# Patient Record
Sex: FEMALE | Race: BLACK OR AFRICAN AMERICAN | NOT HISPANIC OR LATINO | Employment: FULL TIME | URBAN - METROPOLITAN AREA
[De-identification: names, ages, dates, MRNs, and addresses within clinical notes are randomized per-mention and may not be internally consistent; named-entity substitution may affect disease eponyms.]

---

## 2017-01-03 ENCOUNTER — TELEPHONE (OUTPATIENT)
Dept: FAMILY MEDICINE | Facility: CLINIC | Age: 52
End: 2017-01-03

## 2017-01-03 NOTE — TELEPHONE ENCOUNTER
----- Message from Amanda Ball sent at 1/3/2017  2:18 PM CST -----  Contact: Patient  Type: Returning a call    Who left a message? Fely     When did the practice call? Sent an email last week    Comments: Please call the patient at [phone].    Thanks!

## 2017-01-03 NOTE — TELEPHONE ENCOUNTER
Spoke with patient states [facility] never received the tramadol on 12/29/16.  After looking over the number RX was faxed to, it was faxed to the direct phone number.  RX re faxed to [facility] at [phone].  Patient advised

## 2017-01-20 ENCOUNTER — OFFICE VISIT (OUTPATIENT)
Dept: ELECTROPHYSIOLOGY | Facility: CLINIC | Age: 52
End: 2017-01-20
Payer: OTHER GOVERNMENT

## 2017-01-20 ENCOUNTER — HOSPITAL ENCOUNTER (OUTPATIENT)
Dept: CARDIOLOGY | Facility: CLINIC | Age: 52
Discharge: HOME | End: 2017-01-20
Payer: OTHER GOVERNMENT

## 2017-01-20 VITALS
HEIGHT: 64 IN | WEIGHT: 174.38 LBS | DIASTOLIC BLOOD PRESSURE: 80 MMHG | SYSTOLIC BLOOD PRESSURE: 110 MMHG | BODY MASS INDEX: 29.77 KG/M2 | HEART RATE: 92 BPM

## 2017-01-20 DIAGNOSIS — I47.10 SVT (SUPRAVENTRICULAR TACHYCARDIA): Primary | ICD-10-CM

## 2017-01-20 DIAGNOSIS — E78.5 HYPERLIPIDEMIA, UNSPECIFIED HYPERLIPIDEMIA TYPE: ICD-10-CM

## 2017-01-20 DIAGNOSIS — J45.998 ASTHMA IN REMISSION: ICD-10-CM

## 2017-01-20 DIAGNOSIS — I47.10 SVT (SUPRAVENTRICULAR TACHYCARDIA): ICD-10-CM

## 2017-01-20 PROCEDURE — 99214 OFFICE O/P EST MOD 30 MIN: CPT | Mod: S$PBB,,, | Performed by: NURSE PRACTITIONER

## 2017-01-20 PROCEDURE — 99213 OFFICE O/P EST LOW 20 MIN: CPT | Mod: PBBFAC | Performed by: NURSE PRACTITIONER

## 2017-01-20 PROCEDURE — 93005 ELECTROCARDIOGRAM TRACING: CPT | Mod: PBBFAC | Performed by: INTERNAL MEDICINE

## 2017-01-20 PROCEDURE — 93010 ELECTROCARDIOGRAM REPORT: CPT | Mod: S$PBB,,, | Performed by: INTERNAL MEDICINE

## 2017-01-20 PROCEDURE — 99999 PR PBB SHADOW E&M-EST. PATIENT-LVL III: CPT | Mod: PBBFAC,,, | Performed by: NURSE PRACTITIONER

## 2017-01-20 RX ORDER — LEVONORGESTREL/ETHINYL ESTRADIOL AND ETHINYL ESTRADIOL 100-20(84)
KIT ORAL
COMMUNITY
Start: 2017-01-15 | End: 2017-05-23 | Stop reason: SDUPTHER

## 2017-01-20 RX ORDER — DILTIAZEM HYDROCHLORIDE 240 MG/1
240 CAPSULE, COATED, EXTENDED RELEASE ORAL DAILY
Qty: 90 CAPSULE | Refills: 3 | Status: SHIPPED | OUTPATIENT
Start: 2017-01-20 | End: 2017-03-31 | Stop reason: SDUPTHER

## 2017-01-20 RX ORDER — MESALAMINE 1.2 G/1
TABLET, DELAYED RELEASE ORAL
COMMUNITY
Start: 2016-12-29 | End: 2017-03-27 | Stop reason: SDUPTHER

## 2017-01-20 NOTE — MR AVS SNAPSHOT
[facility]  [address]  [facility] [address]  Phone: [phone]  Fax: [phone]                  Sharlene Smith   2017 3:30 PM   Office Visit    Description:  Female : 1965   Provider:  SHANNON Almodovar   Department:  [facility]           Diagnoses this Visit        Comments    SVT (supraventricular tachycardia)    -  Primary     Asthma in remission         Hyperlipidemia, unspecified hyperlipidemia type                To Do List           Future Appointments        Provider Department Dept Phone    2/3/2017 11:00 AM [facility] [phone]      Goals (5 Years of Data)     None       These Medications        Disp Refills Start End    diltiaZEM (CARDIZEM CD) 240 MG 24 hr capsule 90 capsule 3 2017     Take 1 capsule (240 mg total) by mouth once daily. - Oral    Pharmacy: [facility] - [place], [place] - [address] Ph #: [phone]         [facility]     [facility] Nurse Care Line -  Assistance  Registered nurses in the [facility] provide clinical advisement, health education, appointment booking, and other advisory services.  Call for this free service at [phone].             Medications           Message regarding Medications     Verify the changes and/or additions to your medication regime listed below are the same as discussed with your clinician today.  If any of these changes or additions are incorrect, please notify your healthcare provider.        CHANGE how you are taking these medications     Start Taking Instead of    diltiaZEM (CARDIZEM CD) 240 MG 24 hr capsule diltiazem (CARDIZEM CD) 240 MG 24 hr capsule    Dosage:  Take 1 capsule (240 mg total) by mouth once daily. Dosage:  Take 1 capsule (240 mg total) by mouth once daily.    Reason for Change:  Reorder       STOP taking these medications     norethindrone-ethinyl estradiol  "(MICROGESTIN FE 1/20, 28,) 1 mg-20 mcg (21)/75 mg (7) per tablet Take 1 tablet by mouth once daily.    diltiazem (DILACOR XR) 240 MG CDCR Take 1 capsule (240 mg total) by mouth once daily.           Verify that the below list of medications is an accurate representation of the medications you are currently taking.  If none reported, the list may be blank. If incorrect, please contact your healthcare provider. Carry this list with you in case of emergency.           Current Medications     albuterol (PROAIR HFA) 90 mcg/actuation inhaler Inhale 2 puffs into the lungs every 4 (four) hours as needed. 2 HFA Aerosol Inhaler Inhalation Every 4 hours prn    atorvastatin (LIPITOR) 20 MG tablet TAKE 1 TABLET EVERY EVENING    budesonide (RINOCORT AQUA) 32 mcg/actuation nasal spray 1 spray (32 mcg total) by Nasal route once daily.    CAMRESE LO 0.10 mg-20 mcg (84)/10 mcg (7) 3MPk     cetirizine (ZYRTEC) 10 MG tablet Take 1 tablet (10 mg total) by mouth once daily.    diltiaZEM (CARDIZEM CD) 240 MG 24 hr capsule Take 1 capsule (240 mg total) by mouth once daily.    fexofenadine (ALLEGRA) 180 MG tablet Take 1 tablet (180 mg total) by mouth once daily.    LIALDA 1.2 gram TbEC     montelukast (SINGULAIR) 10 mg tablet TAKE 1 TABLET EVERY EVENING    tramadol (ULTRAM) 50 mg tablet Take 1 tablet (50 mg total) by mouth every 6 (six) hours as needed.    aspirin 81 MG Chew Take 81 mg by mouth once daily.           Clinical Reference Information           Vital Signs - Last Recorded  Most recent update: 1/20/2017  3:35 PM by Zane Landers MA    BP Pulse Ht Wt BMI    110/80 92 5' 4" (1.626 m) 79.1 kg (174 lb 6.1 oz) 29.93 kg/m2      Blood Pressure          Most Recent Value    BP  110/80      Allergies as of 1/20/2017     Hydrocodone      Immunizations Administered on Date of Encounter - 1/20/2017     None      "

## 2017-01-20 NOTE — PROGRESS NOTES
Subjective:    Patient ID:  Sharlene Smith is a 51 y.o. female who presents for follow-up of SVT.     Ms. Smith is a patient of Antonia Bach.     HPI     Ms. Smith is a 51 year old female with a hx of SVT, asthma, and HLD. Ms. Smith was first seen in [facility] EP clinic in October of 2013 for palpitations that brought her to ED; she had a long hx of palpitations, but none had been as severe as those leading up to her ED visit. In ED Ms. Smith was found to have short-RP tachycardia at a rate of 218 bpm. This was c/w slow-fast AVNRT; she initially wanted to undergo an RFA, but ultimately decided to defer this in favor of medical management with Cardizem, which has historically worked well.     Since her last office visit (12/03/14), Ms. Smith reports feeling well overall with only 3 notable episodes of palpitations (occurring in May, August, and December of 2016, respectively); her longest episode lasting approximately 1 hour. She noted that her episodes have historically occurred following a meal, and that they typically break with a valsalva maneuver or by drinking a cold glass of water. She denies chest pain, dizziness, or syncope. She only very occasionally notes SOB, which she attributes to Asthma; this is improved with the use of her inhaler. Ms. Smith is inquiring about possibly taking a BB (pill-in-the-pocket) if she were to experience another episode.     Recent cardiac studies:  Echo (10/07/13) revealed an EF of 55-60%, LVDD, trivial MR, trivial KS, and no evidence of intracardiac shunt.     I reviewed today's ECG which demonstrated NSR at 92 bpm; , QRS 80, and QTc 440.    Review of Systems   Constitution: Negative for decreased appetite, diaphoresis, weakness and malaise/fatigue.   HENT: Negative for congestion, headaches and nosebleeds.    Eyes: Negative for blurred vision and double vision.   Cardiovascular: Positive for palpitations. Negative for chest pain, claudication,  cyanosis, dyspnea on exertion, irregular heartbeat, leg swelling, near-syncope, orthopnea, paroxysmal nocturnal dyspnea and syncope.   Respiratory: Positive for shortness of breath. Negative for cough, hemoptysis, sputum production and wheezing.    Skin: Negative.    Musculoskeletal: Negative.    Gastrointestinal: Negative for abdominal pain, hematemesis, hematochezia, nausea and vomiting.   Neurological: Negative for dizziness and light-headedness.   Psychiatric/Behavioral: Negative for altered mental status.        Objective:    Physical Exam   Constitutional: She is oriented to person, place, and time. She appears well-developed and well-nourished.   HENT:   Head: Normocephalic and atraumatic.   Eyes: Pupils are equal, round, and reactive to light.   Neck: Normal range of motion. Neck supple.   Cardiovascular: Normal rate, regular rhythm, normal heart sounds and intact distal pulses.    Pulmonary/Chest: Effort normal and breath sounds normal.   Abdominal: Soft. Bowel sounds are normal.   Musculoskeletal: Normal range of motion.   Neurological: She is alert and oriented to person, place, and time.   Skin: She is not diaphoretic.   Vitals reviewed.        Assessment:       1. SVT (supraventricular tachycardia)    2. Asthma in remission    3. Hyperlipidemia, unspecified hyperlipidemia type         Plan:       In summary, Ms. Smith is a 51 y.o. female with a hx of SVT, asthma, and HLD. Ms. Smith is doing well from a rhythm perspective with only 3, rather short-lived episodes of palpitations since her last office visit.     Continue current medication regimen.   Will discuss with Dr. Antonia Bach the possibility of a cardioselective BB (beta 1); or alternative given her hx of Asthma. (possible pill-in-the-pocket).   Follow up in EP clinic in 1 year, sooner as needed.     Lurdes Davison, [place], APRN, FNP-C        A copy of today's clinic note sent to Dr. Antonia Bach.

## 2017-01-26 RX ORDER — PROPRANOLOL HYDROCHLORIDE 40 MG/1
40 TABLET ORAL DAILY PRN
Qty: 30 TABLET | Refills: 11 | Status: SHIPPED | OUTPATIENT
Start: 2017-01-26 | End: 2017-03-29 | Stop reason: SDUPTHER

## 2017-01-26 NOTE — TELEPHONE ENCOUNTER
----- Message from SHANNON Almodovar sent at 1/25/2017 12:54 PM CST -----  Jesi,     Per Dr. Antonia Bach, can we prescribe Ms. Smith Inderal 40 mg PO QD PRN, so long as she has no allergies or issues with this medication. Could you put this in and call her, Thanks.     CM   ----- Message -----     From: Shabbir Clark MD     Sent: 1/24/2017   7:21 PM       To: SHANNON Almodovar    Inderal 40   or  Atenolol 25  ----- Message -----     From: SHANNON Almodovar     Sent: 1/24/2017  10:21 AM       To: Shabbir Clark MD    That sounds good. What dose of Inderal do you recommend I Rx?     ----- Message -----     From: Shabbir Clark MD     Sent: 1/21/2017   3:46 AM       To: SHANNON Almodovar    She could but the fastest acting agent would be Inderal (non selective).  Atenolol may be an alternative that would be somewhat slower to kick in but maybe more appropriate    ----- Message -----     From: SHANNON Almodovar     Sent: 1/20/2017   4:10 PM       To: MD Dr. Antonia Sidhu,     I sent you ms. Smith's note today. She was inquiring about the possibility of a BB just for a pill-in-the-pocket approach. She is doing very well w/minimal episodes of SVT, but would like this as a back up. I wanted to discuss this w/you given her hx of Asthma. Can you recommend a cardioselective BB?     Thank you.     OLIVER

## 2017-01-26 NOTE — TELEPHONE ENCOUNTER
Called pt about inderal. Pt denies having taken med in past. Only admits to side effects from Hydrocodone, no true allergies. [facility] pt request.

## 2017-02-03 ENCOUNTER — HOSPITAL ENCOUNTER (OUTPATIENT)
Dept: RADIOLOGY | Facility: HOSPITAL | Age: 52
Discharge: HOME | End: 2017-02-03
Attending: FAMILY MEDICINE
Payer: OTHER GOVERNMENT

## 2017-02-03 DIAGNOSIS — Z12.31 OTHER SCREENING MAMMOGRAM: ICD-10-CM

## 2017-02-03 PROCEDURE — 77067 SCR MAMMO BI INCL CAD: CPT | Mod: TC

## 2017-02-03 PROCEDURE — 77067 SCR MAMMO BI INCL CAD: CPT | Mod: 26,,, | Performed by: RADIOLOGY

## 2017-02-14 ENCOUNTER — HOSPITAL ENCOUNTER (EMERGENCY)
Facility: HOSPITAL | Age: 52
Discharge: HOME | End: 2017-02-14
Attending: EMERGENCY MEDICINE
Payer: OTHER GOVERNMENT

## 2017-02-14 VITALS
HEIGHT: 64 IN | OXYGEN SATURATION: 100 % | RESPIRATION RATE: 18 BRPM | SYSTOLIC BLOOD PRESSURE: 112 MMHG | DIASTOLIC BLOOD PRESSURE: 68 MMHG | WEIGHT: 170 LBS | HEART RATE: 92 BPM | BODY MASS INDEX: 29.02 KG/M2 | TEMPERATURE: 98 F

## 2017-02-14 DIAGNOSIS — I47.10 SVT (SUPRAVENTRICULAR TACHYCARDIA): Primary | ICD-10-CM

## 2017-02-14 LAB
ALBUMIN SERPL BCP-MCNC: 4 G/DL
ALP SERPL-CCNC: 46 U/L
ALT SERPL W/O P-5'-P-CCNC: 21 U/L
ANION GAP SERPL CALC-SCNC: 12 MMOL/L
AST SERPL-CCNC: 21 U/L
BASOPHILS # BLD AUTO: 0.05 K/UL
BASOPHILS NFR BLD: 0.3 %
BILIRUB SERPL-MCNC: 0.4 MG/DL
BNP SERPL-MCNC: <10 PG/ML
BUN SERPL-MCNC: 13 MG/DL
CALCIUM SERPL-MCNC: 9.7 MG/DL
CHLORIDE SERPL-SCNC: 104 MMOL/L
CO2 SERPL-SCNC: 19 MMOL/L
CREAT SERPL-MCNC: 0.9 MG/DL
DIFFERENTIAL METHOD: ABNORMAL
EOSINOPHIL # BLD AUTO: 0.1 K/UL
EOSINOPHIL NFR BLD: 0.6 %
ERYTHROCYTE [DISTWIDTH] IN BLOOD BY AUTOMATED COUNT: 12 %
EST. GFR  (AFRICAN AMERICAN): >60 ML/MIN/1.73 M^2
EST. GFR  (NON AFRICAN AMERICAN): >60 ML/MIN/1.73 M^2
GLUCOSE SERPL-MCNC: 169 MG/DL
HCT VFR BLD AUTO: 42 %
HGB BLD-MCNC: 14.3 G/DL
INR PPP: 0.9
LYMPHOCYTES # BLD AUTO: 2.9 K/UL
LYMPHOCYTES NFR BLD: 19.2 %
MCH RBC QN AUTO: 31.8 PG
MCHC RBC AUTO-ENTMCNC: 34 %
MCV RBC AUTO: 93 FL
MONOCYTES # BLD AUTO: 0.3 K/UL
MONOCYTES NFR BLD: 2.2 %
NEUTROPHILS # BLD AUTO: 11.7 K/UL
NEUTROPHILS NFR BLD: 77.5 %
PLATELET # BLD AUTO: 447 K/UL
PMV BLD AUTO: 9.8 FL
POTASSIUM SERPL-SCNC: 4.7 MMOL/L
PROT SERPL-MCNC: 8.1 G/DL
PROTHROMBIN TIME: 10.2 SEC
RBC # BLD AUTO: 4.5 M/UL
SODIUM SERPL-SCNC: 135 MMOL/L
TROPONIN I SERPL DL<=0.01 NG/ML-MCNC: 0.04 NG/ML
WBC # BLD AUTO: 15.04 K/UL

## 2017-02-14 PROCEDURE — 25000003 PHARM REV CODE 250: Performed by: INTERNAL MEDICINE

## 2017-02-14 PROCEDURE — 99291 CRITICAL CARE FIRST HOUR: CPT | Mod: ,,, | Performed by: EMERGENCY MEDICINE

## 2017-02-14 PROCEDURE — 85610 PROTHROMBIN TIME: CPT

## 2017-02-14 PROCEDURE — 84484 ASSAY OF TROPONIN QUANT: CPT

## 2017-02-14 PROCEDURE — 96361 HYDRATE IV INFUSION ADD-ON: CPT

## 2017-02-14 PROCEDURE — 93010 ELECTROCARDIOGRAM REPORT: CPT | Mod: ,,, | Performed by: INTERNAL MEDICINE

## 2017-02-14 PROCEDURE — 80053 COMPREHEN METABOLIC PANEL: CPT

## 2017-02-14 PROCEDURE — 63600175 PHARM REV CODE 636 W HCPCS

## 2017-02-14 PROCEDURE — 93005 ELECTROCARDIOGRAM TRACING: CPT

## 2017-02-14 PROCEDURE — 83880 ASSAY OF NATRIURETIC PEPTIDE: CPT

## 2017-02-14 PROCEDURE — 99284 EMERGENCY DEPT VISIT MOD MDM: CPT | Mod: 25

## 2017-02-14 PROCEDURE — 96374 THER/PROPH/DIAG INJ IV PUSH: CPT

## 2017-02-14 PROCEDURE — 85025 COMPLETE CBC W/AUTO DIFF WBC: CPT

## 2017-02-14 RX ORDER — DILTIAZEM HYDROCHLORIDE 240 MG/1
240 CAPSULE, COATED, EXTENDED RELEASE ORAL
Status: COMPLETED | OUTPATIENT
Start: 2017-02-14 | End: 2017-02-14

## 2017-02-14 RX ORDER — ADENOSINE 3 MG/ML
INJECTION, SOLUTION INTRAVENOUS
Status: COMPLETED
Start: 2017-02-14 | End: 2017-02-14

## 2017-02-14 RX ORDER — ADENOSINE 3 MG/ML
6 INJECTION, SOLUTION INTRAVENOUS
Status: COMPLETED | OUTPATIENT
Start: 2017-02-14 | End: 2017-02-14

## 2017-02-14 RX ADMIN — DILTIAZEM HYDROCHLORIDE 240 MG: 240 CAPSULE, COATED, EXTENDED RELEASE ORAL at 09:02

## 2017-02-14 RX ADMIN — ADENOSINE 6 MG: 3 INJECTION, SOLUTION INTRAVENOUS at 09:02

## 2017-02-14 RX ADMIN — SODIUM CHLORIDE 1000 ML: 0.9 INJECTION, SOLUTION INTRAVENOUS at 09:02

## 2017-02-14 NOTE — DISCHARGE INSTRUCTIONS
Treatment for Supraventricular Tachycardia  Supraventricular tachycardia (SVT) is a type of abnormally fast heartbeat. The heart normally beats 60 to 100 beats per minute. With SVT, the heart beats more than 100 times a minute. It may beat as fast as 250 times a minute. This is caused by a problem in the electrical system of the heart. It can lessen the amount of blood pumped through the heart.  Types of treatment   You may not need treatment for SVT if you have only had one episode. If you do need treatment, there are several kinds. They include:  · Valsalva maneuver. This is a way to increase pressure in the abdomen and chest. It can correct your heart rhythm right away. To do it, you bear down with your stomach muscles, as though you are trying to have a bowel movement.  · Carotid massage. Your healthcare provider may gently rub the carotid artery in your neck. This can also help correct the heart rhythm right away.  · Medicine. There are various kinds you can take. Calcium channel blockers or adenosine can help correct heart rhythm right away. If you have SVT only 1 or 2 times a year, you may take beta-blockers or calcium channel medicine as needed. If your SVT is more frequent, you may need to take medicine every day. Some people may need to take several medicines to prevent episodes of SVT.  · Electrocardioversion. This is a shock to the heart to restart a normal rhythm right away. This may be done if you have a severe episode of SVT.  · Catheter ablation. This can help permanently stop SVT. Your healthcare provider puts a thin, flexible tube (catheter) into a blood vessel in the groin. He or she then gently pushes it up into your heart. The area of your heart that causes your SVT is then burned. This prevents that area from starting a signal that causes SVT.   Lifestyle changes to help prevent SVT episodes  Your healthcare provider might suggest other ways to help prevent SVT, such as:  · Having less  alcohol and caffeine  · Not smoking  · Lowering your stress  · Eating foods that are healthy for your heart  When to call your healthcare provider  Call your healthcare provider if you have any of the following:  · Sudden shortness of breath (call 911)  · Severe palpitations  · Severe dizziness  · Severe chest pain  · Symptoms that are happening more often   Date Last Reviewed: 8/10/2015  © 5163-5358 [employer]. [address] [place] [address], [place] [id number]. All rights reserved. This information is not intended as a substitute for professional medical care. Always follow your healthcare professional's instructions.

## 2017-02-14 NOTE — ED PROVIDER NOTES
Encounter Date: 2017    SCRIBE #1 NOTE: I, Wu Weaver, am scribing for, and in the presence of,  Dr. Howell . I have scribed the following portions of the note - the EKG reading and the Resident attestation.       History     Chief Complaint   Patient presents with    Fatigue     Review of patient's allergies indicates:   Allergen Reactions    Hydrocodone      HPI Comments: Ms. Smith is a 52 yo F with a pmhx of paroxysmal SVT, HPLD, and UC who presents to the ED with complaints of abrupt onset SOB, tachycardia, and palpitations this morning. She takes daily diltiazem XR and propranolol PRN was just added to her regimen. After taking her propranolol, she noted feeling nauseated with emesis x1 and had to lay recumbent at home. Patient reports trying vagal maneuvers at home and reports symptoms of presyncope, lightheadedness and cool, clammy extremities. She said she felt weak when ambulating. She presented to the ED with a regular SVT rate in the 190s and reports not having taken her morning diltiazem. Her last SVT episode occurred in 2016.     The history is provided by the patient.     Past Medical History   Diagnosis Date    Abnormal Pap smear of vagina yrs ago     possible BX?    Allergy     Asthma     Hyperlipidemia     Supraventricular tachycardia     SVT (supraventricular tachycardia)     Tachycardia     Ulcerative colitis      No past medical history pertinent negatives.  Past Surgical History   Procedure Laterality Date    Colonoscopy N/A 2015     Procedure: COLONOSCOPY;  Surgeon: Petr Miller MD;  Location: [address]);  Service: Endoscopy;  Laterality: N/A;     section, classic       x 2     Family History   Problem Relation Age of Onset    Hyperlipidemia Mother     Heart attack Father     Diabetes Father     Heart attack Paternal Grandmother     Heart disease Neg Hx     Hypertension Neg Hx      Social History   Substance Use Topics     Smoking status: Former Smoker     Types: Cigarettes     Quit date: 2/25/1999    Smokeless tobacco: Never Used    Alcohol use 4.2 oz/week     4 Cans of beer, 3 Shots of liquor, 0 Standard drinks or equivalent per week      Comment: Occasionally     Review of Systems   Constitutional: Positive for diaphoresis and fatigue. Negative for chills and fever.   HENT: Negative for rhinorrhea and sore throat.    Respiratory: Positive for shortness of breath. Negative for apnea, cough, choking, chest tightness, wheezing and stridor.    Cardiovascular: Positive for palpitations. Negative for chest pain and leg swelling.   Gastrointestinal: Positive for nausea and vomiting. Negative for abdominal distention, abdominal pain, constipation, diarrhea and rectal pain.   Endocrine: Negative for polyphagia and polyuria.   Genitourinary: Negative for frequency and urgency.   Musculoskeletal: Negative for back pain, gait problem, myalgias and neck pain.   Skin: Positive for pallor. Negative for color change, rash and wound.   Neurological: Positive for weakness and light-headedness. Negative for seizures, syncope, facial asymmetry and numbness.   Hematological: Negative for adenopathy.   Psychiatric/Behavioral: Negative for agitation. The patient is nervous/anxious.    All other systems reviewed and are negative.      Physical Exam   Initial Vitals   BP Pulse Resp Temp SpO2   -- 02/14/17 0847 02/14/17 0847 02/14/17 0847 02/14/17 0847    200 18 97.9 °F (36.6 °C) 98 %     Physical Exam    HENT:   Mouth/Throat: Oropharynx is clear and moist.   Eyes: Conjunctivae and EOM are normal. Pupils are equal, round, and reactive to light. No scleral icterus.   Neck: No thyromegaly present. No tracheal deviation present. No JVD present.   Cardiovascular: Normal heart sounds and intact distal pulses. Exam reveals no gallop and no friction rub.    No murmur heard.  tachycardic   Pulmonary/Chest: Breath sounds normal. No stridor. No respiratory  distress. She has no wheezes. She has no rhonchi. She has no rales. She exhibits no tenderness.   Abdominal: Soft. She exhibits no distension and no mass. There is no tenderness. There is no rebound and no guarding.   Musculoskeletal: Normal range of motion. She exhibits no edema or tenderness.   Lymphadenopathy:     She has no cervical adenopathy.   Neurological: She is alert. She displays normal reflexes. No cranial nerve deficit or sensory deficit.   Skin: Skin is warm and dry. No rash noted. No erythema. No pallor.   Psychiatric: She has a normal mood and affect.     : no CVA ttp    ED Course   Procedures   Critical Care Procedure Note:  Direct patient critical care time: 10 minutes  Additional history critical care time:10 minutes  Ordering / reviewing labs and studies: critical care time:10 minutes  Documentation critical care time: 10 minutes  Consulting other physicians critical care time: 10 minutes  Total critical care time (exclusive of procedural time) : 50 minutes   Reason for Critical Care: Cardiac instability with SVT / tachycardia and light headedness requiring IV adenosine for conversion    Labs Reviewed   CBC W/ AUTO DIFFERENTIAL - Abnormal; Notable for the following:        Result Value    WBC 15.04 (*)     MCH 31.8 (*)     Platelets 447 (*)     Gran # 11.7 (*)     Gran% 77.5 (*)     Mono% 2.2 (*)     All other components within normal limits    Narrative:     PLEASE REVIEW ORDER START TIME BEFORE MARKING SPECIMEN  COLLECTED.   COMPREHENSIVE METABOLIC PANEL - Abnormal; Notable for the following:     Sodium 135 (*)     CO2 19 (*)     Glucose 169 (*)     Alkaline Phosphatase 46 (*)     All other components within normal limits    Narrative:     PLEASE REVIEW ORDER START TIME BEFORE MARKING SPECIMEN  COLLECTED.   TROPONIN I - Abnormal; Notable for the following:     Troponin I 0.040 (*)     All other components within normal limits    Narrative:     PLEASE REVIEW ORDER START TIME BEFORE MARKING  SPECIMEN  COLLECTED.   PROTIME-INR    Narrative:     PLEASE REVIEW ORDER START TIME BEFORE MARKING SPECIMEN  COLLECTED.   B-TYPE NATRIURETIC PEPTIDE    Narrative:     PLEASE REVIEW ORDER START TIME BEFORE MARKING SPECIMEN  COLLECTED.     Imaging Results         X-Ray Chest 1 View (Final result) Result time:  02/14/17 10:05:58    Procedure changed from X-Ray Chest PA And Lateral        Final result by Kevyn Coe MD (02/14/17 10:05:58)    Impression:     Normal chest.      Electronically signed by: CARMELA COE MD  Date:     02/14/17  Time:    10:05     Narrative:    Single view of the chest, comparison 2013.  Heart normal.  Lungs clear.                EKG Readings: (Independently Interpreted)   Initial Reading: No STEMI. Rhythm: Supraventricular Tachycardia (SVT).     Initial EKG: SVT with rate of 192, non specific T-wave pattern, no STEMI     Second reading EKG: EKG: NSR, no RICHIE's or STD's, non-specific twave pattern, no STEMI  (post adenosine)        Medical Decision Making:   History:   Old Medical Records: I decided to obtain old medical records.  Initial Assessment:   52 yo F with SVT presents with SVT HR in 190s with symptoms of presyncope, diaphoresis, palpitations  Differential Diagnosis:   SVT w/ RVR  Atrial flutter  Paroxysmal Atrial fibrillation  Sinus tachycardia  Iatrogenic hypotension 2/2 Beta blocker use  Independently Interpreted Test(s):   I have ordered and independently interpreted X-rays - see prior notes.  Clinical Tests:   Lab Tests: Ordered  Radiological Study: Ordered  Medical Tests: Ordered  ED Management:  Patient put on tele with revealed regular SVT with HR in 190s; responded to adenosine push x1 and home diltiazem reordered with rate control. CXR, ACS labwork ordered.   9:33 AM    Other:   I discussed test(s) with the performing physician.            Scribe Attestation:   Scribe #1: I performed the above scribed service and the documentation accurately describes the  services I performed. I attest to the accuracy of the note.    Attending Attestation:   Physician Attestation Statement for Resident:  As the supervising MD   Physician Attestation Statement: I have personally seen and examined this patient.   I agree with the above history. -: Pt presents with sensation of palpitations and light headedness. She has hx of SVT and this felt like her prior SVT symptoms. I have concern for SVT and other cardio dysrhythmia, MI/ACS,and PE among other diagnoses. EKG  is consistent with SVT. I gave 6mg of adenosine which converted to normal SR.  After conversion to normal SR pt was asymptomatic and back to normal. I monitored in ER for few hours and pt remained in normal SR and asymptomatic. Troponin was 0.04 and I felt this was due to SVT and not to MI/ACS given she was not having any chest pain during this event and is asymptomatic now. The patient was given strict return precautions and follow up instructions and expressed understanding of these. The patient felt comfortable with the plan for discharge and I did as well.  Stable for DC.       As the supervising MD I agree with the above PE.    As the supervising MD I agree with the above treatment, course, plan, and disposition.  I have reviewed and agree with the residents interpretation of the following: lab data and EKG.  I have reviewed the following: old records at this facility.          Physician Attestation for Scribe:  Physician Attestation Statement for Scribe #1: I, Dr. Howell , reviewed documentation, as scribed by Wu Weaver  in my presence, and it is both accurate and complete.                   ED Course     Clinical Impression:   The encounter diagnosis was SVT (supraventricular tachycardia).          Diego Howell MD  02/14/17 1573

## 2017-02-14 NOTE — ED TRIAGE NOTES
Patient woke up around 0400 am with palpitations, SOB, dizziness. Patient has history of SVT. Patient states that she took her propranolol this am with no relief

## 2017-02-14 NOTE — ED AVS SNAPSHOT
[facility]  [address] [facility] [address]               Sharlene Smith   2017  8:51 AM   ED    Description:  Female : 1965   Department:  [facility]           Your Care was Coordinated By:     Provider Role From To    Diego Howell MD Attending Provider 17 0900 --    Stephen Miller MD Resident 17 9289 --      Reason for Visit     Fatigue           Diagnoses this Visit        Comments    SVT (supraventricular tachycardia)    -  Primary       ED Disposition     ED Disposition Condition Comment    Discharge             To Do List           Follow-up Information     Follow up with Mara Chang MD In 5 days.    Specialty:  Family Medicine    Contact information:    [address]  [address]  [place] [address]  [phone]          Follow up with [facility].    Specialty:  Emergency Medicine    Why:  If symptoms worsen    Contact information:    [address] [facility] [id number] [phone]      [facility] On Call     [facility] -  Assistance  Registered nurses in the [facility] provide clinical advisement, health education, appointment booking, and other advisory services.  Call for this free service at [phone].             Medications           Message regarding Medications     Verify the changes and/or additions to your medication regime listed below are the same as discussed with your clinician today.  If any of these changes or additions are incorrect, please notify your healthcare provider.        These medications were administered today        Dose Freq    adenosine injection 6 mg 6 mg ED 1 Time    Sig: Inject 2 mLs (6 mg total) into the vein ED 1 Time.    Class: Normal    Route: Intravenous    sodium chloride 0.9% bolus 1,000 mL 1,000 mL ED 1 Time    Sig: Inject 1,000 mLs into the vein ED 1  "Time.    Class: Normal    Route: Intravenous    adenosine (ADENOCARD) 3 mg/mL injection      Notes to Pharmacy: Created by cabinet override           Verify that the below list of medications is an accurate representation of the medications you are currently taking.  If none reported, the list may be blank. If incorrect, please contact your healthcare provider. Carry this list with you in case of emergency.           Current Medications     albuterol (PROAIR HFA) 90 mcg/actuation inhaler Inhale 2 puffs into the lungs every 4 (four) hours as needed. 2 HFA Aerosol Inhaler Inhalation Every 4 hours prn    atorvastatin (LIPITOR) 20 MG tablet TAKE 1 TABLET EVERY EVENING    budesonide (RINOCORT AQUA) 32 mcg/actuation nasal spray 1 spray (32 mcg total) by Nasal route once daily.    CAMRESE LO 0.10 mg-20 mcg (84)/10 mcg (7) 3MPk     cetirizine (ZYRTEC) 10 MG tablet Take 1 tablet (10 mg total) by mouth once daily.    diltiaZEM (CARDIZEM CD) 240 MG 24 hr capsule Take 1 capsule (240 mg total) by mouth once daily.    fexofenadine (ALLEGRA) 180 MG tablet Take 1 tablet (180 mg total) by mouth once daily.    LIALDA 1.2 gram TbEC     montelukast (SINGULAIR) 10 mg tablet TAKE 1 TABLET EVERY EVENING    propranolol (INDERAL) 40 MG tablet Take 1 tablet (40 mg total) by mouth daily as needed. For fast HR    tramadol (ULTRAM) 50 mg tablet Take 1 tablet (50 mg total) by mouth every 6 (six) hours as needed.    aspirin 81 MG Chew Take 81 mg by mouth once daily.    diltiaZEM 24 hr capsule 240 mg Take 1 capsule (240 mg total) by mouth ED 1 Time.           Clinical Reference Information           Your Vitals Were     BP Pulse Temp Resp Height Weight    112/71 98 97.9 °F (36.6 °C) (Oral) 18 5' 4" (1.626 m) 77.1 kg (170 lb)    SpO2 BMI             99% 29.18 kg/m2         Allergies as of 2/14/2017        Reactions    Hydrocodone       Immunizations Administered on Date of Encounter - 2/14/2017     None      ED Micro, Lab, POCT     Start Ordered    "    Status Ordering Provider    02/14/17 0858 02/14/17 0858  CBC auto differential  STAT      Final result     02/14/17 0858 02/14/17 0858  Comprehensive metabolic panel  STAT      Final result     02/14/17 0858 02/14/17 0858  Protime-INR  STAT      Final result     02/14/17 0858 02/14/17 0858  Troponin I  Now then every 3 hours     Comments:  PLEASE REVIEW ORDER START TIME BEFORE MARKING SPECIMEN COLLECTED.   Start Status   02/14/17 0858 Final result   02/14/17 1158 Acknowledged       Acknowledged     02/14/17 0858 02/14/17 0858  B-Type natriuretic peptide (BNP)  STAT      Final result       ED Imaging Orders     Start Ordered       Status Ordering Provider    02/14/17 0911 02/14/17 0858  X-Ray Chest 1 View  1 time imaging      Final result     02/14/17 0858 02/14/17 0858    1 time imaging,   Status:  Canceled      Canceled         Discharge Instructions         Treatment for Supraventricular Tachycardia  Supraventricular tachycardia (SVT) is a type of abnormally fast heartbeat. The heart normally beats 60 to 100 beats per minute. With SVT, the heart beats more than 100 times a minute. It may beat as fast as 250 times a minute. This is caused by a problem in the electrical system of the heart. It can lessen the amount of blood pumped through the heart.  Types of treatment   You may not need treatment for SVT if you have only had one episode. If you do need treatment, there are several kinds. They include:  · Valsalva maneuver. This is a way to increase pressure in the abdomen and chest. It can correct your heart rhythm right away. To do it, you bear down with your stomach muscles, as though you are trying to have a bowel movement.  · Carotid massage. Your healthcare provider may gently rub the carotid artery in your neck. This can also help correct the heart rhythm right away.  · Medicine. There are various kinds you can take. Calcium channel blockers or adenosine can help correct heart rhythm right away. If you  have SVT only 1 or 2 times a year, you may take beta-blockers or calcium channel medicine as needed. If your SVT is more frequent, you may need to take medicine every day. Some people may need to take several medicines to prevent episodes of SVT.  · Electrocardioversion. This is a shock to the heart to restart a normal rhythm right away. This may be done if you have a severe episode of SVT.  · Catheter ablation. This can help permanently stop SVT. Your healthcare provider puts a thin, flexible tube (catheter) into a blood vessel in the groin. He or she then gently pushes it up into your heart. The area of your heart that causes your SVT is then burned. This prevents that area from starting a signal that causes SVT.   Lifestyle changes to help prevent SVT episodes  Your healthcare provider might suggest other ways to help prevent SVT, such as:  · Having less alcohol and caffeine  · Not smoking  · Lowering your stress  · Eating foods that are healthy for your heart  When to call your healthcare provider  Call your healthcare provider if you have any of the following:  · Sudden shortness of breath (call 911)  · Severe palpitations  · Severe dizziness  · Severe chest pain  · Symptoms that are happening more often   Date Last Reviewed: 8/10/2015  © 1405-0223 [employer]. [address] [place] [address], [place] [address]. All rights reserved. This information is not intended as a substitute for professional medical care. Always follow your healthcare professional's instructions.          Smoking Cessation     If you would like to quit smoking:   You may be eligible for free services if you are a [place] resident and started smoking cigarettes before September 1, 1988.  Call the Smoking Cessation Trust (SCT) toll free at (612) 954-0927 or (843) 648-2627.   Call 9-800-QUIT-NOW if you do not meet the above criteria.             [facility] complies with applicable Federal civil rights laws and  does not discriminate on the basis of race, color, national origin, age, disability, or sex.        Language Assistance Services     ATTENTION: Language assistance services are available, free of charge. Please call [phone].      ATENCIÓN: Si anna barbosa, tiene a murrell disposición servicios gratuitos de asistencia lingüística. Llame al [phone].     CHÚ Ý: N?u b?n nói Ti?ng Vi?t, có các d?ch v? h? tr? ngôn ng? mi?n phí dành cho b?n. G?i s? [phone].

## 2017-02-22 DIAGNOSIS — N94.6 DYSMENORRHEA: ICD-10-CM

## 2017-02-22 RX ORDER — TRAMADOL HYDROCHLORIDE 50 MG/1
50 TABLET ORAL EVERY 6 HOURS PRN
Qty: 60 TABLET | Refills: 0 | Status: SHIPPED | OUTPATIENT
Start: 2017-02-22 | End: 2017-04-28 | Stop reason: SDUPTHER

## 2017-03-27 RX ORDER — MESALAMINE 1.2 G/1
TABLET, DELAYED RELEASE ORAL
Qty: 180 TABLET | Refills: 3 | Status: SHIPPED | OUTPATIENT
Start: 2017-03-27 | End: 2017-09-15

## 2017-03-29 DIAGNOSIS — I47.10 SVT (SUPRAVENTRICULAR TACHYCARDIA): Primary | ICD-10-CM

## 2017-03-30 RX ORDER — PROPRANOLOL HYDROCHLORIDE 40 MG/1
40 TABLET ORAL DAILY PRN
Qty: 90 TABLET | Refills: 11 | Status: ON HOLD | OUTPATIENT
Start: 2017-03-30 | End: 2018-10-08 | Stop reason: HOSPADM

## 2017-03-31 DIAGNOSIS — I47.10 SVT (SUPRAVENTRICULAR TACHYCARDIA): ICD-10-CM

## 2017-03-31 RX ORDER — DILTIAZEM HYDROCHLORIDE 240 MG/1
240 CAPSULE, COATED, EXTENDED RELEASE ORAL DAILY
Qty: 90 CAPSULE | Refills: 3 | Status: SHIPPED | OUTPATIENT
Start: 2017-03-31 | End: 2018-03-10 | Stop reason: SDUPTHER

## 2017-04-06 ENCOUNTER — OFFICE VISIT (OUTPATIENT)
Dept: FAMILY MEDICINE | Facility: CLINIC | Age: 52
End: 2017-04-06
Payer: OTHER GOVERNMENT

## 2017-04-06 ENCOUNTER — HOSPITAL ENCOUNTER (OUTPATIENT)
Dept: RADIOLOGY | Facility: HOSPITAL | Age: 52
Discharge: HOME | End: 2017-04-06
Attending: FAMILY MEDICINE
Payer: OTHER GOVERNMENT

## 2017-04-06 VITALS
DIASTOLIC BLOOD PRESSURE: 84 MMHG | TEMPERATURE: 98 F | BODY MASS INDEX: 29.77 KG/M2 | SYSTOLIC BLOOD PRESSURE: 122 MMHG | WEIGHT: 174.38 LBS | HEIGHT: 64 IN | HEART RATE: 80 BPM

## 2017-04-06 DIAGNOSIS — M25.559 ARTHRALGIA OF HIP, UNSPECIFIED LATERALITY: ICD-10-CM

## 2017-04-06 DIAGNOSIS — M25.559 ARTHRALGIA OF HIP, UNSPECIFIED LATERALITY: Primary | ICD-10-CM

## 2017-04-06 PROCEDURE — 99214 OFFICE O/P EST MOD 30 MIN: CPT | Mod: S$PBB,,, | Performed by: FAMILY MEDICINE

## 2017-04-06 PROCEDURE — 72170 X-RAY EXAM OF PELVIS: CPT | Mod: 26,,, | Performed by: RADIOLOGY

## 2017-04-06 PROCEDURE — 99214 OFFICE O/P EST MOD 30 MIN: CPT | Mod: PBBFAC,PO | Performed by: FAMILY MEDICINE

## 2017-04-06 PROCEDURE — 72170 X-RAY EXAM OF PELVIS: CPT | Mod: TC,PO

## 2017-04-06 PROCEDURE — 99999 PR PBB SHADOW E&M-EST. PATIENT-LVL IV: CPT | Mod: PBBFAC,,, | Performed by: FAMILY MEDICINE

## 2017-04-06 RX ORDER — METHYLPREDNISOLONE 4 MG/1
TABLET ORAL
Qty: 1 PACKAGE | Refills: 0 | Status: SHIPPED | OUTPATIENT
Start: 2017-04-06 | End: 2017-04-22

## 2017-04-06 RX ORDER — MEPERIDINE HYDROCHLORIDE 50 MG/1
50 TABLET ORAL NIGHTLY PRN
Qty: 20 TABLET | Refills: 0 | Status: SHIPPED | OUTPATIENT
Start: 2017-04-06 | End: 2017-12-01 | Stop reason: ALTCHOICE

## 2017-04-06 NOTE — MR AVS SNAPSHOT
[facility]  [address], [address]  [facility] [address]  Phone: [phone]  Fax: [phone]                  Sharlene Smith   2017 3:00 PM   Office Visit    Description:  Female : 1965   Provider:  Mara Chang MD   Department:  [facility]           Reason for Visit     Hip Pain     Leg Pain           Diagnoses this Visit        Comments    Arthralgia of hip, unspecified laterality    -  Primary            To Do List           Future Appointments        Provider Department Dept Phone    2017 9:00 AM Petr Miller MD [facility] - Gastroenterology [phone]    2017 2:00 PM Mara Chang MD [facility] [phone]      Goals (5 Years of Data)     None       These Medications        Disp Refills Start End    methylPREDNISolone (MEDROL DOSEPACK) 4 mg tablet 1 Package 0 2017    use as directed    [facility] [place] [address] Ph #: [phone]       meperidine (DEMEROL) 50 mg tablet 20 tablet 0 2017     Take 1 tablet (50 mg total) by mouth nightly as needed for Pain. - Oral    [facility] [place], [place] [address] Ph #: [phone]         [facility] On [facility]     [facility] -  Assistance  Unless otherwise directed by your provider, please contact [facility], our nurse care line that is available for  assistance.     Registered nurses in the [facility] provide: appointment scheduling, clinical advisement, health education, and other advisory services.  Call: [phone] (toll free)               Medications           Message regarding Medications     Verify the changes and/or additions to your medication regime listed below are the same as discussed with your clinician today.  If any of these changes or additions are  incorrect, please notify your healthcare provider.        START taking these NEW medications        Refills    methylPREDNISolone (MEDROL DOSEPACK) 4 mg tablet 0    Sig: use as directed    Class: Normal    meperidine (DEMEROL) 50 mg tablet 0    Sig: Take 1 tablet (50 mg total) by mouth nightly as needed for Pain.    Class: Print    Route: Oral           Verify that the below list of medications is an accurate representation of the medications you are currently taking.  If none reported, the list may be blank. If incorrect, please contact your healthcare provider. Carry this list with you in case of emergency.           Current Medications     albuterol (PROAIR HFA) 90 mcg/actuation inhaler Inhale 2 puffs into the lungs every 4 (four) hours as needed. 2 HFA Aerosol Inhaler Inhalation Every 4 hours prn    atorvastatin (LIPITOR) 20 MG tablet TAKE 1 TABLET EVERY EVENING    budesonide (RINOCORT AQUA) 32 mcg/actuation nasal spray 1 spray (32 mcg total) by Nasal route once daily.    CAMRESE LO 0.10 mg-20 mcg (84)/10 mcg (7) 3MPk     cetirizine (ZYRTEC) 10 MG tablet Take 1 tablet (10 mg total) by mouth once daily.    diltiaZEM (CARDIZEM CD) 240 MG 24 hr capsule Take 1 capsule (240 mg total) by mouth once daily.    fexofenadine (ALLEGRA) 180 MG tablet Take 1 tablet (180 mg total) by mouth once daily.    LIALDA 1.2 gram TbEC TAKE 2 TABLETS DAILY    montelukast (SINGULAIR) 10 mg tablet TAKE 1 TABLET EVERY EVENING    propranolol (INDERAL) 40 MG tablet Take 1 tablet (40 mg total) by mouth daily as needed. For fast HR    tramadol (ULTRAM) 50 mg tablet Take 1 tablet (50 mg total) by mouth every 6 (six) hours as needed.    aspirin 81 MG Chew Take 81 mg by mouth once daily.    meperidine (DEMEROL) 50 mg tablet Take 1 tablet (50 mg total) by mouth nightly as needed for Pain.    methylPREDNISolone (MEDROL DOSEPACK) 4 mg tablet use as directed           Clinical Reference Information           Your Vitals Were     BP Pulse Temp Height  "Weight BMI    122/84 (BP Location: Left arm) 80 98.1 °F (36.7 °C) 5' 4" (1.626 m) 79.1 kg (174 lb 6.1 oz) 29.93 kg/m2      Blood Pressure          Most Recent Value    BP  122/84      Allergies as of 4/6/2017     Hydrocodone      Immunizations Administered on Date of Encounter - 4/6/2017     None      Orders Placed During Today's Visit      Normal Orders This Visit    Ambulatory consult to Orthopedics     Future Labs/Procedures Expected by Expires    MRI Lower Extremity Joint W WO Contrast Left  4/6/2017 4/6/2018    X-Ray Pelvis Routine AP  4/6/2017 4/6/2018      Instructions      Understanding Osteoarthritis of the Hip    A joint is a place where two bones meet. The hip is a ball-and-socket joint. It is formed where the ball at the top of the thighbone (femur) rests in the socket in the pelvic bone. The hip joint allows the leg to move freely in many directions.  Hip osteoarthritis is a condition where parts of the hip joint wear out. It can lead to pain, stiffness, and limited movement.   What is osteoarthritis?  All joints contain a smooth tissue called cartilage. Cartilage cushions the ends of bones, helping them glide against each other. Hip osteoarthritis occurs when cartilage in the hip joint begins to break down and wear away. The ball and socket bones may then become exposed and rub together. The cartilage may become rough and pitted and may begin to wear away. This prevents smooth movement of the joint and can lead to pain.  Causes of osteoarthritis of the hip  Causes can include:  · Wear and tear from normal use of the hip over time  · Overuse of the hip during sports or work activities  · Being overweight. This increases stress on the hip joint.  · Injury to the hip  · Infection of the hip joint  Symptoms of osteoarthritis of the hip  The main symptom of hip osteoarthritis is pain. The pain is most often felt in the groin area. It may also travel down the leg to the knee or to the back of the hip. The " pain usually gets worse with activity, such as walking or climbing stairs. The pain may get better with rest. The joint may also be stiff first thing in the morning or after periods of sitting or lying down. Stiffness usually gets better with movement.  Treating osteoarthritis of the hip  Osteoarthritis is a long-term condition. Treatment usually focuses on managing symptoms. Treatment may include:  · Over-the-counter or prescription medicines taken by mouth to help relieve pain and swelling  · Injections of medicine into the joint to help relieve symptoms for a time  · A weight-loss plan if you are overweight  · A plan of physical therapy and exercises to improve strength and flexibility around the joint  · Cold or heat packs help relieve pain and stiffness  · Assistive devices that help with movement, such as a cane or a walker  · Assistive devices that make activities of daily life easier, such as raised toilet seats or shower bars  If other treatments dont do enough to relieve severe symptoms, you may need surgery to replace the joint. This surgery replaces the hip joint with an artificial joint. It can help relieve pain and stiffness and improve function of the hip.     When to call your healthcare provider  Call your healthcare provider right away if you have any of these:  · Fever of 100.4°F (38°C) or higher, or as directed  · Symptoms that dont get better with prescribed medicines or get worse  · New symptoms   Date Last Reviewed: 3/10/2016  © 2467-5217 [employer]. [address] [place] [address]. All rights reserved. This information is not intended as a substitute for professional medical care. Always follow your healthcare professional's instructions.             Language Assistance Services     ATTENTION: Language assistance services are available, free of charge. Please call [phone].      ATENCIÓN: Si habla español, tiene a murrell disposición servicios gratuitos de  asistencia lingüística. Leno go [phone].     NEAL Ý: N?u b?n nói Ti?ng Vi?t, có các d?ch v? h? tr? ngôn ng? mi?n phí dành cho b?n. G?i s? [phone].         [facility] complies with applicable Federal civil rights laws and does not discriminate on the basis of race, color, national origin, age, disability, or sex.

## 2017-04-06 NOTE — PATIENT INSTRUCTIONS
Understanding Osteoarthritis of the Hip    A joint is a place where two bones meet. The hip is a ball-and-socket joint. It is formed where the ball at the top of the thighbone (femur) rests in the socket in the pelvic bone. The hip joint allows the leg to move freely in many directions.  Hip osteoarthritis is a condition where parts of the hip joint wear out. It can lead to pain, stiffness, and limited movement.   What is osteoarthritis?  All joints contain a smooth tissue called cartilage. Cartilage cushions the ends of bones, helping them glide against each other. Hip osteoarthritis occurs when cartilage in the hip joint begins to break down and wear away. The ball and socket bones may then become exposed and rub together. The cartilage may become rough and pitted and may begin to wear away. This prevents smooth movement of the joint and can lead to pain.  Causes of osteoarthritis of the hip  Causes can include:  · Wear and tear from normal use of the hip over time  · Overuse of the hip during sports or work activities  · Being overweight. This increases stress on the hip joint.  · Injury to the hip  · Infection of the hip joint  Symptoms of osteoarthritis of the hip  The main symptom of hip osteoarthritis is pain. The pain is most often felt in the groin area. It may also travel down the leg to the knee or to the back of the hip. The pain usually gets worse with activity, such as walking or climbing stairs. The pain may get better with rest. The joint may also be stiff first thing in the morning or after periods of sitting or lying down. Stiffness usually gets better with movement.  Treating osteoarthritis of the hip  Osteoarthritis is a long-term condition. Treatment usually focuses on managing symptoms. Treatment may include:  · Over-the-counter or prescription medicines taken by mouth to help relieve pain and swelling  · Injections of medicine into the joint to help relieve symptoms for a time  · A  weight-loss plan if you are overweight  · A plan of physical therapy and exercises to improve strength and flexibility around the joint  · Cold or heat packs help relieve pain and stiffness  · Assistive devices that help with movement, such as a cane or a walker  · Assistive devices that make activities of daily life easier, such as raised toilet seats or shower bars  If other treatments dont do enough to relieve severe symptoms, you may need surgery to replace the joint. This surgery replaces the hip joint with an artificial joint. It can help relieve pain and stiffness and improve function of the hip.     When to call your healthcare provider  Call your healthcare provider right away if you have any of these:  · Fever of 100.4°F (38°C) or higher, or as directed  · Symptoms that dont get better with prescribed medicines or get worse  · New symptoms   Date Last Reviewed: 3/10/2016  © 8537-7885 [employer]. [address] [place] [address], [place] [address]. All rights reserved. This information is not intended as a substitute for professional medical care. Always follow your healthcare professional's instructions.

## 2017-04-07 ENCOUNTER — TELEPHONE (OUTPATIENT)
Dept: FAMILY MEDICINE | Facility: CLINIC | Age: 52
End: 2017-04-07

## 2017-04-07 NOTE — PROGRESS NOTES
Subjective:       Patient ID: Sharlene Smith is a 51 y.o. female.    Chief Complaint: Hip Pain (LEFT) and Leg Pain (LEFT LEG AND BUTTOCK)   ratio noticed left hip and thigh pain 3 weeks ago that has not gotten better and is painful with standing and walking.  Patient has no remote or recent history of any type of injury or strenuous activity.  Past medical history is significant for  Mild osteoarthritis of both hands, remote family history of rheumatoid arthritis for which the patient tested negative  HPI see above  Review of Systems   Eyes: Negative.    Endocrine: Negative.    Musculoskeletal: Positive for arthralgias and back pain.   Skin: Negative.    Allergic/Immunologic: Negative.    Neurological: Positive for weakness and numbness.   Hematological: Negative.    Psychiatric/Behavioral: Negative.        Objective:      Physical Exam   Constitutional: She is oriented to person, place, and time. She appears well-developed and well-nourished. She appears distressed.   HENT:   Head: Normocephalic and atraumatic.   Eyes: Conjunctivae and EOM are normal. Pupils are equal, round, and reactive to light.   Neck: Normal range of motion. Neck supple. No JVD present.   Musculoskeletal:        Right hip: Normal.        Left hip: She exhibits decreased range of motion, decreased strength and tenderness.        Lumbar back: She exhibits decreased range of motion, pain and spasm. She exhibits no tenderness.   Neurological: She is alert and oriented to person, place, and time. She has normal reflexes. She displays normal reflexes. No cranial nerve deficit. She exhibits normal muscle tone. Coordination normal.   Skin: Skin is warm and dry. No rash noted. No erythema. No pallor.   Psychiatric: She has a normal mood and affect. Her behavior is normal. Judgment and thought content normal.   Nursing note and vitals reviewed.      Assessment:       1. Arthralgia of hip, unspecified laterality     2,     history of  osteoarthritis  Plan:       refer to the med card dated 4/6/2017  methylPREDNISolone (MEDROL DOSEPACK) 4 mg tablet 1 Package 0 4/6/2017 4/27/2017      use as directed     [facility] - [place], [place] - [address] Ph #: [phone]        meperidine (DEMEROL) 50 mg tablet 20 tablet 0 4/6/2017      Take 1 tablet (50 mg total) by mouth nightly as needed for Pain. - Oral     Pharmacy: [facility] - [place], [place] - [address] Ph #: [phone]        Ambulatory consult to Orthopedics      Future Labs/Procedures Expected by Expires     MRI Lower Extremity Joint W WO Contrast Left  4/6/2017 4/6/2018     X-Ray Pelvis Routine AP  4/6/2017 4/6/2018       A contact the patient results when available.

## 2017-04-09 ENCOUNTER — HOSPITAL ENCOUNTER (OUTPATIENT)
Dept: RADIOLOGY | Facility: HOSPITAL | Age: 52
Discharge: HOME | End: 2017-04-09
Attending: FAMILY MEDICINE
Payer: OTHER GOVERNMENT

## 2017-04-09 DIAGNOSIS — M25.559 ARTHRALGIA OF HIP, UNSPECIFIED LATERALITY: ICD-10-CM

## 2017-04-09 PROCEDURE — 73721 MRI JNT OF LWR EXTRE W/O DYE: CPT | Mod: 26,LT,, | Performed by: RADIOLOGY

## 2017-04-09 PROCEDURE — 73721 MRI JNT OF LWR EXTRE W/O DYE: CPT | Mod: TC,LT

## 2017-04-13 RX ORDER — LEVONORGESTREL/ETHINYL ESTRADIOL AND ETHINYL ESTRADIOL 100-20(84)
KIT ORAL
Qty: 91 TABLET | Refills: 0 | OUTPATIENT
Start: 2017-04-13

## 2017-04-17 DIAGNOSIS — R51.9 SINUS HEADACHE: ICD-10-CM

## 2017-04-17 RX ORDER — MINERAL OIL
180 ENEMA (ML) RECTAL DAILY
Qty: 90 TABLET | Refills: 3 | Status: SHIPPED | OUTPATIENT
Start: 2017-04-17 | End: 2018-05-09 | Stop reason: SDUPTHER

## 2017-04-18 NOTE — TELEPHONE ENCOUNTER
Spoke with patient,made aware that refill for Allegra was sent to [facility], verbalized understanding. She questioned whether she had an Ortho referral , made her aware it was still pending ,she was given the number to our  to address.

## 2017-04-22 ENCOUNTER — OFFICE VISIT (OUTPATIENT)
Dept: INTERNAL MEDICINE | Facility: CLINIC | Age: 52
End: 2017-04-22
Payer: OTHER GOVERNMENT

## 2017-04-22 ENCOUNTER — TELEPHONE (OUTPATIENT)
Dept: INTERNAL MEDICINE | Facility: CLINIC | Age: 52
End: 2017-04-22

## 2017-04-22 VITALS
RESPIRATION RATE: 14 BRPM | BODY MASS INDEX: 29.82 KG/M2 | WEIGHT: 173.75 LBS | SYSTOLIC BLOOD PRESSURE: 108 MMHG | HEART RATE: 96 BPM | TEMPERATURE: 98 F | DIASTOLIC BLOOD PRESSURE: 76 MMHG

## 2017-04-22 DIAGNOSIS — J02.9 ACUTE PHARYNGITIS, UNSPECIFIED ETIOLOGY: Primary | ICD-10-CM

## 2017-04-22 DIAGNOSIS — J30.2 SEASONAL ALLERGIC RHINITIS, UNSPECIFIED ALLERGIC RHINITIS TRIGGER: ICD-10-CM

## 2017-04-22 LAB — DEPRECATED S PYO AG THROAT QL EIA: NEGATIVE

## 2017-04-22 PROCEDURE — 87880 STREP A ASSAY W/OPTIC: CPT | Mod: PO

## 2017-04-22 PROCEDURE — 99213 OFFICE O/P EST LOW 20 MIN: CPT | Mod: PBBFAC,PO | Performed by: INTERNAL MEDICINE

## 2017-04-22 PROCEDURE — 99212 OFFICE O/P EST SF 10 MIN: CPT | Mod: S$PBB,,, | Performed by: INTERNAL MEDICINE

## 2017-04-22 PROCEDURE — 96372 THER/PROPH/DIAG INJ SC/IM: CPT | Mod: PBBFAC,PO

## 2017-04-22 PROCEDURE — 99999 PR PBB SHADOW E&M-EST. PATIENT-LVL III: CPT | Mod: PBBFAC,,, | Performed by: INTERNAL MEDICINE

## 2017-04-22 PROCEDURE — 87081 CULTURE SCREEN ONLY: CPT

## 2017-04-22 RX ORDER — AZITHROMYCIN 250 MG/1
TABLET, FILM COATED ORAL
Qty: 6 TABLET | Refills: 0 | Status: SHIPPED | OUTPATIENT
Start: 2017-04-22 | End: 2017-06-20 | Stop reason: ALTCHOICE

## 2017-04-22 RX ORDER — TRIAMCINOLONE ACETONIDE 40 MG/ML
40 INJECTION, SUSPENSION INTRA-ARTICULAR; INTRAMUSCULAR
Status: COMPLETED | OUTPATIENT
Start: 2017-04-22 | End: 2017-04-22

## 2017-04-22 RX ADMIN — TRIAMCINOLONE ACETONIDE 40 MG: 40 INJECTION, SUSPENSION INTRA-ARTICULAR; INTRAMUSCULAR at 09:04

## 2017-04-24 LAB — BACTERIA THROAT CULT: NORMAL

## 2017-04-25 NOTE — PROGRESS NOTES
Subjective:       Patient ID: Sharlene Smith is a 51 y.o. female.    Chief Complaint: Sore Throat and Sinusitis    HPI   The patient presents with complaints of worsening sore throat.  Symptoms have been present for several days.  Right earache is also noted.  The patient has had headache and body aches for the past 5 days.  She denies having any chills or fever.  She does have a history of chronic rhinitis.  Review of Systems   Constitutional: Negative for chills and fever.   HENT: Positive for congestion, ear pain and sore throat. Negative for voice change.    Respiratory: Negative for cough, shortness of breath and wheezing.    Cardiovascular: Negative for chest pain.   Gastrointestinal: Negative for diarrhea and vomiting.   Musculoskeletal: Positive for myalgias.   Neurological: Positive for headaches.       Objective:      Physical Exam   Constitutional: She is oriented to person, place, and time. She appears well-developed and well-nourished. No distress.   HENT:   Head: Normocephalic and atraumatic.   Right Ear: External ear normal.   Left Ear: External ear normal.   Pharynx is injected but without exudate. Sinuses are non-tender to palpation.   Eyes: Conjunctivae are normal. Right eye exhibits no discharge. Left eye exhibits no discharge. No scleral icterus.   Neck: Normal range of motion. Neck supple. No JVD present. No thyromegaly present.   Cardiovascular: Normal rate, regular rhythm and normal heart sounds.  Exam reveals no gallop and no friction rub.    No murmur heard.  Pulmonary/Chest: Effort normal and breath sounds normal. No respiratory distress. She has no wheezes. She has no rales.   Lymphadenopathy:     She has cervical adenopathy.   Neurological: She is alert and oriented to person, place, and time.   Skin: Skin is warm and dry. No rash noted.   Nursing note and vitals reviewed.      Assessment:       1. Acute pharyngitis, unspecified etiology    2. Seasonal allergic rhinitis,  unspecified allergic rhinitis trigger        Plan:       Sharlene was seen today for sore throat and sinusitis.  Rapid strep test will be performed.  Kenalog will be administered at this time.  Xyzal and Zithromax will be prescribed.  The patient is to return to clinic as needed.    Diagnoses and all orders for this visit:    Acute pharyngitis, unspecified etiology  -     Throat Screen, Rapid    Seasonal allergic rhinitis, unspecified allergic rhinitis trigger    Other orders  -     azithromycin (ZITHROMAX Z-JEANINE) 250 MG tablet; Take 2 tabs po on day 1; then 1 po daily until completed.  -     triamcinolone acetonide injection 40 mg; Inject 1 mL (40 mg total) into the muscle one time.  -     Strep A culture, throat

## 2017-04-28 DIAGNOSIS — J45.909: ICD-10-CM

## 2017-04-28 DIAGNOSIS — R51.9 SINUS HEADACHE: ICD-10-CM

## 2017-04-28 DIAGNOSIS — N94.6 DYSMENORRHEA: ICD-10-CM

## 2017-04-28 DIAGNOSIS — J45.998 ASTHMA IN REMISSION: ICD-10-CM

## 2017-04-28 RX ORDER — BENZOCAINE .13; .15; .5; 2 G/100G; G/100G; G/100G; G/100G
1 GEL ORAL DAILY
Qty: 8.6 G | Refills: 1 | Status: SHIPPED | OUTPATIENT
Start: 2017-04-28 | End: 2018-03-14 | Stop reason: SDUPTHER

## 2017-04-28 RX ORDER — TRAMADOL HYDROCHLORIDE 50 MG/1
50 TABLET ORAL EVERY 6 HOURS PRN
Qty: 12 TABLET | Refills: 0 | Status: SHIPPED | OUTPATIENT
Start: 2017-04-28 | End: 2017-05-01

## 2017-04-28 RX ORDER — ALBUTEROL SULFATE 90 UG/1
1-2 AEROSOL, METERED RESPIRATORY (INHALATION) EVERY 6 HOURS PRN
Qty: 1 INHALER | Refills: 0 | Status: SHIPPED | OUTPATIENT
Start: 2017-04-28 | End: 2019-02-01

## 2017-04-28 NOTE — TELEPHONE ENCOUNTER
Please advise pt Albuterol and Tramadol refilled to [facility].  Tramadol refilled for 3 days; if Dr. Lincoln would like pt to continue this then pt will need to request refills from her.

## 2017-05-18 ENCOUNTER — PATIENT MESSAGE (OUTPATIENT)
Dept: OBSTETRICS AND GYNECOLOGY | Facility: CLINIC | Age: 52
End: 2017-05-18

## 2017-05-19 ENCOUNTER — PATIENT MESSAGE (OUTPATIENT)
Dept: OBSTETRICS AND GYNECOLOGY | Facility: CLINIC | Age: 52
End: 2017-05-19

## 2017-05-19 NOTE — TELEPHONE ENCOUNTER
I do not see a recent WWE.  Last exam was  and 3-6 month f/u recommended.  Please ask patient to schedule appt for a WWE.  When appt is scheduled, I will refill until appt time.

## 2017-05-22 ENCOUNTER — PATIENT MESSAGE (OUTPATIENT)
Dept: FAMILY MEDICINE | Facility: CLINIC | Age: 52
End: 2017-05-22

## 2017-05-22 ENCOUNTER — TELEPHONE (OUTPATIENT)
Dept: OBSTETRICS AND GYNECOLOGY | Facility: CLINIC | Age: 52
End: 2017-05-22

## 2017-05-22 DIAGNOSIS — M25.559 ARTHRALGIA OF HIP, UNSPECIFIED LATERALITY: ICD-10-CM

## 2017-05-22 DIAGNOSIS — Z30.011 OCP (ORAL CONTRACEPTIVE PILLS) INITIATION: Primary | ICD-10-CM

## 2017-05-22 NOTE — TELEPHONE ENCOUNTER
Discuss provider recommendation to schedule annual exam before refill. Patient decline scheduling an appointment with Dr Asif, because she is scheduled with  [facility]  Dr Lincoln 06/20/2017 and will have her exam and refill done during that appointment.

## 2017-05-22 NOTE — TELEPHONE ENCOUNTER
----- Message from Marley Sheppard sent at 5/22/2017  8:43 AM CDT -----   _1st Request  _  2nd Request  _  3rd Request    Who:KELLEE PISANO [[id number]]    Why:Patient was returning a call back from the staff     What Number to Call Back:[phone]    When to Expect a call back: (Before the end of the day)   -- if call after 3:00 call back will be tomorrow.

## 2017-05-23 RX ORDER — LEVONORGESTREL/ETHINYL ESTRADIOL AND ETHINYL ESTRADIOL 100-20(84)
1 KIT ORAL DAILY
Qty: 28 TABLET | Refills: 3 | Status: SHIPPED | OUTPATIENT
Start: 2017-05-23 | End: 2018-05-21 | Stop reason: SDUPTHER

## 2017-05-23 RX ORDER — TRAMADOL HYDROCHLORIDE 50 MG/1
50 TABLET ORAL EVERY 6 HOURS PRN
Qty: 60 TABLET | Refills: 0 | Status: SHIPPED | OUTPATIENT
Start: 2017-05-23 | End: 2017-07-18 | Stop reason: SDUPTHER

## 2017-05-25 ENCOUNTER — PATIENT MESSAGE (OUTPATIENT)
Dept: FAMILY MEDICINE | Facility: CLINIC | Age: 52
End: 2017-05-25

## 2017-05-26 ENCOUNTER — PATIENT MESSAGE (OUTPATIENT)
Dept: FAMILY MEDICINE | Facility: CLINIC | Age: 52
End: 2017-05-26

## 2017-05-26 RX ORDER — ALPRAZOLAM 0.25 MG/1
0.25 TABLET ORAL 2 TIMES DAILY PRN
Qty: 20 TABLET | Refills: 0 | Status: SHIPPED | OUTPATIENT
Start: 2017-05-26 | End: 2017-07-18 | Stop reason: SDUPTHER

## 2017-06-14 ENCOUNTER — TELEPHONE (OUTPATIENT)
Dept: FAMILY MEDICINE | Facility: CLINIC | Age: 52
End: 2017-06-14

## 2017-06-14 DIAGNOSIS — Z00.00 ROUTINE GENERAL MEDICAL EXAMINATION AT A HEALTH CARE FACILITY: Primary | ICD-10-CM

## 2017-06-14 NOTE — TELEPHONE ENCOUNTER
----- Message from Марина Stewart sent at 6/14/2017  8:38 AM CDT -----  Contact: self [phone]  Type: Orders Request    What orders/ testing are being requested? Labs     Is there a future appointment scheduled for the patient with PCP? Yes     When? 06/20/17    Comments: please advise , Thanks !

## 2017-06-15 ENCOUNTER — LAB VISIT (OUTPATIENT)
Dept: LAB | Facility: HOSPITAL | Age: 52
End: 2017-06-15
Attending: FAMILY MEDICINE
Payer: OTHER GOVERNMENT

## 2017-06-15 DIAGNOSIS — Z00.00 ROUTINE GENERAL MEDICAL EXAMINATION AT A HEALTH CARE FACILITY: ICD-10-CM

## 2017-06-15 LAB
ALBUMIN SERPL BCP-MCNC: 3.7 G/DL
ALP SERPL-CCNC: 50 U/L
ALT SERPL W/O P-5'-P-CCNC: 27 U/L
ANION GAP SERPL CALC-SCNC: 12 MMOL/L
AST SERPL-CCNC: 17 U/L
BASOPHILS # BLD AUTO: 0.05 K/UL
BASOPHILS NFR BLD: 0.5 %
BILIRUB SERPL-MCNC: 0.5 MG/DL
BUN SERPL-MCNC: 10 MG/DL
CALCIUM SERPL-MCNC: 9.1 MG/DL
CHLORIDE SERPL-SCNC: 104 MMOL/L
CHOLEST/HDLC SERPL: 2.8 {RATIO}
CO2 SERPL-SCNC: 22 MMOL/L
CREAT SERPL-MCNC: 0.6 MG/DL
DIFFERENTIAL METHOD: ABNORMAL
EOSINOPHIL # BLD AUTO: 0.1 K/UL
EOSINOPHIL NFR BLD: 0.7 %
ERYTHROCYTE [DISTWIDTH] IN BLOOD BY AUTOMATED COUNT: 12.6 %
EST. GFR  (AFRICAN AMERICAN): >60 ML/MIN/1.73 M^2
EST. GFR  (NON AFRICAN AMERICAN): >60 ML/MIN/1.73 M^2
GLUCOSE SERPL-MCNC: 82 MG/DL
HCT VFR BLD AUTO: 41 %
HDL/CHOLESTEROL RATIO: 36.4 %
HDLC SERPL-MCNC: 187 MG/DL
HDLC SERPL-MCNC: 68 MG/DL
HGB BLD-MCNC: 13.2 G/DL
LDLC SERPL CALC-MCNC: 95 MG/DL
LYMPHOCYTES # BLD AUTO: 2.9 K/UL
LYMPHOCYTES NFR BLD: 27.3 %
MCH RBC QN AUTO: 31.2 PG
MCHC RBC AUTO-ENTMCNC: 32.2 %
MCV RBC AUTO: 97 FL
MONOCYTES # BLD AUTO: 0.4 K/UL
MONOCYTES NFR BLD: 3.4 %
NEUTROPHILS # BLD AUTO: 7.2 K/UL
NEUTROPHILS NFR BLD: 67.8 %
NONHDLC SERPL-MCNC: 119 MG/DL
PLATELET # BLD AUTO: 442 K/UL
PMV BLD AUTO: 9.8 FL
POTASSIUM SERPL-SCNC: 3.8 MMOL/L
PROT SERPL-MCNC: 7.6 G/DL
RBC # BLD AUTO: 4.23 M/UL
SODIUM SERPL-SCNC: 138 MMOL/L
TRIGL SERPL-MCNC: 120 MG/DL
TSH SERPL DL<=0.005 MIU/L-ACNC: 0.48 UIU/ML
WBC # BLD AUTO: 10.55 K/UL

## 2017-06-15 PROCEDURE — 84443 ASSAY THYROID STIM HORMONE: CPT

## 2017-06-15 PROCEDURE — 36415 COLL VENOUS BLD VENIPUNCTURE: CPT | Mod: PO

## 2017-06-15 PROCEDURE — 85025 COMPLETE CBC W/AUTO DIFF WBC: CPT

## 2017-06-15 PROCEDURE — 80053 COMPREHEN METABOLIC PANEL: CPT

## 2017-06-15 PROCEDURE — 80061 LIPID PANEL: CPT

## 2017-06-20 ENCOUNTER — OFFICE VISIT (OUTPATIENT)
Dept: FAMILY MEDICINE | Facility: CLINIC | Age: 52
End: 2017-06-20
Payer: OTHER GOVERNMENT

## 2017-06-20 VITALS
TEMPERATURE: 98 F | HEART RATE: 90 BPM | WEIGHT: 171.94 LBS | HEIGHT: 65 IN | SYSTOLIC BLOOD PRESSURE: 114 MMHG | BODY MASS INDEX: 28.65 KG/M2 | DIASTOLIC BLOOD PRESSURE: 78 MMHG

## 2017-06-20 DIAGNOSIS — Z12.4 ROUTINE PAPANICOLAOU SMEAR: Primary | ICD-10-CM

## 2017-06-20 DIAGNOSIS — K51.919 ULCERATIVE COLITIS WITH COMPLICATION, UNSPECIFIED LOCATION: ICD-10-CM

## 2017-06-20 DIAGNOSIS — F41.9 ANXIETY: ICD-10-CM

## 2017-06-20 DIAGNOSIS — I47.10 SVT (SUPRAVENTRICULAR TACHYCARDIA): ICD-10-CM

## 2017-06-20 PROCEDURE — G0101 CA SCREEN;PELVIC/BREAST EXAM: HCPCS | Mod: S$PBB,,, | Performed by: FAMILY MEDICINE

## 2017-06-20 PROCEDURE — 82270 OCCULT BLOOD FECES: CPT | Mod: PBBFAC,PO | Performed by: FAMILY MEDICINE

## 2017-06-20 PROCEDURE — 99214 OFFICE O/P EST MOD 30 MIN: CPT | Mod: PBBFAC,PO,25 | Performed by: FAMILY MEDICINE

## 2017-06-20 PROCEDURE — 99396 PREV VISIT EST AGE 40-64: CPT | Mod: S$PBB,25,, | Performed by: FAMILY MEDICINE

## 2017-06-20 PROCEDURE — G0101 CA SCREEN;PELVIC/BREAST EXAM: HCPCS | Mod: PBBFAC,PO | Performed by: FAMILY MEDICINE

## 2017-06-20 PROCEDURE — 99999 PR PBB SHADOW E&M-EST. PATIENT-LVL IV: CPT | Mod: PBBFAC,,, | Performed by: FAMILY MEDICINE

## 2017-06-20 PROCEDURE — 88175 CYTOPATH C/V AUTO FLUID REDO: CPT

## 2017-06-20 RX ORDER — PAROXETINE 10 MG/1
10 TABLET, FILM COATED ORAL EVERY MORNING
Qty: 30 TABLET | Refills: 11 | Status: SHIPPED | OUTPATIENT
Start: 2017-06-20 | End: 2019-02-14

## 2017-06-21 NOTE — PROGRESS NOTES
Sharlene Smith is a 52 y.o. female   Routine physical  Source of history: Patient  Past Medical History:   Diagnosis Date    Abnormal Pap smear of vagina yrs ago    possible BX?    Allergy     Asthma     Hyperlipidemia     Supraventricular tachycardia     SVT (supraventricular tachycardia)     Tachycardia     Ulcerative colitis      Patient  reports that she quit smoking about 18 years ago. Her smoking use included Cigarettes. She has never used smokeless tobacco. She reports that she drinks about 4.2 oz of alcohol per week . She reports that she does not use drugs.  Family History   Problem Relation Age of Onset    Hyperlipidemia Mother     Heart attack Father     Diabetes Father     Heart attack Paternal Grandmother     Heart disease Neg Hx     Hypertension Neg Hx      ROS:  nswers for HPI/ROS submitted by the patient on 6/18/2017   activity change: No  unexpected weight change: No  neck pain: No  hearing loss: No  rhinorrhea: No  trouble swallowing: No  eye discharge: No  visual disturbance: Yes  chest tightness: No  wheezing: No  chest pain: No  palpitations: No  blood in stool: No  constipation: No  vomiting: No  diarrhea: No  polydipsia: No  polyuria: No  difficulty urinating: No  hematuria: No  menstrual problem: No  dysuria: No  joint swelling: Yes  arthralgias: Yes  headaches: Yes  weakness: No  confusion: No  dysphoric mood: Yes      OBJECTIVE:  APPEARANCE: normal appearance  Vitals:    06/20/17 1406   BP: 114/78   Pulse: 90   Temp: 98 °F (36.7 °C)     SKIN: Normal skin turgor, no lesions.  HEENT: Both external auditory canals clear. Both tympanic membranes intact. PERRL. EOMI.   Disk margins sharp. No tonsillar enlargement. No pharyngeal erythema or exudate. No stridor.  NECK: No bruits. No cervical spine tenderness. No cervical lymphadenopathy. No thyromegaly.  NODES: No cervical, axillary or inguinal lymph node enlargement.  CHEST: Breath sounds clear bilaterally. Lungs clear to  auscultation & percussion  . Good air movement. No rales. No retractions. No rhonchi. No stridor. No wheezes.  CARDIOVASCULAR: Normal S1, S2. No murmurs. No edema.  BREASTS: no masses palpated in either breast or axillary area, symmetry noted.  ABDOMEN: Bowel sounds normal. No palpable aortic enlargement. No CVA tenderness.   No pulsatile mass. No rebound tenderness.  PELVIC: Uterus normal size consistency mobility no adnexal masses no cervical motion tenderness noted  Speculum exam reveals a normal-appearing cervix Pap was performed and is pending  GENITALIA: Normal external exam  RECTAL: Heme negative normal tone no masses palpated  PERIPHERAL VASCULAR: Femoral pulses present and symmetrical. No edema.  MUSCULOSKELETAL: Degenerative changes of both ankles, foot, knee, wrist and hand.  BACK: No CVA tenderness. There is no spasm, tenderness or radiculopathy noted with palpation and there is full range of motion.   NEUROLOGIC:   Cranial Nerves: II-XII grossly intact.  Motor: 5/5 strength major flexors/extensors. No tremor.  DTR's: Knees, Ankles 2+ and equal bilaterally; downgoing toes.  Sensory: Intact to light touch distally.  Gait & Posture: Normal gait and fine motion. No cerebellar signs.  MENTAL STATUS: Alert. Oriented x 3. Language skills normal. Memory intact. No suicidal ideation. Normal affect. Normal cognitive functions. Normal serial 7s. Can do simple math. Well kept appearance.    ASSESSMENT/PLAN:   Sharlene was seen today for annual exam.    Diagnoses and all orders for this visit:    Routine Papanicolaou smear  -     Liquid-based pap smear, screening    SVT (supraventricular tachycardia)             Continue propranolol 3 times a day when necessary  Continue diltiazem slow release as directed  Ulcerative colitis with complication, unspecified location  -     Ambulatory consult to Gastroenterology    Anxiety  -     paroxetine (PAXIL) 10 MG tablet; Take 1 tablet (10 mg total) by mouth every morning.  Labs  were reviewed no areas of concern were noted we'll contact the patient with the remainder of tests are available.  Patient will call for refills of her medication when needed.

## 2017-07-18 DIAGNOSIS — M25.559 ARTHRALGIA OF HIP, UNSPECIFIED LATERALITY: ICD-10-CM

## 2017-07-18 RX ORDER — ALPRAZOLAM 0.25 MG/1
0.25 TABLET ORAL 2 TIMES DAILY PRN
Qty: 20 TABLET | Refills: 0 | Status: SHIPPED | OUTPATIENT
Start: 2017-07-18 | End: 2017-09-06 | Stop reason: SDUPTHER

## 2017-07-18 RX ORDER — TRAMADOL HYDROCHLORIDE 50 MG/1
50 TABLET ORAL EVERY 6 HOURS PRN
Qty: 60 TABLET | Refills: 0 | Status: CANCELLED | OUTPATIENT
Start: 2017-07-18 | End: 2018-07-18

## 2017-07-18 RX ORDER — TRAMADOL HYDROCHLORIDE 50 MG/1
50 TABLET ORAL EVERY 6 HOURS PRN
Qty: 60 TABLET | Refills: 0 | Status: SHIPPED | OUTPATIENT
Start: 2017-07-18 | End: 2017-09-06 | Stop reason: SDUPTHER

## 2017-08-08 ENCOUNTER — HOSPITAL ENCOUNTER (EMERGENCY)
Facility: HOSPITAL | Age: 52
Discharge: HOME | End: 2017-08-09
Attending: EMERGENCY MEDICINE | Admitting: EMERGENCY MEDICINE
Payer: OTHER GOVERNMENT

## 2017-08-08 DIAGNOSIS — I47.10 SVT (SUPRAVENTRICULAR TACHYCARDIA): Primary | ICD-10-CM

## 2017-08-08 PROCEDURE — 99284 EMERGENCY DEPT VISIT MOD MDM: CPT | Mod: ,,, | Performed by: EMERGENCY MEDICINE

## 2017-08-08 PROCEDURE — 99283 EMERGENCY DEPT VISIT LOW MDM: CPT | Mod: 25

## 2017-08-08 PROCEDURE — 96374 THER/PROPH/DIAG INJ IV PUSH: CPT

## 2017-08-08 PROCEDURE — 63600175 PHARM REV CODE 636 W HCPCS

## 2017-08-08 RX ORDER — ADENOSINE 3 MG/ML
12 INJECTION, SOLUTION INTRAVENOUS
Status: COMPLETED | OUTPATIENT
Start: 2017-08-09 | End: 2017-08-08

## 2017-08-08 RX ORDER — ADENOSINE 3 MG/ML
INJECTION, SOLUTION INTRAVENOUS
Status: COMPLETED
Start: 2017-08-08 | End: 2017-08-08

## 2017-08-08 RX ADMIN — ADENOSINE 12 MG: 3 INJECTION, SOLUTION INTRAVENOUS at 11:08

## 2017-08-09 VITALS
RESPIRATION RATE: 16 BRPM | SYSTOLIC BLOOD PRESSURE: 109 MMHG | WEIGHT: 170 LBS | OXYGEN SATURATION: 99 % | BODY MASS INDEX: 29.02 KG/M2 | TEMPERATURE: 98 F | DIASTOLIC BLOOD PRESSURE: 68 MMHG | HEART RATE: 107 BPM | HEIGHT: 64 IN

## 2017-08-09 NOTE — ED TRIAGE NOTES
"Sharlene Smith, a 52 y.o. female presents to the ED complaining of palpitations. Pt states it stated at 20:52. Pt has a history of SVT and states "I tried all my maneuvers." pt states last episode was in february and was sent home after cardioversion. Pt complaining of chest pressure. Pt denies SOB, v/d, fever      Chief Complaint   Patient presents with    Tachycardia     pt presents to ed c/o svt episode. she reports hx and was last seen here  in feb      Review of patient's allergies indicates:   Allergen Reactions    Hydrocodone      Past Medical History:   Diagnosis Date    Abnormal Pap smear of vagina yrs ago    possible BX?    Allergy     Asthma     Hyperlipidemia     Supraventricular tachycardia     SVT (supraventricular tachycardia)     Tachycardia     Ulcerative colitis      "

## 2017-08-09 NOTE — ED PROVIDER NOTES
Encounter Date: 2017    SCRIBE #1 NOTE: I, Suellen Nicolas, am scribing for, and in the presence of, Dr. Segura.       History     Chief Complaint   Patient presents with    Tachycardia     pt presents to ed c/o svt episode. she reports hx and was last seen here  in feb        The patient is a 52 y.o. female with hx of:SVT and HLD that presents to the ED for evaluation of SVT episode. She reports an SVT episode of about 2 hour duration. She reports doing all of the vagal maneuvers with no relief. She states she feels clammy. Denies CP or SOB. She reports her last episode was in 2017, she was converted and discharged. No further concerns or complaints at this time.      The history is provided by the patient and medical records.     Review of patient's allergies indicates:   Allergen Reactions    Hydrocodone      Past Medical History:   Diagnosis Date    Abnormal Pap smear of vagina yrs ago    possible BX?    Allergy     Asthma     Hyperlipidemia     Supraventricular tachycardia     SVT (supraventricular tachycardia)     Tachycardia     Ulcerative colitis      Past Surgical History:   Procedure Laterality Date     SECTION, CLASSIC      x 2    COLONOSCOPY N/A 2015    Procedure: COLONOSCOPY;  Surgeon: Petr Miller MD;  Location: [address];  Service: Endoscopy;  Laterality: N/A;     Family History   Problem Relation Age of Onset    Hyperlipidemia Mother     Heart attack Father     Diabetes Father     Heart attack Paternal Grandmother     Heart disease Neg Hx     Hypertension Neg Hx      Social History   Substance Use Topics    Smoking status: Former Smoker     Types: Cigarettes     Quit date: 1999    Smokeless tobacco: Never Used    Alcohol use 4.2 oz/week     4 Cans of beer, 3 Shots of liquor per week      Comment: Occasionally     Review of Systems   Constitutional: Negative for fever.   HENT: Negative for sore throat.    Respiratory: Negative for  shortness of breath.    Cardiovascular: Negative for chest pain.        (+) SVT episode   Gastrointestinal: Negative for abdominal pain.   Genitourinary: Negative for flank pain.   Musculoskeletal: Negative for back pain.   Skin: Negative for rash.   Neurological: Negative for headaches.   Psychiatric/Behavioral: Negative for confusion.       Physical Exam     Initial Vitals [08/08/17 2339]   BP Pulse Resp Temp SpO2   110/80 (!) 218 16 97.8 °F (36.6 °C) 99 %      MAP       90         Physical Exam    Nursing note and vitals reviewed.  Constitutional: She appears well-developed and well-nourished. She appears distressed (moderate).   HENT:   Head: Normocephalic and atraumatic.   Neck: Normal range of motion.   Cardiovascular: Regular rhythm. Tachycardia present.  Exam reveals no gallop.    No murmur heard.  Pulmonary/Chest: Breath sounds normal. No respiratory distress.   Abdominal: Soft. She exhibits no distension. There is no tenderness.   Musculoskeletal: Normal range of motion.   Neurological: She is alert and oriented to person, place, and time.   Skin: Skin is warm and dry. No rash noted.   Psychiatric: Her behavior is normal. Thought content normal.         ED Course   Procedures  Labs Reviewed - No data to display  EKG Readings: (Independently Interpreted)   SVT at 120 bpm.    Repeat EKG: Sinus tachycardia. No ST elevation or depression.          Medical Decision Making:   History:   Old Medical Records: I decided to obtain old medical records.  Initial Assessment:   Patient with long standing hx of SVT presents with 2 hours of SVT. Will attempt adenosine.   Differential Diagnosis:   SVT and A-Fib with RVR.  Independently Interpreted Test(s):   I have ordered and independently interpreted EKG Reading(s) - see prior notes  Clinical Tests:   Medical Tests: Ordered and Reviewed            Scribe Attestation:   Scribe #1: I performed the above scribed service and the documentation accurately describes the  services I performed. I attest to the accuracy of the note.    Attending Attestation:           Physician Attestation for Scribe:  Physician Attestation Statement for Scribe #1: I, Dr. Segura, reviewed documentation, as scribed by Suellen Nicolas in my presence, and it is both accurate and complete.         Attending ED Notes:     Patient's SVT resolved with adenosine. Observed for 30 minutes and will discharge home.          ED Course     Clinical Impression:   The encounter diagnosis was SVT (supraventricular tachycardia).    Disposition:   Disposition: Discharged  Condition: Stable                        Phong Segura III, MD  08/09/17 0146

## 2017-08-09 NOTE — ED NOTES
Patient identifiers verified and correct for Sharlene Smith.    LOC: The patient is awake, alert and aware of environment with an appropriate affect, the patient is oriented x 3 and speaking appropriately.  APPEARANCE: Patient resting comfortably and in no acute distress, patient is clean and well groomed, patient's clothing is properly fastened.  SKIN: The skin is warm and dry, color consistent with ethnicity, patient has normal skin turgor and moist mucus membranes, skin intact, no breakdown or bruising noted.  MUSCULOSKELETAL: Patient moving all extremities spontaneously, no obvious swelling or deformities noted.  RESPIRATORY: Airway is open and patent, respirations are spontaneous, patient has a normal effort and rate, no accessory muscle use noted, bilateral breath sounds even and clear.  CARDIAC: Patient has a tachycardiac rate and regular rhythm, no periphreal edema noted, capillary refill < 3 seconds. Pt reports chest pressure  ABDOMEN: Soft and non tender to palpation, no distention noted, normoactive bowel sounds present in all four quadrants.  NEUROLOGIC: Pupils equal bilaterally, eyes open spontaneously, behavior appropriate to situation, follows commands, facial expression symmetrical, bilateral hand grasp equal and even, purposeful motor response noted, normal sensation in all extremities when touched with a finger.

## 2017-08-09 NOTE — ED NOTES
Pt feels much better. States she is ready to get home and get to bed. MD notified. Will continue to monitor.

## 2017-08-24 DIAGNOSIS — J45.909 UNCOMPLICATED ASTHMA: ICD-10-CM

## 2017-08-24 DIAGNOSIS — E78.5 HYPERLIPIDEMIA: ICD-10-CM

## 2017-08-24 RX ORDER — ATORVASTATIN CALCIUM 20 MG/1
TABLET, FILM COATED ORAL
Qty: 90 TABLET | Refills: 1 | Status: SHIPPED | OUTPATIENT
Start: 2017-08-24 | End: 2018-02-20 | Stop reason: SDUPTHER

## 2017-08-24 RX ORDER — MONTELUKAST SODIUM 10 MG/1
TABLET ORAL
Qty: 90 TABLET | Refills: 1 | Status: SHIPPED | OUTPATIENT
Start: 2017-08-24 | End: 2018-07-26

## 2017-09-01 DIAGNOSIS — M25.559 ARTHRALGIA OF HIP, UNSPECIFIED LATERALITY: ICD-10-CM

## 2017-09-01 RX ORDER — TRAMADOL HYDROCHLORIDE 50 MG/1
50 TABLET ORAL EVERY 6 HOURS PRN
Qty: 60 TABLET | Refills: 0 | OUTPATIENT
Start: 2017-09-01 | End: 2018-09-01

## 2017-09-01 RX ORDER — ALPRAZOLAM 0.25 MG/1
0.25 TABLET ORAL 2 TIMES DAILY PRN
Qty: 20 TABLET | Refills: 0 | OUTPATIENT
Start: 2017-09-01 | End: 2017-10-01

## 2017-09-06 ENCOUNTER — PATIENT MESSAGE (OUTPATIENT)
Dept: FAMILY MEDICINE | Facility: CLINIC | Age: 52
End: 2017-09-06

## 2017-09-06 DIAGNOSIS — F41.9 ANXIETY: Primary | ICD-10-CM

## 2017-09-06 DIAGNOSIS — M25.559 ARTHRALGIA OF HIP, UNSPECIFIED LATERALITY: ICD-10-CM

## 2017-09-06 RX ORDER — ALPRAZOLAM 0.25 MG/1
0.25 TABLET ORAL 2 TIMES DAILY PRN
Qty: 20 TABLET | Refills: 0 | Status: SHIPPED | OUTPATIENT
Start: 2017-09-06 | End: 2017-10-20 | Stop reason: SDUPTHER

## 2017-09-06 RX ORDER — TRAMADOL HYDROCHLORIDE 50 MG/1
50 TABLET ORAL EVERY 6 HOURS PRN
Qty: 60 TABLET | Refills: 0 | Status: SHIPPED | OUTPATIENT
Start: 2017-09-06 | End: 2017-10-20 | Stop reason: SDUPTHER

## 2017-09-15 ENCOUNTER — OFFICE VISIT (OUTPATIENT)
Dept: GASTROENTEROLOGY | Facility: CLINIC | Age: 52
End: 2017-09-15
Payer: OTHER GOVERNMENT

## 2017-09-15 ENCOUNTER — TELEPHONE (OUTPATIENT)
Dept: ENDOSCOPY | Facility: HOSPITAL | Age: 52
End: 2017-09-15

## 2017-09-15 ENCOUNTER — TELEPHONE (OUTPATIENT)
Dept: GASTROENTEROLOGY | Facility: CLINIC | Age: 52
End: 2017-09-15

## 2017-09-15 VITALS
SYSTOLIC BLOOD PRESSURE: 107 MMHG | DIASTOLIC BLOOD PRESSURE: 73 MMHG | WEIGHT: 174.81 LBS | HEIGHT: 64 IN | HEART RATE: 94 BPM | BODY MASS INDEX: 29.84 KG/M2

## 2017-09-15 DIAGNOSIS — K51.80 OTHER ULCERATIVE COLITIS WITHOUT COMPLICATION: ICD-10-CM

## 2017-09-15 DIAGNOSIS — Z12.11 SPECIAL SCREENING FOR MALIGNANT NEOPLASMS, COLON: Primary | ICD-10-CM

## 2017-09-15 DIAGNOSIS — K51.80 OTHER ULCERATIVE COLITIS WITHOUT COMPLICATION: Primary | ICD-10-CM

## 2017-09-15 DIAGNOSIS — R11.0 NAUSEA: ICD-10-CM

## 2017-09-15 PROCEDURE — 99999 PR PBB SHADOW E&M-EST. PATIENT-LVL V: CPT | Mod: PBBFAC,,, | Performed by: PHYSICIAN ASSISTANT

## 2017-09-15 PROCEDURE — 99215 OFFICE O/P EST HI 40 MIN: CPT | Mod: PBBFAC | Performed by: PHYSICIAN ASSISTANT

## 2017-09-15 PROCEDURE — 99214 OFFICE O/P EST MOD 30 MIN: CPT | Mod: S$PBB,,, | Performed by: PHYSICIAN ASSISTANT

## 2017-09-15 PROCEDURE — 3008F BODY MASS INDEX DOCD: CPT | Mod: ,,, | Performed by: PHYSICIAN ASSISTANT

## 2017-09-15 RX ORDER — POLYETHYLENE GLYCOL 3350, SODIUM SULFATE, SODIUM CHLORIDE, POTASSIUM CHLORIDE, SODIUM ASCORBATE, AND ASCORBIC ACID 7.5-2.691G
2000 KIT ORAL ONCE
Qty: 2000 ML | Refills: 0 | Status: SHIPPED | OUTPATIENT
Start: 2017-09-15 | End: 2017-09-15

## 2017-09-15 RX ORDER — MESALAMINE 0.38 G/1
1.5 CAPSULE, EXTENDED RELEASE ORAL DAILY
Qty: 120 CAPSULE | Refills: 0 | Status: SHIPPED | OUTPATIENT
Start: 2017-09-15 | End: 2021-09-21 | Stop reason: SDUPTHER

## 2017-09-15 RX ORDER — MESALAMINE 0.38 G/1
1.5 CAPSULE, EXTENDED RELEASE ORAL DAILY
Qty: 120 CAPSULE | Refills: 0 | Status: SHIPPED | OUTPATIENT
Start: 2017-09-15 | End: 2017-09-15 | Stop reason: SDUPTHER

## 2017-09-15 NOTE — PROGRESS NOTES
[facility]          Inflammatory Bowel Disease Clinic Note    Reason for Visit:  The primary encounter diagnosis was Other ulcerative colitis without complication. A diagnosis of Nausea was also pertinent to this visit.    PCP: Mara Temple   [address] / [facility] [address]    Referring MD:   Mara Temple Md  [address]  [address]  [place] [address]    Inflammatory bowel disease history:  This is a 52 y.o. female here to follow up on her ulcerative colitis  Disease was originally diagnosed: 2002  Last seen for 11/2015 colonoscopy    Interval history:  Bowel movements/day - 1-2 formed  Abdominal pain - no   Extraintestinal manifestations - generalized joint pains   No blood in stool    Also having nausea after meals - rare GERD, no vomiting  Admits to family hx of gallbladder disease    Current medications -   lialda 1.2g - two pills daily. She says she  Would like to try to find a smaller pill    Failed/Previous medications -  Avoiding naids    IBD Health Maintenance  Medication specific labs: CBC, CMP, CRP, (q 3 months), TPMT (prior to initiation of biologic therapy)    Immunizations:    - Flu shot: needed  - Stay up to date with DTaP (diphtheria, tetanus, pertussis) every 10 years: needed  - Shingles (if over 50 and nonimmunosuppressed): consider    Appointments:  - Women get an annual pap smear if immunosuppressed: N/A  - Get an annual visual exam of skin by dermatologist if  Immunosuppressed:   - Get an annual ophthalmology exam (you are at risk of developing iritis and uveitis with Crohn's disease): N/A    ROS:  Constitutional: No fevers, no chills, No unintentional weight loss, no fatigue,   ENT: No allergies  CV: No chest pain, no palpitations, no perif. edema, no sob on exertion  Pulm: No cough, No shortness of breath, no wheezes, no sputum  Ophtho: No vision changes  GI: see HPI; also no nausea, no vomiting, no  change in appetite  Derm: No rash  Heme: No lymphadenopathy, No bruising  MSK: +arthritis in multiple joints, no muscle pain, no muscle weakness  : No dysuria, No hematuria  Endo: No hot or cold intolerance  Neuro: No syncope, No seizure,     Medical History:  has a past medical history of Abnormal Pap smear of vagina (yrs ago); Allergy; Asthma; Hyperlipidemia; Supraventricular tachycardia; SVT (supraventricular tachycardia); Tachycardia; and Ulcerative colitis.    Surgical History:  has a past surgical history that includes Colonoscopy (N/A, 2015) and  section, classic.    Family History: family history includes Diabetes in her father; Heart attack in her father and paternal grandmother; Hyperlipidemia in her mother..     Social History:  reports that she quit smoking about 18 years ago. Her smoking use included Cigarettes. She has never used smokeless tobacco. She reports that she drinks about 4.2 oz of alcohol per week . She reports that she does not use drugs.    Review of patient's allergies indicates:   Allergen Reactions    Hydrocodone        Current Outpatient Prescriptions   Medication Sig    albuterol (PROAIR HFA) 90 mcg/actuation inhaler Inhale 1-2 puffs into the lungs every 6 (six) hours as needed for Wheezing.    alprazolam (XANAX) 0.25 MG tablet Take 1 tablet (0.25 mg total) by mouth 2 (two) times daily as needed for Anxiety.    atorvastatin (LIPITOR) 20 MG tablet TAKE 1 TABLET EVERY EVENING    budesonide (RINOCORT AQUA) 32 mcg/actuation nasal spray 1 spray (32 mcg total) by Nasal route once daily.    CAMRESE LO 0.10 mg-20 mcg (84)/10 mcg (7) 3MPk Take 1 tablet by mouth once daily.    cetirizine (ZYRTEC) 10 MG tablet Take 1 tablet (10 mg total) by mouth once daily.    diltiaZEM (CARDIZEM CD) 240 MG 24 hr capsule Take 1 capsule (240 mg total) by mouth once daily.    fexofenadine (ALLEGRA) 180 MG tablet Take 1 tablet (180 mg total) by mouth once daily.    meperidine (DEMEROL) 50  "mg tablet Take 1 tablet (50 mg total) by mouth nightly as needed for Pain.    montelukast (SINGULAIR) 10 mg tablet TAKE 1 TABLET EVERY EVENING    paroxetine (PAXIL) 10 MG tablet Take 1 tablet (10 mg total) by mouth every morning.    propranolol (INDERAL) 40 MG tablet Take 1 tablet (40 mg total) by mouth daily as needed. For fast HR    tramadol (ULTRAM) 50 mg tablet Take 1 tablet (50 mg total) by mouth every 6 (six) hours as needed.    aspirin 81 MG Chew Take 81 mg by mouth once daily.    mesalamine (APRISO) 0.375 gram Cp24 Take 4 capsules (1.5 g total) by mouth once daily.    polyethylene glycol (MOVIPREP) 100-7.5-2.691 gram solution Take 2,000 mLs by mouth once.     No current facility-administered medications for this visit.        Objective Findings:    Vital Signs:  /73   Pulse 94   Ht 5' 4" (1.626 m)   Wt 79.3 kg (174 lb 13.2 oz)   BMI 30.01 kg/m²   Body mass index is 30.01 kg/m².    Physical Exam:  General Appearance: Well appearing in no acute distress  Head:   Normocephalic, without obvious abnormality  Eyes:    No scleral icterus  ENT: Neck supple, No aphthous ulceration seen  Lungs: CTA bilaterally in anterior and posterior fields, no wheezes, no crackles.  Heart:  Regular rate and rhythm, S1, S2 normal, no murmurs heard  Abdomen: Soft, non tender, non distended with positive bowel sounds in all four quadrants.  Extremities: no edema  Skin: No rash to exposed areas  Neurologic: A&Ox4      Labs:  Lab Results   Component Value Date    WBC 8.98 09/15/2017    WBC 8.98 09/15/2017    HGB 13.8 09/15/2017    HGB 13.8 09/15/2017    HCT 40.8 09/15/2017    HCT 40.8 09/15/2017     (H) 09/15/2017     (H) 09/15/2017    CHOL 187 06/15/2017    TRIG 120 06/15/2017    HDL 68 06/15/2017    ALT 27 06/15/2017    AST 17 06/15/2017     06/15/2017    K 3.8 06/15/2017     06/15/2017    CREATININE 0.6 06/15/2017    BUN 10 06/15/2017    CO2 22 (L) 06/15/2017    TSH 0.476 06/15/2017    INR " 0.9 02/14/2017    HGBA1C 5.8 10/09/2012         Imaging:    Endoscopy:    11/2015- chronic active colitis from cecum to ascending  Assessment:    1. Other ulcerative colitis without complication    2. Nausea       51yo F with UC diagnosed 15yrs ago doing well clinically taking 2.2g of lialda.      Recommendations:  1. Schedule colonoscopy to evaluate for mucosal healing and surveillance biopsies to rule out malignancies    2. Abd US to rule out gallstones    3.  Labs for mesalamine use    4. Ref to infectious disease for tetanus and flu vaccines    5. Schedule EGD to rule out gastric ulcers and Bx for H. Pylori    6. Rx for apriso was written at pt request to see if the pill is any smaller than the lialda.    Return in about 3 months (around 12/15/2017). Dr Miller    Order summary:  Orders Placed This Encounter   Procedures    US Abdomen Complete    CBC auto differential    Comprehensive metabolic panel    C-reactive protein    CBC auto differential    Comprehensive metabolic panel    C-reactive protein    Ambulatory Referral to Infectious Disease       Thank you so much for allowing me to participate in the care of Sharlene Mercado PA-C

## 2017-09-15 NOTE — TELEPHONE ENCOUNTER
Patient was asking if you could send her prescription for Apriso to [facility] [address] instead of [facility]. Patient stated that [facility] no longer accepts her insurance.     ThanksSarina

## 2017-09-15 NOTE — LETTER
September 15, 2017      Mara Chang MD  [address] [facility]  [address]  [facility] [address]           [facility]  [address]  [place] [address]  Phone: [phone]  Fax: [phone]          Patient: Sharlene Smith   MR Number: [id number]   YOB: 1965   Date of Visit: 9/15/2017       Dear Dr. Mara Chang:    Thank you for referring Sharlene Smith to me for evaluation. Attached you will find relevant portions of my assessment and plan of care.    If you have questions, please do not hesitate to call me. I look forward to following Sharlene Smith along with you.    Sincerely,    Amando Mercado PA-C    Enclosure  CC:  No Recipients    If you would like to receive this communication electronically, please contact [email or web address] or [phone] to request more information on [employer] Link access.    For providers and/or their staff who would like to refer a patient to [facility], please contact us through our one-stop-shop provider referral line, [facility], at [phone].    If you feel you have received this communication in error or would no longer like to receive these types of communications, please e-mail [email or web address]

## 2017-09-23 ENCOUNTER — PATIENT MESSAGE (OUTPATIENT)
Dept: GASTROENTEROLOGY | Facility: CLINIC | Age: 52
End: 2017-09-23

## 2017-09-29 ENCOUNTER — HOSPITAL ENCOUNTER (OUTPATIENT)
Dept: RADIOLOGY | Facility: HOSPITAL | Age: 52
Discharge: HOME | End: 2017-09-29
Attending: PHYSICIAN ASSISTANT
Payer: OTHER GOVERNMENT

## 2017-09-29 DIAGNOSIS — R11.0 NAUSEA: ICD-10-CM

## 2017-09-29 DIAGNOSIS — K51.80 OTHER ULCERATIVE COLITIS WITHOUT COMPLICATION: ICD-10-CM

## 2017-09-29 PROCEDURE — 76700 US EXAM ABDOM COMPLETE: CPT | Mod: TC

## 2017-09-29 PROCEDURE — 76700 US EXAM ABDOM COMPLETE: CPT | Mod: 26,,, | Performed by: RADIOLOGY

## 2017-10-04 ENCOUNTER — PATIENT MESSAGE (OUTPATIENT)
Dept: GASTROENTEROLOGY | Facility: CLINIC | Age: 52
End: 2017-10-04

## 2017-10-04 ENCOUNTER — TELEPHONE (OUTPATIENT)
Dept: GASTROENTEROLOGY | Facility: CLINIC | Age: 52
End: 2017-10-04

## 2017-10-04 DIAGNOSIS — R11.0 NAUSEA: Primary | ICD-10-CM

## 2017-10-04 DIAGNOSIS — D13.5 ADENOMYOMATOSIS OF GALLBLADDER: ICD-10-CM

## 2017-10-04 NOTE — TELEPHONE ENCOUNTER
Attempted to contact in regards to HEP appt with no success.    Left message for patient to call back.

## 2017-10-05 ENCOUNTER — PATIENT MESSAGE (OUTPATIENT)
Dept: GASTROENTEROLOGY | Facility: CLINIC | Age: 52
End: 2017-10-05

## 2017-10-05 ENCOUNTER — TELEPHONE (OUTPATIENT)
Dept: GASTROENTEROLOGY | Facility: CLINIC | Age: 52
End: 2017-10-05

## 2017-10-13 ENCOUNTER — HOSPITAL ENCOUNTER (OUTPATIENT)
Facility: HOSPITAL | Age: 52
Discharge: HOME | End: 2017-10-13
Attending: INTERNAL MEDICINE | Admitting: INTERNAL MEDICINE
Payer: OTHER GOVERNMENT

## 2017-10-13 ENCOUNTER — ANESTHESIA (OUTPATIENT)
Dept: ENDOSCOPY | Facility: HOSPITAL | Age: 52
End: 2017-10-13
Payer: OTHER GOVERNMENT

## 2017-10-13 ENCOUNTER — ANESTHESIA EVENT (OUTPATIENT)
Dept: ENDOSCOPY | Facility: HOSPITAL | Age: 52
End: 2017-10-13
Payer: OTHER GOVERNMENT

## 2017-10-13 ENCOUNTER — SURGERY (OUTPATIENT)
Age: 52
End: 2017-10-13

## 2017-10-13 VITALS
HEART RATE: 98 BPM | TEMPERATURE: 98 F | HEIGHT: 64 IN | RESPIRATION RATE: 18 BRPM | DIASTOLIC BLOOD PRESSURE: 75 MMHG | BODY MASS INDEX: 29.37 KG/M2 | OXYGEN SATURATION: 97 % | WEIGHT: 172 LBS | SYSTOLIC BLOOD PRESSURE: 129 MMHG

## 2017-10-13 VITALS — RESPIRATION RATE: 30 BRPM

## 2017-10-13 DIAGNOSIS — K51.90 ULCERATIVE COLITIS: ICD-10-CM

## 2017-10-13 DIAGNOSIS — K51.919 ULCERATIVE COLITIS WITH COMPLICATION, UNSPECIFIED LOCATION: Primary | ICD-10-CM

## 2017-10-13 LAB
B-HCG UR QL: NEGATIVE
CTP QC/QA: YES

## 2017-10-13 PROCEDURE — D9220A PRA ANESTHESIA: Mod: ANES,,, | Performed by: ANESTHESIOLOGY

## 2017-10-13 PROCEDURE — 45380 COLONOSCOPY AND BIOPSY: CPT | Mod: 59,,, | Performed by: INTERNAL MEDICINE

## 2017-10-13 PROCEDURE — 25000003 PHARM REV CODE 250: Performed by: INTERNAL MEDICINE

## 2017-10-13 PROCEDURE — 45385 COLONOSCOPY W/LESION REMOVAL: CPT | Mod: ,,, | Performed by: INTERNAL MEDICINE

## 2017-10-13 PROCEDURE — 43239 EGD BIOPSY SINGLE/MULTIPLE: CPT | Performed by: INTERNAL MEDICINE

## 2017-10-13 PROCEDURE — 45385 COLONOSCOPY W/LESION REMOVAL: CPT | Performed by: INTERNAL MEDICINE

## 2017-10-13 PROCEDURE — 37000008 HC ANESTHESIA 1ST 15 MINUTES: Performed by: INTERNAL MEDICINE

## 2017-10-13 PROCEDURE — 37000009 HC ANESTHESIA EA ADD 15 MINS: Performed by: INTERNAL MEDICINE

## 2017-10-13 PROCEDURE — 81025 URINE PREGNANCY TEST: CPT | Performed by: INTERNAL MEDICINE

## 2017-10-13 PROCEDURE — 88305 TISSUE EXAM BY PATHOLOGIST: CPT | Mod: 26,,, | Performed by: PATHOLOGY

## 2017-10-13 PROCEDURE — 43239 EGD BIOPSY SINGLE/MULTIPLE: CPT | Mod: 51,,, | Performed by: INTERNAL MEDICINE

## 2017-10-13 PROCEDURE — 25000003 PHARM REV CODE 250: Performed by: NURSE ANESTHETIST, CERTIFIED REGISTERED

## 2017-10-13 PROCEDURE — 45380 COLONOSCOPY AND BIOPSY: CPT | Performed by: INTERNAL MEDICINE

## 2017-10-13 PROCEDURE — 27201012 HC FORCEPS, HOT/COLD, DISP: Performed by: INTERNAL MEDICINE

## 2017-10-13 PROCEDURE — 27201089 HC SNARE, DISP (ANY): Performed by: INTERNAL MEDICINE

## 2017-10-13 PROCEDURE — 88305 TISSUE EXAM BY PATHOLOGIST: CPT | Performed by: PATHOLOGY

## 2017-10-13 PROCEDURE — D9220A PRA ANESTHESIA: Mod: CRNA,,, | Performed by: NURSE ANESTHETIST, CERTIFIED REGISTERED

## 2017-10-13 PROCEDURE — 63600175 PHARM REV CODE 636 W HCPCS: Performed by: NURSE ANESTHETIST, CERTIFIED REGISTERED

## 2017-10-13 RX ORDER — SODIUM CHLORIDE 9 MG/ML
INJECTION, SOLUTION INTRAVENOUS CONTINUOUS
Status: DISCONTINUED | OUTPATIENT
Start: 2017-10-13 | End: 2017-10-13 | Stop reason: HOSPADM

## 2017-10-13 RX ORDER — LIDOCAINE HCL/PF 100 MG/5ML
SYRINGE (ML) INTRAVENOUS
Status: DISCONTINUED | OUTPATIENT
Start: 2017-10-13 | End: 2017-10-13

## 2017-10-13 RX ORDER — PROPOFOL 10 MG/ML
VIAL (ML) INTRAVENOUS
Status: DISCONTINUED | OUTPATIENT
Start: 2017-10-13 | End: 2017-10-13

## 2017-10-13 RX ORDER — GLYCOPYRROLATE 0.2 MG/ML
INJECTION INTRAMUSCULAR; INTRAVENOUS
Status: DISCONTINUED | OUTPATIENT
Start: 2017-10-13 | End: 2017-10-13

## 2017-10-13 RX ADMIN — PROPOFOL 30 MG: 10 INJECTION, EMULSION INTRAVENOUS at 10:10

## 2017-10-13 RX ADMIN — PROPOFOL 30 MG: 10 INJECTION, EMULSION INTRAVENOUS at 09:10

## 2017-10-13 RX ADMIN — SODIUM CHLORIDE: 0.9 INJECTION, SOLUTION INTRAVENOUS at 09:10

## 2017-10-13 RX ADMIN — PROPOFOL 140 MG: 10 INJECTION, EMULSION INTRAVENOUS at 09:10

## 2017-10-13 RX ADMIN — GLYCOPYRROLATE 0.1 MG: 0.2 INJECTION, SOLUTION INTRAMUSCULAR; INTRAVENOUS at 09:10

## 2017-10-13 RX ADMIN — PROPOFOL 50 MG: 10 INJECTION, EMULSION INTRAVENOUS at 09:10

## 2017-10-13 RX ADMIN — LIDOCAINE HYDROCHLORIDE 100 MG: 20 INJECTION, SOLUTION INTRAVENOUS at 09:10

## 2017-10-13 NOTE — ANESTHESIA RELEASE NOTE
"Anesthesia Release from PACU Note    Patient: Sharlene Smith    Procedure(s) Performed: Procedure(s) (LRB):  ESOPHAGOGASTRODUODENOSCOPY (EGD) (N/A)  COLONOSCOPY (N/A)    Anesthesia type: GEN    Post pain: Adequate analgesia reported    Post assessment: no apparent anesthetic complications, tolerated procedure well and no evidence of recall    Post vital signs: /75 (BP Location: Left arm, Patient Position: Lying)   Pulse 98   Temp 36.5 °C (97.7 °F) (Temporal)   Resp 18   Ht 5' 4" (1.626 m)   Wt 78 kg (172 lb)   SpO2 97%   Breastfeeding? No   BMI 29.52 kg/m²     Level of consciousness: awake, alert and oriented    Nausea/Vomiting: no nausea/no vomiting    Complications: none    Airway Patency: patent    Respiratory: unassisted, spontaneous ventilation, room air    Cardiovascular: stable and blood pressure at baseline    Hydration: euvolemic    "

## 2017-10-13 NOTE — PATIENT INSTRUCTIONS
Discharge Summary/Instructions after an Endoscopic Procedure  Patient Name: Sharlene Smith  Patient MRN: [id number]  Patient YOB: 1965 Friday, October 13, 2017  Petr Miller MD  RESTRICTIONS:  During your procedure today, you received medications for sedation.  These   medications may affect your judgment, balance and coordination.  Therefore,   for 24 hours, you have the following restrictions:   - DO NOT drive a car, operate machinery, make legal/financial decisions,   sign important papers or drink alcohol.    ACTIVITY:  The following day: return to full activity including work, except no heavy   lifting, straining or running for 3 days if polyps were removed.  DIET:  Eat and drink normally unless instructed otherwise.     TREATMENT FOR COMMON SIDE EFFECTS:  - Mild abdominal pain, belching, bloating or excessive gas: rest, eat   lightly and use a heating pad.  - Sore Throat: treat with throat lozenges and/or gargle with warm salt   water.  SYMPTOMS TO WATCH FOR AND REPORT TO YOUR PHYSICIAN:  1. Abdominal pain or bloating, other than gas cramps.  2. Chest pain.  3. Back pain.  4. Chills or fever occurring within 24 hours after the procedure.  5. Rectal bleeding, which would show as bright red, maroon, or black stools.   (A tablespoon of blood from the rectum is not serious, especially if   hemorrhoids are present.)  6. Vomiting.  7. Weakness or dizziness.  8. Because air was used during the procedure, expelling large amounts of air   from your rectum or belching is normal.  9. If a bowel prep was taken, you may not have a bowel movement for 1-3   days.  This is normal.  GO DIRECTLY TO THE NEAREST EMERGENCY ROOM IF YOU HAVE ANY OF THE FOLLOWING:      Difficulty breathing  Chills and/or fever over 101 F   Persistent vomiting and/or vomiting blood   Severe abdominal pain   Severe chest pain   Black, tarry stools   Bleeding- more than one tablespoon  Your doctor recommends these additional  instructions:  If any biopsies were taken, your doctors clinic will contact you in 1 to 2   weeks with any results.  You have a contact number available for emergencies.  The signs and symptoms   of potential delayed complications were discussed with you.  You may return   to normal activities tomorrow.  Written discharge instructions were   provided to you.   You are being discharged to home.   Resume your previous diet.   Continue your present medications.   We are waiting for your pathology results.   Your physician has recommended a repeat colonoscopy in two years for   surveillance.  For questions, problems or results please call your physician - Petr Miller MD at Work:  [phone].  [facility], EMERGENCY ROOM PHONE NUMBER: [phone]  IF A COMPLICATION OR EMERGENCY SITUATION ARISES AND YOU ARE UNABLE TO REACH   YOUR PHYSICIAN - GO DIRECTLY TO THE EMERGENCY ROOM.  Petr Miller MD  10/13/2017 10:27:01 AM  This report has been verified and signed electronically.

## 2017-10-13 NOTE — H&P (VIEW-ONLY)
[facility]          Inflammatory Bowel Disease Clinic Note    Reason for Visit:  The primary encounter diagnosis was Other ulcerative colitis without complication. A diagnosis of Nausea was also pertinent to this visit.    PCP: Mara Temple   [address] / [facility] [address]    Referring MD:   Mara Temple Md  [address]  [address]  [place] [address]    Inflammatory bowel disease history:  This is a 52 y.o. female here to follow up on her ulcerative colitis  Disease was originally diagnosed: 2002  Last seen for 11/2015 colonoscopy    Interval history:  Bowel movements/day - 1-2 formed  Abdominal pain - no   Extraintestinal manifestations - generalized joint pains   No blood in stool    Also having nausea after meals - rare GERD, no vomiting  Admits to family hx of gallbladder disease    Current medications -   lialda 1.2g - two pills daily. She says she  Would like to try to find a smaller pill    Failed/Previous medications -  Avoiding naids    IBD Health Maintenance  Medication specific labs: CBC, CMP, CRP, (q 3 months), TPMT (prior to initiation of biologic therapy)    Immunizations:    - Flu shot: needed  - Stay up to date with DTaP (diphtheria, tetanus, pertussis) every 10 years: needed  - Shingles (if over 50 and nonimmunosuppressed): consider    Appointments:  - Women get an annual pap smear if immunosuppressed: N/A  - Get an annual visual exam of skin by dermatologist if  Immunosuppressed:   - Get an annual ophthalmology exam (you are at risk of developing iritis and uveitis with Crohn's disease): N/A    ROS:  Constitutional: No fevers, no chills, No unintentional weight loss, no fatigue,   ENT: No allergies  CV: No chest pain, no palpitations, no perif. edema, no sob on exertion  Pulm: No cough, No shortness of breath, no wheezes, no sputum  Ophtho: No vision changes  GI: see HPI; also no nausea, no vomiting, no  change in appetite  Derm: No rash  Heme: No lymphadenopathy, No bruising  MSK: +arthritis in multiple joints, no muscle pain, no muscle weakness  : No dysuria, No hematuria  Endo: No hot or cold intolerance  Neuro: No syncope, No seizure,     Medical History:  has a past medical history of Abnormal Pap smear of vagina (yrs ago); Allergy; Asthma; Hyperlipidemia; Supraventricular tachycardia; SVT (supraventricular tachycardia); Tachycardia; and Ulcerative colitis.    Surgical History:  has a past surgical history that includes Colonoscopy (N/A, 2015) and  section, classic.    Family History: family history includes Diabetes in her father; Heart attack in her father and paternal grandmother; Hyperlipidemia in her mother..     Social History:  reports that she quit smoking about 18 years ago. Her smoking use included Cigarettes. She has never used smokeless tobacco. She reports that she drinks about 4.2 oz of alcohol per week . She reports that she does not use drugs.    Review of patient's allergies indicates:   Allergen Reactions    Hydrocodone        Current Outpatient Prescriptions   Medication Sig    albuterol (PROAIR HFA) 90 mcg/actuation inhaler Inhale 1-2 puffs into the lungs every 6 (six) hours as needed for Wheezing.    alprazolam (XANAX) 0.25 MG tablet Take 1 tablet (0.25 mg total) by mouth 2 (two) times daily as needed for Anxiety.    atorvastatin (LIPITOR) 20 MG tablet TAKE 1 TABLET EVERY EVENING    budesonide (RINOCORT AQUA) 32 mcg/actuation nasal spray 1 spray (32 mcg total) by Nasal route once daily.    CAMRESE LO 0.10 mg-20 mcg (84)/10 mcg (7) 3MPk Take 1 tablet by mouth once daily.    cetirizine (ZYRTEC) 10 MG tablet Take 1 tablet (10 mg total) by mouth once daily.    diltiaZEM (CARDIZEM CD) 240 MG 24 hr capsule Take 1 capsule (240 mg total) by mouth once daily.    fexofenadine (ALLEGRA) 180 MG tablet Take 1 tablet (180 mg total) by mouth once daily.    meperidine (DEMEROL) 50  "mg tablet Take 1 tablet (50 mg total) by mouth nightly as needed for Pain.    montelukast (SINGULAIR) 10 mg tablet TAKE 1 TABLET EVERY EVENING    paroxetine (PAXIL) 10 MG tablet Take 1 tablet (10 mg total) by mouth every morning.    propranolol (INDERAL) 40 MG tablet Take 1 tablet (40 mg total) by mouth daily as needed. For fast HR    tramadol (ULTRAM) 50 mg tablet Take 1 tablet (50 mg total) by mouth every 6 (six) hours as needed.    aspirin 81 MG Chew Take 81 mg by mouth once daily.    mesalamine (APRISO) 0.375 gram Cp24 Take 4 capsules (1.5 g total) by mouth once daily.    polyethylene glycol (MOVIPREP) 100-7.5-2.691 gram solution Take 2,000 mLs by mouth once.     No current facility-administered medications for this visit.        Objective Findings:    Vital Signs:  /73   Pulse 94   Ht 5' 4" (1.626 m)   Wt 79.3 kg (174 lb 13.2 oz)   BMI 30.01 kg/m²   Body mass index is 30.01 kg/m².    Physical Exam:  General Appearance: Well appearing in no acute distress  Head:   Normocephalic, without obvious abnormality  Eyes:    No scleral icterus  ENT: Neck supple, No aphthous ulceration seen  Lungs: CTA bilaterally in anterior and posterior fields, no wheezes, no crackles.  Heart:  Regular rate and rhythm, S1, S2 normal, no murmurs heard  Abdomen: Soft, non tender, non distended with positive bowel sounds in all four quadrants.  Extremities: no edema  Skin: No rash to exposed areas  Neurologic: A&Ox4      Labs:  Lab Results   Component Value Date    WBC 8.98 09/15/2017    WBC 8.98 09/15/2017    HGB 13.8 09/15/2017    HGB 13.8 09/15/2017    HCT 40.8 09/15/2017    HCT 40.8 09/15/2017     (H) 09/15/2017     (H) 09/15/2017    CHOL 187 06/15/2017    TRIG 120 06/15/2017    HDL 68 06/15/2017    ALT 27 06/15/2017    AST 17 06/15/2017     06/15/2017    K 3.8 06/15/2017     06/15/2017    CREATININE 0.6 06/15/2017    BUN 10 06/15/2017    CO2 22 (L) 06/15/2017    TSH 0.476 06/15/2017    INR " 0.9 02/14/2017    HGBA1C 5.8 10/09/2012         Imaging:    Endoscopy:    11/2015- chronic active colitis from cecum to ascending  Assessment:    1. Other ulcerative colitis without complication    2. Nausea       51yo F with UC diagnosed 15yrs ago doing well clinically taking 2.2g of lialda.      Recommendations:  1. Schedule colonoscopy to evaluate for mucosal healing and surveillance biopsies to rule out malignancies    2. Abd US to rule out gallstones    3.  Labs for mesalamine use    4. Ref to infectious disease for tetanus and flu vaccines    5. Schedule EGD to rule out gastric ulcers and Bx for H. Pylori    6. Rx for apriso was written at pt request to see if the pill is any smaller than the lialda.    Return in about 3 months (around 12/15/2017). Dr Miller    Order summary:  Orders Placed This Encounter   Procedures    US Abdomen Complete    CBC auto differential    Comprehensive metabolic panel    C-reactive protein    CBC auto differential    Comprehensive metabolic panel    C-reactive protein    Ambulatory Referral to Infectious Disease       Thank you so much for allowing me to participate in the care of Sharlene Mercado PA-C

## 2017-10-13 NOTE — INTERVAL H&P NOTE
The patient has been examined and the H&P has been reviewed:    There is no interval changes since last encounter.    EGD/Colonoscopy: Nausea and Ulcerative Colitis  Sedation: GA  ASA; Per anesthesia  Mallampati: Per anesthesia      Endoscopy risks, benefits and alternative options discussed and understood by patient/family.          Active Hospital Problems    Diagnosis  POA    Ulcerative colitis [K51.90]  Yes      Resolved Hospital Problems    Diagnosis Date Resolved POA   No resolved problems to display.

## 2017-10-13 NOTE — PATIENT INSTRUCTIONS
Discharge Summary/Instructions after an Endoscopic Procedure  Patient Name: Sharlene Smith  Patient MRN: [id number]  Patient YOB: 1965 Friday, October 13, 2017  Petr Miller MD  RESTRICTIONS:  During your procedure today, you received medications for sedation.  These   medications may affect your judgment, balance and coordination.  Therefore,   for 24 hours, you have the following restrictions:   - DO NOT drive a car, operate machinery, make legal/financial decisions,   sign important papers or drink alcohol.    ACTIVITY:  The following day: return to full activity including work, except no heavy   lifting, straining or running for 3 days if polyps were removed.  DIET:  Eat and drink normally unless instructed otherwise.     TREATMENT FOR COMMON SIDE EFFECTS:  - Mild abdominal pain, belching, bloating or excessive gas: rest, eat   lightly and use a heating pad.  - Sore Throat: treat with throat lozenges and/or gargle with warm salt   water.  SYMPTOMS TO WATCH FOR AND REPORT TO YOUR PHYSICIAN:  1. Abdominal pain or bloating, other than gas cramps.  2. Chest pain.  3. Back pain.  4. Chills or fever occurring within 24 hours after the procedure.  5. Rectal bleeding, which would show as bright red, maroon, or black stools.   (A tablespoon of blood from the rectum is not serious, especially if   hemorrhoids are present.)  6. Vomiting.  7. Weakness or dizziness.  8. Because air was used during the procedure, expelling large amounts of air   from your rectum or belching is normal.  9. If a bowel prep was taken, you may not have a bowel movement for 1-3   days.  This is normal.  GO DIRECTLY TO THE NEAREST EMERGENCY ROOM IF YOU HAVE ANY OF THE FOLLOWING:      Difficulty breathing  Chills and/or fever over 101 F   Persistent vomiting and/or vomiting blood   Severe abdominal pain   Severe chest pain   Black, tarry stools   Bleeding- more than one tablespoon  Your doctor recommends these additional  instructions:  If any biopsies were taken, your doctors clinic will contact you in 1 to 2   weeks with any results.  You have a contact number available for emergencies.  The signs and symptoms   of potential delayed complications were discussed with you.  You may return   to normal activities tomorrow.  Written discharge instructions were   provided to you.   You are being discharged to home.   Resume your previous diet.   Resume your previous diet for one week.   Continue your present medications.   We are waiting for your pathology results.  For questions, problems or results please call your physician - Petr Miller MD at Work:  [phone].  [facility], EMERGENCY ROOM PHONE NUMBER: [phone]  IF A COMPLICATION OR EMERGENCY SITUATION ARISES AND YOU ARE UNABLE TO REACH   YOUR PHYSICIAN - GO DIRECTLY TO THE EMERGENCY ROOM.  Petr Miller MD  10/13/2017 9:46:03 AM  This report has been verified and signed electronically.

## 2017-10-13 NOTE — ANESTHESIA PREPROCEDURE EVALUATION
10/13/2017  Sharlene Smith is a 52 y.o., female with a pre-operative diagnosis of Ulcerative colitis with complication, unspecified location [K51.919] who is scheduled for Procedure(s) (LRB):  ESOPHAGOGASTRODUODENOSCOPY (EGD) (N/A)  COLONOSCOPY (N/A).     Requested anesthesia type: General  Surgeon: Petr Miller MD  Allergies:   Review of patient's allergies indicates:   Allergen Reactions    Hydrocodone      Vital Sign Range:    Chronic Medications:   Prescriptions Prior to Admission   Medication Sig Dispense Refill Last Dose    albuterol (PROAIR HFA) 90 mcg/actuation inhaler Inhale 1-2 puffs into the lungs every 6 (six) hours as needed for Wheezing. 1 Inhaler 0 Taking    alprazolam (XANAX) 0.25 MG tablet Take 1 tablet (0.25 mg total) by mouth 2 (two) times daily as needed for Anxiety. 20 tablet 0 Taking    aspirin 81 MG Chew Take 81 mg by mouth once daily.   Not Taking    atorvastatin (LIPITOR) 20 MG tablet TAKE 1 TABLET EVERY EVENING 90 tablet 1 Taking    budesonide (RINOCORT AQUA) 32 mcg/actuation nasal spray 1 spray (32 mcg total) by Nasal route once daily. 8.6 g 1 Taking    CAMRESE LO 0.10 mg-20 mcg (84)/10 mcg (7) 3MPk Take 1 tablet by mouth once daily. 28 tablet 3 Taking    cetirizine (ZYRTEC) 10 MG tablet Take 1 tablet (10 mg total) by mouth once daily. 30 tablet 3 Taking    diltiaZEM (CARDIZEM CD) 240 MG 24 hr capsule Take 1 capsule (240 mg total) by mouth once daily. 90 capsule 3 Taking    fexofenadine (ALLEGRA) 180 MG tablet Take 1 tablet (180 mg total) by mouth once daily. 90 tablet 3 Taking    meperidine (DEMEROL) 50 mg tablet Take 1 tablet (50 mg total) by mouth nightly as needed for Pain. 20 tablet 0 Taking    mesalamine (APRISO) 0.375 gram Cp24 Take 4 capsules (1.5 g total) by mouth once daily. 120 capsule 0     montelukast (SINGULAIR) 10 mg tablet TAKE 1 TABLET EVERY  EVENING 90 tablet 1 Taking    paroxetine (PAXIL) 10 MG tablet Take 1 tablet (10 mg total) by mouth every morning. 30 tablet 11 Taking    propranolol (INDERAL) 40 MG tablet Take 1 tablet (40 mg total) by mouth daily as needed. For fast HR 90 tablet 11 Taking    tramadol (ULTRAM) 50 mg tablet Take 1 tablet (50 mg total) by mouth every 6 (six) hours as needed. 60 tablet 0 Taking     Current Medications:   No current facility-administered medications for this encounter.      Medical History:   Past Medical History:   Diagnosis Date    Abnormal Pap smear of vagina yrs ago    possible BX?    Allergy     Asthma     Hyperlipidemia     Supraventricular tachycardia     SVT (supraventricular tachycardia)     Tachycardia     Ulcerative colitis      .    Anesthesia Evaluation    I have reviewed the Patient Summary Reports.    I have reviewed the Nursing Notes.   I have reviewed the Medications.     Review of Systems  Anesthesia Hx:  No problems with previous Anesthesia  Denies Family Hx of Anesthesia complications.   Denies Personal Hx of Anesthesia complications.   Hematology/Oncology:  Hematology Normal   Oncology Normal     EENT/Dental:EENT/Dental Normal   Cardiovascular:   Denies Valvular problems/Murmurs.  Denies MI.  Denies CAD.   Dysrhythmias   Denies Angina.             History of SVTs, episodic, approximately 4 times a year.   Pulmonary:   Asthma asymptomatic    Renal/:  Renal/ Normal     Hepatic/GI:   PUD,    Musculoskeletal:  Musculoskeletal Normal    Neurological:   Headaches    Endocrine:  Endocrine Normal    Dermatological:  Skin Normal    Psych:  Psychiatric Normal           Physical Exam  General:  Well nourished    Airway/Jaw/Neck:  Airway Findings: Mouth Opening: Small, but > 3cm Tongue: Normal  General Airway Assessment: Adult  Mallampati: II  TM Distance: Normal, at least 6 cm      Dental:  Dental Findings: In tact   Chest/Lungs:  Chest/Lungs Findings: Clear to auscultation, Normal Respiratory  Rate     Heart/Vascular:  Heart Findings: Rate: Normal  Rhythm: Regular Rhythm        Mental Status:  Mental Status Findings:  Cooperative, Alert and Oriented         Anesthesia Plan  Type of Anesthesia, risks & benefits discussed:  Anesthesia Type:  general, MAC  Patient's Preference: as indicated  Intra-op Monitoring Plan: standard ASA monitors  Intra-op Monitoring Plan Comments:   Post Op Pain Control Plan:   Post Op Pain Control Plan Comments:   Induction:   IV  Beta Blocker:  Patient is on a Beta-Blocker and has received one dose within the past 24 hours (No further documentation required).       Informed Consent: Patient understands risks and agrees with Anesthesia plan.  Questions answered. Anesthesia consent signed with patient.  ASA Score: 3     Day of Surgery Review of History & Physical:  There are no significant changes.  H&P update referred to the provider.     Anesthesia Plan Notes: Risks of dental and eye injury reviewed with patient, agrees to proceed. Reassurance given.        Ready For Surgery From Anesthesia Perspective.

## 2017-10-13 NOTE — TRANSFER OF CARE
"Anesthesia Transfer of Care Note    Patient: Sharlene Smith    Procedure(s) Performed: Procedure(s) (LRB):  ESOPHAGOGASTRODUODENOSCOPY (EGD) (N/A)  COLONOSCOPY (N/A)    Patient location: PACU    Anesthesia Type: general    Transport from OR: Transported from OR on room air with adequate spontaneous ventilation    Post pain: adequate analgesia    Post assessment: no apparent anesthetic complications and tolerated procedure well    Post vital signs: stable    Level of consciousness: awake    Nausea/Vomiting: no nausea/vomiting    Complications: none    Transfer of care protocol was followed      Last vitals:   Visit Vitals  BP (!) 154/75 (BP Location: Left arm, Patient Position: Lying)   Pulse 105   Temp 36.7 °C (98.1 °F) (Skin)   Resp 18   Ht 5' 4" (1.626 m)   Wt 78 kg (172 lb)   SpO2 95%   Breastfeeding? No   BMI 29.52 kg/m²     "

## 2017-10-13 NOTE — ANESTHESIA POSTPROCEDURE EVALUATION
"Anesthesia Post Evaluation    Patient: Sharlene Smith    Procedure(s) Performed: Procedure(s) (LRB):  ESOPHAGOGASTRODUODENOSCOPY (EGD) (N/A)  COLONOSCOPY (N/A)    Final Anesthesia Type: general  Patient location during evaluation: GI PACU  Patient participation: Yes- Able to Participate  Level of consciousness: awake and alert and oriented  Post-procedure vital signs: reviewed and stable  Pain management: adequate  Airway patency: patent  PONV status at discharge: No PONV  Anesthetic complications: no      Cardiovascular status: stable  Respiratory status: unassisted, spontaneous ventilation and room air  Hydration status: euvolemic  Follow-up not needed.        Visit Vitals  /75 (BP Location: Left arm, Patient Position: Lying)   Pulse 98   Temp 36.5 °C (97.7 °F) (Temporal)   Resp 18   Ht 5' 4" (1.626 m)   Wt 78 kg (172 lb)   SpO2 97%   Breastfeeding? No   BMI 29.52 kg/m²       Pain/Sulma Score: Pain Assessment Performed: Yes (10/13/2017 11:00 AM)  Presence of Pain: denies (10/13/2017 11:00 AM)  Sulma Score: 10 (10/13/2017 10:30 AM)    Do dysrhythmic events.  "

## 2017-10-17 NOTE — PROGRESS NOTES
Biopsies show colitis only in a small section of the colon. Continue current medication. Follow up in GI clinic in 1 year.

## 2017-10-18 ENCOUNTER — OFFICE VISIT (OUTPATIENT)
Dept: SURGERY | Facility: CLINIC | Age: 52
End: 2017-10-18
Payer: OTHER GOVERNMENT

## 2017-10-18 ENCOUNTER — PATIENT MESSAGE (OUTPATIENT)
Dept: GASTROENTEROLOGY | Facility: CLINIC | Age: 52
End: 2017-10-18

## 2017-10-18 ENCOUNTER — TELEPHONE (OUTPATIENT)
Dept: GASTROENTEROLOGY | Facility: CLINIC | Age: 52
End: 2017-10-18

## 2017-10-18 VITALS
WEIGHT: 176.13 LBS | SYSTOLIC BLOOD PRESSURE: 144 MMHG | BODY MASS INDEX: 30.07 KG/M2 | HEIGHT: 64 IN | TEMPERATURE: 98 F | HEART RATE: 92 BPM | DIASTOLIC BLOOD PRESSURE: 93 MMHG

## 2017-10-18 DIAGNOSIS — Q44.1 GALLBLADDER ANOMALY: Primary | ICD-10-CM

## 2017-10-18 PROCEDURE — 99203 OFFICE O/P NEW LOW 30 MIN: CPT | Mod: S$PBB,,, | Performed by: SURGERY

## 2017-10-18 PROCEDURE — 99999 PR PBB SHADOW E&M-EST. PATIENT-LVL III: CPT | Mod: PBBFAC,,, | Performed by: SURGERY

## 2017-10-18 PROCEDURE — 99213 OFFICE O/P EST LOW 20 MIN: CPT | Mod: PBBFAC | Performed by: SURGERY

## 2017-10-18 NOTE — TELEPHONE ENCOUNTER
----- Message from Petr Miller MD sent at 10/17/2017  4:10 PM CDT -----  Biopsies show colitis only in a small section of the colon. Continue current medication. Follow up in GI clinic in 1 year.

## 2017-10-18 NOTE — LETTER
October 20, 2017      Amando Mercado PA-C  [address] [address]           [facility] - General Surgery  [address]  [facility] [address]  Phone: [phone]          Patient: Sharlene Smith   MR Number: [id number]   YOB: 1965   Date of Visit: 10/18/2017       Dear Amando Mercado:    Thank you for referring Sharlene Smith to me for evaluation. Attached you will find relevant portions of my assessment and plan of care.    If you have questions, please do not hesitate to call me. I look forward to following Sharlene Smith along with you.    Sincerely,    Glenn Nichole MD    Enclosure  CC:  No Recipients    If you would like to receive this communication electronically, please contact [email or web address] or [phone] to request more information on [employer] access.    For providers and/or their staff who would like to refer a patient to [facility], please contact us through our one-stop-shop provider referral line, [facility], at [phone].    If you feel you have received this communication in error or would no longer like to receive these types of communications, please e-mail [email or web address]

## 2017-10-18 NOTE — PROGRESS NOTES
H&P Clinic Consult   General Surgery    CC: Gallbladder adenomyomatosis found on U/S performed for Ulcerative Colitis     HPI: The patient is a 52 y.o. Female with a PMHx significant for Ulcerative Colitis who presents with one month of nausea that she associates with eating. The patient denies associated pain and states she experiences relief of her symptoms with antacids. The patient's UC is well-controlled with no flairs over the last 5 years. Her gastroenterologist ordered an abdominal US which showed focal gallbladder adenomyomatosis prompting her presentation to our clinic. She has no additional complaints at this time.      PMH:   Past Medical History:   Diagnosis Date    Abnormal Pap smear of vagina yrs ago    possible BX?    Allergy     Asthma     Hyperlipidemia     Supraventricular tachycardia     SVT (supraventricular tachycardia)     Tachycardia     Ulcerative colitis        PSH:   Past Surgical History:   Procedure Laterality Date     SECTION, CLASSIC      x 2    COLONOSCOPY N/A 2015    Procedure: COLONOSCOPY;  Surgeon: Petr Miller MD;  Location: [address]);  Service: Endoscopy;  Laterality: N/A;    COLONOSCOPY N/A 10/13/2017    Procedure: COLONOSCOPY;  Surgeon: Petr Miller MD;  Location: [address]);  Service: Endoscopy;  Laterality: N/A;       Allergies:   Review of patient's allergies indicates:   Allergen Reactions    Hydrocodone      Meds:   Patient's Medications   New Prescriptions    No medications on file   Previous Medications    ALBUTEROL (PROAIR HFA) 90 MCG/ACTUATION INHALER    Inhale 1-2 puffs into the lungs every 6 (six) hours as needed for Wheezing.    ALPRAZOLAM (XANAX) 0.25 MG TABLET    Take 1 tablet (0.25 mg total) by mouth 2 (two) times daily as needed for Anxiety.    ASPIRIN 81 MG CHEW    Take 81 mg by mouth once daily.    ATORVASTATIN (LIPITOR) 20 MG TABLET    TAKE 1 TABLET EVERY EVENING    BUDESONIDE (RINOCORT AQUA) 32  MCG/ACTUATION NASAL SPRAY    1 spray (32 mcg total) by Nasal route once daily.    CAMRESE LO 0.10 MG-20 MCG (84)/10 MCG (7) 3MPK    Take 1 tablet by mouth once daily.    CETIRIZINE (ZYRTEC) 10 MG TABLET    Take 1 tablet (10 mg total) by mouth once daily.    DILTIAZEM (CARDIZEM CD) 240 MG 24 HR CAPSULE    Take 1 capsule (240 mg total) by mouth once daily.    FEXOFENADINE (ALLEGRA) 180 MG TABLET    Take 1 tablet (180 mg total) by mouth once daily.    MEPERIDINE (DEMEROL) 50 MG TABLET    Take 1 tablet (50 mg total) by mouth nightly as needed for Pain.    MESALAMINE (APRISO) 0.375 GRAM CP24    Take 4 capsules (1.5 g total) by mouth once daily.    MONTELUKAST (SINGULAIR) 10 MG TABLET    TAKE 1 TABLET EVERY EVENING    PAROXETINE (PAXIL) 10 MG TABLET    Take 1 tablet (10 mg total) by mouth every morning.    PROPRANOLOL (INDERAL) 40 MG TABLET    Take 1 tablet (40 mg total) by mouth daily as needed. For fast HR    TRAMADOL (ULTRAM) 50 MG TABLET    Take 1 tablet (50 mg total) by mouth every 6 (six) hours as needed.   Modified Medications    No medications on file   Discontinued Medications    No medications on file       Family History   Problem Relation Age of Onset    Hyperlipidemia Mother     Heart attack Father     Diabetes Father     Heart attack Paternal Grandmother     Heart disease Neg Hx     Hypertension Neg Hx     Colon cancer Neg Hx     Esophageal cancer Neg Hx        Review of Systems - For pertinent positives please see HPI   Constitutional: Negative for fever and chills.   HENT: Negative for congestion and ear pain.   Respiratory: Negative for cough and shortness of breath.   Cardiovascular: Negative for chest pain and palpitations.   Gastrointestinal: Negative for nausea, vomiting, abdominal pain, diarrhea, constipation and abdominal distention.   Genitourinary: Negative for dysuria and hematuria.   Musculoskeletal: Negative for myalgias and arthralgias.   Skin: Negative for rash and wound.    Neurological: Negative for weakness and numbness.   Psychiatric/Behavioral: Negative for confusion and dysphoric mood    OBJECTIVE:     Vital Signs (Most Recent)  Temp: 98.1 °F (36.7 °C) (10/18/17 1356)  Pulse: 92 (10/18/17 1356)  BP: (!) 144/93 (10/18/17 1356)      Physical Exam:  General: well developed, well nourished, no distress  Lungs:  clear to auscultation bilaterally and normal respiratory effort  Heart: regular rate and rhythm, S1, S2 normal, no murmur, rub or gallop  Abdomen: soft, non-tender non-distented; bowel sounds normal; no masses,  no organomegaly  Neuro: A&O x3, no focal deficits   Ext: wwp, no edema   Head: NC / AT  EOMI / no scleral icterus   Neck: supple, no tracheal deviation      ASSESSMENT/PLAN:     Assessment: 52 y.o. Female with Nausea and gallbladder adenomyomatosis found on abdominal ultrasound.    Plan: Ultrasound findings are focal and reported symptamotology does not seem to correlate to gallbladder. Patient is awaiting results of recent EGD and treatment reccomendations. Patient instructed to RTC with new RUQ pain associated with eating or if symtpoms do not resolve/worsen on regiment reccomended by her Gastroenterology team. No additional follow-up necessary at this time.     Brad Caban MD

## 2017-10-19 RX ORDER — OMEPRAZOLE 40 MG/1
40 CAPSULE, DELAYED RELEASE ORAL EVERY MORNING
Qty: 30 CAPSULE | Refills: 2 | Status: SHIPPED | OUTPATIENT
Start: 2017-10-19 | End: 2018-01-04

## 2017-10-20 ENCOUNTER — TELEPHONE (OUTPATIENT)
Dept: ENDOSCOPY | Facility: HOSPITAL | Age: 52
End: 2017-10-20

## 2017-10-20 DIAGNOSIS — F41.9 ANXIETY: ICD-10-CM

## 2017-10-20 DIAGNOSIS — M25.559 ARTHRALGIA OF HIP, UNSPECIFIED LATERALITY: ICD-10-CM

## 2017-10-23 RX ORDER — TRAMADOL HYDROCHLORIDE 50 MG/1
50 TABLET ORAL EVERY 6 HOURS PRN
Qty: 60 TABLET | Refills: 0 | Status: SHIPPED | OUTPATIENT
Start: 2017-10-23 | End: 2017-12-01 | Stop reason: SDUPTHER

## 2017-10-23 RX ORDER — ALPRAZOLAM 0.25 MG/1
0.25 TABLET ORAL 2 TIMES DAILY PRN
Qty: 20 TABLET | Refills: 0 | Status: SHIPPED | OUTPATIENT
Start: 2017-10-23 | End: 2017-12-01 | Stop reason: SDUPTHER

## 2017-11-26 ENCOUNTER — HOSPITAL ENCOUNTER (EMERGENCY)
Facility: HOSPITAL | Age: 52
Discharge: HOME | End: 2017-11-26
Attending: EMERGENCY MEDICINE
Payer: OTHER GOVERNMENT

## 2017-11-26 VITALS
DIASTOLIC BLOOD PRESSURE: 64 MMHG | HEART RATE: 92 BPM | HEIGHT: 64 IN | SYSTOLIC BLOOD PRESSURE: 116 MMHG | OXYGEN SATURATION: 100 % | RESPIRATION RATE: 19 BRPM | TEMPERATURE: 98 F | WEIGHT: 170 LBS | BODY MASS INDEX: 29.02 KG/M2

## 2017-11-26 DIAGNOSIS — I47.10 SVT (SUPRAVENTRICULAR TACHYCARDIA): ICD-10-CM

## 2017-11-26 LAB
ALBUMIN SERPL BCP-MCNC: 3.6 G/DL
ALP SERPL-CCNC: 52 U/L
ALT SERPL W/O P-5'-P-CCNC: 28 U/L
ANION GAP SERPL CALC-SCNC: 14 MMOL/L
AST SERPL-CCNC: 29 U/L
BASOPHILS # BLD AUTO: 0.1 K/UL
BASOPHILS NFR BLD: 0.9 %
BILIRUB SERPL-MCNC: 0.4 MG/DL
BUN SERPL-MCNC: 15 MG/DL
CALCIUM SERPL-MCNC: 9.2 MG/DL
CHLORIDE SERPL-SCNC: 104 MMOL/L
CO2 SERPL-SCNC: 16 MMOL/L
CREAT SERPL-MCNC: 0.9 MG/DL
DIFFERENTIAL METHOD: ABNORMAL
EOSINOPHIL # BLD AUTO: 0.3 K/UL
EOSINOPHIL NFR BLD: 2.7 %
ERYTHROCYTE [DISTWIDTH] IN BLOOD BY AUTOMATED COUNT: 11.9 %
EST. GFR  (AFRICAN AMERICAN): >60 ML/MIN/1.73 M^2
EST. GFR  (NON AFRICAN AMERICAN): >60 ML/MIN/1.73 M^2
GLUCOSE SERPL-MCNC: 244 MG/DL
HCT VFR BLD AUTO: 39.7 %
HGB BLD-MCNC: 13.3 G/DL
IMM GRANULOCYTES # BLD AUTO: 0.04 K/UL
IMM GRANULOCYTES NFR BLD AUTO: 0.4 %
LYMPHOCYTES # BLD AUTO: 4.1 K/UL
LYMPHOCYTES NFR BLD: 36.4 %
MCH RBC QN AUTO: 31.4 PG
MCHC RBC AUTO-ENTMCNC: 33.5 G/DL
MCV RBC AUTO: 94 FL
MONOCYTES # BLD AUTO: 0.4 K/UL
MONOCYTES NFR BLD: 3.4 %
NEUTROPHILS # BLD AUTO: 6.4 K/UL
NEUTROPHILS NFR BLD: 56.2 %
NRBC BLD-RTO: 0 /100 WBC
PLATELET # BLD AUTO: 502 K/UL
PMV BLD AUTO: 9.4 FL
POTASSIUM SERPL-SCNC: 4.4 MMOL/L
PROT SERPL-MCNC: 7.5 G/DL
RBC # BLD AUTO: 4.24 M/UL
SODIUM SERPL-SCNC: 134 MMOL/L
WBC # BLD AUTO: 11.34 K/UL

## 2017-11-26 PROCEDURE — 96374 THER/PROPH/DIAG INJ IV PUSH: CPT

## 2017-11-26 PROCEDURE — 85025 COMPLETE CBC W/AUTO DIFF WBC: CPT

## 2017-11-26 PROCEDURE — 80053 COMPREHEN METABOLIC PANEL: CPT

## 2017-11-26 PROCEDURE — 99284 EMERGENCY DEPT VISIT MOD MDM: CPT | Mod: 25

## 2017-11-26 PROCEDURE — 93010 ELECTROCARDIOGRAM REPORT: CPT | Mod: ,,, | Performed by: INTERNAL MEDICINE

## 2017-11-26 PROCEDURE — 25000003 PHARM REV CODE 250: Performed by: EMERGENCY MEDICINE

## 2017-11-26 PROCEDURE — 93005 ELECTROCARDIOGRAM TRACING: CPT

## 2017-11-26 PROCEDURE — 96361 HYDRATE IV INFUSION ADD-ON: CPT | Mod: 59

## 2017-11-26 PROCEDURE — 99291 CRITICAL CARE FIRST HOUR: CPT | Mod: 25,,, | Performed by: EMERGENCY MEDICINE

## 2017-11-26 PROCEDURE — 92960 CARDIOVERSION ELECTRIC EXT: CPT

## 2017-11-26 PROCEDURE — 63600175 PHARM REV CODE 636 W HCPCS: Performed by: EMERGENCY MEDICINE

## 2017-11-26 PROCEDURE — 92960 CARDIOVERSION ELECTRIC EXT: CPT | Mod: 59,,, | Performed by: EMERGENCY MEDICINE

## 2017-11-26 RX ORDER — ADENOSINE 3 MG/ML
INJECTION, SOLUTION INTRAVENOUS
Status: DISCONTINUED
Start: 2017-11-26 | End: 2017-11-26 | Stop reason: HOSPADM

## 2017-11-26 RX ORDER — SODIUM CHLORIDE 9 MG/ML
1000 INJECTION, SOLUTION INTRAVENOUS
Status: COMPLETED | OUTPATIENT
Start: 2017-11-26 | End: 2017-11-26

## 2017-11-26 RX ORDER — ADENOSINE 3 MG/ML
6 INJECTION, SOLUTION INTRAVENOUS
Status: COMPLETED | OUTPATIENT
Start: 2017-11-26 | End: 2017-11-26

## 2017-11-26 RX ADMIN — ADENOSINE 6 MG: 3 INJECTION, SOLUTION INTRAVENOUS at 12:11

## 2017-11-26 RX ADMIN — SODIUM CHLORIDE 1000 ML: 0.9 INJECTION, SOLUTION INTRAVENOUS at 12:11

## 2017-11-26 NOTE — ED PROVIDER NOTES
"Encounter Date: 11/26/2017    SCRIBE #1 NOTE: I, Mary Lou Miguel, am scribing for, and in the presence of,  Dr. Miller. I have scribed the entire note.       History     Chief Complaint   Patient presents with    Palpitations     Pt presented to the ED via pov. Pt c/o palpitations. Pt c/o nausea and sweating. Pt has a hx of SVT. Pt denies any chest pain. Pt states she took her beta blocker.      Time patient was seen by the provider: 12:48 PM    The patient is a 52 y.o. female with prior history of idiopathic SVT and hyperlipidemia that was airlifted to the ED with severe tachycardia: first EKG reading today was at 1242 with a rate at 206 bpm. Symptoms began when the patient decided to have a second cup of coffee this morning. The symptoms included palpitations, severe nausea, and diaphoresis. She does not take beta blockers regularly, but states that she took 40 mg's of propanolol, which had no effects, so she was airlifted to the ED for treatment. She has experienced approximately 3-4 similar episodes in the past with the most recent occurring about six months ago in May and one before that in February. She states that she has never been shocked, but has had the adenosine injection before. She describes the past adenosine injections as extremely painful/ a heavy weight on her chest, so she conveyed slight feeling of anxiety about the "oncoming chest pains," but was eager for the medicine to stop the palpitations, which she states is a miracle drug and works well and quickly. After the adenosine was pushed via IV, the patient visibly livened up within minutes and stated that she "felt about 80% better." Her heart rate dropped to approximately 120 bpm.       The history is provided by the patient and medical records.     Review of patient's allergies indicates:   Allergen Reactions    Hydrocodone      Past Medical History:   Diagnosis Date    Abnormal Pap smear of vagina yrs ago    possible BX?    Allergy     " Asthma     Hyperlipidemia     Supraventricular tachycardia     SVT (supraventricular tachycardia)     Tachycardia     Ulcerative colitis      Past Surgical History:   Procedure Laterality Date     SECTION, CLASSIC      x 2    COLONOSCOPY N/A 2015    Procedure: COLONOSCOPY;  Surgeon: Petr Miller MD;  Location: [facility] ([address] [place] [address]);  Service: Endoscopy;  Laterality: N/A;    COLONOSCOPY N/A 10/13/2017    Procedure: COLONOSCOPY;  Surgeon: Petr Miller MD;  Location: [facility] ([address] [place] [address]);  Service: Endoscopy;  Laterality: N/A;     Family History   Problem Relation Age of Onset    Hyperlipidemia Mother     Heart attack Father     Diabetes Father     Heart attack Paternal Grandmother     Heart disease Neg Hx     Hypertension Neg Hx     Colon cancer Neg Hx     Esophageal cancer Neg Hx      Social History   Substance Use Topics    Smoking status: Former Smoker     Types: Cigarettes     Quit date: 1999    Smokeless tobacco: Never Used    Alcohol use 4.2 oz/week     4 Cans of beer, 3 Shots of liquor per week      Comment: Occasionally     Review of Systems   Constitutional: Positive for diaphoresis.   HENT: Negative.    Eyes: Negative.    Respiratory: Negative.  Negative for chest tightness and shortness of breath.    Cardiovascular: Positive for palpitations. Negative for chest pain and leg swelling.   Gastrointestinal: Positive for nausea. Negative for abdominal pain and vomiting.        Patient feels extremely sick to her stomach   Genitourinary: Negative.    Musculoskeletal: Negative.    Skin: Negative.    Allergic/Immunologic: Negative.    Neurological: Positive for weakness. Negative for tremors, light-headedness and headaches.   Psychiatric/Behavioral: Negative.        Physical Exam     Initial Vitals [17 1237]   BP Pulse Resp Temp SpO2   113/74 (!) 202 17 97.9 °F (36.6 °C) 97 %      MAP       87         Physical Exam    Nursing note and vitals  reviewed.  Constitutional: She appears well-developed and well-nourished. Distressed: SVT, rate at 206 bpm.   HENT:   Head: Normocephalic and atraumatic.   Mouth/Throat: Oropharynx is clear and moist.   Eyes: EOM are normal. Pupils are equal, round, and reactive to light.   Neck: Neck supple.   Cardiovascular: Regular rhythm, normal heart sounds and intact distal pulses. Tachycardia present.    SVT   Pulmonary/Chest: Effort normal.   Abdominal: Soft. Bowel sounds are normal.   Musculoskeletal: Normal range of motion.   Neurological: She is alert and oriented to person, place, and time. She has normal strength and normal reflexes.   Skin: Skin is warm and dry.   Psychiatric: She has a normal mood and affect. Thought content normal.         ED Course   Cardioversion  Date/Time: 2017 12:51 PM  Location procedure was performed: [facility]  Performed by: TUNG GUARDADO  Authorized by: RENAE DAVIS   Pre-operative diagnosis: SVT  Post-operative diagnosis: Tachycardia  Consent Done: Yes  Consent: Verbal consent obtained.  Risks and benefits: risks, benefits and alternatives were discussed  Consent given by: patient  Patient understanding: patient states understanding of the procedure being performed  Patient consent: the patient's understanding of the procedure matches consent given  Procedure consent: procedure consent matches procedure scheduled  Relevant documents: relevant documents present and verified  Test results: test results available and properly labeled  Site marked: the operative site was marked  Required items: required blood products, implants, devices, and special equipment available (EKG, 6 mg adenosine injection)  Patient identity confirmed: , name, verbally with patient, MRN and provided demographic data  Patient sedated: no  Cardioversion basis: elective  Indications comments: 40 mg propanolol failed with patients heart rate at 206 bpm  Pre-procedure rhythm: supraventricular  tachycardia (206 bpm)  Patient was placed in a supine position: Burton's position.  Description of findings: Patient significantly improved   Post-procedure rhythm: sinus tachycardia (120 bpm)  Complications: no complications  Patient tolerance: Patient tolerated the procedure well with no immediate complications  Technical procedures used: IV adenosine  Complications: No        Labs Reviewed - No data to display  EKG Readings: (Independently Interpreted)   1242 EKG= SVT with no ST changes at 206 bpm.             Medical Decision Making:   History:   Old Medical Records: I decided to obtain old medical records.  Initial Assessment:   Patient arrived with EKG showing SVT. She says she feels nauseated without dizziness or feelings of fainting. Blood pressure was normal during triage. She was given 6 mg's adenosine with conversion to sinus rhythm with a rate of approximately 110 bpm. Blood pressure was slightly low at this point, in the 90's (systolic) and she was given a L of IV fluids. CBC and chemistry are unremarkable. No sign of anemia, no obvious causes for SVT although she has had this several times. Patient observed for approximately an hour with no return of SVT. Patient continues to feel much better after adenosine. Will discharge home with instructions to follow up with cardiology. Advised pt to follow up with cardiology or return if concerning symptoms arise. Pt understands and agrees with plan. Will d/c home.      Independently Interpreted Test(s):   I have ordered and independently interpreted EKG Reading(s) - see prior notes  Clinical Tests:   Lab Tests: Ordered and Reviewed  Medical Tests: Ordered and Reviewed            Scribe Attestation:   Scribe #1: I performed the above scribed service and the documentation accurately describes the services I performed. I attest to the accuracy of the note.    Attending Attestation:         Attending Critical Care:   Critical Care Times:   Direct Patient Care  (initial evaluation, reassessments, and time considering the case)................................................................25 minutes.   Additional History from reviewing old medical records or taking additional history from the family, EMS, PCP, etc.......................10 minutes.   Ordering, Reviewing, and Interpreting Diagnostic Studies...............................................................................................................5 minutes.   Documentation..................................................................................................................................................................................5 minutes.   Consultation with the patient's family directly relating to the patient's condition, care, and DNR status (when patient unable)......10 minutes.   ==============================================================  · Total Critical Care Time - exclusive of procedural time: 55 minutes.  ==============================================================  Critical care was necessary to treat or prevent imminent or life-threatening deterioration of the following conditions: cardiac arrhythmia (SVT).   Critical care procedures: cardioversion.   Critical care was time spent personally by me on the following activities: obtaining history from patient or relative, examination of patient, review of old charts, ordering lab, x-rays, and/or EKG, development of treatment plan with patient or relative, ordering and performing treatments and interventions, evaluation of patient's response to treatment, interpretation of cardiac measurements and re-evaluation of patient's conition.   Critical Care Condition: potentially life-threatening           I, Dr. Yasmany Miller, personally performed the services described in this documentation. All medical record entries made by the scribe were at my direction and in my presence.  I have reviewed the chart and agree that the record  reflects my personal performance and is accurate and complete. Yasmany Miller MD.  9:46 PM 11/28/2017         ED Course      Clinical Impression:   The encounter diagnosis was SVT (supraventricular tachycardia).    Disposition:   Disposition: Discharged  Condition: Stable                        Yasmany Miller MD  11/28/17 0072

## 2017-11-26 NOTE — ED NOTES
Pt identifiers Sharlene Smith were checked and correct  LOC: The patient is awake, alert, aware of environment with an appropriate affect. Oriented x3, speaking appropriately  APPEARANCE: Pt resting comfortably, in no acute distress, pt is clean and well groomed, clothing properly fastened  SKIN: Skin warm, dry and intact, normal skin turgor, moist mucus membranes, pt diaphoretic.   RESPIRATORY: Airway is open and patent, respirations are spontaneous, even and unlabored, normal effort and rate  CARDIAC:  SVT rate and rhythm, no peripheral edema noted, capillary refill < 3 seconds, bilateral radial pulses 2+  ABDOMEN: Soft, non tender, non distended. Bowel sounds present x 4 quadrants.    NEUROLOGIC: PERRLA, facial expression is symmetrical, patient moving all extremities spontaneously, normal sensation in all extremities when touched with a finger.  Follows all commands appropriately  MUSCULOSKELETAL: No obvious deformities.

## 2017-11-26 NOTE — ED NOTES
Pt presented to the ED via pov. Pt c/o palpitations since this morning at 0900. Pt states she drank 2 cups of coffee. Pt diaphoretic. Pt denies any chest pain. Pt states she took her beta blocker. Pt's  at the bedside.

## 2017-11-26 NOTE — ED NOTES
Bed: 02  Expected date: 11/26/17  Expected time: 1:14 PM  Means of arrival:   Comments:  Intubated/transfer

## 2017-11-26 NOTE — ED NOTES
The patient is awake, alert and cooperative with a calm affect, patient is aware of environment. Airway is open and patent, respirations are spontaneous, normal respiratory effort and rate noted, skin warm and dry, moves all extremities well, appearance: no apparent distress noted. Side rails x 2. Call bell within reach. Will continue to monitor.

## 2017-12-01 ENCOUNTER — OFFICE VISIT (OUTPATIENT)
Dept: FAMILY MEDICINE | Facility: CLINIC | Age: 52
End: 2017-12-01
Payer: OTHER GOVERNMENT

## 2017-12-01 VITALS
TEMPERATURE: 98 F | HEIGHT: 64 IN | WEIGHT: 176.38 LBS | BODY MASS INDEX: 30.11 KG/M2 | HEART RATE: 92 BPM | DIASTOLIC BLOOD PRESSURE: 92 MMHG | SYSTOLIC BLOOD PRESSURE: 127 MMHG

## 2017-12-01 DIAGNOSIS — M25.559 ARTHRALGIA OF HIP, UNSPECIFIED LATERALITY: ICD-10-CM

## 2017-12-01 DIAGNOSIS — F41.9 ANXIETY: ICD-10-CM

## 2017-12-01 DIAGNOSIS — M54.9 BACK PAIN, UNSPECIFIED BACK LOCATION, UNSPECIFIED BACK PAIN LATERALITY, UNSPECIFIED CHRONICITY: Primary | ICD-10-CM

## 2017-12-01 PROCEDURE — 99999 PR PBB SHADOW E&M-EST. PATIENT-LVL IV: CPT | Mod: PBBFAC,,, | Performed by: FAMILY MEDICINE

## 2017-12-01 PROCEDURE — 99214 OFFICE O/P EST MOD 30 MIN: CPT | Mod: PBBFAC,PO | Performed by: FAMILY MEDICINE

## 2017-12-01 PROCEDURE — 99214 OFFICE O/P EST MOD 30 MIN: CPT | Mod: S$PBB,,, | Performed by: FAMILY MEDICINE

## 2017-12-01 RX ORDER — ALPRAZOLAM 0.25 MG/1
0.25 TABLET ORAL 2 TIMES DAILY PRN
Qty: 30 TABLET | Refills: 2 | Status: SHIPPED | OUTPATIENT
Start: 2017-12-01 | End: 2017-12-31

## 2017-12-01 RX ORDER — TRAMADOL HYDROCHLORIDE 50 MG/1
50 TABLET ORAL EVERY 6 HOURS PRN
Qty: 120 TABLET | Refills: 1 | Status: SHIPPED | OUTPATIENT
Start: 2017-12-01 | End: 2018-03-14 | Stop reason: SDUPTHER

## 2017-12-02 NOTE — PROGRESS NOTES
Sharlene Smith 52 y.o. presents complaining of low back pain for several days.    Past medical history significant for chronic low back pain.  Past Medical History:   Diagnosis Date    Abnormal Pap smear of vagina yrs ago    possible BX?    Allergy     Asthma     Hyperlipidemia     Supraventricular tachycardia     SVT (supraventricular tachycardia)     Tachycardia     Ulcerative colitis      Patient  reports that she quit smoking about 18 years ago. Her smoking use included Cigarettes. She has never used smokeless tobacco. She reports that she drinks about 4.2 oz of alcohol per week . She reports that she does not use drugs.  Family History   Problem Relation Age of Onset    Hyperlipidemia Mother     Heart attack Father     Diabetes Father     Heart attack Paternal Grandmother     Heart disease Neg Hx     Hypertension Neg Hx     Colon cancer Neg Hx     Esophageal cancer Neg Hx      Review of systems: Answers for HPI/ROS submitted by the patient on 11/29/2017   Back pain  Chronicity: chronic  Onset: more than 1 year ago  Frequency: constantly  Progression since onset: gradually worsening  Pain location: sacro-iliac  Pain quality: aching, shooting  Pain - numeric: 8/10  Pain is: worse during the day  Aggravated by: bending, position, sitting, standing, twisting  Stiffness is present: all day  abdominal pain: No  bladder incontinence: No  bowel incontinence: No  chest pain: No  dysuria: No  fever: No  headaches: No  leg pain: No  numbness: No  paresis: No  paresthesias: No  pelvic pain: No  perianal numbness: No  tingling: No  weakness: Yes  weight loss: No  genital pain: No  hematuria: No  Risk factors: recent trauma  Pain severity: moderate  Treatments tried: analgesics, chiropractic manipulation, home exercises  Improvement on treatment: mild    Physical exam:  Gen. appearance: Uncomfortable  Vital signs:  Vitals:    12/01/17 1507   BP: (!) 127/92   Pulse: 92   Temp: 97.9 °F (36.6 °C)      Spine: No scoliosis, no kyphosis nontender to palpation.  on from the thoracic spine to the lumbar spine negative straight leg raise bilaterally.  Neuro: Cranial nerves II through XII grossly intact, motor exam 5 out of 5 all extremities  No gait disturbances, sensation intact in all distal extremities   Vascular: Distal pulses palpable throughout good capillary refill in all nail beds  Skin: Good turgor no rashes or lesions  Mental status exam: Grossly intact    Impression:Back Pain                     Muscle spasm                     osteoarthritis                   Plan: Referred to the med card dated 12/01/2017    traMADol (ULTRAM) 50 mg tablet 50 mg, Every 6 hours PRN      ALPRAZolam (XANAX) 0.25 MG tablet 0.25 mg, 2 times daily PRN   Will contact pt with results when available   MRI Lumbar Spine Without Contrast         Ambulatory Consult to [facility]

## 2017-12-14 NOTE — PROGRESS NOTES
Subjective:      Patient ID: Sharlene Smith is a 52 y.o. female.    Chief Complaint: Low-back Pain    Referred by: Sonia Chang*     Ms Smith is a 51 yo female with ulcerative colitis here for evaluation of low back pain.  She has had low back pain for the past 7 years.  The pain is with standing too long.  It is a feeling like going to snap in half.  The pain is constant.  The pain is with standing, mopping and bending.  She did aggravate the pain with transferring her mother.  The pain is worse in the morning.  She feels best midday.  She feels best lying down.  It is hard to sleep at night.  She cannot get comfortable.  She will take tramadol for the pain.  Pain is 6/10 now, worst 9/10 standing, best 4/10 in the afternoon.  She has been to PT with some relief, she went to 2 visits, she did not continue.  She cannot have aleve.  She has not had injections.  The pain is a stabbing pain.  The pain is in the back, there is no pain in the leg.  Tingling in toes with sitting too long.      X-ray lumbar 1/2016  Lumbar vertebral bodies demonstrate preserved height and alignment.  Mild disk height loss noted at L5-S1.  Lower lumbar facet arthropathy noted.  No suspicious lytic or blastic lesions.    Sacral arcuate lines are maintained.  No evidence for fracture or destructive lesion.  Sacral iliac joints are unremarkable, without erosion or ankylosis.  Coccyx is unremarkable.  Impression      As above.    MRI lumbar 2/2016  Lumbar spine alignment is within normal limits. The vertebral body heights are well maintained, with no fracture.  No marrow signal abnormality suspicious for an infiltrative process.      The conus is normal in appearance, and terminates at the L1 level.  The adjacent soft tissue structures show a small fat containing umbilical hernia.      There are findings of multi-level  lumbar spondylosis, as below.    L1-L2: There is no focal disc herniation. No significant spinal canal or  neural foraminal narrowing.    L2-L3: There is no focal disc herniation. No significant spinal canal or neural foraminal narrowing.    L3-L4: There is no focal disc herniation. No significant spinal canal or neural foraminal narrowing.    L4-L5: There is no focal disc herniation. No significant spinal canal or neural foraminal narrowing.    L5-S1: There is a circumferential disk bulge with mild bilateral facet hypertrophy. No significant spinal canal or neural foraminal narrowing.  Impression          No focal cause for back pain is identified on today's examination.     Past Medical History:  yrs ago: Abnormal Pap smear of vagina      Comment: possible BX?  No date: Allergy  No date: Asthma  No date: Hyperlipidemia  No date: Supraventricular tachycardia  No date: SVT (supraventricular tachycardia)  No date: Tachycardia  No date: Ulcerative colitis    Past Surgical History:  No date:  SECTION, CLASSIC      Comment: x 2  2015: COLONOSCOPY N/A      Comment: Procedure: COLONOSCOPY;  Surgeon: Petr Miller MD;  Location: [facility] ([address] [place] [address]);                 Service: Endoscopy;  Laterality: N/A;  10/13/2017: COLONOSCOPY N/A      Comment: Procedure: COLONOSCOPY;  Surgeon: Petr Miller MD;  Location: [facility] ([address] [place] [address]);                 Service: Endoscopy;  Laterality: N/A;    Review of patient's family history indicates:    Hyperlipidemia                 Mother                    Heart attack                   Father                    Diabetes                       Father                    Heart attack                   Paternal Grandmother      Heart disease                  Neg Hx                    Hypertension                   Neg Hx                    Colon cancer                   Neg Hx                    Esophageal cancer              Neg Hx                      Social History    Marital status:              Spouse name:                       Years of  education:                 Number of children:               Social History Main Topics    Smoking status: Former Smoker                                                                Packs/day: 0.00      Years: 0.00           Types: Cigarettes       Quit date: 2/25/1999    Smokeless tobacco: Never Used                        Alcohol use: Yes           4.2 oz/week       Cans of beer: 4, Shots of liquor: 3 per week       Comment: Occasionally    Drug use: No              Sexual activity: Yes               Partners with: Male    Social History Narrative    Works for [employer]        Current Outpatient Prescriptions:  albuterol (PROAIR HFA) 90 mcg/actuation inhaler, Inhale 1-2 puffs into the lungs every 6 (six) hours as needed for Wheezing., Disp: 1 Inhaler, Rfl: 0  ALPRAZolam (XANAX) 0.25 MG tablet, Take 1 tablet (0.25 mg total) by mouth 2 (two) times daily as needed for Anxiety., Disp: 30 tablet, Rfl: 2  aspirin 81 MG Chew, Take 81 mg by mouth once daily., Disp: , Rfl:   atorvastatin (LIPITOR) 20 MG tablet, TAKE 1 TABLET EVERY EVENING, Disp: 90 tablet, Rfl: 1  budesonide (RINOCORT AQUA) 32 mcg/actuation nasal spray, 1 spray (32 mcg total) by Nasal route once daily., Disp: 8.6 g, Rfl: 1  CAMRESE LO 0.10 mg-20 mcg (84)/10 mcg (7) 3MPk, Take 1 tablet by mouth once daily., Disp: 28 tablet, Rfl: 3  cetirizine (ZYRTEC) 10 MG tablet, Take 1 tablet (10 mg total) by mouth once daily., Disp: 30 tablet, Rfl: 3  diltiaZEM (CARDIZEM CD) 240 MG 24 hr capsule, Take 1 capsule (240 mg total) by mouth once daily., Disp: 90 capsule, Rfl: 3  fexofenadine (ALLEGRA) 180 MG tablet, Take 1 tablet (180 mg total) by mouth once daily., Disp: 90 tablet, Rfl: 3  mesalamine (APRISO) 0.375 gram Cp24, Take 4 capsules (1.5 g total) by mouth once daily., Disp: 120 capsule, Rfl: 0  montelukast (SINGULAIR) 10 mg tablet, TAKE 1 TABLET EVERY EVENING, Disp: 90 tablet, Rfl: 1  omeprazole (PRILOSEC) 40 MG capsule, Take 1 capsule (40 mg total) by mouth  every morning. 30 minutes before breakfast, Disp: 30 capsule, Rfl: 2  paroxetine (PAXIL) 10 MG tablet, Take 1 tablet (10 mg total) by mouth every morning., Disp: 30 tablet, Rfl: 11  propranolol (INDERAL) 40 MG tablet, Take 1 tablet (40 mg total) by mouth daily as needed. For fast HR, Disp: 90 tablet, Rfl: 11  traMADol (ULTRAM) 50 mg tablet, Take 1 tablet (50 mg total) by mouth every 6 (six) hours as needed., Disp: 120 tablet, Rfl: 1    No current facility-administered medications for this visit.       Review of patient's allergies indicates:   -- Hydrocodone                 Review of Systems   Constitution: Negative for weight gain and weight loss.   Cardiovascular: Negative for chest pain.   Respiratory: Negative for shortness of breath.    Musculoskeletal: Positive for back pain. Negative for joint pain and joint swelling.   Gastrointestinal: Negative for abdominal pain and bowel incontinence.   Genitourinary: Negative for bladder incontinence.   Neurological: Negative for numbness.           Objective:          General    Vitals reviewed.  Constitutional: She is oriented to person, place, and time. She appears well-developed and well-nourished.   HENT:   Head: Normocephalic and atraumatic.   Pulmonary/Chest: Effort normal.   Neurological: She is alert and oriented to person, place, and time.   Psychiatric: She has a normal mood and affect. Her behavior is normal. Judgment and thought content normal.     General Musculoskeletal Exam   Gait: normal     Right Ankle/Foot Exam     Tests   Heel Walk: able to perform  Tiptoe Walk: able to perform    Left Ankle/Foot Exam     Tests   Heel Walk: able to perform  Tiptoe Walk: able to perform  Back (L-Spine & T-Spine) / Neck (C-Spine) Exam     Tenderness Right paramedian tenderness of the Sacrum. Left paramedian tenderness of the Sacrum.     Back (L-Spine & T-Spine) Range of Motion   Extension: 10   Flexion: 70   Lateral Bend Right: 10   Lateral Bend Left: 10   Rotation  Right: 30   Rotation Left: 30     Spinal Sensation   Right Side Sensation  C-Spine Level: normal   L-Spine Level: normal  S-Spine Level: normal  Left Side Sensation  C-Spine Level: normal  L-Spine Level: normal  S-Spine Level: normal    Back (L-Spine & T-Spine) Tests   Right Side Tests  Straight leg raise:      Sitting SLR: > 70 degrees      Left Side Tests  Straight leg raise:     Sitting SLR: > 70 degrees          Other She has no scoliosis .  Spinal Kyphosis:  Absent    Comments:  Pos juan pablo on the right for low back pain      Muscle Strength   Right Upper Extremity   Biceps: 5/5/5   Deltoid:  5/5  Triceps:  5/5  Wrist Extension: 5/5/5   Finger Flexors:  5/5  Left Upper Extremity  Biceps: 5/5/5   Deltoid:  5/5  Triceps:  5/5  Wrist Extension: 5/5/5   Finger Flexors:  5/5  Right Lower Extremity   Hip Flexion: 5/5   Quadriceps:  5/5   Anterior tibial:  5/5/5  EHL:  5/5  Left Lower Extremity   Hip Flexion: 5/5   Quadriceps:  5/5   Anterior tibial:  5/5/5   EHL:  5/5    Reflexes     Left Side  Biceps:  2+  Triceps:  2+  Brachioradialis:  2+  Quadriceps:  2+  Achilles:  2+  Left Fontanez's Sign:  Absent  Babinski Sign:  absent    Right Side   Biceps:  2+  Triceps:  2+  Brachioradialis:  2+  Quadriceps:  2+  Achilles:  2+  Right Fontanez's Sign:  absent  Babinski Sign:  absent    Vascular Exam     Right Pulses        Carotid:                  2+    Left Pulses        Carotid:                  2+        Assessment:       Encounter Diagnoses   Name Primary?    SI (sacroiliac) joint dysfunction Yes    Spondylosis of lumbar region without myelopathy or radiculopathy     Chronic bilateral low back pain without sciatica          Plan:       Sharlene was seen today for low-back pain.    Diagnoses and all orders for this visit:    SI (sacroiliac) joint dysfunction  -     Procedure Order to [facility]; Future  -     Ambulatory Referral to Physical/Occupational Therapy    Spondylosis of lumbar region without myelopathy  or radiculopathy  -     Ambulatory Referral to Physical/Occupational Therapy    Chronic bilateral low back pain without sciatica    Other orders  -     tiZANidine (ZANAFLEX) 4 MG tablet; Take 0.5-1 tablets (2-4 mg total) by mouth every 8 (eight) hours as needed.         More than 50% of the total time of 45 minutes was spent in counseling on diagnosis and treatment options. We discussed back pain and the nature of back pain.  We discussed that it is not one thing that causes the pain but an accumulation of multiple things that we do.  We discussed posture sitting and the importance of trying to sit better.  We discussed the benefits of therapy and exercise and continuing to move.  She wants to be more mobile.  She wants to be able to dance.  We discussed increasing her day to day activity  1.  PT for progressive resistance exercise at healthy back pattern 1  2.  Bilateral SI joint injections with pain management   3.  Tizanidine 2-4 mg po TID  4.  We discussed starting walking program with 5-10 min a day  5.  RTC 3 months

## 2017-12-15 ENCOUNTER — OFFICE VISIT (OUTPATIENT)
Dept: SPINE | Facility: CLINIC | Age: 52
End: 2017-12-15
Attending: PHYSICAL MEDICINE & REHABILITATION
Payer: OTHER GOVERNMENT

## 2017-12-15 VITALS
HEART RATE: 95 BPM | WEIGHT: 177 LBS | DIASTOLIC BLOOD PRESSURE: 79 MMHG | BODY MASS INDEX: 30.22 KG/M2 | HEIGHT: 64 IN | SYSTOLIC BLOOD PRESSURE: 138 MMHG

## 2017-12-15 DIAGNOSIS — M54.50 CHRONIC BILATERAL LOW BACK PAIN WITHOUT SCIATICA: ICD-10-CM

## 2017-12-15 DIAGNOSIS — M47.816 SPONDYLOSIS OF LUMBAR REGION WITHOUT MYELOPATHY OR RADICULOPATHY: ICD-10-CM

## 2017-12-15 DIAGNOSIS — M53.3 SI (SACROILIAC) JOINT DYSFUNCTION: Primary | ICD-10-CM

## 2017-12-15 DIAGNOSIS — G89.29 CHRONIC BILATERAL LOW BACK PAIN WITHOUT SCIATICA: ICD-10-CM

## 2017-12-15 PROCEDURE — 99999 PR PBB SHADOW E&M-EST. PATIENT-LVL III: CPT | Mod: PBBFAC,,, | Performed by: PHYSICAL MEDICINE & REHABILITATION

## 2017-12-15 PROCEDURE — 99213 OFFICE O/P EST LOW 20 MIN: CPT | Mod: PBBFAC | Performed by: PHYSICAL MEDICINE & REHABILITATION

## 2017-12-15 PROCEDURE — 99204 OFFICE O/P NEW MOD 45 MIN: CPT | Mod: S$PBB,,, | Performed by: PHYSICAL MEDICINE & REHABILITATION

## 2017-12-15 RX ORDER — TIZANIDINE 4 MG/1
2-4 TABLET ORAL EVERY 8 HOURS PRN
Qty: 90 TABLET | Refills: 2 | Status: SHIPPED | OUTPATIENT
Start: 2017-12-15 | End: 2018-11-07 | Stop reason: SDUPTHER

## 2017-12-15 NOTE — LETTER
December 15, 2017      Mara Chang MD  [address] [facility]  [address]  [facility] [address]           [facility]  [address], [address]  [facility] [address]  Phone: [phone]  Fax: [phone]          Patient: Sharlene Smith   MR Number: [id number]   YOB: 1965   Date of Visit: 12/15/2017       Dear Dr. Mara Chang:    Thank you for referring Sharlene Smith to me for evaluation. Attached you will find relevant portions of my assessment and plan of care.    If you have questions, please do not hesitate to call me. I look forward to following Sharlene Smith along with you.    Sincerely,    Lurdes Miller MD    Enclosure  CC:  No Recipients    If you would like to receive this communication electronically, please contact [email or web address] or [phone] to request more information on [employer] Link access.    For providers and/or their staff who would like to refer a patient to [facility], please contact us through our one-stop-shop provider referral line, [facility], at [phone].    If you feel you have received this communication in error or would no longer like to receive these types of communications, please e-mail [email or web address]

## 2018-01-04 ENCOUNTER — HOSPITAL ENCOUNTER (EMERGENCY)
Facility: HOSPITAL | Age: 53
Discharge: HOME | End: 2018-01-04
Attending: EMERGENCY MEDICINE
Payer: OTHER GOVERNMENT

## 2018-01-04 VITALS
RESPIRATION RATE: 16 BRPM | HEART RATE: 86 BPM | TEMPERATURE: 99 F | OXYGEN SATURATION: 98 % | SYSTOLIC BLOOD PRESSURE: 114 MMHG | WEIGHT: 170 LBS | HEIGHT: 64 IN | BODY MASS INDEX: 29.02 KG/M2 | DIASTOLIC BLOOD PRESSURE: 61 MMHG

## 2018-01-04 DIAGNOSIS — R11.2 NAUSEA & VOMITING: ICD-10-CM

## 2018-01-04 DIAGNOSIS — K21.9 GERD (GASTROESOPHAGEAL REFLUX DISEASE): Primary | ICD-10-CM

## 2018-01-04 LAB
ALBUMIN SERPL BCP-MCNC: 4 G/DL
ALP SERPL-CCNC: 82 U/L
ALT SERPL W/O P-5'-P-CCNC: 18 U/L
ANION GAP SERPL CALC-SCNC: 13 MMOL/L
AST SERPL-CCNC: 14 U/L
BASOPHILS # BLD AUTO: 0.07 K/UL
BASOPHILS NFR BLD: 0.7 %
BILIRUB SERPL-MCNC: 0.4 MG/DL
BUN SERPL-MCNC: 5 MG/DL
CALCIUM SERPL-MCNC: 9.8 MG/DL
CHLORIDE SERPL-SCNC: 104 MMOL/L
CO2 SERPL-SCNC: 20 MMOL/L
CREAT SERPL-MCNC: 0.7 MG/DL
DIFFERENTIAL METHOD: ABNORMAL
EOSINOPHIL # BLD AUTO: 0 K/UL
EOSINOPHIL NFR BLD: 0.4 %
ERYTHROCYTE [DISTWIDTH] IN BLOOD BY AUTOMATED COUNT: 11.2 %
EST. GFR  (AFRICAN AMERICAN): >60 ML/MIN/1.73 M^2
EST. GFR  (NON AFRICAN AMERICAN): >60 ML/MIN/1.73 M^2
GLUCOSE SERPL-MCNC: 114 MG/DL
HCT VFR BLD AUTO: 41.8 %
HGB BLD-MCNC: 14.3 G/DL
IMM GRANULOCYTES # BLD AUTO: 0.02 K/UL
IMM GRANULOCYTES NFR BLD AUTO: 0.2 %
LYMPHOCYTES # BLD AUTO: 2.4 K/UL
LYMPHOCYTES NFR BLD: 22.6 %
MCH RBC QN AUTO: 30.6 PG
MCHC RBC AUTO-ENTMCNC: 34.2 G/DL
MCV RBC AUTO: 89 FL
MONOCYTES # BLD AUTO: 0.2 K/UL
MONOCYTES NFR BLD: 1.6 %
NEUTROPHILS # BLD AUTO: 7.9 K/UL
NEUTROPHILS NFR BLD: 74.5 %
NRBC BLD-RTO: 0 /100 WBC
PLATELET # BLD AUTO: 444 K/UL
PMV BLD AUTO: 10.1 FL
POTASSIUM SERPL-SCNC: 3.7 MMOL/L
PROT SERPL-MCNC: 8.7 G/DL
RBC # BLD AUTO: 4.68 M/UL
SODIUM SERPL-SCNC: 137 MMOL/L
WBC # BLD AUTO: 10.65 K/UL

## 2018-01-04 PROCEDURE — 96374 THER/PROPH/DIAG INJ IV PUSH: CPT

## 2018-01-04 PROCEDURE — 96361 HYDRATE IV INFUSION ADD-ON: CPT

## 2018-01-04 PROCEDURE — 63600175 PHARM REV CODE 636 W HCPCS: Performed by: EMERGENCY MEDICINE

## 2018-01-04 PROCEDURE — 85025 COMPLETE CBC W/AUTO DIFF WBC: CPT

## 2018-01-04 PROCEDURE — 93010 ELECTROCARDIOGRAM REPORT: CPT | Mod: ,,, | Performed by: INTERNAL MEDICINE

## 2018-01-04 PROCEDURE — 80053 COMPREHEN METABOLIC PANEL: CPT

## 2018-01-04 PROCEDURE — 25000003 PHARM REV CODE 250: Performed by: EMERGENCY MEDICINE

## 2018-01-04 PROCEDURE — 99284 EMERGENCY DEPT VISIT MOD MDM: CPT | Mod: 25

## 2018-01-04 PROCEDURE — 93005 ELECTROCARDIOGRAM TRACING: CPT

## 2018-01-04 PROCEDURE — 99284 EMERGENCY DEPT VISIT MOD MDM: CPT | Mod: ,,, | Performed by: EMERGENCY MEDICINE

## 2018-01-04 PROCEDURE — C9113 INJ PANTOPRAZOLE SODIUM, VIA: HCPCS | Performed by: EMERGENCY MEDICINE

## 2018-01-04 PROCEDURE — 96375 TX/PRO/DX INJ NEW DRUG ADDON: CPT

## 2018-01-04 RX ORDER — OMEPRAZOLE 40 MG/1
40 CAPSULE, DELAYED RELEASE ORAL EVERY MORNING
Qty: 30 CAPSULE | Refills: 2 | Status: SHIPPED | OUTPATIENT
Start: 2018-01-04 | End: 2018-06-22 | Stop reason: SDUPTHER

## 2018-01-04 RX ORDER — ONDANSETRON 4 MG/1
4 TABLET, FILM COATED ORAL EVERY 6 HOURS
Qty: 12 TABLET | Refills: 0 | Status: SHIPPED | OUTPATIENT
Start: 2018-01-04 | End: 2019-02-01

## 2018-01-04 RX ORDER — PANTOPRAZOLE SODIUM 40 MG/10ML
80 INJECTION, POWDER, LYOPHILIZED, FOR SOLUTION INTRAVENOUS
Status: COMPLETED | OUTPATIENT
Start: 2018-01-04 | End: 2018-01-04

## 2018-01-04 RX ORDER — ONDANSETRON 2 MG/ML
4 INJECTION INTRAMUSCULAR; INTRAVENOUS
Status: COMPLETED | OUTPATIENT
Start: 2018-01-04 | End: 2018-01-04

## 2018-01-04 RX ADMIN — PANTOPRAZOLE SODIUM 80 MG: 40 INJECTION, POWDER, FOR SOLUTION INTRAVENOUS at 07:01

## 2018-01-04 RX ADMIN — ONDANSETRON 4 MG: 2 INJECTION INTRAMUSCULAR; INTRAVENOUS at 07:01

## 2018-01-04 RX ADMIN — SODIUM CHLORIDE 1000 ML: 0.9 INJECTION, SOLUTION INTRAVENOUS at 07:01

## 2018-01-04 RX ADMIN — ALUMINUM HYDROXIDE, MAGNESIUM HYDROXIDE, AND SIMETHICONE 50 ML: 200; 200; 20 SUSPENSION ORAL at 07:01

## 2018-01-04 NOTE — ED NOTES
Patient identifiers have been checked and are correct.      LOC: The patient is awake, alert, and aware of environment. The patient is oriented x 3 and speaking appropriately.   APPEARANCE: No acute distress noted.   PSYCHOSOCIAL: Patient is calm and cooperative.   SKIN: The skin is warm, dry.   RESPIRATORY: Airway is open and patent. Bilateral chest rise and fall. Respirations are spontaneous, even and unlabored. Normal effort and rate noted. No accessory muscle use noted.   CARDIAC: SR noted on cardiac monitor. Denies chest pain or SOB.   ABDOMEN: Soft and non tender to palpation. No distention noted. C/o nausea.   NEUROLOGIC: Eyes open spontaneously. Speech clear. Tolerating saliva secretions well. Able to follow commands. Moving all extremities well. Movement is purposeful.  MUSCULOSKELETAL: No obvious deformities noted.     Side rails up x2. Call light within reach. Family at bedside. Updated on plan of care. Will continue to monitor.

## 2018-01-04 NOTE — ED TRIAGE NOTES
52 year old pt presents to the ed with acid reflux and nausea  x 3-4 hours. Pt is awake alert and oriented x3. Pt denies any chest pain sob or nausea.pt has a hx of SVT but does not have chest pain.

## 2018-01-04 NOTE — ED PROVIDER NOTES
Encounter Date: 2018    SCRIBE #1 NOTE: I, Nelia Street, am scribing for, and in the presence of,  Dr. Eckert. I have scribed the following portions of the note - Other sections scribed: Attending ED Notes.       History     Chief Complaint   Patient presents with    Gastroesophageal Reflux     c/o GERD x 4 days, OTCs with no relief, c/o heartburn and constant nausea, and 1 episode of hematemesis this morning.      Mrs. Smith is a 52-year-old female with SVT, HLD, and Ulcerative colitis (diagnosed in ) who presents to the hospital with persistent reflux and nausea. She states that reflux began 4 days ago and has had nausea for the same period. She tried GasX and Emetrol at home with no relief. She also tried Zantec, which helped relieve for a short period of time. Patient had multiple episodes of retching and 1 episode of vomiting last evening that contained food and a small amount of blood.   Of note, patient had an EGD completed 2017 which showed gastropathy and patient was prescribed omeprazole. States she took the medication for one month, but did not fill the prescription after.   Patient denies chest pain, abdominal pain, fever, chills, constipation, or diarrhea.      HPI  Review of patient's allergies indicates:   Allergen Reactions    Hydrocodone      Past Medical History:   Diagnosis Date    Abnormal Pap smear of vagina yrs ago    possible BX?    Allergy     Asthma     Hyperlipidemia     Supraventricular tachycardia     SVT (supraventricular tachycardia)     Tachycardia     Ulcerative colitis      Past Surgical History:   Procedure Laterality Date     SECTION, CLASSIC      x 2    COLONOSCOPY N/A 2015    Procedure: COLONOSCOPY;  Surgeon: Petr Miller MD;  Location: [address]);  Service: Endoscopy;  Laterality: N/A;    COLONOSCOPY N/A 10/13/2017    Procedure: COLONOSCOPY;  Surgeon: Petr Miller MD;  Location: [facility] ([address] [place] [address]);  Service:  Endoscopy;  Laterality: N/A;     Family History   Problem Relation Age of Onset    Hyperlipidemia Mother     Heart attack Father     Diabetes Father     Heart attack Paternal Grandmother     Heart disease Neg Hx     Hypertension Neg Hx     Colon cancer Neg Hx     Esophageal cancer Neg Hx      Social History   Substance Use Topics    Smoking status: Former Smoker     Types: Cigarettes     Quit date: 2/25/1999    Smokeless tobacco: Never Used    Alcohol use 4.2 oz/week     4 Cans of beer, 3 Shots of liquor per week      Comment: Occasionally     Review of Systems   Constitutional: Negative for activity change, appetite change, chills, fatigue, fever and unexpected weight change.   HENT: Negative for congestion, drooling and sore throat.    Respiratory: Negative for choking and shortness of breath.    Cardiovascular: Negative for chest pain.   Gastrointestinal: Positive for nausea and vomiting (one episode). Negative for anal bleeding, blood in stool, constipation and diarrhea.   Genitourinary: Negative for dysuria, hematuria and urgency.   Musculoskeletal: Negative for back pain and gait problem.   Skin: Negative for pallor and rash.   Neurological: Negative for dizziness, syncope, weakness, light-headedness and numbness.   Hematological: Does not bruise/bleed easily.   Psychiatric/Behavioral: Negative for agitation and confusion.       Physical Exam     Initial Vitals [01/04/18 0531]   BP Pulse Resp Temp SpO2   (!) 127/99 (!) 119 18 98.5 °F (36.9 °C) 98 %      MAP       108.33         Physical Exam    Constitutional: She appears well-developed and well-nourished. She is not diaphoretic. No distress.   Fatigued-appearing    HENT:   Head: Normocephalic and atraumatic.   Mouth/Throat: No oropharyngeal exudate.   Eyes: EOM are normal. Pupils are equal, round, and reactive to light.   Neck: Normal range of motion. Neck supple. No thyromegaly present.   Cardiovascular: Regular rhythm, normal heart sounds and  intact distal pulses.   tachycardiac   Pulmonary/Chest: Breath sounds normal. No respiratory distress.   Abdominal: Soft. Bowel sounds are normal. She exhibits no distension and no ascites. There is no hepatosplenomegaly. There is no tenderness. There is no rebound.   Musculoskeletal: Normal range of motion. She exhibits no edema or tenderness.   Neurological: She is alert and oriented to person, place, and time.   Skin: Skin is warm and dry.   Psychiatric: She has a normal mood and affect.         ED Course   Procedures  Labs Reviewed - No data to display  EKG Readings: (Independently Interpreted)   Rhythm: Sinus Tachycardia.       X-Rays:   Independently Interpreted Readings:   Other Readings:  No acute findings noted          APC / Resident Notes:   7:33 AM  - Patient fatigued-appearing but in no acute distress   - Chest x-ray, CBC, CMP awaiting  - ordered zofran, protonix, and GI cocktail     8:20 AM  - patient re-examined and has immense relief from GI-cocktail and zofran; now asking for a diet     8:36 AM  - patient tachycardia resolved and nausea resolved; educated on mild diet and prescribing zofran and prilosec; patient content to be discharged home         Scribe Attestation:   Scribe #1: I performed the above scribed service and the documentation accurately describes the services I performed. I attest to the accuracy of the note.    Attending Attestation:             Attending ED Notes:   Reviewing medical records. She had an EGD done October 2017. She had mild gastropathy.           ED Course      Clinical Impression:   GERD with nausea and vomiting - prescribed zofran and prilosec     Disposition:   Disposition: Discharged                        Nelia Beck MD  Resident  01/04/18 6071

## 2018-01-04 NOTE — ED NOTES
Pain: Denies at present.    Psychosocial: Patient is calm and cooperative. Patients insight and judgement are appropriate to situation. Appears clean, well maintained, with clothing appropriate to environment. No evidence of delusions, hallucinations, or psychosis.    Neuro: Eyes open spontaneously. Awake, alert, oriented x 4. Speech clear and appropriate. Tolerating saliva secretions well. Able to follow commands, demonstrating ability to actively and appropriately communicate within context of current conversation. Symmetrical facial muscles. Moving all extremities well with no noted weakness. Adequate muscle tone present. Movement is purposeful. No evidence of impaired sensation. Responds to external stimuli with appropriate reflexes.     Airway: Bilateral chest rise and fall. RR regular and non-labored. Air entry patent and clear x 5 lobes of the lungs. No crepitus or subcutaneous emphysema noted on palpation.     Circulatory: Skin warm, dry, and pink. Apical and radial pulses strong and regular. Capillary refill/skin blanching less than 3 seconds to distal of 4 extremities. Placed on CM in NSR without ectopy.    Abdomen: Abdomen obese, soft and non-distended. Positive normo-active bowel sounds x 4 quadrants.     Urinary: Patient reports routine urination without pain, frequency, or urgency. Voids independently. Reports urine appears moisés/yellow in color.    Extremities: No redness, heat, swelling, deformity, or pain.     Skin: Intact with no bruising/discolorations noted.

## 2018-01-04 NOTE — ED NOTES
"Pt resting in bed. Denies any nausea or pain. Reports "now I just feel empty, like hungry." Questioning how much longer, will ask MD. No distress noted. Respirations even and unlabored. Will continue to monitor.   "

## 2018-01-04 NOTE — PROVIDER PROGRESS NOTES - EMERGENCY DEPT.
Encounter Date: 1/4/2018    ED Physician Progress Notes        Physician Note:   8:38 AM  Reevaluation. Patient feeling much better after treatment. No symptoms. Heart rate down to 90. Abdomen non tender. Will discharge on Prilosec and Zofran.       I, Nelia Street, am scribing for, and in the presence of, Dr. Eckert. I performed the above scribed service and the documentation accurately describes the services I performed. I attest to the accuracy of the note.

## 2018-02-20 DIAGNOSIS — E78.5 HYPERLIPIDEMIA: ICD-10-CM

## 2018-02-20 DIAGNOSIS — Z00.00 ROUTINE GENERAL MEDICAL EXAMINATION AT A HEALTH CARE FACILITY: Primary | ICD-10-CM

## 2018-02-20 RX ORDER — ATORVASTATIN CALCIUM 20 MG/1
TABLET, FILM COATED ORAL
Qty: 90 TABLET | Refills: 1 | Status: SHIPPED | OUTPATIENT
Start: 2018-02-20 | End: 2019-05-06 | Stop reason: SDUPTHER

## 2018-02-21 NOTE — TELEPHONE ENCOUNTER
----- Message from Maureen Ruiz sent at 2/21/2018  2:09 PM CST -----  Doctor appointment and lab have been scheduled.  Please link lab orders to the lab appointment.  Date of doctor appointment:  06/22  Physical or EP: Physical   Date of lab appointment: 06/15   Comments:

## 2018-03-05 ENCOUNTER — HOSPITAL ENCOUNTER (OUTPATIENT)
Facility: OTHER | Age: 53
Discharge: HOME | End: 2018-03-05
Attending: ANESTHESIOLOGY | Admitting: ANESTHESIOLOGY
Payer: OTHER GOVERNMENT

## 2018-03-05 ENCOUNTER — SURGERY (OUTPATIENT)
Age: 53
End: 2018-03-05

## 2018-03-05 VITALS
RESPIRATION RATE: 18 BRPM | OXYGEN SATURATION: 98 % | TEMPERATURE: 98 F | HEART RATE: 76 BPM | HEIGHT: 64 IN | WEIGHT: 170 LBS | SYSTOLIC BLOOD PRESSURE: 120 MMHG | DIASTOLIC BLOOD PRESSURE: 74 MMHG | BODY MASS INDEX: 29.02 KG/M2

## 2018-03-05 DIAGNOSIS — M53.3 SACROILIAC JOINT DYSFUNCTION OF BOTH SIDES: Primary | ICD-10-CM

## 2018-03-05 DIAGNOSIS — G89.29 CHRONIC PAIN: ICD-10-CM

## 2018-03-05 PROCEDURE — 27096 INJECT SACROILIAC JOINT: CPT | Mod: 50,,, | Performed by: ANESTHESIOLOGY

## 2018-03-05 PROCEDURE — 25000003 PHARM REV CODE 250: Performed by: ANESTHESIOLOGY

## 2018-03-05 PROCEDURE — 25500020 PHARM REV CODE 255: Performed by: ANESTHESIOLOGY

## 2018-03-05 PROCEDURE — 63600175 PHARM REV CODE 636 W HCPCS: Performed by: ANESTHESIOLOGY

## 2018-03-05 PROCEDURE — 27096 INJECT SACROILIAC JOINT: CPT | Mod: 50 | Performed by: ANESTHESIOLOGY

## 2018-03-05 RX ORDER — LIDOCAINE HYDROCHLORIDE 10 MG/ML
INJECTION INFILTRATION; PERINEURAL
Status: DISCONTINUED | OUTPATIENT
Start: 2018-03-05 | End: 2018-03-05 | Stop reason: HOSPADM

## 2018-03-05 RX ORDER — TRIAMCINOLONE ACETONIDE 40 MG/ML
INJECTION, SUSPENSION INTRA-ARTICULAR; INTRAMUSCULAR
Status: DISCONTINUED | OUTPATIENT
Start: 2018-03-05 | End: 2018-03-05 | Stop reason: HOSPADM

## 2018-03-05 RX ORDER — SODIUM CHLORIDE 9 MG/ML
500 INJECTION, SOLUTION INTRAVENOUS CONTINUOUS
Status: DISCONTINUED | OUTPATIENT
Start: 2018-03-05 | End: 2018-03-05 | Stop reason: HOSPADM

## 2018-03-05 RX ORDER — BUPIVACAINE HYDROCHLORIDE 2.5 MG/ML
INJECTION, SOLUTION EPIDURAL; INFILTRATION; INTRACAUDAL
Status: DISCONTINUED | OUTPATIENT
Start: 2018-03-05 | End: 2018-03-05 | Stop reason: HOSPADM

## 2018-03-05 RX ORDER — ALPRAZOLAM 0.5 MG/1
1 TABLET, ORALLY DISINTEGRATING ORAL ONCE
Status: COMPLETED | OUTPATIENT
Start: 2018-03-05 | End: 2018-03-05

## 2018-03-05 RX ADMIN — IOHEXOL 5 ML: 300 INJECTION, SOLUTION INTRAVENOUS at 01:03

## 2018-03-05 RX ADMIN — BUPIVACAINE HYDROCHLORIDE 10 ML: 2.5 INJECTION, SOLUTION EPIDURAL; INFILTRATION; INTRACAUDAL; PERINEURAL at 01:03

## 2018-03-05 RX ADMIN — ALPRAZOLAM 1 MG: 0.5 TABLET, ORALLY DISINTEGRATING ORAL at 01:03

## 2018-03-05 RX ADMIN — LIDOCAINE HYDROCHLORIDE 10 ML: 10 INJECTION, SOLUTION INFILTRATION; PERINEURAL at 01:03

## 2018-03-05 RX ADMIN — TRIAMCINOLONE ACETONIDE 40 MG: 40 INJECTION, SUSPENSION INTRA-ARTICULAR; INTRAMUSCULAR at 01:03

## 2018-03-05 NOTE — DISCHARGE INSTRUCTIONS
Home Care Instructions Pain Management:    1. DIET:   You may resume your normal diet today.   2. BATHING:   You may shower with luke warm water.  3. DRESSING:   You may remove your bandage today.   4. ACTIVITY LEVEL:   You may resume your normal activities 24 hrs after your procedure.  5. MEDICATIONS:   You may resume your normal medications today.   6. SPECIAL INSTRUCTIONS:   No heat to the injection site for 24 hrs including, bath or shower, heating pad, moist heat, or hot tubs.    Use ice pack to injection site for any pain or discomfort.  Apply ice packs for 20 minute intervals as needed.   If you have received any sedatives by mouth today you may not drive for 12 hours.    If you have received any sedation through your IV, you may not drive for 24 hrs.     PLEASE CALL YOUR DOCTOR IF:  1. Redness or swelling around the injection site.  2. Fever of 101 degrees  3. Drainage (pus) from the injection site.  4. For any continuous bleeding (some dried blood over the incision is normal.)    FOR EMERGENCIES:   If any unusual problems or difficulties occur during clinic hours, call [phone] or [phone].     Thank you for allowing us to care for you today. You may receive a survey about the care we provided. Your feedback is valuable and helps us provide excellent care throughout the community.

## 2018-03-05 NOTE — OP NOTE
Sacroiliac Joint Injection under Fluoroscopy  Time-out taken to identify patient and procedure side prior to starting the procedure.   I attest that I have reviewed the patient's home medications prior to the procedure and no contraindication have been identified. I  re-evaluated the patient after the patient was positioned for the procedure in the procedure room immediately before the procedural time-out. The vital signs are current and represent the current state of the patient which has not significantly changed since the preprocedure assessment.               Date of Service: 03/05/2018    PCP: Mara Chang MD                                                     PROCEDURE:  bilateralsacroiliac joint injection under fluoroscopy.    REASON FOR PROCEDURE: bilateral sacroiliac joint SI (sacroiliac) joint dysfunction [M53.3]  1. Sacroiliac joint dysfunction of both sides    2. Chronic pain        PHYSICIAN: Alexander Bartlett MD  ASSISTANTS: Zack Ruiz MD; Lazaro Heard MD    MEDICATIONS INJECTED:  Kenalog 20mg and Bupivacaine 0.25% (1.5ml of bupivicaine per side).    LOCAL ANESTHETIC USED: Xylocaine 1% 9ml with Sodium Bicarbonate 1ml. 3ml per site.  SEDATION MEDICATIONS: None    ESTIMATED BLOOD LOSS:  None.    COMPLICATIONS:  None.    TECHNIQUE:   Laying in the prone position, the patient was prepped and draped in the usual sterile fashion using ChloraPrep and fenestrated drape.  The area was determined under fluoroscopy.  Local Xylocaine was injected by raising a wheel and going down to the periosteum using a 27-gauge hypodermic needle.  The 3.5 inch 22-gauge spinal needle was introduce into the bilateral sacroiliac joint.  Negative pressure applied to confirm no intravascular placement.  Omnipaque was injected to confirm placement and to confirm that there was no vascular runoff.  The medication was then injected slowly.  The patient tolerated the procedure well.    PAIN BEFORE THE PROCEDURE:   4/10.    PAIN AFTER THE PROCEDURE: 0/10.    The patient was monitored for approximately 30 minutes after the procedure.  Patient was given post procedure and discharge instructions to follow at home.  We will see the patient back in two weeks or the patient may call to inform of status. The patient was discharged in a stable condition

## 2018-03-09 ENCOUNTER — TELEPHONE (OUTPATIENT)
Dept: FAMILY MEDICINE | Facility: CLINIC | Age: 53
End: 2018-03-09

## 2018-03-09 DIAGNOSIS — Z12.39 ENCOUNTER FOR BREAST CANCER SCREENING OTHER THAN MAMMOGRAM: Primary | ICD-10-CM

## 2018-03-09 NOTE — TELEPHONE ENCOUNTER
----- Message from Shayy Ochoa sent at 3/9/2018 11:27 AM CST -----  Contact: [email or web address] request  Message     Appointment Request From: Sharlene Smith    With Provider: Other - (see comments)    Would Accept With:Request to schedule a test or procedure    Preferred Date Range: From 3/30/2018 To 3/30/2018    Preferred Times: Any    Reason for visit: Request an Appt    Comments:  Mammogram

## 2018-03-10 DIAGNOSIS — I47.10 SVT (SUPRAVENTRICULAR TACHYCARDIA): ICD-10-CM

## 2018-03-12 RX ORDER — DILTIAZEM HYDROCHLORIDE 240 MG/1
CAPSULE, COATED, EXTENDED RELEASE ORAL
Qty: 90 CAPSULE | Refills: 3 | Status: SHIPPED | OUTPATIENT
Start: 2018-03-12 | End: 2021-01-12

## 2018-03-14 DIAGNOSIS — R51.9 SINUS HEADACHE: ICD-10-CM

## 2018-03-14 DIAGNOSIS — M25.559 ARTHRALGIA OF HIP, UNSPECIFIED LATERALITY: ICD-10-CM

## 2018-03-14 RX ORDER — BENZOCAINE .13; .15; .5; 2 G/100G; G/100G; G/100G; G/100G
1 GEL ORAL DAILY
Qty: 8.6 G | Refills: 1 | Status: SHIPPED | OUTPATIENT
Start: 2018-03-14 | End: 2018-09-28

## 2018-03-14 RX ORDER — TRAMADOL HYDROCHLORIDE 50 MG/1
50 TABLET ORAL EVERY 6 HOURS PRN
Qty: 120 TABLET | Refills: 1 | Status: SHIPPED | OUTPATIENT
Start: 2018-03-14 | End: 2018-06-22 | Stop reason: SDUPTHER

## 2018-03-16 ENCOUNTER — CLINICAL SUPPORT (OUTPATIENT)
Dept: REHABILITATION | Facility: OTHER | Age: 53
End: 2018-03-16
Attending: PHYSICAL MEDICINE & REHABILITATION
Payer: OTHER GOVERNMENT

## 2018-03-16 DIAGNOSIS — G89.29 CHRONIC BILATERAL LOW BACK PAIN WITHOUT SCIATICA: ICD-10-CM

## 2018-03-16 DIAGNOSIS — M54.50 CHRONIC BILATERAL LOW BACK PAIN WITHOUT SCIATICA: ICD-10-CM

## 2018-03-16 PROCEDURE — G8978 MOBILITY CURRENT STATUS: HCPCS | Mod: CK

## 2018-03-16 PROCEDURE — G8979 MOBILITY GOAL STATUS: HCPCS | Mod: CJ

## 2018-03-16 PROCEDURE — 97162 PT EVAL MOD COMPLEX 30 MIN: CPT

## 2018-03-16 PROCEDURE — 97110 THERAPEUTIC EXERCISES: CPT

## 2018-03-16 NOTE — PLAN OF CARE
[facility] - PHYSICAL THERAPY EVALUATION     Name: Sharlene Reyes [facility] Number: [id number]    Sharlene is a 52 y.o. female evaluated on 03/16/2018  Time In: 10:30  Time out: 11:00    Diagnosis:   Encounter Diagnosis   Name Primary?    Chronic bilateral low back pain without sciatica      Physician: Lurdes Miller, *  Treatment Orders: PT Eval and Treat    Past Medical History:   Diagnosis Date    Abnormal Pap smear of vagina yrs ago    possible BX?    Allergy     Asthma     Hyperlipidemia     Supraventricular tachycardia     SVT (supraventricular tachycardia)     Tachycardia     Ulcerative colitis      Current Outpatient Prescriptions   Medication Sig    albuterol (PROAIR HFA) 90 mcg/actuation inhaler Inhale 1-2 puffs into the lungs every 6 (six) hours as needed for Wheezing.    ALPRAZolam (XANAX) 0.25 MG tablet Take 1 tablet (0.25 mg total) by mouth 2 (two) times daily as needed for Anxiety.    aspirin 81 MG Chew Take 81 mg by mouth once daily.    atorvastatin (LIPITOR) 20 MG tablet TAKE 1 TABLET EVERY EVENING    budesonide (RINOCORT AQUA) 32 mcg/actuation nasal spray 1 spray (32 mcg total) by Nasal route once daily.    CAMRESE LO 0.10 mg-20 mcg (84)/10 mcg (7) 3MPk Take 1 tablet by mouth once daily.    cetirizine (ZYRTEC) 10 MG tablet Take 1 tablet (10 mg total) by mouth once daily.    diltiaZEM (CARDIZEM CD) 240 MG 24 hr capsule TAKE 1 CAPSULE DAILY    fexofenadine (ALLEGRA) 180 MG tablet Take 1 tablet (180 mg total) by mouth once daily.    mesalamine (APRISO) 0.375 gram Cp24 Take 4 capsules (1.5 g total) by mouth once daily.    montelukast (SINGULAIR) 10 mg tablet TAKE 1 TABLET EVERY EVENING    omeprazole (PRILOSEC) 40 MG capsule Take 1 capsule (40 mg total) by mouth every morning. 30 minutes before breakfast    ondansetron (ZOFRAN) 4 MG tablet Take 1 tablet (4 mg total) by mouth every 6 (six) hours.    paroxetine (PAXIL) 10 MG tablet Take 1 tablet (10 mg total) by  "mouth every morning.    propranolol (INDERAL) 40 MG tablet Take 1 tablet (40 mg total) by mouth daily as needed. For fast HR    tiZANidine (ZANAFLEX) 4 MG tablet Take 0.5-1 tablets (2-4 mg total) by mouth every 8 (eight) hours as needed.    traMADol (ULTRAM) 50 mg tablet Take 1 tablet (50 mg total) by mouth every 6 (six) hours as needed.     No current facility-administered medications for this visit.      Review of patient's allergies indicates:   Allergen Reactions    Hydrocodone      Precautions: Cardiac, asthma, Tachycardia, Ulcerative colitis     Visit # authorized: 16  Authorization period: 05/13/18  Plan of care Expiration: Sent 03/16/2018      HISTORY   History of Present Illness: Pt reports pain is located in low back with occasional referral into bilateral gluts. Pt denies symptoms into legs but does have tingling in toes with sitting too long. Pt describes symptoms as aching pain. Pt reports it feels as if she were going to "snap in half". Pt reports acute bout of signficant sciatica about 3 years ago but none since.  Pt reports pain increased after transferring her mother and performing more sedentary work duties over the past few years. Pain started around 46 years old. Mother recently passed away in January so she has not been as active or seeking socialization.       Diagnostic Tests: From EPIC Radiographs and MRI  X-ray lumbar 1/2016  Lumbar vertebral bodies demonstrate preserved height and alignment.  Mild disk height loss noted at L5-S1.  Lower lumbar facet arthropathy noted.  No suspicious lytic or blastic lesions.     Sacral arcuate lines are maintained.  No evidence for fracture or destructive lesion.  Sacral iliac joints are unremarkable, without erosion or ankylosis.  Coccyx is unremarkable.  Impression       As above.     MRI lumbar 2/2016  Lumbar spine alignment is within normal limits. The vertebral body heights are well maintained, with no fracture.  No marrow signal abnormality " suspicious for an infiltrative process.       The conus is normal in appearance, and terminates at the L1 level.  The adjacent soft tissue structures show a small fat containing umbilical hernia.       There are findings of multi-level  lumbar spondylosis, as below.     L1-L2: There is no focal disc herniation. No significant spinal canal or neural foraminal narrowing.     L2-L3: There is no focal disc herniation. No significant spinal canal or neural foraminal narrowing.     L3-L4: There is no focal disc herniation. No significant spinal canal or neural foraminal narrowing.     L4-L5: There is no focal disc herniation. No significant spinal canal or neural foraminal narrowing.     L5-S1: There is a circumferential disk bulge with mild bilateral facet hypertrophy. No significant spinal canal or neural foraminal narrowing.  Impression            No focal cause for back pain is identified on today's examination.     Pain Scale: Sharlene rates pain on a scale of 0-10 to be 9 at worst; 6 currently; 4 at best using VAS.   Pain location: Low back bilateral along belt line     Aggravating factors: Prolong standing, walking (longer than 1 mile), sitting, mopping, bending, worse in morning  Easing Factors: Midday, laying down, medication (only takes the edge off)   Disturbed Sleep: Yes, sleeps on side without pillow     Pattern of pain questions:  1.  Where is your pain the worst? Low back   2.  Is your pain constant or intermittent? Constant   3.  Does bending forward make your typical pain worse? Yes   4.  Since the start of your back pain, has there been a change in your bowel or bladder? No   5.  What can't you do now that you use to be able to do? Dancing, Not limited but stops doing some activity, mopping, cleaning, lifting     Prior Treatment: Previous PT with some relief for 2 sessions. Pt had injections several weeks ago but doesn't note much different.   Prior functional status: Independent  DME owned/used: none, has  access to [employer] (has 3 hours of paid exercise)   Occupation:  Works for [employer] (, light duty, sedentary)    Leisure: Not very active, used to walk with friend   Pts goals:  Lose weight, get back into being more     Red Flag Screening:   Cough  Sneeze  Strain: No  Bladder/ bowel: No  Falls: No  General health: Good, Has colitis and supraventricular tachycardia (Does have palpitation episodes about every 4 months), Allergy induced asthma   Night pain: No   Unexplained weight loss: No     OBJECTIVE     POSTURE  Posture Alignment :forward head  Postural examination/scapula alignment: Head forward and mild lordosis  Joint integrity: Hypermobile as seen through spring testing  Sitting: poor  Standing: Fair  Correction of posture: Added slimline roll, Improved posture in sitting.     SLS: Right 10 seconds, Left 10 seconds  Trendelenburg: Right +, Left +  Functional squat: Poor     MOVEMENT LOSS    ROM Loss   Flexion minimal loss and fair cruve reversal   Extension moderate loss and ERP   Side bending Right minimal loss   Side bending Left minimal loss   Rotation Right minimal loss   Rotation Left minimal loss     Hip Flexiblity:   Supine Flexion:              Right moderate loss   Left moderate loss  Supine Internal rotation: Right hypermobile Left hypermobile  Hamstrings 90/90:          Right within functional limits Left within functional limits  Prone Quadriceps:         Right minimal loss Left minimal loss  Prone Extension:            Right within functional limits Left within functional limits      Lower Extremity Strength   Right LE  Left LE   Hip flexion: 4-/5 Hip flexion: 4-/5   Hip extension:  4-/5 Hip extension: 4-/5   Hip abduction: 4/5 Hip abduction: 4/5   Hip adduction:  4/5 Hip adduction:  4/5   Hip Internal rotation   4/5 Hip Internal rotation 4/5   Knee Flexion 4/5 Knee Flexion 4/5   Knee Extension 4+/5 Knee Extension 4/5   Ankle dorsiflexion: 5/5 Ankle dorsiflexion:  5   Ankle plantarflexion: 5 Ankle plantarflexion: 5         GAIT:  Assistive Device used: none  Level of Assistance: independent  Patient displays the following gait deviations: no gait deviations observed.     Special Tests:   Test Name  Test Result   Prone Instability Test (--)   SI Joint Provocation Test (+) Thigh thrust, sacral thrust, gaenslens, - compression, distraction   Straight Leg Raise (+) Beyond 60 deg   Neural Tension Test (--)   Crossed Straight Leg Raise (--)   Walking on toes (--)   Walking on heels  (--)     Long axis distraction: Increased symptoms    NEUROLOGICAL SCREENING     Sensory deficit: LE intact to light touch    Reflexes:    Left Right   Patella Tendon 2+ 2+   Achilles Tendon 2+ 2+   Babinski NT NT   Clonus - -     REPEATED TEST MOVEMENTS:  Repeated Flexion in Standing worse   Repeated Extension in Standing worse worse than bending    Repeated Flexion in lying no better  better motor control with ball   Repeated Extension in lying  end range pain     Baseline Isometric Testing on Med X equipment: Testing administered by PT    Baseline IM Testing Results:   Date of testin2018  ROM 27-12 deg   Max Peak Torque 84    Min Peak Torque 28    Flex/Ext Ratio 3   % below normative data -73   Fem 5    SP 0    FOTO: Focus on Therapeutic Outcomes   Category: lumbar   % Impaired: 48%  Current Score  = CK = at least 40% but < 60% impaired, limited or restricted  Goal at Discharge Score = CJ = at least 20% but < 40% impaired, limited or restricted    Score interpretation is as follows:     TEST SCORE  Modifier  Impairment Limitation Restriction    0/50  CH  0 % impaired, limited or restricted   1-9/50  CI  @ least 1% but less than 20% impaired, limited or restricted   10-19/50  CJ  @ least 20%<40% impaired, limited or restricted   20-29/50  CK  @ least 40%<60% impaired, limited or restricted   30-39/50  CL  @ least 60% <80% impaired, limited or restricted   40-49/50  CM  @ least  80%<100% impaired limited or restricted   50/50  CN  100% impaired, limited or restricted       Treatment   Time In: 10:20  Time Out: 12:00    PT Evaluation Completed? Yes  Discussed Plan of Care with patient: Yes      Written Home Exercises Provided:   Handouts were given to the patient. Pt demo good understanding of the education provided. Sharlene demonstrated good return demonstration of activities.     - Patient received education regarding proper posture and body mechanics.    - Dahiana roll tried, recommended, and purchase information was provided.    - Patient received a handout regarding anticipated muscular soreness following the isometric test and strategies for management were reviewed with patient including stretching, using ice and scheduled rest.     HEP as follows     Flexion in lying 10x, 3x/daily  Scapular retraction 10x, 3x/daily  Supine pelvic tilts 5-10 s/h 5x, 2x daily  Z-lie 10-20 min, 2x daily      Pt was instructed in and performed the following:   Cardiovascular exercise and therapeutic exercise to improve posture, lumbar/cervical ROM, strength, and muscular endurance as follows:     Sharlene received therapeutic exercises to develop/improve posture, lumbar/cervical ROM, strength and muscular endurance for 30 minutes including the following exercises: med-x lumbar machine testing    [facility] 3/16/2018   Visit Number 1   VAS Pain Rating 6   Lumbar Extension Seat Pad 0   Femur Restraint 5   Top Dead Center 24   Counterweight 170   Lumbar Flexion 27   Lumbar Extension 12   Lumbar Peak Torque 84   Min Torque 28   Percent From Norm -73   Ice - Z Lie (in min.) 10       Sharlene received the following manual therapy techniques: none  Assessment   This is a 52 y.o. female referred to [facility] and presents with a medical diagnosis of   Encounter Diagnosis   Name Primary?    Chronic bilateral low back pain without sciatica     and demonstrates limitations as described below in the  problem list. Pt rehab potential is Good. Pt presents with lumbar dysfunction, decreased lumbar strength and ROM compared to norms, decreased LE ROM, decreased LE strength, decreased hip flexibility, poor postural alignment, poor body mechanics, positive SI special tests indicated increased SI irritation. No significant pelvic imbalances noted. Pt reported increased muscle activation/fatique but no worse by end of session.     Pain Pattern: 1 PEN (flexion responder, pt demonstrates poor posture and motor control, started on trial of scapular retractions, core contraction, and SIMRAN)        Patient received education on the [facility] program, purpose of the isometric test, progression of back strengthening as well as wellness approach and systemic strengthening.  Details of the program were discussed.  Reviewed that patient should feel support/pressure from med ex restraints but no pain or discomfort and patient expressed understanding.    Based on the above history and physical examination an active physical therapy program is recommended.  Pt will continue to benefit from skilled outpatient physical therapy to address the deficits listed below in the chart, provide pt/family education and to maximize pt's level of independence in the home and community environment. .     No environmental, cultural, spiritual, developmental or education needs expressed or noted    Medical necessity is demonstrated by the following problem list.    Pt presents with the following impairments:   History  Co-morbidities and personal factors that may impact the plan of care Examination  Body Structures and Functions, activity limitations and participation restrictions that may impact the plan of care Clinical Presentation   Decision Making/ Complexity Score   Co-morbidities:    Cardiac, asthma, Tachycardia, Ulcerative colitis        Personal Factors:   lifestyle  attitudes  Not complaint with previous HEP, sedentary Body Regions:    back    Body Systems:   gross symmetry  ROM  strength  gross coordinated movement  motor control    Activity limitations:   Learning and applying knowledge  no deficits    General Tasks and Commands  no deficits    Communication  no deficits    Mobility  lifting and carrying objects  walking  Standing, stairs    Self care  no deficits    Domestic Life  cooking  doing house work (cleaning house, washing dishes, laundry)    Interactions/Relationships  no deficits    Life Areas  employment    Community and Social Life  community life  recreation and leisure    Participation Restrictions:   Increased pain by end of work day     evolving clinical presentation with changing clinical characteristics   Worsening   Moderate         GOALS: Pt is in agreement with the following goals.    Short term goals:  6 weeks or 10 visits   1.  Pt will demonstrate increased lumbar ROM by at least 3 degrees from the initial ROM value with improvements noted in functional ROM and ability to perform ADLs  2.  Pt will demonstrate increased maximum isometric torque value by 10% when compared to the initial value resulting in improved ability to perform bending, lifting, and carrying activities safely, confidently.  3.  Patient report a reduction in worst pain score by 1-2 points for improved tolerance during work and recreational activities  4.  Pt able to perform HEP correctly with minimal cueing or supervision for therapist    Long term goals: 13 weeks or 20 visits   1. Pt will demonstrate increased lumbar ROM by at least 9 degrees from initial ROM value, resulting in improved ability to perform functional fwd bending while standing and sitting.   2. Pt will demonstrate increased maximum isometric torque value by 30% when compared to the initial value resulting in improved ability to perform bending, lifting, and carrying activities safely, confidently.  3. Pt to demonstrate ability to independently control and reduce their pain through  "posture positioning and mechanical movements throughout a typical day.  4.  Patient will demonstrate improved overall function per FOTO Survey to CJ = at least 20% but < 40% impaired, limited or restricted score or less.  5. Pt will tolerate up to 15 minutes of standing without increased symptoms.       Plan   Outpatient physical therapy 2x week for 13 weeks or 20 visits to include the following:   - Patient education  - Therapeutic exercise  - Manual therapy  - Performance testing   - Neuromuscular Re-education  - Therapeutic activity   - Modalities    Pt may be seen by PTA as part of the rehabilitation team.     Therapist: Kathleen Crawford, PT  3/16/2018    "I certify the need for these services furnished under this plan of treatment and while under my care."    ____________________________________  Physician/Referring Practitioner    _______________  Date of Signature              "

## 2018-03-26 NOTE — TELEPHONE ENCOUNTER
----- Message from Petr Miller MD sent at 3/27/2017  7:30 AM CDT -----  Please schedule a clinic visit.  
per dr soares to schedule office visit , pt didn't answer so appt scheduled and letter mailed  
DC instructions

## 2018-04-13 ENCOUNTER — CLINICAL SUPPORT (OUTPATIENT)
Dept: REHABILITATION | Facility: OTHER | Age: 53
End: 2018-04-13
Attending: PHYSICAL MEDICINE & REHABILITATION
Payer: OTHER GOVERNMENT

## 2018-04-13 DIAGNOSIS — G89.29 CHRONIC BILATERAL LOW BACK PAIN WITHOUT SCIATICA: ICD-10-CM

## 2018-04-13 DIAGNOSIS — M54.50 CHRONIC BILATERAL LOW BACK PAIN WITHOUT SCIATICA: ICD-10-CM

## 2018-04-13 PROCEDURE — 97110 THERAPEUTIC EXERCISES: CPT

## 2018-04-13 NOTE — PROGRESS NOTES
[facility]      Name: Sharlene Reyes [facility] Number: [id number]  Date of Treatment: 2018   Diagnosis:   Encounter Diagnosis   Name Primary?    Chronic bilateral low back pain without sciatica      Physician: Lurdes Miller, *    Pain pattern determined: 1 PEN (flexion responder, pt demonstrates poor posture and motor control, started on trial of scapular retractions, core contraction, and SIMRAN)      Plan of care signed: 3/16/18   Time in: 10:30  Time Out: 11:30  Total Treatment time: 60  Precautions: Cardiac, asthma, Tachycardia, Ulcerative colitis  Visit #: 2    POC due: 18  Reassessment done: 18  Reassessment due:18    Subjective   Sharlene reports no significant change in symptoms. She has performed PPT but not other exercises very regularly.     Patient reports their pain to be 6/10 on a 0-10 scale with 0 being no pain and 10 being the worst pain imaginable.    Pain Location: Low back bilateral along belt line      Occupation:  Works for [employer] (, light duty, sedentary)    Leisure: Not very active, used to walk with friend   Pts goals:  Lose weight, get back into being more    Objective     Baseline IM Testing Results:   Date of testin2018  ROM 27-12 deg   Max Peak Torque 84    Min Peak Torque 28    Flex/Ext Ratio 3   % below normative data -73   Fem 5    SP 0     FOTO: Focus on Therapeutic Outcomes   Category: lumbar   % Impaired: 48%  Current Score  = CK = at least 40% but < 60% impaired, limited or restricted  Goal at Discharge Score = CJ = at least 20% but < 40% impaired, limited or restricted    Treatment    Pt was instructed in and performed the following:     Sharlene received therapeutic exercises to develop/improved posture, cardiovascular endurance, muscular endurance, lumbar/cervical ROM, strength and muscular endurance for 50 minutes including the following exercises:     [facility]  4/13/2018   Visit Number 2   VAS Pain Rating 6   Recumbent Bike Seat Pos. 13   Time 5   Lumbar Extension Seat Pad -   Femur Restraint -   Top Dead Center -   Counterweight -   Lumbar Flexion -   Lumbar Extension -   Lumbar Peak Torque -   Min Torque -   Percent From Norm -   Lumbar Weight 45   Repetitions 14   Rating of Perceived Exertion 3   Ice - Z Lie (in min.) 10       Peripheral muscle strengthening which included 1 set of 15-20 repetitions at a slow, controlled 7 second per rep pace focused on strengthening supporting musculature for improved body mechanics and functional mobility.  Pt and therapist focused on proper form during treatment to ensure optimal strengthening of each targeted muscle group.  Machines were utilized including torso rotation, leg extension, leg curl, chest press, upright row. Tricep extension, bicep curl, leg press, and hip abduction added on third visit.       Sharelne received the following manual therapy techniques: None      Home Exercise Program as follows:   Flexion in lying 10x, 3x/daily  Scapular retraction 10x, 3x/daily  Supine and standing pelvic tilts 5-10 s/h 5x, 2x daily  Z-lie 10-20 min, 2x daily     Handouts were given to the patient. Pt demo good understanding of the education provided. Sharlene demonstrated good return demonstration of activities.     Lumbar roll use compliance: Unknown  Additional exercises taught this treatment session:   Standing PPT 10x     Assessment     Pt presents to physical therapy for 2nd visit following initial evaluation. Pt reports mild soreness following initial evaluation. Reviewed HEP exercises. Pt performed with mod verbal cues for recall and technique. Added standing pelvic tilts per reports of improved symptoms when engaging core while standing with positive pt response. Pt introduced to Med X lumbar dynamic exercises and peripheral resistance exercises up to upright row.  Pt tolerated Med X lumbar exercise weight of 50% peak torque  with  reported 3/10 Abe Exertion scale. Pt required moderate verbal cues to maintian speed to 7 sec per rep. Pt completed all peripheral resistance exercises with no reports of increased symptoms or discomfort.       Patient is making good progress towards established goals.  Pt will continue to benefit from skilled outpatient physical therapy to address the deficits stated in the impairment chart, provide pt/family education and to maximize pt's level of independence in the home and community environment.       Pt's spiritual, cultural and educational needs considered and pt agreeable to plan of care and goals as stated below:     Medical necessity is demonstrated by the following problem list.    Pt presents with the following impairments:   History  Co-morbidities and personal factors that may impact the plan of care Examination  Body Structures and Functions, activity limitations and participation restrictions that may impact the plan of care Clinical Presentation    Decision Making/ Complexity Score   Co-morbidities:    Cardiac, asthma, Tachycardia, Ulcerative colitis           Personal Factors:   lifestyle  attitudes  Not complaint with previous HEP, sedentary Body Regions:   back     Body Systems:   gross symmetry  ROM  strength  gross coordinated movement  motor control     Activity limitations:   Learning and applying knowledge  no deficits     General Tasks and Commands  no deficits     Communication  no deficits     Mobility  lifting and carrying objects  walking  Standing, stairs     Self care  no deficits     Domestic Life  cooking  doing house work (cleaning house, washing dishes, laundry)     Interactions/Relationships  no deficits     Life Areas  employment     Community and Social Life  community life  recreation and leisure     Participation Restrictions:   Increased pain by end of work day       evolving clinical presentation with changing clinical characteristics   Worsening    Moderate             GOALS: Pt is in agreement with the following goals.     Short term goals:  6 weeks or 10 visits   1.  Pt will demonstrate increased lumbar ROM by at least 3 degrees from the initial ROM value with improvements noted in functional ROM and ability to perform ADLs  2.  Pt will demonstrate increased maximum isometric torque value by 10% when compared to the initial value resulting in improved ability to perform bending, lifting, and carrying activities safely, confidently.  3.  Patient report a reduction in worst pain score by 1-2 points for improved tolerance during work and recreational activities  4.  Pt able to perform HEP correctly with minimal cueing or supervision for therapist     Long term goals: 13 weeks or 20 visits   1. Pt will demonstrate increased lumbar ROM by at least 9 degrees from initial ROM value, resulting in improved ability to perform functional fwd bending while standing and sitting.   2. Pt will demonstrate increased maximum isometric torque value by 30% when compared to the initial value resulting in improved ability to perform bending, lifting, and carrying activities safely, confidently.  3. Pt to demonstrate ability to independently control and reduce their pain through posture positioning and mechanical movements throughout a typical day.  4.  Patient will demonstrate improved overall function per FOTO Survey to CJ = at least 20% but < 40% impaired, limited or restricted score or less.  5. Pt will tolerate up to 15 minutes of standing without increased symptoms.          Plan   Continue with established Plan of Care towards established PT goals.

## 2018-04-19 ENCOUNTER — CLINICAL SUPPORT (OUTPATIENT)
Dept: REHABILITATION | Facility: OTHER | Age: 53
End: 2018-04-19
Attending: PHYSICAL MEDICINE & REHABILITATION
Payer: OTHER GOVERNMENT

## 2018-04-19 DIAGNOSIS — G89.29 CHRONIC BILATERAL LOW BACK PAIN WITHOUT SCIATICA: ICD-10-CM

## 2018-04-19 DIAGNOSIS — M54.50 CHRONIC BILATERAL LOW BACK PAIN WITHOUT SCIATICA: ICD-10-CM

## 2018-04-19 PROCEDURE — 97110 THERAPEUTIC EXERCISES: CPT

## 2018-04-19 NOTE — PROGRESS NOTES
[facility]      Name: Sharlene Reyes [facility] Number: [id number]  Date of Treatment: 2018   Diagnosis:   Encounter Diagnosis   Name Primary?    Chronic bilateral low back pain without sciatica      Physician: Lurdes Miller, *    Pain pattern determined: 1 PEN (flexion responder, pt demonstrates poor posture and motor control, started on trial of scapular retractions, core contraction, and SIMRAN)      Plan of care signed: 3/16/18   Time in: 5:00  Time Out: 6:00  Total Treatment time: 60  Precautions: Cardiac, asthma, Tachycardia, Ulcerative colitis  Visit #: 4    POC due: 18  Reassessment done: 18  Reassessment due:18    Subjective   Sharlene reports no significant change in symptoms. She has performed PPT but not other exercises very regularly. She reports feeling fairly fatigued after not sleeping very well and working all day.     Patient reports their pain to be 7/10 on a 0-10 scale with 0 being no pain and 10 being the worst pain imaginable.    Pain Location: Low back bilateral along belt line      Occupation:  Works for [employer] (, light duty, sedentary)    Leisure: Not very active, used to walk with friend   Pts goals:  Lose weight, get back into being more    Objective     Baseline IM Testing Results:   Date of testin2018  ROM 27-12 deg   Max Peak Torque 84    Min Peak Torque 28    Flex/Ext Ratio 3   % below normative data -73   Fem 5    SP 0     FOTO: Focus on Therapeutic Outcomes   Category: lumbar   % Impaired: 48%  Current Score  = CK = at least 40% but < 60% impaired, limited or restricted  Goal at Discharge Score = CJ = at least 20% but < 40% impaired, limited or restricted    Treatment    Pt was instructed in and performed the following:     Sharlene received therapeutic exercises to develop/improved posture, cardiovascular endurance, muscular endurance, lumbar/cervical ROM, strength and muscular  endurance for 50 minutes including the following exercises:     [facility] 4/19/2018   Visit Number 3   VAS Pain Rating 7   Recumbent Bike Seat Pos. 13   Time 5   Lumbar Extension Seat Pad -   Femur Restraint -   Top Dead Center -   Counterweight -   Lumbar Flexion -   Lumbar Extension -   Lumbar Peak Torque -   Min Torque -   Percent From Norm -   Lumbar Weight 45   Repetitions 16   Rating of Perceived Exertion 3   Ice - Z Lie (in min.) 10       Peripheral muscle strengthening which included 1 set of 15-20 repetitions at a slow, controlled 7 second per rep pace focused on strengthening supporting musculature for improved body mechanics and functional mobility.  Pt and therapist focused on proper form during treatment to ensure optimal strengthening of each targeted muscle group.  Machines were utilized including torso rotation, leg extension, leg curl, chest press, upright row. Tricep extension, bicep curl, leg press, and hip abduction added on third visit.       Sharlene received the following manual therapy techniques: None      Home Exercise Program as follows:   Flexion in lying 10x, 3x/daily  Scapular retraction 10x, 3x/daily  Supine and standing pelvic tilts 5-10 s/h 5x, 2x daily  Z-lie 10-20 min, 2x daily     Handouts were given to the patient. Pt demo good understanding of the education provided. Sharlene demonstrated good return demonstration of activities.     Lumbar roll use compliance: Unknown  Additional exercises taught this treatment session:   HEP Review     Assessment     Pt presents to physical therapy with moderate low back pain which did not change significantly throughout session. Pt encouraged to observe better sleep hygine such as getting to bed at a regular time and not snoozing for up to 30 minutes before alarm. Reviewed HEP exercises. Pt performed with mod verbal cues for recall and technique. Pt introduced to Med X lumbar dynamic exercises and peripheral resistance exercises up to hip  abduction. Pt tolerated Med X lumbar exercise at previous weight  with reported 3/10 Abe Exertion scale. Pt required moderate verbal cues to maintian speed to 7 sec per rep. Pt completed all peripheral resistance exercises with no reports of increased symptoms or discomfort.     Patient is making good progress towards established goals.  Pt will continue to benefit from skilled outpatient physical therapy to address the deficits stated in the impairment chart, provide pt/family education and to maximize pt's level of independence in the home and community environment.       Pt's spiritual, cultural and educational needs considered and pt agreeable to plan of care and goals as stated below:     Medical necessity is demonstrated by the following problem list.    Pt presents with the following impairments:   History  Co-morbidities and personal factors that may impact the plan of care Examination  Body Structures and Functions, activity limitations and participation restrictions that may impact the plan of care Clinical Presentation    Decision Making/ Complexity Score   Co-morbidities:    Cardiac, asthma, Tachycardia, Ulcerative colitis           Personal Factors:   lifestyle  attitudes  Not complaint with previous HEP, sedentary Body Regions:   back     Body Systems:   gross symmetry  ROM  strength  gross coordinated movement  motor control     Activity limitations:   Learning and applying knowledge  no deficits     General Tasks and Commands  no deficits     Communication  no deficits     Mobility  lifting and carrying objects  walking  Standing, stairs     Self care  no deficits     Domestic Life  cooking  doing house work (cleaning house, washing dishes, laundry)     Interactions/Relationships  no deficits     Life Areas  employment     Community and Social Life  community life  recreation and leisure     Participation Restrictions:   Increased pain by end of work day       evolving clinical presentation with  changing clinical characteristics   Worsening    Moderate            GOALS: Pt is in agreement with the following goals.     Short term goals:  6 weeks or 10 visits   1.  Pt will demonstrate increased lumbar ROM by at least 3 degrees from the initial ROM value with improvements noted in functional ROM and ability to perform ADLs  2.  Pt will demonstrate increased maximum isometric torque value by 10% when compared to the initial value resulting in improved ability to perform bending, lifting, and carrying activities safely, confidently.  3.  Patient report a reduction in worst pain score by 1-2 points for improved tolerance during work and recreational activities  4.  Pt able to perform HEP correctly with minimal cueing or supervision for therapist     Long term goals: 13 weeks or 20 visits   1. Pt will demonstrate increased lumbar ROM by at least 9 degrees from initial ROM value, resulting in improved ability to perform functional fwd bending while standing and sitting.   2. Pt will demonstrate increased maximum isometric torque value by 30% when compared to the initial value resulting in improved ability to perform bending, lifting, and carrying activities safely, confidently.  3. Pt to demonstrate ability to independently control and reduce their pain through posture positioning and mechanical movements throughout a typical day.  4.  Patient will demonstrate improved overall function per FOTO Survey to CJ = at least 20% but < 40% impaired, limited or restricted score or less.  5. Pt will tolerate up to 15 minutes of standing without increased symptoms.          Plan   Continue with established Plan of Care towards established PT goals.

## 2018-04-26 NOTE — PROGRESS NOTES
Subjective:      Patient ID: Sharlene Smith is a 52 y.o. female.    Chief Complaint: Low-back Pain    Referred by: No ref. provider found     Ms Smith is a 51 yo female with ulcerative colitis here for follow up of low back pain.  She has had low back pain for the past 7 years.  She was last seen by me on 12/15/2017 and the pain is with standing too long.  It is a feeling like going to snap in half.  She was sent to [facility] and has completed 3 visits, and for a SI joint injections.  Si joint injections done 3/5/2018 with Dr. Bartlett.  She has been trying to be more active.  She was doing well until yesterday when she stepped wrong.  The pain is down the right butt and into the right leg.  She has been trying to walk more.  She is trying to fit PT into her therapy.  She did not feel like the injections will helpful.  She feels like the pain has not changed, but she has been more active.  She has pain with standing for too long.  She lost her mother.  She feels the arthritis.  The pain is 6/10 now, worst 6/10 morning, 4/10 midmorning.  Sitting is most comfortable.      X-ray lumbar 1/2016  Lumbar vertebral bodies demonstrate preserved height and alignment.  Mild disk height loss noted at L5-S1.  Lower lumbar facet arthropathy noted.  No suspicious lytic or blastic lesions.    Sacral arcuate lines are maintained.  No evidence for fracture or destructive lesion.  Sacral iliac joints are unremarkable, without erosion or ankylosis.  Coccyx is unremarkable.  Impression      As above.    MRI lumbar 2/2016  Lumbar spine alignment is within normal limits. The vertebral body heights are well maintained, with no fracture.  No marrow signal abnormality suspicious for an infiltrative process.      The conus is normal in appearance, and terminates at the L1 level.  The adjacent soft tissue structures show a small fat containing umbilical hernia.      There are findings of multi-level  lumbar spondylosis, as  below.    L1-L2: There is no focal disc herniation. No significant spinal canal or neural foraminal narrowing.    L2-L3: There is no focal disc herniation. No significant spinal canal or neural foraminal narrowing.    L3-L4: There is no focal disc herniation. No significant spinal canal or neural foraminal narrowing.    L4-L5: There is no focal disc herniation. No significant spinal canal or neural foraminal narrowing.    L5-S1: There is a circumferential disk bulge with mild bilateral facet hypertrophy. No significant spinal canal or neural foraminal narrowing.  Impression          No focal cause for back pain is identified on today's examination.     Past Medical History:  yrs ago: Abnormal Pap smear of vagina      Comment: possible BX?  No date: Allergy  No date: Asthma  No date: Hyperlipidemia  No date: Supraventricular tachycardia  No date: SVT (supraventricular tachycardia)  No date: Tachycardia  No date: Ulcerative colitis    Past Surgical History:  No date:  SECTION, CLASSIC      Comment: x 2  2015: COLONOSCOPY N/A      Comment: Procedure: COLONOSCOPY;  Surgeon: Petr Miller MD;  Location: [facility] ([address] [place] [address]);                 Service: Endoscopy;  Laterality: N/A;  10/13/2017: COLONOSCOPY N/A      Comment: Procedure: COLONOSCOPY;  Surgeon: Petr Miller MD;  Location: [facility] ([address] [place] [address]);                 Service: Endoscopy;  Laterality: N/A;    Review of patient's family history indicates:    Hyperlipidemia                 Mother                    Heart attack                   Father                    Diabetes                       Father                    Heart attack                   Paternal Grandmother      Heart disease                  Neg Hx                    Hypertension                   Neg Hx                    Colon cancer                   Neg Hx                    Esophageal cancer              Neg Hx                      Social  History    Marital status:              Spouse name:                       Years of education:                 Number of children:               Social History Main Topics    Smoking status: Former Smoker                                                                Packs/day: 0.00      Years: 0.00           Types: Cigarettes       Quit date: 2/25/1999    Smokeless tobacco: Never Used                        Alcohol use: Yes           4.2 oz/week       Cans of beer: 4, Shots of liquor: 3 per week       Comment: Occasionally    Drug use: No              Sexual activity: Yes               Partners with: Male    Social History Narrative    Works for [employer]        Current Outpatient Prescriptions:  albuterol (PROAIR HFA) 90 mcg/actuation inhaler, Inhale 1-2 puffs into the lungs every 6 (six) hours as needed for Wheezing., Disp: 1 Inhaler, Rfl: 0  ALPRAZolam (XANAX) 0.25 MG tablet, Take 1 tablet (0.25 mg total) by mouth 2 (two) times daily as needed for Anxiety., Disp: 30 tablet, Rfl: 2  aspirin 81 MG Chew, Take 81 mg by mouth once daily., Disp: , Rfl:   atorvastatin (LIPITOR) 20 MG tablet, TAKE 1 TABLET EVERY EVENING, Disp: 90 tablet, Rfl: 1  budesonide (RINOCORT AQUA) 32 mcg/actuation nasal spray, 1 spray (32 mcg total) by Nasal route once daily., Disp: 8.6 g, Rfl: 1  CAMRESE LO 0.10 mg-20 mcg (84)/10 mcg (7) 3MPk, Take 1 tablet by mouth once daily., Disp: 28 tablet, Rfl: 3  cetirizine (ZYRTEC) 10 MG tablet, Take 1 tablet (10 mg total) by mouth once daily., Disp: 30 tablet, Rfl: 3  diltiaZEM (CARDIZEM CD) 240 MG 24 hr capsule, Take 1 capsule (240 mg total) by mouth once daily., Disp: 90 capsule, Rfl: 3  fexofenadine (ALLEGRA) 180 MG tablet, Take 1 tablet (180 mg total) by mouth once daily., Disp: 90 tablet, Rfl: 3  mesalamine (APRISO) 0.375 gram Cp24, Take 4 capsules (1.5 g total) by mouth once daily., Disp: 120 capsule, Rfl: 0  montelukast (SINGULAIR) 10 mg tablet, TAKE 1 TABLET EVERY EVENING, Disp: 90  tablet, Rfl: 1  omeprazole (PRILOSEC) 40 MG capsule, Take 1 capsule (40 mg total) by mouth every morning. 30 minutes before breakfast, Disp: 30 capsule, Rfl: 2  paroxetine (PAXIL) 10 MG tablet, Take 1 tablet (10 mg total) by mouth every morning., Disp: 30 tablet, Rfl: 11  propranolol (INDERAL) 40 MG tablet, Take 1 tablet (40 mg total) by mouth daily as needed. For fast HR, Disp: 90 tablet, Rfl: 11  traMADol (ULTRAM) 50 mg tablet, Take 1 tablet (50 mg total) by mouth every 6 (six) hours as needed., Disp: 120 tablet, Rfl: 1    No current facility-administered medications for this visit.       Review of patient's allergies indicates:   -- Hydrocodone             Review of Systems   Constitution: Negative for weight gain and weight loss.   Cardiovascular: Negative for chest pain.   Respiratory: Negative for shortness of breath.    Musculoskeletal: Positive for back pain (right ). Negative for joint pain and joint swelling.   Gastrointestinal: Negative for abdominal pain and bowel incontinence.   Genitourinary: Negative for bladder incontinence.   Neurological: Negative for numbness.           Objective:          General    Vitals reviewed.  Constitutional: She is oriented to person, place, and time. She appears well-developed and well-nourished.   HENT:   Head: Normocephalic and atraumatic.   Pulmonary/Chest: Effort normal.   Neurological: She is alert and oriented to person, place, and time.   Psychiatric: She has a normal mood and affect. Her behavior is normal. Judgment and thought content normal.     General Musculoskeletal Exam   Gait: normal     Right Ankle/Foot Exam     Tests   Heel Walk: able to perform  Tiptoe Walk: able to perform    Left Ankle/Foot Exam     Tests   Heel Walk: able to perform  Tiptoe Walk: able to perform  Back (L-Spine & T-Spine) / Neck (C-Spine) Exam     Tenderness Right paramedian tenderness of the Sacrum. Left paramedian tenderness of the Sacrum.     Back (L-Spine & T-Spine) Range of  Motion   Extension: 10   Flexion: 70   Lateral Bend Right: 10   Lateral Bend Left: 10   Rotation Right: 30   Rotation Left: 30     Spinal Sensation   Right Side Sensation  C-Spine Level: normal   L-Spine Level: normal  S-Spine Level: normal  Left Side Sensation  C-Spine Level: normal  L-Spine Level: normal  S-Spine Level: normal    Back (L-Spine & T-Spine) Tests   Right Side Tests  Straight leg raise:      Sitting SLR: > 70 degrees      Left Side Tests  Straight leg raise:     Sitting SLR: > 70 degrees          Other She has no scoliosis .  Spinal Kyphosis:  Absent      Muscle Strength   Right Upper Extremity   Biceps: 5/5/5   Deltoid:  5/5  Triceps:  5/5  Wrist Extension: 5/5/5   Finger Flexors:  5/5  Left Upper Extremity  Biceps: 5/5/5   Deltoid:  5/5  Triceps:  5/5  Wrist Extension: 5/5/5   Finger Flexors:  5/5  Right Lower Extremity   Hip Flexion: 5/5   Quadriceps:  5/5   Anterior tibial:  5/5/5  EHL:  5/5  Left Lower Extremity   Hip Flexion: 5/5   Quadriceps:  5/5   Anterior tibial:  5/5/5   EHL:  5/5    Reflexes     Left Side  Biceps:  2+  Triceps:  2+  Brachioradialis:  2+  Quadriceps:  2+  Achilles:  2+  Left Fontanez's Sign:  Absent  Babinski Sign:  absent    Right Side   Biceps:  2+  Triceps:  2+  Brachioradialis:  2+  Quadriceps:  2+  Achilles:  2+  Right Fontanez's Sign:  absent  Babinski Sign:  absent    Vascular Exam     Right Pulses        Carotid:                  2+    Left Pulses        Carotid:                  2+        Assessment:       Encounter Diagnoses   Name Primary?    SI (sacroiliac) joint dysfunction Yes    Spondylosis of lumbar region without myelopathy or radiculopathy     Chronic bilateral low back pain without sciatica          Plan:       Sharlene was seen today for low-back pain.    Diagnoses and all orders for this visit:    SI (sacroiliac) joint dysfunction    Spondylosis of lumbar region without myelopathy or radiculopathy    Chronic bilateral low back pain without sciatica            1.  Continue PT for progressive resistance exercise at healthy back pattern 1, she has been to 3 visits, and has one today.  She can onlt go once a week  2.  Bilateral SI joint injections with pain management done 3/2018 she does not feel like it helped.  Might try L5-S1 facet injections  3.  Tizanidine 2-4 mg po TID  4.  We discussed continuing walking program, she feels like she has been more active.    5.  RTC 3 months

## 2018-04-27 ENCOUNTER — CLINICAL SUPPORT (OUTPATIENT)
Dept: REHABILITATION | Facility: OTHER | Age: 53
End: 2018-04-27
Attending: PHYSICAL MEDICINE & REHABILITATION
Payer: OTHER GOVERNMENT

## 2018-04-27 ENCOUNTER — OFFICE VISIT (OUTPATIENT)
Dept: SPINE | Facility: CLINIC | Age: 53
End: 2018-04-27
Attending: PHYSICAL MEDICINE & REHABILITATION
Payer: OTHER GOVERNMENT

## 2018-04-27 VITALS
SYSTOLIC BLOOD PRESSURE: 137 MMHG | DIASTOLIC BLOOD PRESSURE: 79 MMHG | HEIGHT: 64 IN | WEIGHT: 176 LBS | BODY MASS INDEX: 30.05 KG/M2 | HEART RATE: 107 BPM

## 2018-04-27 DIAGNOSIS — G89.29 CHRONIC BILATERAL LOW BACK PAIN WITHOUT SCIATICA: ICD-10-CM

## 2018-04-27 DIAGNOSIS — M53.3 SI (SACROILIAC) JOINT DYSFUNCTION: Primary | ICD-10-CM

## 2018-04-27 DIAGNOSIS — M54.50 CHRONIC BILATERAL LOW BACK PAIN WITHOUT SCIATICA: ICD-10-CM

## 2018-04-27 DIAGNOSIS — M47.816 SPONDYLOSIS OF LUMBAR REGION WITHOUT MYELOPATHY OR RADICULOPATHY: ICD-10-CM

## 2018-04-27 PROCEDURE — 97110 THERAPEUTIC EXERCISES: CPT

## 2018-04-27 PROCEDURE — 99214 OFFICE O/P EST MOD 30 MIN: CPT | Mod: S$PBB,,, | Performed by: PHYSICAL MEDICINE & REHABILITATION

## 2018-04-27 PROCEDURE — 99999 PR PBB SHADOW E&M-EST. PATIENT-LVL III: CPT | Mod: PBBFAC,,, | Performed by: PHYSICAL MEDICINE & REHABILITATION

## 2018-04-27 PROCEDURE — 99213 OFFICE O/P EST LOW 20 MIN: CPT | Mod: PBBFAC | Performed by: PHYSICAL MEDICINE & REHABILITATION

## 2018-04-27 RX ORDER — ALPRAZOLAM 0.25 MG/1
0.25 TABLET ORAL 2 TIMES DAILY PRN
Refills: 0 | COMMUNITY
Start: 2018-04-16 | End: 2019-02-01

## 2018-04-27 RX ORDER — ALPRAZOLAM 0.25 MG/1
TABLET ORAL
COMMUNITY
Start: 2018-04-16 | End: 2018-05-31 | Stop reason: SDUPTHER

## 2018-04-27 NOTE — PROGRESS NOTES
[facility] Back Physical Therapy Treatment      Name: Sharlene Reyes [facility] Number: [id number]  Date of Treatment: 2018   Diagnosis:   Encounter Diagnosis   Name Primary?    Chronic bilateral low back pain without sciatica      Physician: Lurdes Miller, *    Pain pattern determined: 1 PEN (flexion responder, pt demonstrates poor posture and motor control, started on trial of scapular retractions, core contraction, and SIMRAN)      Plan of care signed: 3/16/18   Time in: 5:00  Time Out: 6:00  Total Treatment time: 60  Precautions: Cardiac, asthma, Tachycardia, Ulcerative colitis  Visit #: 4    POC due: 18  Reassessment done: 18  Reassessment due:18 done 18  Reassessment due: 18    Subjective   Sharlene reports no significant change in symptoms. She has performed PPT but not other exercises very regularly. She reports feeling fairly fatigued after not sleeping very well and working all day. She notes increased right sciatica symptoms after attempting to increase her walking distance.     Patient reports their pain to be 6/10 on a 0-10 scale with 0 being no pain and 10 being the worst pain imaginable.    Pain Location: Low back bilateral along belt line      Occupation:  Works for [employer] (, light duty, sedentary)    Leisure: Not very active, used to walk with friend   Pts goals:  Lose weight, get back into being more    Objective     Baseline IM Testing Results:   Date of testin2018  ROM 27-12 deg   Max Peak Torque 84    Min Peak Torque 28    Flex/Ext Ratio 3   % below normative data -73   Fem 5    SP 0     FOTO: Focus on Therapeutic Outcomes   Category: lumbar   % Impaired: 48%  Current Score  = CK = at least 40% but < 60% impaired, limited or restricted  Goal at Discharge Score = CJ = at least 20% but < 40% impaired, limited or restricted    Treatment    Pt was instructed in and performed the following:     Sharlene received therapeutic  exercises to develop/improved posture, cardiovascular endurance, muscular endurance, lumbar/cervical ROM, strength and muscular endurance for 50 minutes including the following exercises:   [facility] Therapy 4/27/2018   Visit Number 4   VAS Pain Rating 6   Recumbent Bike Seat Pos. 13   Time 5   Lumbar Extension Seat Pad -   Femur Restraint -   Top Dead Center -   Counterweight -   Lumbar Flexion -   Lumbar Extension -   Lumbar Peak Torque -   Min Torque -   Percent From Norm -   Lumbar Weight 45   Repetitions 17   Rating of Perceived Exertion 3   Ice - Z Lie (in min.) 10       Peripheral muscle strengthening which included 1 set of 15-20 repetitions at a slow, controlled 7 second per rep pace focused on strengthening supporting musculature for improved body mechanics and functional mobility.  Pt and therapist focused on proper form during treatment to ensure optimal strengthening of each targeted muscle group.  Machines were utilized including torso rotation, leg extension, leg curl, chest press, upright row. Tricep extension, bicep curl, leg press, and hip abduction added on third visit.       Sharlene received the following manual therapy techniques: None      Home Exercise Program as follows:   Flexion in lying 10x, 3x/daily  Scapular retraction 10x, 3x/daily  Supine and standing pelvic tilts 5-10 s/h 5x, 2x daily  Z-lie 10-20 min, 2x daily     Handouts were given to the patient. Pt demo good understanding of the education provided. Sharlene demonstrated good return demonstration of activities.     Lumbar roll use compliance: Unknown  Additional exercises taught this treatment session:   HEP Review     Assessment     Pt presents to physical therapy with moderate low back pain which reduced mildly throughout session. Pt reports increased pain with walking longer distances. Pt encouraged to reduce her walking distance and take frequent breaks. Pt also encouraged to perform periodic rest in z-lie position throughout the  day. Reviewed HEP exercises. Pt performed with mild verbal cues for recall and technique. Pt tolerated Med X lumbar exercise at previous weight  with reported 3/10 Abe Exertion scale. Pt required moderate verbal cues to maintian speed to 7 sec per rep. Pt completed all peripheral resistance exercises with no reports of increased symptoms or discomfort.     Patient is making fair progress towards established goals.  Pt will continue to benefit from skilled outpatient physical therapy to address the deficits stated in the impairment chart, provide pt/family education and to maximize pt's level of independence in the home and community environment.       Pt's spiritual, cultural and educational needs considered and pt agreeable to plan of care and goals as stated below:     Medical necessity is demonstrated by the following problem list.    Pt presents with the following impairments:   History  Co-morbidities and personal factors that may impact the plan of care Examination  Body Structures and Functions, activity limitations and participation restrictions that may impact the plan of care Clinical Presentation    Decision Making/ Complexity Score   Co-morbidities:    Cardiac, asthma, Tachycardia, Ulcerative colitis           Personal Factors:   lifestyle  attitudes  Not complaint with previous HEP, sedentary Body Regions:   back     Body Systems:   gross symmetry  ROM  strength  gross coordinated movement  motor control     Activity limitations:   Learning and applying knowledge  no deficits     General Tasks and Commands  no deficits     Communication  no deficits     Mobility  lifting and carrying objects  walking  Standing, stairs     Self care  no deficits     Domestic Life  cooking  doing house work (cleaning house, washing dishes, laundry)     Interactions/Relationships  no deficits     Life Areas  employment     Community and Social Life  community life  recreation and leisure     Participation Restrictions:    Increased pain by end of work day       evolving clinical presentation with changing clinical characteristics   Worsening    Moderate            GOALS: Pt is in agreement with the following goals.     Short term goals:  6 weeks or 10 visits   1.  Pt will demonstrate increased lumbar ROM by at least 3 degrees from the initial ROM value with improvements noted in functional ROM and ability to perform ADLs  2.  Pt will demonstrate increased maximum isometric torque value by 10% when compared to the initial value resulting in improved ability to perform bending, lifting, and carrying activities safely, confidently.  3.  Patient report a reduction in worst pain score by 1-2 points for improved tolerance during work and recreational activities  4.  Pt able to perform HEP correctly with minimal cueing or supervision for therapist     Long term goals: 13 weeks or 20 visits   1. Pt will demonstrate increased lumbar ROM by at least 9 degrees from initial ROM value, resulting in improved ability to perform functional fwd bending while standing and sitting.   2. Pt will demonstrate increased maximum isometric torque value by 30% when compared to the initial value resulting in improved ability to perform bending, lifting, and carrying activities safely, confidently.  3. Pt to demonstrate ability to independently control and reduce their pain through posture positioning and mechanical movements throughout a typical day.  4.  Patient will demonstrate improved overall function per FOTO Survey to CJ = at least 20% but < 40% impaired, limited or restricted score or less.  5. Pt will tolerate up to 15 minutes of standing without increased symptoms.          Plan   Continue with established Plan of Care towards established PT goals.

## 2018-05-09 DIAGNOSIS — M25.559 ARTHRALGIA OF HIP, UNSPECIFIED LATERALITY: ICD-10-CM

## 2018-05-09 DIAGNOSIS — R51.9 SINUS HEADACHE: ICD-10-CM

## 2018-05-09 RX ORDER — MINERAL OIL
180 ENEMA (ML) RECTAL DAILY
Qty: 90 TABLET | Refills: 3 | Status: SHIPPED | OUTPATIENT
Start: 2018-05-09 | End: 2019-02-01

## 2018-05-09 RX ORDER — TRAMADOL HYDROCHLORIDE 50 MG/1
50 TABLET ORAL EVERY 6 HOURS PRN
Qty: 120 TABLET | Refills: 1 | OUTPATIENT
Start: 2018-05-09 | End: 2019-05-09

## 2018-05-10 ENCOUNTER — TELEPHONE (OUTPATIENT)
Dept: FAMILY MEDICINE | Facility: CLINIC | Age: 53
End: 2018-05-10

## 2018-05-11 ENCOUNTER — CLINICAL SUPPORT (OUTPATIENT)
Dept: REHABILITATION | Facility: OTHER | Age: 53
End: 2018-05-11
Attending: PHYSICAL MEDICINE & REHABILITATION
Payer: OTHER GOVERNMENT

## 2018-05-11 DIAGNOSIS — M54.50 CHRONIC BILATERAL LOW BACK PAIN WITHOUT SCIATICA: ICD-10-CM

## 2018-05-11 DIAGNOSIS — G89.29 CHRONIC BILATERAL LOW BACK PAIN WITHOUT SCIATICA: ICD-10-CM

## 2018-05-11 PROCEDURE — 97110 THERAPEUTIC EXERCISES: CPT

## 2018-05-11 NOTE — PROGRESS NOTES
[facility] Therapy Treatment      Name: Sharlene Reyes [facility] Number: [id number]  Date of Treatment: 2018   Diagnosis:   No diagnosis found.  Physician: Lurdes Miller, *    Pain pattern determined: 1 PEN (flexion responder, pt demonstrates poor posture and motor control, started on trial of scapular retractions, core contraction, and SIMRAN)      Plan of care signed: 3/16/18   Time in: 9:30  Time Out: 10:30  Total Treatment time: 60  Precautions: Cardiac, asthma, Tachycardia, Ulcerative colitis  Visit #: 5 (reassessment next session)    POC due: 18  Reassessment done: 18  Reassessment due:18    Face to Face discussion of patient was done between PT and PTA.     Subjective   Sharlene reports no significant change in symptoms. She only doing HEP 1x/day. She notes increased right sciatica symptoms after attempting to increase her walking distance.     Patient reports their pain to be 6/10 on a 0-10 scale with 0 being no pain and 10 being the worst pain imaginable.    Pain Location: Low back bilateral along belt line      Occupation:  Works for [employer] (, light duty, sedentary)    Leisure: Not very active, used to walk with friend   Pts goals:  Lose weight, get back into being more    Objective     Baseline IM Testing Results:   Date of testin2018  ROM 27-12 deg   Max Peak Torque 84    Min Peak Torque 28    Flex/Ext Ratio 3   % below normative data -73   Fem 5    SP 0     FOTO: Focus on Therapeutic Outcomes   Category: lumbar   % Impaired: 48%  Current Score  = CK = at least 40% but < 60% impaired, limited or restricted  Goal at Discharge Score = CJ = at least 20% but < 40% impaired, limited or restricted    Treatment    Pt was instructed in and performed the following:     Sharlene received therapeutic exercises to develop/improved posture, cardiovascular endurance, muscular endurance, lumbar/cervical ROM, strength and muscular endurance  for 50 minutes including the following exercises:     HealthyBack Therapy 5/11/2018   Visit Number 5   VAS Pain Rating 6   Recumbent Bike Seat Pos. 13   Time 5   Lumbar Extension Seat Pad -   Femur Restraint -   Top Dead Center -   Counterweight -   Lumbar Flexion -   Lumbar Extension -   Lumbar Peak Torque -   Min Torque -   Percent From Norm -   Lumbar Weight 45   Repetitions 20   Rating of Perceived Exertion 4   Ice - Z Lie (in min.) 10   Scapular retraction 10x, 3x/daily  Supine and standing pelvic tilts 5-10 s/h 5x, 2x daily  Piriformis 10 secs, 3x   LTR 10x      Peripheral muscle strengthening which included 1 set of 15-20 repetitions at a slow, controlled 7 second per rep pace focused on strengthening supporting musculature for improved body mechanics and functional mobility.  Pt and therapist focused on proper form during treatment to ensure optimal strengthening of each targeted muscle group.  Machines were utilized including torso rotation, leg extension, leg curl, chest press, upright row. Tricep extension, bicep curl, leg press, and hip abduction added on third visit.       Sharleen received the following manual therapy techniques: None      Home Exercise Program as follows:   Flexion in lying 10x, 3x/daily  Scapular retraction 10x, 3x/daily  Supine and standing pelvic tilts 5-10 s/h 5x, 2x daily  Z-lie 10-20 min, 2x daily     Handouts were given to the patient. Pt demo good understanding of the education provided. Sharlene demonstrated good return demonstration of activities.     Lumbar roll use compliance: Unknown  Additional exercises taught this treatment session:   Piriformis 10 secs, 3x   LTR 10x  Assessment     Pt presents to physical therapy with moderate low back pain which reduced mildly throughout session. Pt reports increased pain with walking longer distances. Pt encouraged to reduce her walking distance and take frequent breaks. Also to stretch before and after walking. Reviewed HEP exercises.Added  LTR and piriformis stretch due to glut pain.  Pt performed with mild verbal cues for recall and technique. Pt tolerated Med X lumbar exercise at previous weight  with reported 4/10 Abe Exertion scale. Pt required moderate verbal cues to maintian speed to 7 sec per rep. Pt completed all peripheral resistance exercises with no reports of increased symptoms or discomfort.     Patient is making fair progress towards established goals.  Pt will continue to benefit from skilled outpatient physical therapy to address the deficits stated in the impairment chart, provide pt/family education and to maximize pt's level of independence in the home and community environment.       Pt's spiritual, cultural and educational needs considered and pt agreeable to plan of care and goals as stated below:     Medical necessity is demonstrated by the following problem list.    Pt presents with the following impairments:   History  Co-morbidities and personal factors that may impact the plan of care Examination  Body Structures and Functions, activity limitations and participation restrictions that may impact the plan of care Clinical Presentation    Decision Making/ Complexity Score   Co-morbidities:    Cardiac, asthma, Tachycardia, Ulcerative colitis           Personal Factors:   lifestyle  attitudes  Not complaint with previous HEP, sedentary Body Regions:   back     Body Systems:   gross symmetry  ROM  strength  gross coordinated movement  motor control     Activity limitations:   Learning and applying knowledge  no deficits     General Tasks and Commands  no deficits     Communication  no deficits     Mobility  lifting and carrying objects  walking  Standing, stairs     Self care  no deficits     Domestic Life  cooking  doing house work (cleaning house, washing dishes, laundry)     Interactions/Relationships  no deficits     Life Areas  employment     Community and Social Life  community life  recreation and leisure     Participation  Restrictions:   Increased pain by end of work day       evolving clinical presentation with changing clinical characteristics   Worsening    Moderate            GOALS: Pt is in agreement with the following goals.     Short term goals:  6 weeks or 10 visits   1.  Pt will demonstrate increased lumbar ROM by at least 3 degrees from the initial ROM value with improvements noted in functional ROM and ability to perform ADLs  2.  Pt will demonstrate increased maximum isometric torque value by 10% when compared to the initial value resulting in improved ability to perform bending, lifting, and carrying activities safely, confidently.  3.  Patient report a reduction in worst pain score by 1-2 points for improved tolerance during work and recreational activities  4.  Pt able to perform HEP correctly with minimal cueing or supervision for therapist     Long term goals: 13 weeks or 20 visits   1. Pt will demonstrate increased lumbar ROM by at least 9 degrees from initial ROM value, resulting in improved ability to perform functional fwd bending while standing and sitting.   2. Pt will demonstrate increased maximum isometric torque value by 30% when compared to the initial value resulting in improved ability to perform bending, lifting, and carrying activities safely, confidently.  3. Pt to demonstrate ability to independently control and reduce their pain through posture positioning and mechanical movements throughout a typical day.  4.  Patient will demonstrate improved overall function per FOTO Survey to CJ = at least 20% but < 40% impaired, limited or restricted score or less.  5. Pt will tolerate up to 15 minutes of standing without increased symptoms.          Plan   Continue with established Plan of Care towards established PT goals.

## 2018-05-14 DIAGNOSIS — M25.559 ARTHRALGIA OF HIP, UNSPECIFIED LATERALITY: ICD-10-CM

## 2018-05-14 RX ORDER — TRAMADOL HYDROCHLORIDE 50 MG/1
TABLET ORAL
Qty: 120 TABLET | Refills: 1 | OUTPATIENT
Start: 2018-05-14

## 2018-05-14 NOTE — TELEPHONE ENCOUNTER
Spoke with Lurdes with [facility] pharmacy states patient last refilled the tramadol on 3/19 with a dispense amount of 120 patient still has a refill remaining.   Patient informed per [facility].

## 2018-05-17 ENCOUNTER — CLINICAL SUPPORT (OUTPATIENT)
Dept: REHABILITATION | Facility: OTHER | Age: 53
End: 2018-05-17
Attending: PHYSICAL MEDICINE & REHABILITATION
Payer: OTHER GOVERNMENT

## 2018-05-17 DIAGNOSIS — G89.29 CHRONIC BILATERAL LOW BACK PAIN WITHOUT SCIATICA: ICD-10-CM

## 2018-05-17 DIAGNOSIS — M54.50 CHRONIC BILATERAL LOW BACK PAIN WITHOUT SCIATICA: ICD-10-CM

## 2018-05-17 PROCEDURE — 97110 THERAPEUTIC EXERCISES: CPT

## 2018-05-17 NOTE — PROGRESS NOTES
[facility]      Name: Sharlene Reyes [facility] Number: [id number]  Date of Treatment: 2018   Diagnosis:   Encounter Diagnosis   Name Primary?    Chronic bilateral low back pain without sciatica      Physician: Lurdes Miller, *    Pain pattern determined: 1 PEN (flexion responder, pt demonstrates poor posture and motor control, started on trial of scapular retractions, core contraction, and SIMRAN)      Plan of care signed: 3/16/18   Time in: 3:30  Time Out: 4:30  Total Treatment time: 60  Precautions: Cardiac, asthma, Tachycardia, Ulcerative colitis  Visit #: 5 (reassessment next session)    POC due: 18  Reassessment done: 18  Reassessment due:18  Reassessment due: 18        Face to Face discussion of patient was done between PT and PTA.     Subjective   Sharlene reports no significant change in symptoms. She only doing HEP 1x/day. She notes increased right sciatica symptoms after attempting to increase her walking distance. She feels she is not getting better.     Patient reports their pain to be 6/10 on a 0-10 scale with 0 being no pain and 10 being the worst pain imaginable.    Pain Location: Low back bilateral along belt line      Occupation:  Works for [employer] (, light duty, sedentary)    Leisure: Not very active, used to walk with friend   Pts goals:  Lose weight, get back into being more    Objective     Baseline IM Testing Results:   Date of testin2018  ROM 27-12 deg   Max Peak Torque 84    Min Peak Torque 28    Flex/Ext Ratio 3   % below normative data -73   Fem 5    SP 0     FOTO: Focus on Therapeutic Outcomes   Category: lumbar   % Impaired: 48%  Current Score  = CK = at least 40% but < 60% impaired, limited or restricted  Goal at Discharge Score = CJ = at least 20% but < 40% impaired, limited or restricted    Treatment    Pt was instructed in and performed the following:     Sharlene received  therapeutic exercises to develop/improved posture, cardiovascular endurance, muscular endurance, lumbar/cervical ROM, strength and muscular endurance for 50 minutes including the following exercises:   HealthyBack Therapy 5/17/2018   Visit Number 6   VAS Pain Rating 6   Recumbent Bike Seat Pos. 13   Time 5   Lumbar Extension Seat Pad -   Femur Restraint -   Top Dead Center -   Counterweight -   Lumbar Flexion -   Lumbar Extension -   Lumbar Peak Torque -   Min Torque -   Percent From Norm -   Lumbar Weight 49   Repetitions 15   Rating of Perceived Exertion 3   Ice - Z Lie (in min.) 10   Scapular retraction 10x, 3x/daily  Supine and standing pelvic tilts 5-10 s/h 5x, 2x daily  Piriformis 10 secs, 3x   LTR 10x      Peripheral muscle strengthening which included 1 set of 15-20 repetitions at a slow, controlled 7 second per rep pace focused on strengthening supporting musculature for improved body mechanics and functional mobility.  Pt and therapist focused on proper form during treatment to ensure optimal strengthening of each targeted muscle group.  Machines were utilized including torso rotation, leg extension, leg curl, chest press, upright row. Tricep extension, bicep curl, leg press, and hip abduction added on third visit.       Sharlene received the following manual therapy techniques: None      Home Exercise Program as follows:   Flexion in lying 10x, 3x/daily  Scapular retraction 10x, 3x/daily  Supine and standing pelvic tilts 5-10 s/h 5x, 2x daily  Z-lie 10-20 min, 2x daily  Piriformis 10 secs, 3x   LTR 10x    Handouts were given to the patient. Pt demo good understanding of the education provided. Sharlene demonstrated good return demonstration of activities.     Lumbar roll use compliance: Unknown  Additional exercises taught this treatment session:     Assessment     Pt presents to physical therapy with moderate low back pain which reduced mildly throughout session. Pt reports increased pain with walking longer  distances. Pt encouraged to reduce her walking distance and take frequent breaks. Also to stretch before and after walking, which she is doing. Reviewed HEP exercises. Pt performed with mild verbal cues for recall and technique. Pt tolerated Med X lumbar with a weight ^ with reported 3/10 Abe Exertion scale. Pt required moderate verbal cues to maintian speed to 7 sec per rep. Pt completed all peripheral resistance exercises with no reports of increased symptoms or discomfort. Pt may benefit from cupping next session.     Patient is making fair progress towards established goals.  Pt will continue to benefit from skilled outpatient physical therapy to address the deficits stated in the impairment chart, provide pt/family education and to maximize pt's level of independence in the home and community environment.       Pt's spiritual, cultural and educational needs considered and pt agreeable to plan of care and goals as stated below:     Medical necessity is demonstrated by the following problem list.    Pt presents with the following impairments:   History  Co-morbidities and personal factors that may impact the plan of care Examination  Body Structures and Functions, activity limitations and participation restrictions that may impact the plan of care Clinical Presentation    Decision Making/ Complexity Score   Co-morbidities:    Cardiac, asthma, Tachycardia, Ulcerative colitis           Personal Factors:   lifestyle  attitudes  Not complaint with previous HEP, sedentary Body Regions:   back     Body Systems:   gross symmetry  ROM  strength  gross coordinated movement  motor control     Activity limitations:   Learning and applying knowledge  no deficits     General Tasks and Commands  no deficits     Communication  no deficits     Mobility  lifting and carrying objects  walking  Standing, stairs     Self care  no deficits     Domestic Life  cooking  doing house work (cleaning house, washing dishes,  laundry)     Interactions/Relationships  no deficits     Life Areas  employment     Community and Social Life  community life  recreation and leisure     Participation Restrictions:   Increased pain by end of work day       evolving clinical presentation with changing clinical characteristics   Worsening    Moderate            GOALS: Pt is in agreement with the following goals.     Short term goals:  6 weeks or 10 visits   1.  Pt will demonstrate increased lumbar ROM by at least 3 degrees from the initial ROM value with improvements noted in functional ROM and ability to perform ADLs  2.  Pt will demonstrate increased maximum isometric torque value by 10% when compared to the initial value resulting in improved ability to perform bending, lifting, and carrying activities safely, confidently.  3.  Patient report a reduction in worst pain score by 1-2 points for improved tolerance during work and recreational activities  4.  Pt able to perform HEP correctly with minimal cueing or supervision for therapist     Long term goals: 13 weeks or 20 visits   1. Pt will demonstrate increased lumbar ROM by at least 9 degrees from initial ROM value, resulting in improved ability to perform functional fwd bending while standing and sitting.   2. Pt will demonstrate increased maximum isometric torque value by 30% when compared to the initial value resulting in improved ability to perform bending, lifting, and carrying activities safely, confidently.  3. Pt to demonstrate ability to independently control and reduce their pain through posture positioning and mechanical movements throughout a typical day.  4.  Patient will demonstrate improved overall function per FOTO Survey to CJ = at least 20% but < 40% impaired, limited or restricted score or less.  5. Pt will tolerate up to 15 minutes of standing without increased symptoms.          Plan   Continue with established Plan of Care towards established PT goals.

## 2018-05-21 DIAGNOSIS — Z30.011 OCP (ORAL CONTRACEPTIVE PILLS) INITIATION: ICD-10-CM

## 2018-05-21 RX ORDER — LEVONORGESTREL/ETHINYL ESTRADIOL AND ETHINYL ESTRADIOL 100-20(84)
KIT ORAL
Qty: 91 TABLET | Refills: 3 | Status: SHIPPED | OUTPATIENT
Start: 2018-05-21 | End: 2019-05-16 | Stop reason: SDUPTHER

## 2018-05-31 ENCOUNTER — OFFICE VISIT (OUTPATIENT)
Dept: OPTOMETRY | Facility: CLINIC | Age: 53
End: 2018-05-31
Payer: OTHER GOVERNMENT

## 2018-05-31 DIAGNOSIS — H52.203 ASTIGMATISM OF BOTH EYES WITH PRESBYOPIA: Primary | ICD-10-CM

## 2018-05-31 DIAGNOSIS — H52.4 ASTIGMATISM OF BOTH EYES WITH PRESBYOPIA: Primary | ICD-10-CM

## 2018-05-31 PROCEDURE — 99212 OFFICE O/P EST SF 10 MIN: CPT | Mod: PBBFAC,PO | Performed by: OPTOMETRIST

## 2018-05-31 PROCEDURE — 92015 DETERMINE REFRACTIVE STATE: CPT | Mod: ,,, | Performed by: OPTOMETRIST

## 2018-05-31 PROCEDURE — 99999 PR PBB SHADOW E&M-EST. PATIENT-LVL II: CPT | Mod: PBBFAC,,, | Performed by: OPTOMETRIST

## 2018-05-31 PROCEDURE — 92014 COMPRE OPH EXAM EST PT 1/>: CPT | Mod: S$PBB,,, | Performed by: OPTOMETRIST

## 2018-05-31 NOTE — PROGRESS NOTES
HPI     RAGHU 11/2015 Eyes feel like there's a constant strain.  Trouble seeing TV.    Wears OTC +1.75 readers.  No spex for distance.  Near seems fine with   readers.    Last edited by Thee Cortes, OD on 5/31/2018  8:09 AM. (History)        ROS     Negative for: Constitutional, Gastrointestinal, Neurological, Skin,   Genitourinary, Musculoskeletal, HENT, Endocrine, Cardiovascular, Eyes,   Respiratory, Psychiatric, Allergic/Imm, Heme/Lymph    Last edited by Thee Cortes, OD on 5/31/2018  8:09 AM. (History)        Assessment /Plan     For exam results, see Encounter Report.    Astigmatism of both eyes with presbyopia      astig presby--pt wearing otc +1.75, but can't see computer.  Discussed could get otc +1.00 for computer, and switch between that and +1.75 for near, OR discussed PALs.  Wrote spex Rx.  Discussed VARILUX/adaptation    PLAN:    rtc 1 yr

## 2018-06-01 ENCOUNTER — CLINICAL SUPPORT (OUTPATIENT)
Dept: REHABILITATION | Facility: OTHER | Age: 53
End: 2018-06-01
Attending: PHYSICAL MEDICINE & REHABILITATION
Payer: OTHER GOVERNMENT

## 2018-06-01 DIAGNOSIS — G89.29 CHRONIC BILATERAL LOW BACK PAIN WITHOUT SCIATICA: ICD-10-CM

## 2018-06-01 DIAGNOSIS — M54.50 CHRONIC BILATERAL LOW BACK PAIN WITHOUT SCIATICA: ICD-10-CM

## 2018-06-01 PROCEDURE — 97110 THERAPEUTIC EXERCISES: CPT

## 2018-06-01 NOTE — PROGRESS NOTES
[facility] Treatment      Name: Sharlene Reyes [facility] Number: [id number]  Date of Treatment: 2018   Diagnosis:   Encounter Diagnosis   Name Primary?    Chronic bilateral low back pain without sciatica      Physician: Lurdes Miller, *    Pain pattern determined: 1 PEN (flexion responder, pt demonstrates poor posture and motor control, started on trial of scapular retractions, core contraction, and SIMRAN)      Plan of care signed: 3/16/18   Time in: 3:00  Time Out: 4:00  Total Treatment time: 60  Precautions: Cardiac, asthma, Tachycardia, Ulcerative colitis  Visit #:7 (increase ROM next visit)    POC due: 18    Reassessment due: 18        Face to Face discussion of patient was done between PT and PTA.     Subjective   Sharlene reports that she always has some kid of pain all across the low back, it doesn't wake her out of her sleep. The early morning is worse and it feels better after she moves around and takes a tramadol.    Patient reports their pain to be 5/10 on a 0-10 scale with 0 being no pain and 10 being the worst pain imaginable.    Pain Location: Low back bilateral along belt line      Occupation:  Works for [employer] (, light duty, sedentary)    Leisure: Not very active, used to walk with friend   Pts goals:  Lose weight, get back into being more    Objective     Baseline IM Testing Results:   Date of testin2018  ROM 27-12 deg   Max Peak Torque 84    Min Peak Torque 28    Flex/Ext Ratio 3   % below normative data -73   Fem 5    SP 0     FOTO: Focus on Therapeutic Outcomes   Category: lumbar   % Impaired: 48%  Current Score  = CK = at least 40% but < 60% impaired, limited or restricted  Goal at Discharge Score = CJ = at least 20% but < 40% impaired, limited or restricted    Treatment    Pt was instructed in and performed the following:     Sharlene received therapeutic exercises to develop/improved posture, cardiovascular  endurance, muscular endurance, lumbar/cervical ROM, strength and muscular endurance for 50 minutes including the following exercises:     [facility] 6/1/2018   Visit Number 7   VAS Pain Rating 5   Recumbent Bike Seat Pos. 13   Time 5   Flexion in Lying 10   Lumbar Extension Seat Pad -   Femur Restraint -   Top Dead Center -   Counterweight -   Lumbar Flexion -   Lumbar Extension -   Lumbar Peak Torque -   Min Torque -   Percent From Norm -   Lumbar Weight 49   Repetitions 18   Rating of Perceived Exertion 3   Ice - Z Lie (in min.) 10         Scapular retraction 10x, 3x/daily  Supine and standing pelvic tilts 5-10 s/h 5x, 2x daily  Piriformis 10 secs, 3x   LTR 10x      Peripheral muscle strengthening which included 1 set of 15-20 repetitions at a slow, controlled 7 second per rep pace focused on strengthening supporting musculature for improved body mechanics and functional mobility.  Pt and therapist focused on proper form during treatment to ensure optimal strengthening of each targeted muscle group.  Machines were utilized including torso rotation, leg extension, leg curl, chest press, upright row. Tricep extension, bicep curl, leg press, and hip abduction added on third visit.       Sharlene received the following manual therapy techniques: None      Home Exercise Program as follows:   Flexion in lying 10x, 3x/daily  Scapular retraction 10x, 3x/daily  Supine and standing pelvic tilts 5-10 s/h 5x, 2x daily  Z-lie 10-20 min, 2x daily  Piriformis 10 secs, 3x   LTR 10x    Handouts were given to the patient. Pt demo good understanding of the education provided. Sharlene demonstrated good return demonstration of activities.     Lumbar roll use compliance: Unknown  Additional exercises taught this treatment session:     Assessment     Pt presents to physical therapy with moderate low back pain which reduced mildly throughout session. Reviewed HEP exercises. Pt tolerated Med X lumbar with a rep ^ with reported 3/10 Abe  Exertion scale, mod verbalc cues for pace on machine.Increase ROM next visit. Pt completed all peripheral resistance exercises with no reports of increased symptoms or discomfort. Pt may benefit from cupping next session.     Patient is making fair progress towards established goals.  Pt will continue to benefit from skilled outpatient physical therapy to address the deficits stated in the impairment chart, provide pt/family education and to maximize pt's level of independence in the home and community environment.       Pt's spiritual, cultural and educational needs considered and pt agreeable to plan of care and goals as stated below:     Medical necessity is demonstrated by the following problem list.    Pt presents with the following impairments:   History  Co-morbidities and personal factors that may impact the plan of care Examination  Body Structures and Functions, activity limitations and participation restrictions that may impact the plan of care Clinical Presentation    Decision Making/ Complexity Score   Co-morbidities:    Cardiac, asthma, Tachycardia, Ulcerative colitis           Personal Factors:   lifestyle  attitudes  Not complaint with previous HEP, sedentary Body Regions:   back     Body Systems:   gross symmetry  ROM  strength  gross coordinated movement  motor control     Activity limitations:   Learning and applying knowledge  no deficits     General Tasks and Commands  no deficits     Communication  no deficits     Mobility  lifting and carrying objects  walking  Standing, stairs     Self care  no deficits     Domestic Life  cooking  doing house work (cleaning house, washing dishes, laundry)     Interactions/Relationships  no deficits     Life Areas  employment     Community and Social Life  community life  recreation and leisure     Participation Restrictions:   Increased pain by end of work day       evolving clinical presentation with changing clinical characteristics   Worsening    Moderate             GOALS: Pt is in agreement with the following goals.     Short term goals:  6 weeks or 10 visits   1.  Pt will demonstrate increased lumbar ROM by at least 3 degrees from the initial ROM value with improvements noted in functional ROM and ability to perform ADLs  2.  Pt will demonstrate increased maximum isometric torque value by 10% when compared to the initial value resulting in improved ability to perform bending, lifting, and carrying activities safely, confidently.  3.  Patient report a reduction in worst pain score by 1-2 points for improved tolerance during work and recreational activities  4.  Pt able to perform HEP correctly with minimal cueing or supervision for therapist     Long term goals: 13 weeks or 20 visits   1. Pt will demonstrate increased lumbar ROM by at least 9 degrees from initial ROM value, resulting in improved ability to perform functional fwd bending while standing and sitting.   2. Pt will demonstrate increased maximum isometric torque value by 30% when compared to the initial value resulting in improved ability to perform bending, lifting, and carrying activities safely, confidently.  3. Pt to demonstrate ability to independently control and reduce their pain through posture positioning and mechanical movements throughout a typical day.  4.  Patient will demonstrate improved overall function per FOTO Survey to CJ = at least 20% but < 40% impaired, limited or restricted score or less.  5. Pt will tolerate up to 15 minutes of standing without increased symptoms.          Plan   Continue with established Plan of Care towards established PT goals.

## 2018-06-04 ENCOUNTER — HOSPITAL ENCOUNTER (EMERGENCY)
Facility: HOSPITAL | Age: 53
Discharge: HOME | End: 2018-06-04
Attending: EMERGENCY MEDICINE
Payer: OTHER GOVERNMENT

## 2018-06-04 VITALS
OXYGEN SATURATION: 97 % | SYSTOLIC BLOOD PRESSURE: 102 MMHG | TEMPERATURE: 98 F | HEIGHT: 64 IN | DIASTOLIC BLOOD PRESSURE: 64 MMHG | BODY MASS INDEX: 29.02 KG/M2 | WEIGHT: 170 LBS | HEART RATE: 78 BPM | RESPIRATION RATE: 20 BRPM

## 2018-06-04 DIAGNOSIS — I47.10 SVT (SUPRAVENTRICULAR TACHYCARDIA): Primary | ICD-10-CM

## 2018-06-04 LAB
ALBUMIN SERPL BCP-MCNC: 3.6 G/DL
ALP SERPL-CCNC: 45 U/L
ALT SERPL W/O P-5'-P-CCNC: 17 U/L
ANION GAP SERPL CALC-SCNC: 13 MMOL/L
AST SERPL-CCNC: 22 U/L
BASOPHILS # BLD AUTO: 0.11 K/UL
BASOPHILS NFR BLD: 0.9 %
BILIRUB SERPL-MCNC: 0.3 MG/DL
BUN SERPL-MCNC: 13 MG/DL
CALCIUM SERPL-MCNC: 9.2 MG/DL
CHLORIDE SERPL-SCNC: 108 MMOL/L
CO2 SERPL-SCNC: 15 MMOL/L
CREAT SERPL-MCNC: 0.9 MG/DL
DIFFERENTIAL METHOD: ABNORMAL
EOSINOPHIL # BLD AUTO: 0.3 K/UL
EOSINOPHIL NFR BLD: 2.7 %
ERYTHROCYTE [DISTWIDTH] IN BLOOD BY AUTOMATED COUNT: 11.6 %
EST. GFR  (AFRICAN AMERICAN): >60 ML/MIN/1.73 M^2
EST. GFR  (NON AFRICAN AMERICAN): >60 ML/MIN/1.73 M^2
GLUCOSE SERPL-MCNC: 255 MG/DL
HCT VFR BLD AUTO: 38.8 %
HGB BLD-MCNC: 12.7 G/DL
IMM GRANULOCYTES # BLD AUTO: 0.03 K/UL
IMM GRANULOCYTES NFR BLD AUTO: 0.2 %
LYMPHOCYTES # BLD AUTO: 5.5 K/UL
LYMPHOCYTES NFR BLD: 43.8 %
MCH RBC QN AUTO: 31.5 PG
MCHC RBC AUTO-ENTMCNC: 32.7 G/DL
MCV RBC AUTO: 96 FL
MONOCYTES # BLD AUTO: 0.4 K/UL
MONOCYTES NFR BLD: 3.3 %
NEUTROPHILS # BLD AUTO: 6.1 K/UL
NEUTROPHILS NFR BLD: 49.1 %
NRBC BLD-RTO: 0 /100 WBC
PLATELET # BLD AUTO: 408 K/UL
PMV BLD AUTO: 9.7 FL
POTASSIUM SERPL-SCNC: 4.4 MMOL/L
PROT SERPL-MCNC: 7.2 G/DL
RBC # BLD AUTO: 4.03 M/UL
SODIUM SERPL-SCNC: 136 MMOL/L
WBC # BLD AUTO: 12.46 K/UL

## 2018-06-04 PROCEDURE — 63600175 PHARM REV CODE 636 W HCPCS: Performed by: EMERGENCY MEDICINE

## 2018-06-04 PROCEDURE — 96374 THER/PROPH/DIAG INJ IV PUSH: CPT

## 2018-06-04 PROCEDURE — 85025 COMPLETE CBC W/AUTO DIFF WBC: CPT

## 2018-06-04 PROCEDURE — 99284 EMERGENCY DEPT VISIT MOD MDM: CPT | Mod: ,,, | Performed by: EMERGENCY MEDICINE

## 2018-06-04 PROCEDURE — 80053 COMPREHEN METABOLIC PANEL: CPT

## 2018-06-04 PROCEDURE — 99283 EMERGENCY DEPT VISIT LOW MDM: CPT | Mod: 25

## 2018-06-04 RX ORDER — ADENOSINE 3 MG/ML
INJECTION, SOLUTION INTRAVENOUS
Status: DISCONTINUED
Start: 2018-06-04 | End: 2018-06-05 | Stop reason: HOSPADM

## 2018-06-04 RX ORDER — ADENOSINE 3 MG/ML
6 INJECTION, SOLUTION INTRAVENOUS
Status: COMPLETED | OUTPATIENT
Start: 2018-06-04 | End: 2018-06-04

## 2018-06-04 RX ADMIN — ADENOSINE 6 MG: 3 INJECTION, SOLUTION INTRAVENOUS at 10:06

## 2018-06-05 NOTE — ED PROVIDER NOTES
Encounter Date: 2018    SCRIBE #1 NOTE: I, Deepa Packer, am scribing for, and in the presence of,  Dr. Miller. I have scribed the entire note.       History     Chief Complaint   Patient presents with    Tachycardia     feels like she is having another episode of SVT, similar to previous episodes. denies cp, c/o SOB.      Time patient was seen by the provider: 10:45 PM      The patient is a 53 y.o. female with co-morbidities including: SVT and HLD who presents to the ED with a complaint of SVT. The patient states that this is a chronic issue and her last episode was about 3-4 months ago. She states that her sx are similar to previous episodes and endorses diaphoresis, lightheadedness, and SOB but denies CP. Reports that she usually receives adenosine for her sx and has never been cardioverted. She also states that she takes  Diltiazem daily.         The history is provided by the patient and medical records.     Review of patient's allergies indicates:   Allergen Reactions    Hydrocodone      Past Medical History:   Diagnosis Date    Abnormal Pap smear of vagina yrs ago    possible BX?    Allergy     Arthritis     Asthma     Hyperlipidemia     Supraventricular tachycardia     SVT (supraventricular tachycardia)     Tachycardia     Ulcerative colitis      Past Surgical History:   Procedure Laterality Date     SECTION, CLASSIC      x 2    COLONOSCOPY N/A 2015    Procedure: COLONOSCOPY;  Surgeon: Petr Miller MD;  Location: [address]);  Service: Endoscopy;  Laterality: N/A;    COLONOSCOPY N/A 10/13/2017    Procedure: COLONOSCOPY;  Surgeon: Petr Miller MD;  Location: [address]);  Service: Endoscopy;  Laterality: N/A;     Family History   Problem Relation Age of Onset    Hyperlipidemia Mother     Cancer Mother     Cataracts Mother     Heart attack Father     Diabetes Father     Heart attack Paternal Grandmother     Glaucoma Maternal Grandfather     Heart  disease Neg Hx     Hypertension Neg Hx     Colon cancer Neg Hx     Esophageal cancer Neg Hx     Amblyopia Neg Hx     Blindness Neg Hx     Macular degeneration Neg Hx     Retinal detachment Neg Hx     Strabismus Neg Hx     Stroke Neg Hx     Thyroid disease Neg Hx      Social History   Substance Use Topics    Smoking status: Former Smoker     Types: Cigarettes     Quit date: 2/25/1999    Smokeless tobacco: Never Used    Alcohol use 4.2 oz/week     4 Cans of beer, 3 Shots of liquor per week      Comment: Occasionally     Review of Systems   Constitutional: Positive for diaphoresis. Negative for fever.   HENT: Negative for nosebleeds.    Eyes: Negative for visual disturbance.   Respiratory: Positive for shortness of breath.    Cardiovascular: Positive for palpitations. Negative for chest pain.   Gastrointestinal: Negative for abdominal pain.   Musculoskeletal: Negative for gait problem.   Skin: Negative for wound.   Neurological: Positive for light-headedness.   Psychiatric/Behavioral: Negative for confusion.       Physical Exam     Initial Vitals   BP Pulse Resp Temp SpO2   06/04/18 2256 06/04/18 2236 06/04/18 2236 06/04/18 2236 06/04/18 2236   109/69 (!) 180 18 97.5 °F (36.4 °C) 98 %      MAP       06/04/18 2256       82.33         Physical Exam    Nursing note and vitals reviewed.  Constitutional:   Diaphoretic    HENT:   Head: Normocephalic and atraumatic.   Neck: Neck supple.   Cardiovascular:   HR very tachycardic.    Pulmonary/Chest: Breath sounds normal. No respiratory distress. She has no wheezes. She has no rhonchi. She has no rales.   Abdominal: Soft. She exhibits no distension. There is no tenderness.   Musculoskeletal: She exhibits no tenderness.   Neurological: She is alert and oriented to person, place, and time. No cranial nerve deficit or sensory deficit.   Skin: Capillary refill takes less than 2 seconds. No rash noted. There is pallor.   Psychiatric: She has a normal mood and affect.          ED Course   Procedures  Labs Reviewed   CBC W/ AUTO DIFFERENTIAL   COMPREHENSIVE METABOLIC PANEL             Medical Decision Making:   History:   Old Medical Records: I decided to obtain old medical records.  Initial Assessment:   On arrival pads, were placed on pt and monitor shows SVT. I have personally taken care of pt before and know her hx of SVT. Unable to get BP both with machine and manually. However, pt states that she doesn't feel worse than previous episodes and has palpable radial pulses. 6 mg adenosine was administered with successful conversion to sinus rhythm.  Clinical Tests:   Lab Tests: Ordered and Reviewed  ED Management:  10:44  Monitor reads SVT    10:50  Sinus rhythm    11:49 PM  After about an hour of observation, pt remains in stable sinus rhythm. CBC and CMP unremarkable other than an elevated glucose. I advise pt to follow-up with electrophysiology. Otherwise stable for d/c.     Advised pt to follow up with PCP or return if concerning symptoms arise. Pt understands and agrees with plan. Will d/c home.                Scribe Attestation:   Scribe #1: I performed the above scribed service and the documentation accurately describes the services I performed. I attest to the accuracy of the note.               Clinical Impression:   There were no encounter diagnoses.    No orders to display       Disposition:   Disposition: Discharged  Condition: Stable                        Yasmany Miller MD  06/05/18 0697

## 2018-06-05 NOTE — ED NOTES
LOC: The patient is awake, alert, aware of environment with an appropriate affect. Oriented x4, speaking appropriately  APPEARANCE: Pt resting comfortably, in no acute distress, pt is clean and well groomed, clothing properly fastened  SKIN:The skin is warm and dry, color consistent with ethnicity, patient has normal skin turgor and moist mucus membranes  RESPIRATORY:Airway is open and patent, respirations are spontaneous, patient has a normal effort and rate, no accessory muscle use noted.  CARDIAC:fast rate and rhythm, no peripheral edema noted, capillary refill < 3 seconds, bilateral radial pulses 2+.   NEUROLOGIC: PERRLA, facial expression is symmetrical, patient moving all extremities spontaneously, normal sensation in all extremities when touched with a finger.  Follows all commands appropriately  MUSCULOSKELETAL: Patient moving all extremities spontaneously, no obvious swelling or deformities noted.

## 2018-06-15 ENCOUNTER — LAB VISIT (OUTPATIENT)
Dept: LAB | Facility: HOSPITAL | Age: 53
End: 2018-06-15
Attending: FAMILY MEDICINE
Payer: OTHER GOVERNMENT

## 2018-06-15 DIAGNOSIS — E78.5 HYPERLIPIDEMIA: ICD-10-CM

## 2018-06-15 DIAGNOSIS — Z00.00 ROUTINE GENERAL MEDICAL EXAMINATION AT A HEALTH CARE FACILITY: ICD-10-CM

## 2018-06-15 LAB
ALBUMIN SERPL BCP-MCNC: 3.9 G/DL
ALP SERPL-CCNC: 52 U/L
ALT SERPL W/O P-5'-P-CCNC: 14 U/L
ANION GAP SERPL CALC-SCNC: 11 MMOL/L
AST SERPL-CCNC: 13 U/L
BASOPHILS # BLD AUTO: 0.06 K/UL
BASOPHILS NFR BLD: 0.6 %
BILIRUB SERPL-MCNC: 0.5 MG/DL
BUN SERPL-MCNC: 10 MG/DL
CALCIUM SERPL-MCNC: 9.4 MG/DL
CHLORIDE SERPL-SCNC: 106 MMOL/L
CHOLEST SERPL-MCNC: 189 MG/DL
CHOLEST/HDLC SERPL: 3.5 {RATIO}
CO2 SERPL-SCNC: 22 MMOL/L
CREAT SERPL-MCNC: 0.7 MG/DL
DIFFERENTIAL METHOD: ABNORMAL
EOSINOPHIL # BLD AUTO: 0.2 K/UL
EOSINOPHIL NFR BLD: 2.3 %
ERYTHROCYTE [DISTWIDTH] IN BLOOD BY AUTOMATED COUNT: 11.8 %
EST. GFR  (AFRICAN AMERICAN): >60 ML/MIN/1.73 M^2
EST. GFR  (NON AFRICAN AMERICAN): >60 ML/MIN/1.73 M^2
GLUCOSE SERPL-MCNC: 100 MG/DL
HCT VFR BLD AUTO: 41 %
HDLC SERPL-MCNC: 54 MG/DL
HDLC SERPL: 28.6 %
HGB BLD-MCNC: 13.4 G/DL
IMM GRANULOCYTES # BLD AUTO: 0.03 K/UL
IMM GRANULOCYTES NFR BLD AUTO: 0.3 %
LDLC SERPL CALC-MCNC: 107.6 MG/DL
LYMPHOCYTES # BLD AUTO: 2.7 K/UL
LYMPHOCYTES NFR BLD: 27.5 %
MCH RBC QN AUTO: 31.3 PG
MCHC RBC AUTO-ENTMCNC: 32.7 G/DL
MCV RBC AUTO: 96 FL
MONOCYTES # BLD AUTO: 0.4 K/UL
MONOCYTES NFR BLD: 3.9 %
NEUTROPHILS # BLD AUTO: 6.5 K/UL
NEUTROPHILS NFR BLD: 65.4 %
NONHDLC SERPL-MCNC: 135 MG/DL
NRBC BLD-RTO: 0 /100 WBC
PLATELET # BLD AUTO: 452 K/UL
PMV BLD AUTO: 10 FL
POTASSIUM SERPL-SCNC: 4.1 MMOL/L
PROT SERPL-MCNC: 7.8 G/DL
RBC # BLD AUTO: 4.28 M/UL
SODIUM SERPL-SCNC: 139 MMOL/L
TRIGL SERPL-MCNC: 137 MG/DL
TSH SERPL DL<=0.005 MIU/L-ACNC: 0.51 UIU/ML
WBC # BLD AUTO: 9.95 K/UL

## 2018-06-15 PROCEDURE — 80053 COMPREHEN METABOLIC PANEL: CPT

## 2018-06-15 PROCEDURE — 85025 COMPLETE CBC W/AUTO DIFF WBC: CPT

## 2018-06-15 PROCEDURE — 36415 COLL VENOUS BLD VENIPUNCTURE: CPT | Mod: PO

## 2018-06-15 PROCEDURE — 84443 ASSAY THYROID STIM HORMONE: CPT

## 2018-06-15 PROCEDURE — 80061 LIPID PANEL: CPT

## 2018-06-22 ENCOUNTER — OFFICE VISIT (OUTPATIENT)
Dept: FAMILY MEDICINE | Facility: CLINIC | Age: 53
End: 2018-06-22
Payer: OTHER GOVERNMENT

## 2018-06-22 VITALS
BODY MASS INDEX: 29.99 KG/M2 | SYSTOLIC BLOOD PRESSURE: 112 MMHG | TEMPERATURE: 98 F | WEIGHT: 175.69 LBS | HEIGHT: 64 IN | DIASTOLIC BLOOD PRESSURE: 80 MMHG | RESPIRATION RATE: 20 BRPM

## 2018-06-22 DIAGNOSIS — J45.998 ASTHMA IN REMISSION: ICD-10-CM

## 2018-06-22 DIAGNOSIS — K21.9 GASTROESOPHAGEAL REFLUX DISEASE, ESOPHAGITIS PRESENCE NOT SPECIFIED: Primary | ICD-10-CM

## 2018-06-22 DIAGNOSIS — J45.909 UNCOMPLICATED ASTHMA, UNSPECIFIED ASTHMA SEVERITY, UNSPECIFIED WHETHER PERSISTENT: ICD-10-CM

## 2018-06-22 DIAGNOSIS — F41.9 ANXIETY: ICD-10-CM

## 2018-06-22 DIAGNOSIS — I47.10 SVT (SUPRAVENTRICULAR TACHYCARDIA): ICD-10-CM

## 2018-06-22 DIAGNOSIS — Z23 IMMUNIZATION DUE: ICD-10-CM

## 2018-06-22 DIAGNOSIS — R51.9 SINUS HEADACHE: ICD-10-CM

## 2018-06-22 DIAGNOSIS — M25.559 ARTHRALGIA OF HIP, UNSPECIFIED LATERALITY: ICD-10-CM

## 2018-06-22 DIAGNOSIS — E78.5 HYPERLIPIDEMIA, UNSPECIFIED HYPERLIPIDEMIA TYPE: ICD-10-CM

## 2018-06-22 PROCEDURE — 99213 OFFICE O/P EST LOW 20 MIN: CPT | Mod: PBBFAC,PO | Performed by: FAMILY MEDICINE

## 2018-06-22 PROCEDURE — 99396 PREV VISIT EST AGE 40-64: CPT | Mod: S$PBB,,, | Performed by: FAMILY MEDICINE

## 2018-06-22 PROCEDURE — 90471 IMMUNIZATION ADMIN: CPT | Mod: PBBFAC,PO

## 2018-06-22 PROCEDURE — 99999 PR PBB SHADOW E&M-EST. PATIENT-LVL III: CPT | Mod: PBBFAC,,, | Performed by: FAMILY MEDICINE

## 2018-06-22 RX ORDER — OMEPRAZOLE 40 MG/1
40 CAPSULE, DELAYED RELEASE ORAL EVERY MORNING
Qty: 90 CAPSULE | Refills: 3 | Status: SHIPPED | OUTPATIENT
Start: 2018-06-22 | End: 2018-06-22 | Stop reason: SDUPTHER

## 2018-06-22 RX ORDER — TRAMADOL HYDROCHLORIDE 50 MG/1
50 TABLET ORAL EVERY 6 HOURS PRN
Qty: 120 TABLET | Refills: 4 | Status: SHIPPED | OUTPATIENT
Start: 2018-06-22 | End: 2019-02-01 | Stop reason: SDUPTHER

## 2018-06-22 RX ORDER — OMEPRAZOLE 40 MG/1
40 CAPSULE, DELAYED RELEASE ORAL EVERY MORNING
Qty: 90 CAPSULE | Refills: 3 | Status: SHIPPED | OUTPATIENT
Start: 2018-06-22 | End: 2018-10-08

## 2018-06-22 NOTE — PROGRESS NOTES
Two patient identifiers verified.  Allergies reviewed.  Tdap administered to Left deltoid as per MD order advise patient to wait 15min after shot is given for any adverse reaction.

## 2018-06-23 NOTE — PROGRESS NOTES
Sharlene Smith is a 53 y.o. female   Routine physical  Source of history: Patient  Past Medical History:   Diagnosis Date    Abnormal Pap smear of vagina yrs ago    possible BX?    Allergy     Arthritis     Asthma     Hyperlipidemia     Supraventricular tachycardia     SVT (supraventricular tachycardia)     Tachycardia     Ulcerative colitis      Patient  reports that she quit smoking about 19 years ago. Her smoking use included Cigarettes. She has never used smokeless tobacco. She reports that she drinks about 4.2 oz of alcohol per week . She reports that she does not use drugs.  Family History   Problem Relation Age of Onset    Hyperlipidemia Mother     Cancer Mother     Cataracts Mother     Heart attack Father     Diabetes Father     Heart attack Paternal Grandmother     Glaucoma Maternal Grandfather     Heart disease Neg Hx     Hypertension Neg Hx     Colon cancer Neg Hx     Esophageal cancer Neg Hx     Amblyopia Neg Hx     Blindness Neg Hx     Macular degeneration Neg Hx     Retinal detachment Neg Hx     Strabismus Neg Hx     Stroke Neg Hx     Thyroid disease Neg Hx      ROS:  GENERAL: No fever, chills, fatigability or weight loss.  SKIN: No rashes, itching or changes in color or texture of skin.  HEAD: No headaches or recent head trauma.  EYES: Visual acuity fine. No photophobia, ocular pain or diplopia.  EARS: Denies ear pain, discharge or vertigo.  NOSE: No loss of smell, no epistaxis or postnasal drip.  MOUTH & THROAT: No hoarseness or change in voice. No excessive gum bleeding.  NODES: Denies swollen glands.  CHEST: Denies CONTRERAS, cyanosis, wheezing, cough and sputum production.  CARDIOVASCULAR: Denies chest pain, PND, orthopnea or reduced exercise tolerance.  ABDOMEN: Appetite fine. No weight loss. Denies diarrhea, abdominal pain, hematemesis or blood in stool.  URINARY: No flank pain, dysuria or hematuria.  PERIPHERAL VASCULAR: No claudication or cyanosis.  MUSCULOSKELETAL:  No joint stiffness or swelling. Denies back pain.  NEUROLOGIC: No history of seizures, paralysis, alteration of gait or coordination.    OBJECTIVE:  APPEARANCE: normal appearance  Vitals:    06/22/18 1405   BP: 112/80   Resp: 20   Temp: 97.8 °F (36.6 °C)     SKIN: Normal skin turgor, no lesions.  HEENT: Both external auditory canals clear. Both tympanic membranes intact. PERRL. EOMI.   Disk margins sharp. No tonsillar enlargement. No pharyngeal erythema or exudate. No stridor.  NECK: No bruits. No cervical spine tenderness. No cervical lymphadenopathy. No thyromegaly.  NODES: No cervical, axillary or inguinal lymph node enlargement.  CHEST: Breath sounds clear bilaterally. Lungs clear to auscultation & percussion.   Good air movement. No rales. No retractions. No rhonchi. No stridor. No wheezes.  CARDIOVASCULAR: Normal S1, S2. No murmurs. No edema.  BREASTS: no masses palpated in either breast or axillary area, symmetry noted.  ABDOMEN: Bowel sounds normal. No palpable aortic enlargement. No CVA tenderness. No pulsatile mass. No rebound tenderness.  PERIPHERAL VASCULAR: Femoral pulses present and symmetrical. No edema.  MUSCULOSKELETAL: Degenerative changes of both ankles, foot, knee, wrist and hand.  BACK: No CVA tenderness. There is no spasm, tenderness or radiculopathy noted with palpation and there is full range of motion.   NEUROLOGIC:   Cranial Nerves: II-XII grossly intact.  Motor: 5/5 strength major flexors/extensors. No tremor.  DTR's: Knees, Ankles 2+ and equal bilaterally; downgoing toes.  Sensory: Intact to light touch distally.  Gait & Posture: Normal gait and fine motion. No cerebellar signs.  MENTAL STATUS: Alert. Oriented x 3. Language skills normal. Memory intact. No suicidal ideation. Can do simple math. Well kept appearance.    ASSESSMENT/PLAN:   Sharlene was seen today for annual exam.    Diagnoses and all orders for this visit:    Gastroesophageal reflux disease, esophagitis presence not  specified  -     Discontinue: omeprazole (PRILOSEC) 40 MG capsule; Take 1 capsule (40 mg total) by mouth every morning. 30 minutes before breakfast  -     omeprazole (PRILOSEC) 40 MG capsule; Take 1 capsule (40 mg total) by mouth every morning. 30 minutes before breakfast    Asthma in remission  Albuterol inhaler prn  Hyperlipidemia, unspecified hyperlipidemia type  Low fat diet    Anxiety  Xanax prn  Arthralgia of hip, unspecified laterality  -     traMADol (ULTRAM) 50 mg tablet; Take 1 tablet (50 mg total) by mouth every 6 (six) hours as needed.    Immunization due  -     Tdap Vaccine    Labs discussed  No areas of concern.  F/u in 6 months review med.

## 2018-07-11 ENCOUNTER — HOSPITAL ENCOUNTER (EMERGENCY)
Facility: HOSPITAL | Age: 53
Discharge: HOME | End: 2018-07-11
Attending: EMERGENCY MEDICINE
Payer: OTHER GOVERNMENT

## 2018-07-11 VITALS
TEMPERATURE: 98 F | SYSTOLIC BLOOD PRESSURE: 133 MMHG | HEART RATE: 100 BPM | RESPIRATION RATE: 20 BRPM | HEIGHT: 64 IN | OXYGEN SATURATION: 100 % | BODY MASS INDEX: 32.27 KG/M2 | WEIGHT: 189 LBS | DIASTOLIC BLOOD PRESSURE: 86 MMHG

## 2018-07-11 DIAGNOSIS — I47.10 SVT (SUPRAVENTRICULAR TACHYCARDIA): Primary | ICD-10-CM

## 2018-07-11 LAB
ALBUMIN SERPL BCP-MCNC: 3.6 G/DL
ALP SERPL-CCNC: 54 U/L
ALT SERPL W/O P-5'-P-CCNC: 23 U/L
ANION GAP SERPL CALC-SCNC: 11 MMOL/L
AST SERPL-CCNC: 19 U/L
BASOPHILS # BLD AUTO: 0.13 K/UL
BASOPHILS NFR BLD: 0.9 %
BILIRUB SERPL-MCNC: 0.4 MG/DL
BUN SERPL-MCNC: 15 MG/DL
CALCIUM SERPL-MCNC: 8.8 MG/DL
CHLORIDE SERPL-SCNC: 108 MMOL/L
CO2 SERPL-SCNC: 19 MMOL/L
CREAT SERPL-MCNC: 0.8 MG/DL
DIFFERENTIAL METHOD: ABNORMAL
EOSINOPHIL # BLD AUTO: 0.5 K/UL
EOSINOPHIL NFR BLD: 3.6 %
ERYTHROCYTE [DISTWIDTH] IN BLOOD BY AUTOMATED COUNT: 11.8 %
EST. GFR  (AFRICAN AMERICAN): >60 ML/MIN/1.73 M^2
EST. GFR  (NON AFRICAN AMERICAN): >60 ML/MIN/1.73 M^2
GLUCOSE SERPL-MCNC: 190 MG/DL
HCT VFR BLD AUTO: 37.3 %
HGB BLD-MCNC: 12.2 G/DL
IMM GRANULOCYTES # BLD AUTO: 0.05 K/UL
IMM GRANULOCYTES NFR BLD AUTO: 0.3 %
LYMPHOCYTES # BLD AUTO: 5.1 K/UL
LYMPHOCYTES NFR BLD: 34.2 %
MCH RBC QN AUTO: 30.7 PG
MCHC RBC AUTO-ENTMCNC: 32.7 G/DL
MCV RBC AUTO: 94 FL
MONOCYTES # BLD AUTO: 0.5 K/UL
MONOCYTES NFR BLD: 3.5 %
NEUTROPHILS # BLD AUTO: 8.6 K/UL
NEUTROPHILS NFR BLD: 57.5 %
NRBC BLD-RTO: 0 /100 WBC
PLATELET # BLD AUTO: 471 K/UL
PMV BLD AUTO: 9.4 FL
POTASSIUM SERPL-SCNC: 3.7 MMOL/L
PROT SERPL-MCNC: 7.1 G/DL
RBC # BLD AUTO: 3.98 M/UL
SODIUM SERPL-SCNC: 138 MMOL/L
WBC # BLD AUTO: 14.9 K/UL

## 2018-07-11 PROCEDURE — 99285 EMERGENCY DEPT VISIT HI MDM: CPT | Mod: ,,, | Performed by: EMERGENCY MEDICINE

## 2018-07-11 PROCEDURE — 80053 COMPREHEN METABOLIC PANEL: CPT

## 2018-07-11 PROCEDURE — 96361 HYDRATE IV INFUSION ADD-ON: CPT

## 2018-07-11 PROCEDURE — 25000003 PHARM REV CODE 250: Performed by: EMERGENCY MEDICINE

## 2018-07-11 PROCEDURE — 85025 COMPLETE CBC W/AUTO DIFF WBC: CPT

## 2018-07-11 PROCEDURE — 25000003 PHARM REV CODE 250: Performed by: HOSPITALIST

## 2018-07-11 PROCEDURE — 99284 EMERGENCY DEPT VISIT MOD MDM: CPT | Mod: 25

## 2018-07-11 PROCEDURE — 96374 THER/PROPH/DIAG INJ IV PUSH: CPT

## 2018-07-11 PROCEDURE — 63600175 PHARM REV CODE 636 W HCPCS: Performed by: EMERGENCY MEDICINE

## 2018-07-11 RX ORDER — ADENOSINE 3 MG/ML
6 INJECTION, SOLUTION INTRAVENOUS
Status: COMPLETED | OUTPATIENT
Start: 2018-07-11 | End: 2018-07-11

## 2018-07-11 RX ORDER — ADENOSINE 3 MG/ML
12 INJECTION, SOLUTION INTRAVENOUS
Status: DISCONTINUED | OUTPATIENT
Start: 2018-07-11 | End: 2018-07-11

## 2018-07-11 RX ADMIN — ADENOSINE 6 MG: 3 INJECTION, SOLUTION INTRAVENOUS at 06:07

## 2018-07-11 RX ADMIN — SODIUM CHLORIDE 1000 ML: 0.9 INJECTION, SOLUTION INTRAVENOUS at 06:07

## 2018-07-11 RX ADMIN — SODIUM CHLORIDE 1000 ML: 0.9 INJECTION, SOLUTION INTRAVENOUS at 07:07

## 2018-07-11 NOTE — ED NOTES
Adenosine 6mg given in right hand IV. On cont. Cardiac Monitor, physician at bs, tolerating well. Converted from 192bpm to 102bpm sinus tach. Additional EKG acquired. Pt. Speaking full sentences, states feeling better, sating 100% RA, no distress noted.

## 2018-07-11 NOTE — ED TRIAGE NOTES
Pt. Presents to ED today with c/o high heart rate since approximately 1630 today. Pt. Has hx of SVT, tried all vagal maneuvers at home without success.  in triage and in ED. Pads placed on patient. Speaking full sentences, non labored breathing, sating 98% RA.

## 2018-07-12 ENCOUNTER — PATIENT MESSAGE (OUTPATIENT)
Dept: FAMILY MEDICINE | Facility: CLINIC | Age: 53
End: 2018-07-12

## 2018-07-12 DIAGNOSIS — I47.10 SVT (SUPRAVENTRICULAR TACHYCARDIA): Primary | ICD-10-CM

## 2018-07-12 NOTE — ED PROVIDER NOTES
Encounter Date: 2018       History     Chief Complaint   Patient presents with    Irregular Heart Beat     Pt presented to the ED via pov. Pt c/o rapid heart beat. Pt alert. Pt c/o cold sweats. Pt denies chest pain. Pt has a hx of SVT.      52 y/o woman w/ h/o SVT, ulcerative colitis p/w palpitations x 2 hours. Patient reports developing palpitations, faintness, nausea shortly prior to 1700. She suspected she was having an episode of SVT as her symptoms were consistent with prior episodes. She took a prescribed beta blocker and attempted her vagal maneuvers that she has used in the past but did not feel any relief so she presented to the ED. She reports feeling weak, nauseous, but denies chest pain, SOB, lightheadedness.          Review of patient's allergies indicates:  No Known Allergies  Past Medical History:   Diagnosis Date    Abnormal Pap smear of vagina yrs ago    possible BX?    Allergy     Arthritis     Asthma     Hyperlipidemia     Supraventricular tachycardia     SVT (supraventricular tachycardia)     Tachycardia     Ulcerative colitis      Past Surgical History:   Procedure Laterality Date     SECTION, CLASSIC      x 2    COLONOSCOPY N/A 2015    Procedure: COLONOSCOPY;  Surgeon: Petr Miller MD;  Location: [facility] ENDO ([address] [place] [address]);  Service: Endoscopy;  Laterality: N/A;    COLONOSCOPY N/A 10/13/2017    Procedure: COLONOSCOPY;  Surgeon: Petr Miller MD;  Location: [facility] ([address] [place] [address]);  Service: Endoscopy;  Laterality: N/A;     Family History   Problem Relation Age of Onset    Hyperlipidemia Mother     Cancer Mother     Cataracts Mother     Heart attack Father     Diabetes Father     Heart attack Paternal Grandmother     Glaucoma Maternal Grandfather     Heart disease Neg Hx     Hypertension Neg Hx     Colon cancer Neg Hx     Esophageal cancer Neg Hx     Amblyopia Neg Hx     Blindness Neg Hx     Macular degeneration Neg Hx     Retinal detachment Neg Hx      Strabismus Neg Hx     Stroke Neg Hx     Thyroid disease Neg Hx      Social History   Substance Use Topics    Smoking status: Former Smoker     Types: Cigarettes     Quit date: 2/25/1999    Smokeless tobacco: Never Used    Alcohol use 4.2 oz/week     4 Cans of beer, 3 Shots of liquor per week      Comment: Occasionally     Review of Systems   Constitutional: Negative for chills and fever.   HENT: Negative for sore throat.    Respiratory: Negative for cough and shortness of breath.    Cardiovascular: Positive for palpitations. Negative for chest pain and leg swelling.   Gastrointestinal: Positive for nausea. Negative for abdominal pain and diarrhea.   Genitourinary: Negative for dysuria.   Musculoskeletal: Negative for back pain.   Skin: Negative for rash.   Neurological: Negative for dizziness, syncope, speech difficulty and light-headedness.   Psychiatric/Behavioral: Negative for confusion.       Physical Exam     Initial Vitals [07/11/18 1844]   BP Pulse Resp Temp SpO2   109/60 (!) 198 17 97.6 °F (36.4 °C) 97 %      MAP       --         Physical Exam    Nursing note and vitals reviewed.  Constitutional: She appears well-developed and well-nourished. She is not diaphoretic.   Middle age woman, uncomfortable but not toxic appearing   HENT:   Head: Normocephalic and atraumatic.   Mouth/Throat: Oropharynx is clear and moist.   Eyes: EOM are normal. Pupils are equal, round, and reactive to light.   Cardiovascular: Regular rhythm and intact distal pulses.   No murmur heard.  tachycardic   Pulmonary/Chest: No stridor. No respiratory distress. She has no wheezes. She has no rhonchi. She has no rales.   Abdominal: Soft. Bowel sounds are normal. She exhibits no distension. There is no tenderness. There is no rebound.   Neurological: She is alert and oriented to person, place, and time.   Skin: Skin is warm.         ED Course   Procedures  Labs Reviewed   CBC W/ AUTO DIFFERENTIAL - Abnormal; Notable for the  following:        Result Value    WBC 14.90 (*)     RBC 3.98 (*)     Platelets 471 (*)     Gran # (ANC) 8.6 (*)     Immature Grans (Abs) 0.05 (*)     Lymph # 5.1 (*)     Mono% 3.5 (*)     All other components within normal limits   COMPREHENSIVE METABOLIC PANEL - Abnormal; Notable for the following:     CO2 19 (*)     Glucose 190 (*)     Alkaline Phosphatase 54 (*)     All other components within normal limits     EKG Readings: (Independently Interpreted)   Initial Reading: No STEMI. Previous EKG: Compared with most recent EKG Rhythm: Supraventricular Tachycardia (SVT). Heart Rate: 189. Ectopy: No Ectopy. ST Segments: Normal ST Segments. T Waves: Normal. Axis: Normal.       Imaging Results          X-Ray Chest AP Portable (Final result)  Result time 07/11/18 19:23:42    Final result by Dimitrios Shoemaker MD (07/11/18 19:23:42)                 Impression:      1. No acute cardiopulmonary process.      Electronically signed by: Dimitrios Shoemaker MD  Date:    07/11/2018  Time:    19:23             Narrative:    EXAMINATION:  XR CHEST AP PORTABLE    CLINICAL HISTORY:  svt;    TECHNIQUE:  Single frontal view of the chest was performed.    COMPARISON:  01/04/2018    FINDINGS:  Exam is limited by positioning and habitus.  The cardiomediastinal silhouette is not enlarged, magnified by technique..  There is no pleural effusion.  The trachea is midline.  The lungs are symmetrically expanded bilaterally without evidence of acute parenchymal process. No large focal consolidation seen.  There is no pneumothorax.  The osseous structures are remarkable for degenerative changes..                                 Medical Decision Making:   History:   Old Medical Records: I decided to obtain old medical records.  Clinical Tests:   Lab Tests: Ordered and Reviewed  Radiological Study: Ordered and Reviewed       APC / Resident Notes:   DDx incl AVRT/AVNRT, Afib, Aflutter, WPW, ACS, among others. EKG c/w SVT -- regular tachycardia, no delta  wave, no significant ST-T changes. Attempted vagal maneuvers with no improvement. Gave adenosine 6mg IVP with conversion to sinus tach at 114 on follow up EKG. Will get basic labs to rule out electrolyte imbalance, anemia. No indication for troponin as no angina or equivalents and EKG w/o ischemic changes.   Mani Dias M.D.  PGY5 [facility]  7/11/2018 7:13 PM    Update  Labs unremarkable. Patient remained in NSR, asymptomatic after chemical cardioversion. Will discharge with f/u with cardiology for discussion of ablation. Patient and family understand diagnosis, findings, treatment plan and are amenable.  Mani Dias M.D.  PGY5 [facility]  7/12/2018 5:26 PM           Attending Attestation:   Physician Attestation Statement for Resident:  As the supervising MD   Physician Attestation Statement: I have personally seen and examined this patient.   I agree with the above history. -:   As the supervising MD I agree with the above PE.    As the supervising MD I agree with the above treatment, course, plan, and disposition.                       Clinical Impression:   The encounter diagnosis was SVT (supraventricular tachycardia).                             Mani Dias MD  Resident  07/12/18 7345

## 2018-07-12 NOTE — ED NOTES
All discharge instructions reviewed with patient and questions addressed. VSS, A&OX4, and patient following commands. All patient belongings with patient at time of departure. Patient ambulated from department without difficulty.  with patient at departure from room.

## 2018-07-17 ENCOUNTER — OFFICE VISIT (OUTPATIENT)
Dept: INTERNAL MEDICINE | Facility: CLINIC | Age: 53
End: 2018-07-17
Payer: OTHER GOVERNMENT

## 2018-07-17 VITALS
DIASTOLIC BLOOD PRESSURE: 90 MMHG | OXYGEN SATURATION: 99 % | SYSTOLIC BLOOD PRESSURE: 140 MMHG | HEART RATE: 85 BPM | RESPIRATION RATE: 17 BRPM | BODY MASS INDEX: 29.77 KG/M2 | HEIGHT: 64 IN | WEIGHT: 174.38 LBS | TEMPERATURE: 98 F

## 2018-07-17 DIAGNOSIS — I47.10 SVT (SUPRAVENTRICULAR TACHYCARDIA): ICD-10-CM

## 2018-07-17 DIAGNOSIS — J30.2 SEASONAL ALLERGIC RHINITIS, UNSPECIFIED TRIGGER: ICD-10-CM

## 2018-07-17 DIAGNOSIS — J01.00 ACUTE NON-RECURRENT MAXILLARY SINUSITIS: Primary | ICD-10-CM

## 2018-07-17 PROCEDURE — 99215 OFFICE O/P EST HI 40 MIN: CPT | Mod: PBBFAC,PO | Performed by: INTERNAL MEDICINE

## 2018-07-17 PROCEDURE — 96372 THER/PROPH/DIAG INJ SC/IM: CPT | Mod: PBBFAC,PO

## 2018-07-17 PROCEDURE — 99214 OFFICE O/P EST MOD 30 MIN: CPT | Mod: S$PBB,,, | Performed by: INTERNAL MEDICINE

## 2018-07-17 PROCEDURE — 99999 PR PBB SHADOW E&M-EST. PATIENT-LVL V: CPT | Mod: PBBFAC,,, | Performed by: INTERNAL MEDICINE

## 2018-07-17 RX ORDER — CODEINE PHOSPHATE AND GUAIFENESIN 10; 100 MG/5ML; MG/5ML
5 SOLUTION ORAL 3 TIMES DAILY PRN
Qty: 118 ML | Refills: 0 | Status: SHIPPED | OUTPATIENT
Start: 2018-07-17 | End: 2018-07-27

## 2018-07-17 RX ORDER — TRIAMCINOLONE ACETONIDE 40 MG/ML
40 INJECTION, SUSPENSION INTRA-ARTICULAR; INTRAMUSCULAR ONCE
Status: COMPLETED | OUTPATIENT
Start: 2018-07-17 | End: 2018-07-17

## 2018-07-17 RX ORDER — AZITHROMYCIN 250 MG/1
TABLET, FILM COATED ORAL
Qty: 6 TABLET | Refills: 0 | Status: SHIPPED | OUTPATIENT
Start: 2018-07-17 | End: 2018-07-22

## 2018-07-17 RX ORDER — BENZONATATE 200 MG/1
200 CAPSULE ORAL 3 TIMES DAILY PRN
Qty: 30 CAPSULE | Refills: 0 | Status: SHIPPED | OUTPATIENT
Start: 2018-07-17 | End: 2018-07-27

## 2018-07-17 RX ADMIN — TRIAMCINOLONE ACETONIDE 40 MG: 40 INJECTION, SUSPENSION INTRA-ARTICULAR; INTRAMUSCULAR at 01:07

## 2018-07-17 NOTE — PROGRESS NOTES
Subjective:       Patient ID: Sharlene Smith is a 53 y.o. female.    Chief Complaint: Cough; Sinusitis; and Headache (slight)    HPI   Patient presenting with cough and sinus drip for last 2 weeks.  She notes right ear ache and coughing.  She has hx of UC.  She notes white sputum production.  She has tried flonase, dayquil/nyquil with mild relief.  She denies any fevers or chills.  She notes HA.  She does have a hx of allergies.  She notes SOB and pain with deep breathing.        Review of Systems   Constitutional: Negative for activity change, chills and fever.   HENT: Positive for congestion, ear pain, postnasal drip, rhinorrhea, sinus pain and sinus pressure. Negative for sore throat.    Eyes: Negative for pain and redness.   Respiratory: Positive for cough, chest tightness and shortness of breath.    Cardiovascular: Negative for chest pain.   Gastrointestinal: Negative for abdominal pain, constipation, nausea and vomiting.   Genitourinary: Negative for difficulty urinating.   Musculoskeletal: Negative for arthralgias and joint swelling.   Skin: Negative for rash.   Neurological: Positive for headaches. Negative for dizziness and light-headedness.   Psychiatric/Behavioral: Negative for dysphoric mood and sleep disturbance.     Objective:     Vitals:    07/17/18 1319   BP: (!) 140/90   Pulse: 85   Resp: 17   Temp: 98.3 °F (36.8 °C)    Body mass index is 29.93 kg/m².  Physical Exam   Constitutional: She is oriented to person, place, and time. She appears well-developed and well-nourished.   HENT:   Head: Normocephalic and atraumatic.   Right Ear: A middle ear effusion is present.   Left Ear: A middle ear effusion is present.   Nose: Right sinus exhibits maxillary sinus tenderness. Right sinus exhibits no frontal sinus tenderness. Left sinus exhibits maxillary sinus tenderness. Left sinus exhibits no frontal sinus tenderness.   Mouth/Throat: Posterior oropharyngeal erythema present.   Eyes: Conjunctivae are  normal. Pupils are equal, round, and reactive to light.   Neck: Normal range of motion. No tracheal deviation present. No thyromegaly present.   Cardiovascular: Normal rate, regular rhythm, normal heart sounds and intact distal pulses.  Exam reveals no gallop and no friction rub.    No murmur heard.  Pulmonary/Chest: Effort normal. She has decreased breath sounds. She has no wheezes. She has no rales.   Mildly decreased BS in bases   Lymphadenopathy:        Head (right side): Tonsillar adenopathy present.        Head (left side): Tonsillar adenopathy present.     She has cervical adenopathy.   Neurological: She is alert and oriented to person, place, and time.   Skin: Skin is warm and dry. No rash noted.   Psychiatric: She has a normal mood and affect. Judgment normal.     Assessment:     1. Acute non-recurrent maxillary sinusitis    2. Seasonal allergic rhinitis, unspecified trigger    3. SVT (supraventricular tachycardia)      Plan:   1. Acute non-recurrent maxillary sinusitis  - push fluids  - azithromycin (Z-JEANINE) 250 MG tablet; Take 2 tablets by mouth on day 1; Take 1 tablet by mouth on days 2-5  Dispense: 6 tablet; Refill: 0  - guaifenesin-codeine 100-10 mg/5 ml (TUSSI-ORGANIDIN NR)  mg/5 mL syrup; Take 5 mLs by mouth 3 (three) times daily as needed.  Dispense: 118 mL; Refill: 0  - triamcinolone acetonide injection 40 mg; Inject 1 mL (40 mg total) into the muscle once.  - benzonatate (TESSALON) 200 MG capsule; Take 1 capsule (200 mg total) by mouth 3 (three) times daily as needed.  Dispense: 30 capsule; Refill: 0    2. Seasonal allergic rhinitis, unspecified trigger  - Ambulatory Referral to Allergy    3. SVT (supraventricular tachycardia)  - Ambulatory Referral to Cardiology        Patient is to call or return to clinic if symptoms worsen or fail to improve.

## 2018-07-17 NOTE — PATIENT INSTRUCTIONS
Below are suggestions for symptomatic relief:              -Tylenol every 4 hours OR ibuprofen every 6 hours as needed for pain/fever.              -Salt water gargles to soothe throat pain.              -Chloroseptic spray also helps to numb throat pain.              -Nasal saline spray reduces inflammation and dryness.              -Warm face compresses to help with facial sinus pain/pressure.              -Vicks vapor rub at night.              -Flonase OTC or Nasacort OTC for nasal congestion.              -Simple foods like chicken noodle soup.              -Delsym helps with coughing at night              -Zyrtec/Claritin during the day & Benadryl at night may help with allergies.                If you DO NOT have Hypertension or any history of palpitations, it is ok to take over the counter Sudafed or Mucinex D or Allegra-D or Claritin-D or Zyrtec-D.  If you do take one of the above, it is ok to combine that with plain over the counter Mucinex or Allegra or Claritin or Zyrtec. If, for example, you are taking Zyrtec -D, you can combine that with Mucinex, but not Mucinex-D.  If you are taking Mucinex-D, you can combine that with plain Allegra or Claritin or Zyrtec.   If you DO have Hypertension or palpitations, it is safe to take Coricidin HBP for relief of sinus symptoms.

## 2018-07-26 ENCOUNTER — TELEPHONE (OUTPATIENT)
Dept: SPINE | Facility: CLINIC | Age: 53
End: 2018-07-26

## 2018-07-26 ENCOUNTER — OFFICE VISIT (OUTPATIENT)
Dept: CARDIOLOGY | Facility: CLINIC | Age: 53
End: 2018-07-26
Payer: OTHER GOVERNMENT

## 2018-07-26 VITALS
BODY MASS INDEX: 30.21 KG/M2 | DIASTOLIC BLOOD PRESSURE: 86 MMHG | HEIGHT: 64 IN | WEIGHT: 176.94 LBS | SYSTOLIC BLOOD PRESSURE: 134 MMHG | HEART RATE: 80 BPM

## 2018-07-26 DIAGNOSIS — I47.10 SVT (SUPRAVENTRICULAR TACHYCARDIA): Primary | ICD-10-CM

## 2018-07-26 PROCEDURE — 99999 PR PBB SHADOW E&M-EST. PATIENT-LVL IV: CPT | Mod: PBBFAC,,, | Performed by: INTERNAL MEDICINE

## 2018-07-26 PROCEDURE — 99243 OFF/OP CNSLTJ NEW/EST LOW 30: CPT | Mod: S$PBB,,, | Performed by: INTERNAL MEDICINE

## 2018-07-26 PROCEDURE — 99214 OFFICE O/P EST MOD 30 MIN: CPT | Mod: PBBFAC,PO | Performed by: INTERNAL MEDICINE

## 2018-07-26 RX ORDER — METOPROLOL SUCCINATE 50 MG/1
50 TABLET, EXTENDED RELEASE ORAL NIGHTLY
Qty: 30 TABLET | Refills: 11 | Status: CANCELLED | OUTPATIENT
Start: 2018-07-26 | End: 2019-07-26

## 2018-07-26 RX ORDER — METOPROLOL SUCCINATE 50 MG/1
50 TABLET, EXTENDED RELEASE ORAL NIGHTLY
Qty: 30 TABLET | Refills: 11 | Status: SHIPPED | OUTPATIENT
Start: 2018-07-26 | End: 2018-07-27 | Stop reason: SDUPTHER

## 2018-07-26 NOTE — PATIENT INSTRUCTIONS
Continue diltiazem in the morning.  Begin metoprolol at night.  You may take propranolol as needed if the SVT recurs.    Valsalva for SVT  Place hand on the abdomen and push your stomach out  Pinch your nose and blow air out through your lips   Do this for 5-7 seconds while seated or laying down     ==============    Triggers for arrhythmias:    Caffeine  Alcohol  Decongestant medicines (cold and sinus meds: phenylephrine, pseudoephedrine)  Lack of sleep  Stress    OK to use Flonase.

## 2018-07-26 NOTE — TELEPHONE ENCOUNTER
Staff contacted patient to confirm her appt patient stated that she has to cancel and will contact the office to reschedule

## 2018-07-26 NOTE — LETTER
July 29, 2018      Niya Bartlett MD  [address] [facility] [place] [address]           [place] - Cardiology  [address] [facility] [address]  Phone: [phone]          Patient: Sharlene Smith   MR Number: [id number]   YOB: 1965   Date of Visit: 7/26/2018       Dear Dr. Niya Bartlett:    Thank you for referring Sharlene Smith to me for evaluation. Attached you will find relevant portions of my assessment and plan of care.    If you have questions, please do not hesitate to call me. I look forward to following Sharlene Smith along with you.    Sincerely,    Ewelina Disla MD    Enclosure  CC:  No Recipients    If you would like to receive this communication electronically, please contact [email or web address] or [phone] to request more information on [employer] access.    For providers and/or their staff who would like to refer a patient to [facility], please contact us through our one-stop-shop provider referral line, [facility], at [phone].    If you feel you have received this communication in error or would no longer like to receive these types of communications, please e-mail [email or web address]

## 2018-07-26 NOTE — PROGRESS NOTES
Subjective:     Patient ID:  Sharlene Smith is a 53 y.o. female who presents for evaluation of SVT      Problem List:      HPI:     Sharlene Smith       Review of Systems   Constitution: Negative for weakness, malaise/fatigue, weight gain and weight loss.   HENT: Negative for hearing loss and nosebleeds.    Eyes: Negative for visual disturbance.   Cardiovascular: Positive for palpitations. Negative for chest pain, claudication, dyspnea on exertion, irregular heartbeat, leg swelling, near-syncope and syncope.   Respiratory: Negative for cough, hemoptysis, sputum production and wheezing.    Hematologic/Lymphatic: Does not bruise/bleed easily.   Musculoskeletal: Positive for arthritis and back pain. Negative for falls, joint pain, joint swelling, muscle cramps, muscle weakness and neck pain.   Gastrointestinal: Negative for abdominal pain, constipation, diarrhea, heartburn and melena.   Genitourinary: Negative for frequency, hematuria and nocturia.   Neurological: Negative for dizziness, focal weakness, headaches, light-headedness, loss of balance, numbness, paresthesias and seizures.   Psychiatric/Behavioral: Negative for depression. The patient is not nervous/anxious.          Current Outpatient Prescriptions   Medication Sig    albuterol (PROAIR HFA) 90 mcg/actuation inhaler Inhale 1-2 puffs into the lungs every 6 (six) hours as needed for Wheezing.    ALPRAZolam (XANAX) 0.25 MG tablet Take 0.25 mg by mouth 2 (two) times daily as needed.     aspirin 81 MG Chew Take 81 mg by mouth once daily.    atorvastatin (LIPITOR) 20 MG tablet TAKE 1 TABLET EVERY EVENING    benzonatate (TESSALON) 200 MG capsule Take 1 capsule (200 mg total) by mouth 3 (three) times daily as needed.    budesonide (RINOCORT AQUA) 32 mcg/actuation nasal spray 1 spray (32 mcg total) by Nasal route once daily.    CAMRESE LO 0.10 mg-20 mcg (84)/10 mcg (7) 3MPk TAKE 1 TABLET DAILY    cetirizine (ZYRTEC) 10 MG tablet Take 1 tablet  (10 mg total) by mouth once daily. (Patient taking differently: Take 10 mg by mouth daily as needed. )    diltiaZEM (CARDIZEM CD) 240 MG 24 hr capsule TAKE 1 CAPSULE AT NIGHT    fexofenadine (ALLEGRA) 180 MG tablet Take 1 tablet (180 mg total) by mouth once daily. (Patient taking differently: Take 180 mg by mouth daily as needed. )    guaifenesin-codeine 100-10 mg/5 ml (TUSSI-ORGANIDIN NR)  mg/5 mL syrup Take 5 mLs by mouth 3 (three) times daily as needed.    mesalamine (APRISO) 0.375 gram Cp24 Take 4 capsules (1.5 g total) by mouth once daily. (Patient taking differently: Take 0.75 g by mouth once daily. )    omeprazole (PRILOSEC) 40 MG capsule Take 1 capsule (40 mg total) by mouth every morning. 30 minutes before breakfast    ondansetron (ZOFRAN) 4 MG tablet Take 1 tablet (4 mg total) by mouth every 6 (six) hours.    paroxetine (PAXIL) 10 MG tablet Take 1 tablet (10 mg total) by mouth every morning.    propranolol (INDERAL) 40 MG tablet Take 1 tablet (40 mg total) by mouth daily as needed. For fast HR    tiZANidine (ZANAFLEX) 4 MG tablet Take 0.5-1 tablets (2-4 mg total) by mouth every 8 (eight) hours as needed.    traMADol (ULTRAM) 50 mg tablet Take 1 tablet (50 mg total) by mouth every 6 (six) hours as needed.           Past Medical History:   Diagnosis Date    Abnormal Pap smear of vagina yrs ago    possible BX?    Allergy     Arthritis     Asthma     Hyperlipidemia     Supraventricular tachycardia     SVT (supraventricular tachycardia)     Tachycardia     Ulcerative colitis       Past Surgical History:   Procedure Laterality Date     SECTION, CLASSIC      x 2    COLONOSCOPY N/A 2015    Procedure: COLONOSCOPY;  Surgeon: Petr Miller MD;  Location: [address]);  Service: Endoscopy;  Laterality: N/A;    COLONOSCOPY N/A 10/13/2017    Procedure: COLONOSCOPY;  Surgeon: Petr Miller MD;  Location: [facility] ([address] [place] [address]);  Service: Endoscopy;  Laterality: N/A;  "        Social History   Substance Use Topics    Smoking status: Former Smoker     Types: Cigarettes     Quit date: 2/25/1999    Smokeless tobacco: Never Used    Alcohol use 4.2 oz/week     4 Cans of beer, 3 Shots of liquor per week      Comment: Occasionally         Family History   Problem Relation Age of Onset    Hyperlipidemia Mother     Cancer Mother     Cataracts Mother     Heart attack Father     Diabetes Father     Heart attack Paternal Grandmother     Glaucoma Maternal Grandfather          Objective:     Physical Exam   Constitutional: She is oriented to person, place, and time. She appears well-developed and well-nourished.   /86   Pulse 80   Ht 5' 4" (1.626 m)   Wt 80.3 kg (176 lb 14.7 oz)   BMI 30.37 kg/m²      HENT:   Head: Normocephalic.   Neck: No JVD present. Carotid bruit is not present.   Cardiovascular: Normal rate, regular rhythm, S1 normal and S2 normal.  Exam reveals no gallop.    No murmur heard.  Pulses:       Posterior tibial pulses are 2+ on the right side, and 2+ on the left side.   Pulmonary/Chest: Effort normal. She has no wheezes. She has no rales. Chest wall is not dull to percussion.   Abdominal: Soft. She exhibits no abdominal bruit. There is no splenomegaly or hepatomegaly. There is no tenderness.   Musculoskeletal:        Right lower leg: She exhibits no edema.        Left lower leg: She exhibits no edema.   Neurological: She is alert and oriented to person, place, and time. Gait normal.   Skin: Skin is warm and dry. No bruising and no rash noted. No cyanosis. Nails show no clubbing.   Psychiatric: She has a normal mood and affect. Her speech is normal and behavior is normal. Judgment normal. Cognition and memory are normal.         Lab Results   Component Value Date    CHOL 189 06/15/2018    CHOL 187 06/15/2017    CHOL 174 12/11/2015     Lab Results   Component Value Date    HDL 54 06/15/2018    HDL 68 06/15/2017    HDL 58 12/11/2015     Lab Results   Component " Value Date    LDLCALC 107.6 06/15/2018    LDLCALC 95.0 06/15/2017    LDLCALC 91.6 12/11/2015     Lab Results   Component Value Date    TRIG 137 06/15/2018    TRIG 120 06/15/2017    TRIG 122 12/11/2015     Lab Results   Component Value Date    CHOLHDL 28.6 06/15/2018    CHOLHDL 36.4 06/15/2017    CHOLHDL 33.3 12/11/2015     Lab Results   Component Value Date    ALT 23 07/11/2018     Lab Results   Component Value Date    ALT 23 07/11/2018    AST 19 07/11/2018    ALKPHOS 54 (L) 07/11/2018    BILITOT 0.4 07/11/2018      Lab Results   Component Value Date    CREATININE 0.8 07/11/2018    BUN 15 07/11/2018     07/11/2018    K 3.7 07/11/2018     07/11/2018    CO2 19 (L) 07/11/2018         Assessment and Plan:     SVT (supraventricular tachycardia)  -     metoprolol succinate (TOPROL-XL) 50 MG 24 hr tablet; Take 1 tablet (50 mg total) by mouth nightly.  Dispense: 30 tablet; Refill: 11  -     Ambulatory consult to Cardiology        No Follow-up on file.

## 2018-07-27 DIAGNOSIS — I47.10 SVT (SUPRAVENTRICULAR TACHYCARDIA): ICD-10-CM

## 2018-07-27 RX ORDER — METOPROLOL SUCCINATE 50 MG/1
50 TABLET, EXTENDED RELEASE ORAL NIGHTLY
Qty: 30 TABLET | Refills: 11 | Status: SHIPPED | OUTPATIENT
Start: 2018-07-27 | End: 2019-01-29

## 2018-09-13 ENCOUNTER — INITIAL CONSULT (OUTPATIENT)
Dept: ELECTROPHYSIOLOGY | Facility: CLINIC | Age: 53
End: 2018-09-13
Payer: OTHER GOVERNMENT

## 2018-09-13 ENCOUNTER — HOSPITAL ENCOUNTER (OUTPATIENT)
Dept: CARDIOLOGY | Facility: CLINIC | Age: 53
Discharge: HOME | End: 2018-09-13
Payer: OTHER GOVERNMENT

## 2018-09-13 VITALS
WEIGHT: 177.5 LBS | DIASTOLIC BLOOD PRESSURE: 68 MMHG | SYSTOLIC BLOOD PRESSURE: 116 MMHG | HEIGHT: 64 IN | BODY MASS INDEX: 30.3 KG/M2 | HEART RATE: 76 BPM

## 2018-09-13 DIAGNOSIS — J30.2 SEASONAL ALLERGIC RHINITIS, UNSPECIFIED TRIGGER: ICD-10-CM

## 2018-09-13 DIAGNOSIS — I47.10 SVT (SUPRAVENTRICULAR TACHYCARDIA): Primary | ICD-10-CM

## 2018-09-13 DIAGNOSIS — E78.5 HYPERLIPIDEMIA, UNSPECIFIED HYPERLIPIDEMIA TYPE: ICD-10-CM

## 2018-09-13 DIAGNOSIS — I49.9 CARDIAC ARRHYTHMIA, UNSPECIFIED CARDIAC ARRHYTHMIA TYPE: ICD-10-CM

## 2018-09-13 PROCEDURE — 99213 OFFICE O/P EST LOW 20 MIN: CPT | Mod: PBBFAC,25 | Performed by: INTERNAL MEDICINE

## 2018-09-13 PROCEDURE — 93010 ELECTROCARDIOGRAM REPORT: CPT | Mod: S$PBB,,, | Performed by: INTERNAL MEDICINE

## 2018-09-13 PROCEDURE — 99999 PR PBB SHADOW E&M-EST. PATIENT-LVL III: CPT | Mod: PBBFAC,,, | Performed by: INTERNAL MEDICINE

## 2018-09-13 PROCEDURE — 99205 OFFICE O/P NEW HI 60 MIN: CPT | Mod: S$PBB,,, | Performed by: INTERNAL MEDICINE

## 2018-09-13 PROCEDURE — 93005 ELECTROCARDIOGRAM TRACING: CPT | Mod: PBBFAC | Performed by: INTERNAL MEDICINE

## 2018-09-13 NOTE — H&P (VIEW-ONLY)
"  Subjective:   Patient ID:  Sharlene Smith is a 53 y.o. female     Chief complaint:SVT (supraventricular tachycardia)      HPI  53 woman  First encounter with me was in 10/2013 for palps that brought her to ER -- she has a total of a 5 yr hx  In ER she had short RP tachycardia at a rate of 218. This was c/w slow fast AVNRT -- she initially wanted to have RFA then thought otherwise and has been Sx free on cardizem.  Has been feeling well x for the " chronic allergic asthma like issues".  ECG tracings today show  bpm. -- she says rate is often fast but ASxc , she also rarely uses her inhalers  Her last visit with me was in Dec 2014  Since then ,she has been having more recurrences that drive her to the ER -- she at times can stop the SVT with vagal maneuvers-- last week used the ice cold water trick (head). At times she uses a valsalva.  She has had 5 ER visits the past year for SVT. Probably had at least as many that she broke.   The risk of recurrence makes her quite anxious when she travels etc   I have reviewed the actual image of the ECG tracing obtained today and it shows NSR with normal intervals    Current Outpatient Medications   Medication Sig    albuterol (PROAIR HFA) 90 mcg/actuation inhaler Inhale 1-2 puffs into the lungs every 6 (six) hours as needed for Wheezing.    ALPRAZolam (XANAX) 0.25 MG tablet Take 0.25 mg by mouth 2 (two) times daily as needed.     aspirin 81 MG Chew Take 81 mg by mouth once daily.    atorvastatin (LIPITOR) 20 MG tablet TAKE 1 TABLET EVERY EVENING    budesonide (RINOCORT AQUA) 32 mcg/actuation nasal spray 1 spray (32 mcg total) by Nasal route once daily.    CAMRESE LO 0.10 mg-20 mcg (84)/10 mcg (7) 3MPk TAKE 1 TABLET DAILY    cetirizine (ZYRTEC) 10 MG tablet Take 1 tablet (10 mg total) by mouth once daily. (Patient taking differently: Take 10 mg by mouth daily as needed. )    diltiaZEM (CARDIZEM CD) 240 MG 24 hr capsule TAKE 1 CAPSULE DAILY    fexofenadine " (ALLEGRA) 180 MG tablet Take 1 tablet (180 mg total) by mouth once daily. (Patient taking differently: Take 180 mg by mouth daily as needed. )    mesalamine (APRISO) 0.375 gram Cp24 Take 4 capsules (1.5 g total) by mouth once daily. (Patient taking differently: Take 0.75 g by mouth once daily. )    metoprolol succinate (TOPROL-XL) 50 MG 24 hr tablet Take 1 tablet (50 mg total) by mouth nightly.    omeprazole (PRILOSEC) 40 MG capsule Take 1 capsule (40 mg total) by mouth every morning. 30 minutes before breakfast    ondansetron (ZOFRAN) 4 MG tablet Take 1 tablet (4 mg total) by mouth every 6 (six) hours.    paroxetine (PAXIL) 10 MG tablet Take 1 tablet (10 mg total) by mouth every morning.    propranolol (INDERAL) 40 MG tablet Take 1 tablet (40 mg total) by mouth daily as needed. For fast HR    tiZANidine (ZANAFLEX) 4 MG tablet Take 0.5-1 tablets (2-4 mg total) by mouth every 8 (eight) hours as needed.    traMADol (ULTRAM) 50 mg tablet Take 1 tablet (50 mg total) by mouth every 6 (six) hours as needed.    sulfamethoxazole-trimethoprim 800-160mg (BACTRIM DS) 800-160 mg Tab Take 1 tablet by mouth 2 (two) times daily.     No current facility-administered medications for this visit.      Review of Systems   Constitution: Negative for decreased appetite, weakness, weight gain and weight loss.   HENT: Negative for nosebleeds.    Eyes: Negative for blurred vision and visual disturbance.   Cardiovascular: Negative for chest pain, claudication, cyanosis, dyspnea on exertion, irregular heartbeat, leg swelling, near-syncope, orthopnea, palpitations, paroxysmal nocturnal dyspnea and syncope.   Respiratory: Negative for cough, shortness of breath and wheezing.    Endocrine: Negative for heat intolerance.   Skin: Negative for rash.   Musculoskeletal: Negative for muscle weakness and myalgias.   Gastrointestinal: Negative for abdominal pain, anorexia, melena, nausea and vomiting.   Genitourinary: Negative for menorrhagia.  "  Neurological: Negative for excessive daytime sleepiness, dizziness, headaches, loss of balance, seizures and vertigo.   Psychiatric/Behavioral: Negative for altered mental status and depression. The patient is not nervous/anxious.    All other systems reviewed and are negative.      Objective:   Physical Exam   Constitutional: She is oriented to person, place, and time. She appears well-developed and well-nourished.   HENT:   Head: Normocephalic and atraumatic.   Right Ear: External ear normal.   Left Ear: External ear normal.   Neck: Normal range of motion. Neck supple. No thyromegaly present.   Cardiovascular: Normal rate, regular rhythm, normal heart sounds and intact distal pulses. Exam reveals no gallop, no S3, no S4, no friction rub, no midsystolic click and no opening snap.   No murmur heard.  Pulses:       Carotid pulses are 2+ on the right side, and 2+ on the left side.       Radial pulses are 2+ on the right side, and 2+ on the left side.        Dorsalis pedis pulses are 2+ on the right side, and 2+ on the left side.        Posterior tibial pulses are 2+ on the right side, and 2+ on the left side.   Pulmonary/Chest: Effort normal and breath sounds normal.   Abdominal: Soft. She exhibits no distension. There is no tenderness.   Musculoskeletal:        Right ankle: She exhibits no swelling.        Left ankle: She exhibits no swelling.        Right lower leg: She exhibits no swelling.        Left lower leg: She exhibits no swelling.   Neurological: She is alert and oriented to person, place, and time. She has normal strength. No cranial nerve deficit. Gait normal.   Skin: Skin is warm. No rash noted. No cyanosis. Nails show no clubbing.   Psychiatric: She has a normal mood and affect. Her speech is normal and behavior is normal. Thought content normal. Cognition and memory are normal.   Nursing note and vitals reviewed.    /68   Pulse 76   Ht 5' 4" (1.626 m)   Wt 80.5 kg (177 lb 7.5 oz)   BMI " 30.46 kg/m²      Assessment:    Sxc AVNRT - slow fast  1. SVT (supraventricular tachycardia)    2. Hyperlipidemia, unspecified hyperlipidemia type    3. Seasonal allergic rhinitis, unspecified trigger        Plan:    She wants to proceed with RFA  I have discussed the procedure in detail with the patient. I described its benefits and risks. I reviewed alternative therapies and discussed their potential value. The patient was given ample opportunity to express concerns and ask questions and I provided appropriate responses and  answers to such.The patient understands and agrees to proceed.  Consent form was signed today by patient and myself and appropriately witnessed.     No orders of the defined types were placed in this encounter.    Follow-up post RFA.  There are no discontinued medications.  This SmartLink is deprecated. Use AVInstreet NetworkEDLIST instead to display the medication list for a patient.  This SmartLink is deprecated. Use AVInstreet NetworkEDLIST instead to display the medication list for a patient.

## 2018-09-13 NOTE — LETTER
September 16, 2018      Ewelina Disla MD  [address]  8th Floor - Cardiology  [place] [address]           [facility] - [facility]  [address]  [place] [address]  Phone: [phone]  Fax: [phone]          Patient: Sharlene Smith   MR Number: [id number]   YOB: 1965   Date of Visit: 9/13/2018       Dear Dr. Ewelina Disla:    Thank you for referring Sharlene Smith to me for evaluation. Attached you will find relevant portions of my assessment and plan of care.    If you have questions, please do not hesitate to call me. I look forward to following Sharlene Smith along with you.    Sincerely,    Shabbir Clark MD    Enclosure  CC:  No Recipients    If you would like to receive this communication electronically, please contact [email or web address] or [phone] to request more information on [employer] Link access.    For providers and/or their staff who would like to refer a patient to [facility], please contact us through our one-stop-shop provider referral line, [facility], at [phone].    If you feel you have received this communication in error or would no longer like to receive these types of communications, please e-mail [email or web address]

## 2018-09-13 NOTE — PROGRESS NOTES
"  Subjective:   Patient ID:  Sharlene Smith is a 53 y.o. female     Chief complaint:SVT (supraventricular tachycardia)      HPI  53 woman  First encounter with me was in 10/2013 for palps that brought her to ER -- she has a total of a 5 yr hx  In ER she had short RP tachycardia at a rate of 218. This was c/w slow fast AVNRT -- she initially wanted to have RFA then thought otherwise and has been Sx free on cardizem.  Has been feeling well x for the " chronic allergic asthma like issues".  ECG tracings today show  bpm. -- she says rate is often fast but ASxc , she also rarely uses her inhalers  Her last visit with me was in Dec 2014  Since then ,she has been having more recurrences that drive her to the ER -- she at times can stop the SVT with vagal maneuvers-- last week used the ice cold water trick (head). At times she uses a valsalva.  She has had 5 ER visits the past year for SVT. Probably had at least as many that she broke.   The risk of recurrence makes her quite anxious when she travels etc   I have reviewed the actual image of the ECG tracing obtained today and it shows NSR with normal intervals    Current Outpatient Medications   Medication Sig    albuterol (PROAIR HFA) 90 mcg/actuation inhaler Inhale 1-2 puffs into the lungs every 6 (six) hours as needed for Wheezing.    ALPRAZolam (XANAX) 0.25 MG tablet Take 0.25 mg by mouth 2 (two) times daily as needed.     aspirin 81 MG Chew Take 81 mg by mouth once daily.    atorvastatin (LIPITOR) 20 MG tablet TAKE 1 TABLET EVERY EVENING    budesonide (RINOCORT AQUA) 32 mcg/actuation nasal spray 1 spray (32 mcg total) by Nasal route once daily.    CAMRESE LO 0.10 mg-20 mcg (84)/10 mcg (7) 3MPk TAKE 1 TABLET DAILY    cetirizine (ZYRTEC) 10 MG tablet Take 1 tablet (10 mg total) by mouth once daily. (Patient taking differently: Take 10 mg by mouth daily as needed. )    diltiaZEM (CARDIZEM CD) 240 MG 24 hr capsule TAKE 1 CAPSULE DAILY    fexofenadine " (ALLEGRA) 180 MG tablet Take 1 tablet (180 mg total) by mouth once daily. (Patient taking differently: Take 180 mg by mouth daily as needed. )    mesalamine (APRISO) 0.375 gram Cp24 Take 4 capsules (1.5 g total) by mouth once daily. (Patient taking differently: Take 0.75 g by mouth once daily. )    metoprolol succinate (TOPROL-XL) 50 MG 24 hr tablet Take 1 tablet (50 mg total) by mouth nightly.    omeprazole (PRILOSEC) 40 MG capsule Take 1 capsule (40 mg total) by mouth every morning. 30 minutes before breakfast    ondansetron (ZOFRAN) 4 MG tablet Take 1 tablet (4 mg total) by mouth every 6 (six) hours.    paroxetine (PAXIL) 10 MG tablet Take 1 tablet (10 mg total) by mouth every morning.    propranolol (INDERAL) 40 MG tablet Take 1 tablet (40 mg total) by mouth daily as needed. For fast HR    tiZANidine (ZANAFLEX) 4 MG tablet Take 0.5-1 tablets (2-4 mg total) by mouth every 8 (eight) hours as needed.    traMADol (ULTRAM) 50 mg tablet Take 1 tablet (50 mg total) by mouth every 6 (six) hours as needed.    sulfamethoxazole-trimethoprim 800-160mg (BACTRIM DS) 800-160 mg Tab Take 1 tablet by mouth 2 (two) times daily.     No current facility-administered medications for this visit.      Review of Systems   Constitution: Negative for decreased appetite, weakness, weight gain and weight loss.   HENT: Negative for nosebleeds.    Eyes: Negative for blurred vision and visual disturbance.   Cardiovascular: Negative for chest pain, claudication, cyanosis, dyspnea on exertion, irregular heartbeat, leg swelling, near-syncope, orthopnea, palpitations, paroxysmal nocturnal dyspnea and syncope.   Respiratory: Negative for cough, shortness of breath and wheezing.    Endocrine: Negative for heat intolerance.   Skin: Negative for rash.   Musculoskeletal: Negative for muscle weakness and myalgias.   Gastrointestinal: Negative for abdominal pain, anorexia, melena, nausea and vomiting.   Genitourinary: Negative for menorrhagia.  "  Neurological: Negative for excessive daytime sleepiness, dizziness, headaches, loss of balance, seizures and vertigo.   Psychiatric/Behavioral: Negative for altered mental status and depression. The patient is not nervous/anxious.    All other systems reviewed and are negative.      Objective:   Physical Exam   Constitutional: She is oriented to person, place, and time. She appears well-developed and well-nourished.   HENT:   Head: Normocephalic and atraumatic.   Right Ear: External ear normal.   Left Ear: External ear normal.   Neck: Normal range of motion. Neck supple. No thyromegaly present.   Cardiovascular: Normal rate, regular rhythm, normal heart sounds and intact distal pulses. Exam reveals no gallop, no S3, no S4, no friction rub, no midsystolic click and no opening snap.   No murmur heard.  Pulses:       Carotid pulses are 2+ on the right side, and 2+ on the left side.       Radial pulses are 2+ on the right side, and 2+ on the left side.        Dorsalis pedis pulses are 2+ on the right side, and 2+ on the left side.        Posterior tibial pulses are 2+ on the right side, and 2+ on the left side.   Pulmonary/Chest: Effort normal and breath sounds normal.   Abdominal: Soft. She exhibits no distension. There is no tenderness.   Musculoskeletal:        Right ankle: She exhibits no swelling.        Left ankle: She exhibits no swelling.        Right lower leg: She exhibits no swelling.        Left lower leg: She exhibits no swelling.   Neurological: She is alert and oriented to person, place, and time. She has normal strength. No cranial nerve deficit. Gait normal.   Skin: Skin is warm. No rash noted. No cyanosis. Nails show no clubbing.   Psychiatric: She has a normal mood and affect. Her speech is normal and behavior is normal. Thought content normal. Cognition and memory are normal.   Nursing note and vitals reviewed.    /68   Pulse 76   Ht 5' 4" (1.626 m)   Wt 80.5 kg (177 lb 7.5 oz)   BMI " 30.46 kg/m²      Assessment:    Sxc AVNRT - slow fast  1. SVT (supraventricular tachycardia)    2. Hyperlipidemia, unspecified hyperlipidemia type    3. Seasonal allergic rhinitis, unspecified trigger        Plan:    She wants to proceed with RFA  I have discussed the procedure in detail with the patient. I described its benefits and risks. I reviewed alternative therapies and discussed their potential value. The patient was given ample opportunity to express concerns and ask questions and I provided appropriate responses and  answers to such.The patient understands and agrees to proceed.  Consent form was signed today by patient and myself and appropriately witnessed.     No orders of the defined types were placed in this encounter.    Follow-up post RFA.  There are no discontinued medications.  This SmartLink is deprecated. Use AVFootnoteEDLIST instead to display the medication list for a patient.  This SmartLink is deprecated. Use AVFootnoteEDLIST instead to display the medication list for a patient.

## 2018-09-14 ENCOUNTER — TELEPHONE (OUTPATIENT)
Dept: ELECTROPHYSIOLOGY | Facility: CLINIC | Age: 53
End: 2018-09-14

## 2018-09-14 DIAGNOSIS — I47.10 SVT (SUPRAVENTRICULAR TACHYCARDIA): Primary | ICD-10-CM

## 2018-09-14 NOTE — TELEPHONE ENCOUNTER
Spoke with patient provided potential date for EP procedure. Patient in agreement. Will sent instructions via portal and mail.     Yarelis LO CCM

## 2018-09-14 NOTE — PROGRESS NOTES
ABLATION EDUCATION CHECKLIST    PRE-PROCEDURE TESTIN-17-18 @ 930 AM  Pre-Procedure labs have been ordered for you at:  [facility]   · Be sure to arrive at your scheduled time. YOU DO NOT HAVE TO FAST FOR THIS LAB WORK!    DAY OF PROCEDURE:    18 @ 6 AM  Report to Cardiology Waiting Room on 3rd floor of the Hospital    · Do not eat or drink anything after: 12 mn on the night before your procedure  · Please do not wear makeup (especially mascara) when arriving for your procedure    Medications:   · HOLD METOPROLOL 5 DAYS PRIOR TO YOUR PROCEDURE. Take your last dose of METOPROLOL on 2018.  · You may take your other usual morning medications with a sip of water      Directions to the Cardiology Waiting Room  If you park in the Parking Garage:  Take elevators to the 2nd floor  Walk up ramp and turn right by Gold Elevators  Take elevator to the 3rd floor  Upon exiting the elevator, turn away from the clinic areas  Walk long agee around to front of hospital to area with windows overlooking [facility]  Check in at Reception Desk  OR  If family is dropping you off:  Have them drop you off at the front of the Hospital  (Near the ER, where all the flags are hung).  Take the E elevators to the 3rd floor.  Check in at the Reception Desk in the waiting room.        · You will be spending the night after your procedure.  · You will need someone to drive you home the day after your procedure.  · Your pain during your procedure will be managed by the anesthesia team.     Any need to reschedule or cancel procedures, or any questions regarding your procedures should be addressed directly with the Arrhythmia Department Nurses at the following phone number: [phone]

## 2018-09-15 ENCOUNTER — OFFICE VISIT (OUTPATIENT)
Dept: INTERNAL MEDICINE | Facility: CLINIC | Age: 53
End: 2018-09-15
Payer: OTHER GOVERNMENT

## 2018-09-15 VITALS
SYSTOLIC BLOOD PRESSURE: 129 MMHG | BODY MASS INDEX: 29.55 KG/M2 | WEIGHT: 173.06 LBS | DIASTOLIC BLOOD PRESSURE: 91 MMHG | TEMPERATURE: 98 F | HEIGHT: 64 IN | RESPIRATION RATE: 14 BRPM | HEART RATE: 88 BPM

## 2018-09-15 DIAGNOSIS — N39.0 URINARY TRACT INFECTION WITH HEMATURIA, SITE UNSPECIFIED: Primary | ICD-10-CM

## 2018-09-15 DIAGNOSIS — R31.9 URINARY TRACT INFECTION WITH HEMATURIA, SITE UNSPECIFIED: Primary | ICD-10-CM

## 2018-09-15 LAB
BILIRUB SERPL-MCNC: POSITIVE MG/DL
BLOOD URINE, POC: POSITIVE
COLOR, POC UA: ABNORMAL
GLUCOSE UR QL STRIP: NORMAL
KETONES UR QL STRIP: NEGATIVE
LEUKOCYTE ESTERASE URINE, POC: POSITIVE
NITRITE, POC UA: NEGATIVE
PH, POC UA: 5
PROTEIN, POC: POSITIVE
SPECIFIC GRAVITY, POC UA: 1.02
UROBILINOGEN, POC UA: NORMAL

## 2018-09-15 PROCEDURE — 81002 URINALYSIS NONAUTO W/O SCOPE: CPT | Mod: PBBFAC,PO | Performed by: INTERNAL MEDICINE

## 2018-09-15 PROCEDURE — 87077 CULTURE AEROBIC IDENTIFY: CPT

## 2018-09-15 PROCEDURE — 87088 URINE BACTERIA CULTURE: CPT

## 2018-09-15 PROCEDURE — 99999 PR PBB SHADOW E&M-EST. PATIENT-LVL III: CPT | Mod: PBBFAC,,, | Performed by: INTERNAL MEDICINE

## 2018-09-15 PROCEDURE — 99213 OFFICE O/P EST LOW 20 MIN: CPT | Mod: S$PBB,,, | Performed by: INTERNAL MEDICINE

## 2018-09-15 PROCEDURE — 99213 OFFICE O/P EST LOW 20 MIN: CPT | Mod: PBBFAC,PO | Performed by: INTERNAL MEDICINE

## 2018-09-15 PROCEDURE — 87086 URINE CULTURE/COLONY COUNT: CPT

## 2018-09-15 PROCEDURE — 87186 SC STD MICRODIL/AGAR DIL: CPT

## 2018-09-15 RX ORDER — SULFAMETHOXAZOLE AND TRIMETHOPRIM 800; 160 MG/1; MG/1
1 TABLET ORAL 2 TIMES DAILY
Qty: 10 TABLET | Refills: 0 | Status: SHIPPED | OUTPATIENT
Start: 2018-09-15 | End: 2018-09-28 | Stop reason: ALTCHOICE

## 2018-09-15 NOTE — PROGRESS NOTES
This office note has been dictated.  HISTORY OF PRESENT ILLNESS:  This is a 53-year-old lady in today with 1 week of   dysuria, urgency, frequency.  No abdominal pain.  No back pain.  No fever,   chills, body aches.  No vaginal discharge.  No history of recurrent UTIs.    CURRENT MEDICATIONS:  All medications noted and reviewed in the electronic   medical record medication list.    PAST MEDICAL HISTORY, PAST SURGICAL HISTORY, FAMILY MEDICAL HISTORY AND SOCIAL   HISTORY:  All noted and reviewed in the electronic medical record history   sections.    PHYSICAL EXAMINATION:  GENERAL:  Alert, pleasant, appropriately groomed lady in no acute distress.  VITAL SIGNS:  All noted and reviewed, afebrile.  BACK:  No CVA tenderness.    DATA:  Urinalysis dipstick in the office today is abnormal, positive for   infectious parameters.    IMPRESSION:  Cystitis.    PLAN:  1.  Urine is submitted for culture.  2.  Bactrim DS one p.o. b.i.d. 5 days.  3.  Advised without resolution of symptoms or worsening.      PRISCILA  dd: 09/15/2018 11:46:50 (CDT)  td: 09/16/2018 07:16:08 (CDT)  Doc ID   #[id number]  Job ID #[id number]    CC:

## 2018-09-17 ENCOUNTER — TELEPHONE (OUTPATIENT)
Dept: ELECTROPHYSIOLOGY | Facility: CLINIC | Age: 53
End: 2018-09-17

## 2018-09-17 DIAGNOSIS — I49.9 CARDIAC ARRHYTHMIA, UNSPECIFIED CARDIAC ARRHYTHMIA TYPE: Primary | ICD-10-CM

## 2018-09-17 NOTE — TELEPHONE ENCOUNTER
Spoke with patient,Patient reports that she needs to re-schedule SVT ablation. She started her cycle and she has a UTI at present. Will work on new date and provide patient with updated information.      Yarelis LO CCM

## 2018-09-17 NOTE — TELEPHONE ENCOUNTER
Regarding: FW:Reminder for Upcoming Procedure  Contact: [phone]      ----- Message -----  From: Sharlene Smith  Sent: 9/15/2018  11:11 AM  To: Amber KELLEY Staff  Subject: RE:Reminder for Upcoming Procedure               ----- Message from [facility], System Message sent at 9/15/2018 11:11 AM CDT -----    Dr. Clark,  I may be on my period next week. Can the procedure still be done or should I reschedule?  Sharlene Smith  ----- Message -----  From: Shabbir Clark MD  Sent: 9/15/2018  8:30 AM CDT  To: Sharlene Smith  Subject: Reminder for Upcoming Procedure  [facility] [address] [place] [address]      09/15/2018      Dear Sharlene Smith,      This is a reminder for your upcoming procedure with Shabbir Clark MD on 9/19/2018. We will contact you again before the day of your procedure with your scheduled arrival time.        If you have questions or scheduling concerns, you can contact your physicians office:   Shabbir Clark MD  Phone Number: [phone]      Sincerely,

## 2018-09-17 NOTE — PROGRESS NOTES
ABLATION EDUCATION CHECKLIST     PRE-PROCEDURE TESTING:     10-1-18 @ 915 AM  Pre-Procedure labs have been ordered for you at:  [facility]   · Be sure to arrive at your scheduled time. YOU DO NOT HAVE TO FAST FOR THIS LAB WORK!     DAY OF PROCEDURE:     10-3-18 @8AM  Report to Cardiology Waiting Room on 3rd floor of the Hospital    · Do not eat or drink anything after: 12 mn on the night before your procedure  · Please do not wear makeup (especially mascara) when arriving for your procedure     Medications:   · HOLD METOPROLOL 5 DAYS PRIOR TO YOUR PROCEDURE. Take your last dose of METOPROLOL on 9/27/2018.  · You may take your other usual morning medications with a sip of water        Directions to the Cardiology Waiting Room  If you park in the Parking Garage:  Take elevators to the 2nd floor  Walk up ramp and turn right by Gold Elevators  Take elevator to the 3rd floor  Upon exiting the elevator, turn away from the clinic areas  Walk long agee around to front of hospital to area with windows overlooking [facility]  Check in at Reception Desk  OR  If family is dropping you off:  Have them drop you off at the front of the Hospital  (Near the ER, where all the flags are hung).  Take the E elevators to the 3rd floor.  Check in at the Reception Desk in the waiting room.           · You will be spending the night after your procedure.  · You will need someone to drive you home the day after your procedure.  · Your pain during your procedure will be managed by the anesthesia team.      Any need to reschedule or cancel procedures, or any questions regarding your procedures should be addressed directly with the Arrhythmia Department Nurses at the following phone number: [phone]

## 2018-09-18 LAB — BACTERIA UR CULT: NORMAL

## 2018-09-28 ENCOUNTER — LAB VISIT (OUTPATIENT)
Dept: LAB | Facility: HOSPITAL | Age: 53
End: 2018-09-28
Attending: FAMILY MEDICINE
Payer: OTHER GOVERNMENT

## 2018-09-28 ENCOUNTER — OFFICE VISIT (OUTPATIENT)
Dept: ALLERGY | Facility: CLINIC | Age: 53
End: 2018-09-28
Payer: OTHER GOVERNMENT

## 2018-09-28 VITALS — OXYGEN SATURATION: 98 % | BODY MASS INDEX: 31.24 KG/M2 | WEIGHT: 183 LBS | HEIGHT: 64 IN | HEART RATE: 98 BPM

## 2018-09-28 DIAGNOSIS — H10.423 SIMPLE CHRONIC CONJUNCTIVITIS OF BOTH EYES: ICD-10-CM

## 2018-09-28 DIAGNOSIS — J31.0 CHRONIC RHINITIS: Primary | ICD-10-CM

## 2018-09-28 DIAGNOSIS — J31.0 CHRONIC RHINITIS: ICD-10-CM

## 2018-09-28 PROCEDURE — 86003 ALLG SPEC IGE CRUDE XTRC EA: CPT | Mod: 59

## 2018-09-28 PROCEDURE — 36415 COLL VENOUS BLD VENIPUNCTURE: CPT | Mod: PO

## 2018-09-28 PROCEDURE — 99213 OFFICE O/P EST LOW 20 MIN: CPT | Mod: PBBFAC,PO | Performed by: ALLERGY & IMMUNOLOGY

## 2018-09-28 PROCEDURE — 99999 PR PBB SHADOW E&M-EST. PATIENT-LVL III: CPT | Mod: PBBFAC,,, | Performed by: ALLERGY & IMMUNOLOGY

## 2018-09-28 PROCEDURE — 86003 ALLG SPEC IGE CRUDE XTRC EA: CPT

## 2018-09-28 PROCEDURE — 99244 OFF/OP CNSLTJ NEW/EST MOD 40: CPT | Mod: S$PBB,,, | Performed by: ALLERGY & IMMUNOLOGY

## 2018-09-28 PROCEDURE — 82785 ASSAY OF IGE: CPT

## 2018-09-28 NOTE — PROGRESS NOTES
ABLATION EDUCATION CHECKLIST     PRE-PROCEDURE TESTING:     10-1-18 @ 915 AM  Pre-Procedure labs have been ordered for you at:  [facility]   · Be sure to arrive at your scheduled time. YOU DO NOT HAVE TO FAST FOR THIS LAB WORK!     DAY OF PROCEDURE:     10-3-18 @ 10AM  Report to Cardiology Waiting Room on 3rd floor of the Hospital    · Do not eat or drink anything after: 12 mn on the night before your procedure  · Please do not wear makeup (especially mascara) when arriving for your procedure     Medications:   · HOLD METOPROLOL 5 DAYS PRIOR TO YOUR PROCEDURE. Take your last dose of METOPROLOL on 9/27/2018.  · You may take your other usual morning medications with a sip of water        Directions to the Cardiology Waiting Room  If you park in the Parking Garage:  Take elevators to the 2nd floor  Walk up ramp and turn right by Gold Elevators  Take elevator to the 3rd floor  Upon exiting the elevator, turn away from the clinic areas  Walk long agee around to front of hospital to area with windows overlooking [facility]  Check in at Reception Desk  OR  If family is dropping you off:  Have them drop you off at the front of the Hospital  (Near the ER, where all the flags are hung).  Take the E elevators to the 3rd floor.  Check in at the Reception Desk in the waiting room.           · You will be spending the night after your procedure.  · You will need someone to drive you home the day after your procedure.  · Your pain during your procedure will be managed by the anesthesia team.      Any need to reschedule or cancel procedures, or any questions regarding your procedures should be addressed directly with the Arrhythmia Department Nurses at the following phone number: [phone]

## 2018-09-28 NOTE — LETTER
September 28, 2018      Niya Bartlett MD  [address]  [place] [address]           [place] [facility]  [address]  [place] [address]  Phone: [phone]          Patient: Sharlene Smith   MR Number: [id number]   YOB: 1965   Date of Visit: 9/28/2018       Dear Dr. Niya Bartlett:    Thank you for referring Sharlene Smith to me for evaluation. Attached you will find relevant portions of my assessment and plan of care.    If you have questions, please do not hesitate to call me. I look forward to following Sharlene Smith along with you.    Sincerely,    Althea Stapleton MD    Enclosure  CC:  No Recipients    If you would like to receive this communication electronically, please contact [email or web address] or [phone] to request more information on [employer] access.    For providers and/or their staff who would like to refer a patient to [facility], please contact us through our one-stop-shop provider referral line, [facility] , at [phone].    If you feel you have received this communication in error or would no longer like to receive these types of communications, please e-mail [email or web address]

## 2018-09-28 NOTE — PROGRESS NOTES
Subjective:       Patient ID: Sharlene Smith is a 53 y.o. female.    Chief Complaint:  Allergies (sinus issues)      54 yo woman presents for consult from Dr Niya zayas for possible allergies. She states she always has PND and some congestion. never had allergies before but moved back to [facility] from [place] in 2008 and ha had since. She does get itchy throat and burning watery eyes. Not much sneeze. No runny nose but sniffles often. No chest tightness but occ CONTRERAS like climbing stairs. She has been on Claritin, zyrtec, allegra, Singulair and right now uses Allegra but not daily. Helps a little. Has all year. Will flare worse off and on but no season. Is worse in AM. Little worse if outside long. Worse around cu grass. Not on any nasal sprays. Has been told has allergy induced asthma but no on any asthma meds. No eczema. No known food, insect or latex allergy. Has SVT and UC. No sinus surgery. No T&A.        Environmental History: see history section for home environment  Review of Systems   Constitutional: Negative for appetite change, chills, fatigue and fever.   HENT: Positive for congestion, postnasal drip and sinus pressure. Negative for ear discharge, ear pain, facial swelling, nosebleeds, rhinorrhea, sneezing, sore throat, trouble swallowing and voice change.    Eyes: Positive for discharge and itching. Negative for redness and visual disturbance.   Respiratory: Positive for chest tightness and shortness of breath. Negative for cough, choking and wheezing.    Cardiovascular: Positive for palpitations. Negative for chest pain and leg swelling.   Gastrointestinal: Negative for abdominal distention, abdominal pain, constipation, diarrhea, nausea and vomiting.   Genitourinary: Negative for difficulty urinating.   Musculoskeletal: Negative for arthralgias, gait problem, joint swelling and myalgias.   Skin: Negative for color change and rash.   Neurological: Negative for dizziness, syncope, weakness,  light-headedness and headaches.   Hematological: Negative for adenopathy. Does not bruise/bleed easily.   Psychiatric/Behavioral: Negative for agitation, behavioral problems, confusion and sleep disturbance. The patient is not nervous/anxious.         Objective:      Physical Exam   Constitutional: She is oriented to person, place, and time. She appears well-developed and well-nourished. No distress.   HENT:   Head: Normocephalic and atraumatic.   Right Ear: Hearing, tympanic membrane, external ear and ear canal normal.   Left Ear: Hearing, tympanic membrane, external ear and ear canal normal.   Nose: No mucosal edema, rhinorrhea, sinus tenderness or septal deviation. No epistaxis. Right sinus exhibits no maxillary sinus tenderness and no frontal sinus tenderness. Left sinus exhibits no maxillary sinus tenderness and no frontal sinus tenderness.   Mouth/Throat: Uvula is midline, oropharynx is clear and moist and mucous membranes are normal. No uvula swelling.   Eyes: Conjunctivae are normal. Right eye exhibits no discharge. Left eye exhibits no discharge.   Neck: Normal range of motion. No thyromegaly present.   Cardiovascular: Normal rate, regular rhythm and normal heart sounds.   No murmur heard.  Pulmonary/Chest: Effort normal and breath sounds normal. No respiratory distress. She has no wheezes.   Abdominal: Soft. She exhibits no distension. There is no tenderness.   Musculoskeletal: Normal range of motion. She exhibits no edema or tenderness.   Lymphadenopathy:     She has no cervical adenopathy.   Neurological: She is alert and oriented to person, place, and time.   Skin: Skin is warm and dry. No rash noted. No erythema.   Psychiatric: She has a normal mood and affect. Her behavior is normal. Judgment and thought content normal.   Nursing note and vitals reviewed.      Laboratory:   none performed   Assessment:       1. Chronic rhinitis    2. Simple chronic conjunctivitis of both eyes         Plan:       1.  Immunocaps today to determine IgE allergy vs non allergic rhinitis  2. Can continue fexofenadine 180 mg daily and will adjust with results  . Phone review  4. Dr Bartlett notified of completed consult via fax

## 2018-10-01 ENCOUNTER — LAB VISIT (OUTPATIENT)
Dept: LAB | Facility: HOSPITAL | Age: 53
End: 2018-10-01
Attending: INTERNAL MEDICINE
Payer: OTHER GOVERNMENT

## 2018-10-01 ENCOUNTER — TELEPHONE (OUTPATIENT)
Dept: ELECTROPHYSIOLOGY | Facility: CLINIC | Age: 53
End: 2018-10-01

## 2018-10-01 DIAGNOSIS — I47.10 SVT (SUPRAVENTRICULAR TACHYCARDIA): ICD-10-CM

## 2018-10-01 LAB
A ALTERNATA IGE QN: <0.35 KU/L
A FUMIGATUS IGE QN: <0.35 KU/L
ALLERGEN MAPLE/SYCAMORE IGE: <0.35 KU/L
ALLERGEN PENICILLIUM IGE: <0.35 KU/L
ALLERGEN WALNUT TREE IGE: <0.35 KU/L
ALLERGEN WHITE PINE TREE IGE: <0.35 KU/L
ALLERGEN WILLOW IGE: <0.35 KU/L
ANION GAP SERPL CALC-SCNC: 10 MMOL/L
APTT BLDCRRT: 22.4 SEC
BAHIA GRASS IGE QN: <0.35 KU/L
BALD CYPRESS IGE QN: <0.35 KU/L
BASOPHILS # BLD AUTO: 0.06 K/UL
BASOPHILS NFR BLD: 0.6 %
BERMUDA GRASS IGE QN: <0.35 KU/L
BUN SERPL-MCNC: 12 MG/DL
C GLOBOSUM IGE QN: <0.35 KU/L
C HERBARUM IGE QN: <0.35 KU/L
C LUNATA IGE QN: <0.35 KU/L
CALCIUM SERPL-MCNC: 9.5 MG/DL
CAT DANDER IGE QN: <0.35 KU/L
CHLORIDE SERPL-SCNC: 103 MMOL/L
CO2 SERPL-SCNC: 24 MMOL/L
COMMON RAGWEED IGE QN: <0.35 KU/L
COTTONWOOD IGE QN: <0.35 KU/L
CREAT SERPL-MCNC: 0.7 MG/DL
D FARINAE IGE QN: <0.35 KU/L
D PTERONYSS IGE QN: <0.35 KU/L
DEPRECATED A ALTERNATA IGE RAST QL: NORMAL
DEPRECATED A FUMIGATUS IGE RAST QL: NORMAL
DEPRECATED BAHIA GRASS IGE RAST QL: NORMAL
DEPRECATED BALD CYPRESS IGE RAST QL: NORMAL
DEPRECATED BERMUDA GRASS IGE RAST QL: NORMAL
DEPRECATED C GLOBOSUM IGE RAST QL: NORMAL
DEPRECATED C HERBARUM IGE RAST QL: NORMAL
DEPRECATED C LUNATA IGE RAST QL: NORMAL
DEPRECATED CAT DANDER IGE RAST QL: NORMAL
DEPRECATED COMMON RAGWEED IGE RAST QL: NORMAL
DEPRECATED COTTONWOOD IGE RAST QL: NORMAL
DEPRECATED D FARINAE IGE RAST QL: NORMAL
DEPRECATED D PTERONYSS IGE RAST QL: NORMAL
DEPRECATED DOG DANDER IGE RAST QL: NORMAL
DEPRECATED ENGL PLANTAIN IGE RAST QL: NORMAL
DEPRECATED JOHNSON GRASS IGE RAST QL: NORMAL
DEPRECATED MARSH ELDER IGE RAST QL: NORMAL
DEPRECATED MUGWORT IGE RAST QL: NORMAL
DEPRECATED PECAN/HICK TREE IGE RAST QL: NORMAL
DEPRECATED ROACH IGE RAST QL: NORMAL
DEPRECATED S ROSTRATA IGE RAST QL: NORMAL
DEPRECATED SALTWORT IGE RAST QL: NORMAL
DEPRECATED SILVER BIRCH IGE RAST QL: NORMAL
DEPRECATED TIMOTHY IGE RAST QL: NORMAL
DEPRECATED WHITE OAK IGE RAST QL: NORMAL
DIFFERENTIAL METHOD: ABNORMAL
DOG DANDER IGE QN: <0.35 KU/L
ENGL PLANTAIN IGE QN: <0.35 KU/L
EOSINOPHIL # BLD AUTO: 0.1 K/UL
EOSINOPHIL NFR BLD: 1 %
ERYTHROCYTE [DISTWIDTH] IN BLOOD BY AUTOMATED COUNT: 11.9 %
EST. GFR  (AFRICAN AMERICAN): >60 ML/MIN/1.73 M^2
EST. GFR  (NON AFRICAN AMERICAN): >60 ML/MIN/1.73 M^2
GLUCOSE SERPL-MCNC: 106 MG/DL
HCT VFR BLD AUTO: 40 %
HGB BLD-MCNC: 12.9 G/DL
IGE SERPL-ACNC: <35 IU/ML
IMM GRANULOCYTES # BLD AUTO: 0.03 K/UL
IMM GRANULOCYTES NFR BLD AUTO: 0.3 %
INR PPP: 0.9
JOHNSON GRASS IGE QN: <0.35 KU/L
LYMPHOCYTES # BLD AUTO: 3 K/UL
LYMPHOCYTES NFR BLD: 29.8 %
MAPLE/SYCAMORE CLASS: NORMAL
MARSH ELDER IGE QN: <0.35 KU/L
MCH RBC QN AUTO: 31.5 PG
MCHC RBC AUTO-ENTMCNC: 32.3 G/DL
MCV RBC AUTO: 98 FL
MONOCYTES # BLD AUTO: 0.4 K/UL
MONOCYTES NFR BLD: 3.7 %
MUGWORT IGE QN: <0.35 KU/L
NEUTROPHILS # BLD AUTO: 6.6 K/UL
NEUTROPHILS NFR BLD: 64.6 %
NRBC BLD-RTO: 0 /100 WBC
PECAN/HICK TREE IGE QN: <0.35 KU/L
PENICILLIUM CLASS: NORMAL
PLATELET # BLD AUTO: 444 K/UL
PMV BLD AUTO: 9.8 FL
POTASSIUM SERPL-SCNC: 3.9 MMOL/L
PROTHROMBIN TIME: 9.9 SEC
RAGWEED, WESTERN IGE: <0.35 KU/L
RAGWEED, WESTERN, CLASS: NORMAL
RBC # BLD AUTO: 4.1 M/UL
ROACH IGE QN: <0.35 KU/L
S ROSTRATA IGE QN: <0.35 KU/L
SALTWORT IGE QN: <0.35 KU/L
SILVER BIRCH IGE QN: <0.35 KU/L
SODIUM SERPL-SCNC: 137 MMOL/L
TIMOTHY IGE QN: <0.35 KU/L
WALNUT TREE CLASS: NORMAL
WBC # BLD AUTO: 10.21 K/UL
WHITE OAK IGE QN: <0.35 KU/L
WHITE PINE CLASS: NORMAL
WILLOW CLASS: NORMAL

## 2018-10-01 PROCEDURE — 80048 BASIC METABOLIC PNL TOTAL CA: CPT

## 2018-10-01 PROCEDURE — 85610 PROTHROMBIN TIME: CPT

## 2018-10-01 PROCEDURE — 85025 COMPLETE CBC W/AUTO DIFF WBC: CPT

## 2018-10-01 PROCEDURE — 85730 THROMBOPLASTIN TIME PARTIAL: CPT

## 2018-10-01 PROCEDURE — 36415 COLL VENOUS BLD VENIPUNCTURE: CPT | Mod: PO

## 2018-10-01 NOTE — PROGRESS NOTES
ABLATION EDUCATION CHECKLIST     PRE-PROCEDURE TESTING:     Done on 10-1-18  Pre-Procedure labs        DAY OF PROCEDURE:     10-8-18 @5:30AM  Report to Cardiology Waiting Room on 3rd floor of the Hospital    · Do not eat or drink anything after: 12 mn on the night before your procedure  · Please do not wear makeup (especially mascara) when arriving for your procedure     Medications:   · HOLD METOPROLOL 5 DAYS PRIOR TO YOUR PROCEDURE. Take your last dose of METOPROLOL on 9/27/2018. Okay to continue to hold with new procedure date.  · You may take your other usual morning medications with a sip of water        Directions to the Cardiology Waiting Room  If you park in the Parking Garage:  Take elevators to the 2nd floor  Walk up ramp and turn right by Gold Elevators  Take elevator to the 3rd floor  Upon exiting the elevator, turn away from the clinic areas  Walk long agee around to front of hospital to area with windows overlooking [facility]  Check in at Reception Desk  OR  If family is dropping you off:  Have them drop you off at the front of the Hospital  (Near the ER, where all the flags are hung).  Take the E elevators to the 3rd floor.  Check in at the Reception Desk in the waiting room.           · You will be spending the night after your procedure.  · You will need someone to drive you home the day after your procedure.  · Your pain during your procedure will be managed by the anesthesia team.      Any need to reschedule or cancel procedures, or any questions regarding your procedures should be addressed directly with the Arrhythmia Department Nurses at the following phone number: [phone]

## 2018-10-01 NOTE — TELEPHONE ENCOUNTER
Patient's EP procedure date needed to be moved from 10-3-18 to 10-8-18. Patient agreeable to new date and time. New instructions sent to patient.      Yarelis LO CCM

## 2018-10-05 ENCOUNTER — TELEPHONE (OUTPATIENT)
Dept: ELECTROPHYSIOLOGY | Facility: CLINIC | Age: 53
End: 2018-10-05

## 2018-10-05 NOTE — TELEPHONE ENCOUNTER
Spoke with patient, went over pre-procedure instructions, to include: Pre-Op Labs, Pre-procedure test ordered, How to take medications prior to procedure, Procedure date/time, Where to report to on day of procedure, Expected time frame of procedure, Expected Discharge date. Patient verbalizes understanding and is in agreement to adhere to above.     Reviewed Pre-procedure labs; Labs WNL.         Yarelis LO CCM

## 2018-10-06 ENCOUNTER — ANESTHESIA EVENT (OUTPATIENT)
Dept: MEDSURG UNIT | Facility: HOSPITAL | Age: 53
End: 2018-10-06
Payer: OTHER GOVERNMENT

## 2018-10-07 NOTE — ANESTHESIA PREPROCEDURE EVALUATION
10/07/2018  Sharlene Smith is a 53 y.o., female.  Patient Active Problem List   Diagnosis    Menstrual cycle disorder    Migraine headache    Seasonal allergies    Asthma in remission    SVT (supraventricular tachycardia)    Hyperlipidemia    Otitis    Ulcerative colitis    Chronic pain    Chronic bilateral low back pain without sciatica         Anesthesia Evaluation         Review of Systems      Physical Exam  General:  Well nourished    Airway/Jaw/Neck:  Airway Findings: Mouth Opening: Normal Tongue: Normal  General Airway Assessment: Adult  Mallampati: II  Improves to II with phonation.  TM Distance: Normal, at least 6 cm      Dental:  Dental Findings: In tact   Chest/Lungs:  Chest/Lungs Findings: Clear to auscultation     Heart/Vascular:  Heart Findings: Rate: Normal  Rhythm: Regular Rhythm  Sounds: Normal        Mental Status:  Mental Status Findings:  Cooperative, Alert and Oriented         Anesthesia Plan  Type of Anesthesia, risks & benefits discussed:  Anesthesia Type:  general  Patient's Preference: General  Intra-op Monitoring Plan: standard ASA monitors  Intra-op Monitoring Plan Comments: Standard ASA monitors.   Post Op Pain Control Plan: per primary service following discharge from PACU  Post Op Pain Control Plan Comments: Per primary service.     Induction:   IV  Beta Blocker:  Patient is not currently on a Beta-Blocker (No further documentation required).       Informed Consent: Patient understands risks and agrees with Anesthesia plan.  Questions answered. Anesthesia consent signed with patient.  ASA Score: 3     Day of Surgery Review of History & Physical:    H&P update referred to the surgeon.     Anesthesia Plan Notes: Chart reviewed, patient interviewed and examined.  The plan for general anesthesia was explained.  Questions were answered and the consent was signed.   Tory GARDINER         Ready For Surgery From Anesthesia Perspective.

## 2018-10-08 ENCOUNTER — PATIENT MESSAGE (OUTPATIENT)
Dept: ALLERGY | Facility: CLINIC | Age: 53
End: 2018-10-08

## 2018-10-08 ENCOUNTER — ANESTHESIA (OUTPATIENT)
Dept: MEDSURG UNIT | Facility: HOSPITAL | Age: 53
End: 2018-10-08
Payer: OTHER GOVERNMENT

## 2018-10-08 ENCOUNTER — HOSPITAL ENCOUNTER (OUTPATIENT)
Facility: HOSPITAL | Age: 53
Discharge: HOME | End: 2018-10-08
Attending: INTERNAL MEDICINE | Admitting: INTERNAL MEDICINE
Payer: OTHER GOVERNMENT

## 2018-10-08 VITALS
OXYGEN SATURATION: 98 % | SYSTOLIC BLOOD PRESSURE: 118 MMHG | BODY MASS INDEX: 29.02 KG/M2 | DIASTOLIC BLOOD PRESSURE: 71 MMHG | HEIGHT: 64 IN | WEIGHT: 170 LBS | TEMPERATURE: 98 F | RESPIRATION RATE: 16 BRPM | HEART RATE: 102 BPM

## 2018-10-08 DIAGNOSIS — I47.10 SUPRAVENTRICULAR TACHYCARDIA: ICD-10-CM

## 2018-10-08 DIAGNOSIS — I47.10 SVT (SUPRAVENTRICULAR TACHYCARDIA): Primary | ICD-10-CM

## 2018-10-08 LAB
B-HCG UR QL: NEGATIVE
CTP QC/QA: YES

## 2018-10-08 PROCEDURE — 37000008 HC ANESTHESIA 1ST 15 MINUTES: Performed by: INTERNAL MEDICINE

## 2018-10-08 PROCEDURE — 00537 ANES CARDIAC EP PROCEDURES: CPT | Performed by: INTERNAL MEDICINE

## 2018-10-08 PROCEDURE — 63600175 PHARM REV CODE 636 W HCPCS: Performed by: ANESTHESIOLOGY

## 2018-10-08 PROCEDURE — 25000003 PHARM REV CODE 250: Performed by: NURSE PRACTITIONER

## 2018-10-08 PROCEDURE — 25000003 PHARM REV CODE 250

## 2018-10-08 PROCEDURE — 93005 ELECTROCARDIOGRAM TRACING: CPT | Mod: 59

## 2018-10-08 PROCEDURE — 81025 URINE PREGNANCY TEST: CPT | Performed by: INTERNAL MEDICINE

## 2018-10-08 PROCEDURE — 63600175 PHARM REV CODE 636 W HCPCS

## 2018-10-08 PROCEDURE — 93010 ELECTROCARDIOGRAM REPORT: CPT | Mod: 59,,, | Performed by: INTERNAL MEDICINE

## 2018-10-08 PROCEDURE — 37000009 HC ANESTHESIA EA ADD 15 MINS: Performed by: INTERNAL MEDICINE

## 2018-10-08 PROCEDURE — D9220A PRA ANESTHESIA: Mod: ANES,,, | Performed by: ANESTHESIOLOGY

## 2018-10-08 PROCEDURE — 25000003 PHARM REV CODE 250: Performed by: NURSE ANESTHETIST, CERTIFIED REGISTERED

## 2018-10-08 PROCEDURE — 27100025 HC TUBING, SET FLUID WARMER: Performed by: NURSE ANESTHETIST, CERTIFIED REGISTERED

## 2018-10-08 PROCEDURE — D9220A PRA ANESTHESIA: Mod: CRNA,,, | Performed by: NURSE ANESTHETIST, CERTIFIED REGISTERED

## 2018-10-08 PROCEDURE — 93010 ELECTROCARDIOGRAM REPORT: CPT | Mod: 76,59,, | Performed by: INTERNAL MEDICINE

## 2018-10-08 PROCEDURE — 63600175 PHARM REV CODE 636 W HCPCS: Performed by: NURSE ANESTHETIST, CERTIFIED REGISTERED

## 2018-10-08 RX ORDER — DIPHENHYDRAMINE HYDROCHLORIDE 50 MG/ML
25 INJECTION INTRAMUSCULAR; INTRAVENOUS EVERY 6 HOURS PRN
Status: DISCONTINUED | OUTPATIENT
Start: 2018-10-08 | End: 2018-10-08 | Stop reason: HOSPADM

## 2018-10-08 RX ORDER — FENTANYL CITRATE 50 UG/ML
INJECTION, SOLUTION INTRAMUSCULAR; INTRAVENOUS
Status: DISCONTINUED | OUTPATIENT
Start: 2018-10-08 | End: 2018-10-08

## 2018-10-08 RX ORDER — PROPOFOL 10 MG/ML
VIAL (ML) INTRAVENOUS CONTINUOUS PRN
Status: DISCONTINUED | OUTPATIENT
Start: 2018-10-08 | End: 2018-10-08

## 2018-10-08 RX ORDER — LIDOCAINE HCL/PF 100 MG/5ML
SYRINGE (ML) INTRAVENOUS
Status: DISCONTINUED | OUTPATIENT
Start: 2018-10-08 | End: 2018-10-08

## 2018-10-08 RX ORDER — MIDAZOLAM HYDROCHLORIDE 1 MG/ML
INJECTION, SOLUTION INTRAMUSCULAR; INTRAVENOUS
Status: DISCONTINUED | OUTPATIENT
Start: 2018-10-08 | End: 2018-10-08

## 2018-10-08 RX ORDER — TRAMADOL HYDROCHLORIDE 50 MG/1
50 TABLET ORAL EVERY 6 HOURS PRN
Status: COMPLETED | OUTPATIENT
Start: 2018-10-08 | End: 2018-10-08

## 2018-10-08 RX ORDER — ONDANSETRON 2 MG/ML
INJECTION INTRAMUSCULAR; INTRAVENOUS
Status: DISCONTINUED | OUTPATIENT
Start: 2018-10-08 | End: 2018-10-08

## 2018-10-08 RX ORDER — SODIUM CHLORIDE 9 MG/ML
INJECTION, SOLUTION INTRAVENOUS CONTINUOUS
Status: ACTIVE | OUTPATIENT
Start: 2018-10-08

## 2018-10-08 RX ORDER — PROPOFOL 10 MG/ML
VIAL (ML) INTRAVENOUS
Status: DISCONTINUED | OUTPATIENT
Start: 2018-10-08 | End: 2018-10-08

## 2018-10-08 RX ORDER — FENTANYL CITRATE 50 UG/ML
25 INJECTION, SOLUTION INTRAMUSCULAR; INTRAVENOUS EVERY 5 MIN PRN
Status: COMPLETED | OUTPATIENT
Start: 2018-10-08 | End: 2018-10-08

## 2018-10-08 RX ADMIN — PROPOFOL 60 MG: 10 INJECTION, EMULSION INTRAVENOUS at 09:10

## 2018-10-08 RX ADMIN — DIPHENHYDRAMINE HYDROCHLORIDE 25 MG: 50 INJECTION, SOLUTION INTRAMUSCULAR; INTRAVENOUS at 10:10

## 2018-10-08 RX ADMIN — PROPOFOL 20 MG: 10 INJECTION, EMULSION INTRAVENOUS at 08:10

## 2018-10-08 RX ADMIN — TRAMADOL HYDROCHLORIDE 50 MG: 50 TABLET, FILM COATED ORAL at 11:10

## 2018-10-08 RX ADMIN — ONDANSETRON 4 MG: 2 INJECTION INTRAMUSCULAR; INTRAVENOUS at 09:10

## 2018-10-08 RX ADMIN — PROPOFOL 50 MCG/KG/MIN: 10 INJECTION, EMULSION INTRAVENOUS at 08:10

## 2018-10-08 RX ADMIN — FENTANYL CITRATE 50 MCG: 50 INJECTION, SOLUTION INTRAMUSCULAR; INTRAVENOUS at 10:10

## 2018-10-08 RX ADMIN — FENTANYL CITRATE 25 MCG: 50 INJECTION INTRAMUSCULAR; INTRAVENOUS at 10:10

## 2018-10-08 RX ADMIN — FENTANYL CITRATE 25 MCG: 50 INJECTION, SOLUTION INTRAMUSCULAR; INTRAVENOUS at 09:10

## 2018-10-08 RX ADMIN — LIDOCAINE HYDROCHLORIDE 70 MG: 20 INJECTION, SOLUTION INTRAVENOUS at 08:10

## 2018-10-08 RX ADMIN — SODIUM CHLORIDE: 0.9 INJECTION, SOLUTION INTRAVENOUS at 07:10

## 2018-10-08 RX ADMIN — MIDAZOLAM 2 MG: 1 INJECTION INTRAMUSCULAR; INTRAVENOUS at 07:10

## 2018-10-08 RX ADMIN — PROPOFOL 30 MG: 10 INJECTION, EMULSION INTRAVENOUS at 08:10

## 2018-10-08 RX ADMIN — PROPOFOL 60 MG: 10 INJECTION, EMULSION INTRAVENOUS at 08:10

## 2018-10-08 RX ADMIN — SODIUM CHLORIDE, SODIUM GLUCONATE, SODIUM ACETATE, POTASSIUM CHLORIDE, MAGNESIUM CHLORIDE, SODIUM PHOSPHATE, DIBASIC, AND POTASSIUM PHOSPHATE: .53; .5; .37; .037; .03; .012; .00082 INJECTION, SOLUTION INTRAVENOUS at 07:10

## 2018-10-08 RX ADMIN — FENTANYL CITRATE 25 MCG: 50 INJECTION, SOLUTION INTRAMUSCULAR; INTRAVENOUS at 07:10

## 2018-10-08 NOTE — PLAN OF CARE
Problem: Patient Care Overview  Goal: Plan of Care Review  Outcome: Ongoing (interventions implemented as appropriate)  Report received from WALLY Giron. Patient s/p SVT RFA. bilat groin sites cdi, soft. Ice pack to bilat groin sites. + pulses. Post procedure protocol discussed with patient and family. Call light in reach. Will monitor.

## 2018-10-08 NOTE — TRANSFER OF CARE
"Anesthesia Transfer of Care Note    Patient: Sharlene Smith    Procedure(s) Performed: Procedure(s) (LRB):  ABLATION (N/A)    Patient location: PACU    Anesthesia Type: general    Transport from OR: Transported from OR on 6-10 L/min O2 by face mask with adequate spontaneous ventilation    Post pain: adequate analgesia    Post assessment: no apparent anesthetic complications and tolerated procedure well    Post vital signs: stable    Level of consciousness: awake, alert and oriented    Nausea/Vomiting: no nausea/vomiting    Complications: none    Transfer of care protocol was followed      Last vitals:   Visit Vitals  /79 (BP Location: Left arm, Patient Position: Lying)   Pulse 95   Temp 35.9 °C (96.6 °F) (Oral)   Resp 18   Ht 5' 4" (1.626 m)   Wt 77.1 kg (170 lb)   SpO2 96%   Breastfeeding? No   BMI 29.18 kg/m²     "

## 2018-10-08 NOTE — INTERVAL H&P NOTE
Ms. Smith presents today to [facility] for a scheduled SVT ablation with Dr. Clark. She denies any history of MI, CVA, chest pain, SOB, dizziness, weakness, syncope, or near syncopal episodes. She does state she has occasional CONTRERAS, palpitations, and light headedness. He denies any bleeding, infections, rashes, or surgeries in the past 30 day. She is currently taking metoprolol 50 mg daily, last dose 9/27/18 and cardizem 240 mg daily, last dose 10/7/18.     The patient has been examined and the H&P has been reviewed:     I concur with the findings and no changes have occurred since H&P was written.    EKG from this morning revealed sinus rhythm with a ventricular rate of 93 bpm.  Labs from 10/1/18 and today reviewed.     Review of Systems:   Constitution: Negative for fevers/chills, weakness, and malaise/fatigue.   HENT: Negative for ear pain and tinnitus.   Eyes: Negative for blurred vision.   Cardiovascular: Positive for palpitations and light headedness. Negative for chest pain, near-syncope, and syncope.   Respiratory: Negative for shortness of breath. Positive for CONTRERAS at times.   Endocrine:  Negative for polyuria.   Hematologic/Lymphatic: Negative for bruise/bleed easily.   Skin: Negative for rash.   Musculoskeletal: Negative for joint pain and muscle weakness.   Extremity: Negative for swelling in the lower extremities.   Gastrointestinal:  Negative for abdominal pain and change in bowel habit.   Genitourinary: Negative for frequency.   Neurological:  Negative for dizziness.   Psychiatric/Behavioral:  Negative for depression or anxiety.      Physical Exam:   Constitutional: She is oriented to person, place, and time. Well developed, well nourished, no distress on room air.   HEENT: Head is normocephalic, atraumatic;  Lungs: Clear to auscultation bilaterally.  No wheezing. Normal respiratory effort.  Cardiovascular: S1 normal, S2 normal, normal rate, regular rhythm. Exam reveals normal heart sounds, no gallop  and no friction rub. No murmur heard. PMI is not displaced. Intact distal pulses +2 bilaterally.   Extremities: No LE edema. Pulses 2+ and symmetric.   Abdomen: Abdomen is soft, non-tender non-distended with normal bowel sounds.  Skin: Skin color, texture, turgor normal. No rashes.  Musculoskeletal: normal range of motion   Neurologic: Normal strength and tone. No focal numbness or weakness.   Psychiatric: Normal mood and affect. Behavior is normal. Alert and oriented X 3.     Active Hospital Problems    Diagnosis  POA    SVT (supraventricular tachycardia) [I47.1]  Yes     AVNRT        Resolved Hospital Problems   No resolved problems to display.     Plan:  EP study with RFA of SVT mechanism  Anesthesia for sedation     Prior to procedure, extensive discussion with patient regarding risks and benefits of  ablation, and patient  would like to proceed.  Risks of procedure include but are not limited to the risk of infection, bleeding, stroke, paralysis,  MI, death, embolism, perforation requiring emergency draining or surgery, AV block, possibility for need for  pacemaker implantation.  The patient voices understanding and all questions have been answered. No further questions/concerns voiced at this time. Patient spoke with Dr. Clark in clinic, consents signed at that time.     SHANNON Pak-RAEANN  Cardiology Electrophysiology  NP   [facility]     Attending: Shabbir Clark MD

## 2018-10-08 NOTE — NURSING TRANSFER
Nursing Transfer Note      10/8/2018     Transfer To: sscu 4    Transfer via stretcher    Transfer with cardiac monitoring    Transported by RN    Medicines sent: no    Chart send with patient: Yes    Notified: spouse    Patient reassessed at: 10/8/18@1145hrs

## 2018-10-08 NOTE — ANESTHESIA POSTPROCEDURE EVALUATION
"Anesthesia Post Evaluation    Patient: Sharlene Smith    Procedure(s) Performed: Procedure(s) (LRB):  ABLATION (N/A)    Final Anesthesia Type: general  Patient location during evaluation: PACU  Patient participation: Yes- Able to Participate  Level of consciousness: awake and alert  Post-procedure vital signs: reviewed and stable  Pain management: adequate  Airway patency: patent  PONV status at discharge: No PONV  Anesthetic complications: no      Cardiovascular status: hemodynamically stable  Respiratory status: unassisted  Hydration status: euvolemic  Follow-up not needed.        Visit Vitals  /84   Pulse 100   Temp 36.4 °C (97.6 °F) (Oral)   Resp 18   Ht 5' 4" (1.626 m)   Wt 77.1 kg (170 lb)   SpO2 98%   Breastfeeding? No   BMI 29.18 kg/m²       Pain/Sulma Score: Pain Assessment Performed: Yes (10/8/2018 11:45 AM)  Presence of Pain: complains of pain/discomfort (10/8/2018 12:15 PM)  Pain Rating Prior to Med Admin: 5 (10/8/2018 11:02 AM)  Pain Rating Post Med Admin: 5 (10/8/2018 11:30 AM)  Sulma Score: 9 (10/8/2018 11:45 AM)        "

## 2018-10-08 NOTE — INTERVAL H&P NOTE
Ms. Smith presents today to [facility] for a scheduled SVT ablation with Dr. Clark. She denies any history of MI, CVA, chest pain, SOB, dizziness, weakness, syncope, or near syncopal episodes. She does state she has occasional CONTRERAS, palpitations, and light headedness. He denies any bleeding, infections, rashes, or surgeries in the past 30 day. She is currently taking metoprolol 50 mg daily, last dose 9/27/18 and cardizem 240 mg daily, last dose 10/7/18.    The patient has been examined and the H&P has been reviewed:    I concur with the findings and no changes have occurred since H&P was written.    Anesthesia/Surgery risks, benefits and alternative options discussed and understood by patient/family.    EKG from this morning revealed sinus rhythm with a ventricular rate of 93 bpm. QTc...    Labs from 10/1/18 and today reviewed.    Review of Systems   Constitution: Negative for fevers/chills.   Positive for easily gets    weak and has malaise/fatigue.   HENT: Negative for ear pain and tinnitus.   Eyes: Negative for blurred vision.   Cardiovascular: Positive for palpitations. Negative for chest pain,  near-syncope, and syncope.   Respiratory:  Positive for shortness of breath   Endocrine:  Negative for polyuria.   Hematologic/Lymphatic: Negative for bruise/bleed easily.   Skin:  Negative for rash.   Musculoskeletal: Negative for joint pain and muscle weakness.   Extremity: Negative for swelling in the lower extremities.   Gastrointestinal:  Negative for abdominal pain and change in bowel habit.   Genitourinary: Negative for frequency.   Neurological:  Negative for dizziness.   Psychiatric/Behavioral:  Negative for depression, anxiety     PE:   General: Well developed, well nourished, No distress on room air   HEENT: Head is normocephalic, atraumatic;  Lungs: Clear to auscultation bilaterally.  No wheezing. Normal respiratory effort.  Cardiovascular:  S1 normal S2 normal Normal rate, regular rhythm and normal heart  sounds.    PMI is not displaced. No murmur or rub noted.  Extremities: No LE edema. Pulses 2+ and symmetric.   Abdomen: Abdomen is soft, non-tender non-distended with normal bowel sounds.  Skin: Skin color, texture, turgor normal. No rashes.  Musculoskeletal: normal range of motion   Neurologic: Normal strength and tone. No focal numbness or weakness.   Psychiatric: Normal mood and affect. Behavior is normal. Alert and oriented X 3.    Active Hospital Problems    Diagnosis  POA    SVT (supraventricular tachycardia) [I47.1]  Yes     AVNRT        Resolved Hospital Problems   No resolved problems to display.     Plan:  EP study with RFA of SVT mechanism  Anesthesia for sedation    Prior to procedure, extensive discussion with patient regarding risks and benefits of  ablation, and patient  would like to proceed.  Risks of procedure include but are not limited to the risk of infection, bleeding, stroke, paralysis,  MI, death, embolism, perforation requiring emergency draining or surgery, AV block, possibility for need for  pacemaker implantation.  The patient voices understanding and all questions have been answered. No further questions/concerns voiced at this time. Patient spoke with Dr. Clark in clinic, consents signed at that time.    SHANNON Pak-RAEANN  Cardiology Electrophysiology  NP   [facility]    Attending: Shabbir Clark MD

## 2018-10-08 NOTE — DISCHARGE SUMMARY
[facility]  Cardiac Electrophysiology  Discharge Summary      Patient Name: Sharlene Smith  MRN: [id number]  Admission Date: 10/8/2018  Hospital Length of Stay: 0 days  Discharge Date and Time:  10/08/2018 3:19 PM  Attending Physician: Shabbir Clark MD    Discharging Provider: Melany Raymundo NP  Primary Care Physician: Mara Chang MD    HPI: Ms. Smith presents today to [facility] for a scheduled SVT ablation with Dr. Clark. She denies any history of MI, CVA, chest pain, SOB, dizziness, weakness, syncope, or near syncopal episodes. She does state she has occasional CONTRERAS, palpitations, and light headedness. He denies any bleeding, infections, rashes, or surgeries in the past 30 day. She is currently taking metoprolol 50 mg daily, last dose 9/27/18 and cardizem 240 mg daily, last dose 10/7/18.  The patient has been examined and the H&P has been reviewed:     I concur with the findings and no changes have occurred since H&P was written.     EKG from this morning revealed sinus rhythm with a ventricular rate of 93 bpm.  Labs from 10/1/18 and today reviewed.     Procedure(s) (LRB):  ABLATION (N/A)     Indwelling Lines/Drains at time of discharge:  Lines/Drains/Airways          None        Hospital Course: Patient underwent SVT ablation ( see procedure note for details), tolerated procedure well with no acute complications. VSS. Bilateral groins with no bleeding or hematoma noted. Patient ambulating, tolerating PO diet, and voiding with no issues. Patient to continue home medications and follow up with Shabbir Clark MD in 4-6 weeks. Discharge plans/instructions discussed with patient and  who verbalized understanding and agreement of plans of care. No further questions or concerns voiced at this time     Consults:   Anesthesia.    Significant Diagnostic Studies: EKG this AM revealed sinus rhythm with a ventricular rate of 93 bpm.    Final Active Diagnoses:     Diagnosis Date Noted POA    PRINCIPAL PROBLEM:  SVT (supraventricular tachycardia) [I47.1] 10/07/2013 Yes      Problems Resolved During this Admission:     No new Assessment & Plan notes have been filed under this hospital service since the last note was generated.  Service: Arrhythmia    Discharged Condition: stable    Disposition: Home or Self Care    Follow Up:  Follow-up Information     Shabbir Clark MD In 6 weeks.    Specialties:  Electrophysiology, Cardiology  Contact information:  [address]  [facility] [address]  [phone]                 Patient Instructions:      Diet Cardiac     Other restrictions (specify):   Order Comments: 1. Do not strain or lift anything greater than 5 lb for 1 week.   2. Do not drive or operate any dangerous machinery for 24 hours.   3. Keep the dressing on, clean, and dry for 24 hours.   4. After 24 hours, the dressing may be removed and a shower is allowed.   5. Clean the area with mild soap and water.   6. Once the skin has healed (1 week), bathing in a tub or swimming is allowed.   7. Inspect the groin site daily and report to the physician any signs of infection at the site: redness, pain, fever >100.4, unusual pain at the access site or affected extremity, unusual swelling at the access site, or any yellow, white or green drainage.  Call 911 if you have:   Bleeding from the puncture site that you cannot stop by doing the following:   Relax and lie down right away. Keep your leg flat and apply firm pressure to the site using your fingers and a gauze pad. Keep the pressure on for 10 minutes. Continue this until the bleeding stops. This may take awhile. When bleeding stops, cover the site with a sterile bandage and keep your leg still as much as possible.  8. Follow up with Dr. Clark in 6 weeks.     Lifting restrictions   Order Comments: No lifting anything greater than 5 pounds for 1 week.     Notify your health care provider if you experience any of the  following:  temperature >100.4     Notify your health care provider if you experience any of the following:  persistent nausea and vomiting or diarrhea     Notify your health care provider if you experience any of the following:  severe uncontrolled pain     Notify your health care provider if you experience any of the following:  redness, tenderness, or signs of infection (pain, swelling, redness, odor or green/yellow discharge around incision site)     Notify your health care provider if you experience any of the following:  difficulty breathing or increased cough     Notify your health care provider if you experience any of the following:  severe persistent headache     Notify your health care provider if you experience any of the following:  worsening rash     Notify your health care provider if you experience any of the following:  persistent dizziness, light-headedness, or visual disturbances     Notify your health care provider if you experience any of the following:  increased confusion or weakness     Notify your health care provider if you experience any of the following:   Order Comments: For any concerning medical symptoms.     Remove dressing in 24 hours   Order Comments: You may shower after your dressing is removed. No baths, swimming, or hot tubs for 1 week.     No driving until:   Order Comments: For 24-48 hours post procedure.     Shower on day dressing removed (No bath)     Medications:  Reconciled Home Medications:      Medication List      CHANGE how you take these medications    cetirizine 10 MG tablet  Commonly known as:  ZYRTEC  Take 1 tablet (10 mg total) by mouth once daily.  What changed:    · when to take this  · reasons to take this     fexofenadine 180 MG tablet  Commonly known as:  ALLEGRA  Take 1 tablet (180 mg total) by mouth once daily.  What changed:    · when to take this  · reasons to take this     mesalamine 0.375 gram Cp24  Commonly known as:  APRISO  Take 4 capsules (1.5 g  total) by mouth once daily.  What changed:  how much to take        CONTINUE taking these medications    albuterol 90 mcg/actuation inhaler  Commonly known as:  PROAIR HFA  Inhale 1-2 puffs into the lungs every 6 (six) hours as needed for Wheezing.     ALPRAZolam 0.25 MG tablet  Commonly known as:  XANAX  Take 0.25 mg by mouth 2 (two) times daily as needed.     aspirin 81 MG Chew  Take 81 mg by mouth once daily.     atorvastatin 20 MG tablet  Commonly known as:  LIPITOR  TAKE 1 TABLET EVERY EVENING     CAMRESE LO 0.10 mg-20 mcg (84)/10 mcg (7) 3mpk  Generic drug:  L norgest/e.estradiol-e.estrad  TAKE 1 TABLET DAILY     diltiaZEM 240 MG 24 hr capsule  Commonly known as:  CARDIZEM CD  TAKE 1 CAPSULE DAILY     metoprolol succinate 50 MG 24 hr tablet  Commonly known as:  TOPROL-XL  Take 1 tablet (50 mg total) by mouth nightly.     omeprazole 40 MG capsule  Commonly known as:  PRILOSEC  Take 1 capsule (40 mg total) by mouth every morning. 30 minutes before breakfast     ondansetron 4 MG tablet  Commonly known as:  ZOFRAN  Take 1 tablet (4 mg total) by mouth every 6 (six) hours.     paroxetine 10 MG tablet  Commonly known as:  PAXIL  Take 1 tablet (10 mg total) by mouth every morning.     tiZANidine 4 MG tablet  Commonly known as:  ZANAFLEX  Take 0.5-1 tablets (2-4 mg total) by mouth every 8 (eight) hours as needed.     traMADol 50 mg tablet  Commonly known as:  ULTRAM  Take 1 tablet (50 mg total) by mouth every 6 (six) hours as needed.        STOP taking these medications    propranolol 40 MG tablet  Commonly known as:  INDERAL          Plan:  -Continue home medications.  -Follow up with Dr. Clark in 4-6 weeks.    Time spent on the discharge of patient: 16 minutes    Melany Raymundo NP  Cardiac Electrophysiology  [facility]    STAFF: Shabbir Clark MD

## 2018-10-08 NOTE — PROGRESS NOTES
Patient discharged per MD orders. Instructions given on medications, wound care, activity, signs of infection, when to call MD, and follow up appointments. Pt verbalized understanding.  Patient AAOx3, VSS, no c/o pain or discomfort at this time. Telemetry and PIV removed. Patient waiting for wheelchair with family.

## 2018-10-08 NOTE — DISCHARGE INSTRUCTIONS
1. Do not strain or lift anything greater than 5 lb for 1 week.   2. Do not drive or operate any dangerous machinery for 24 hours.   3. Keep the dressing on, clean, and dry for 24 hours.   4. After 24 hours, the dressing may be removed and a shower is allowed.   5. Clean the area with mild soap and water.   6. Once the skin has healed (1 week), bathing in a tub or swimming is allowed.   7. Inspect the groin site daily and report to the physician any signs of infection at the site: redness, pain, fever >100.4, unusual pain at the access site or affected extremity, unusual swelling at the access site, or any yellow, white or green drainage.  Call 911 if you have:   Bleeding from the puncture site that you cannot stop by doing the following:   Relax and lie down right away. Keep your leg flat and apply firm pressure to the site using your fingers and a gauze pad. Keep the pressure on for 10 minutes. Continue this until the bleeding stops. This may take awhile. When bleeding stops, cover the site with a sterile bandage and keep your leg still as much as possible.  8. Follow up with Dr. Clark in 4-6 weeks.

## 2018-11-07 RX ORDER — TIZANIDINE 4 MG/1
TABLET ORAL
Qty: 270 TABLET | Refills: 0 | Status: SHIPPED | OUTPATIENT
Start: 2018-11-07 | End: 2019-11-22 | Stop reason: SDUPTHER

## 2018-11-19 DIAGNOSIS — I47.10 SVT (SUPRAVENTRICULAR TACHYCARDIA): Primary | ICD-10-CM

## 2018-12-17 ENCOUNTER — PROCEDURE VISIT (OUTPATIENT)
Dept: INTERNAL MEDICINE | Facility: CLINIC | Age: 53
End: 2018-12-17
Payer: OTHER GOVERNMENT

## 2018-12-17 VITALS
SYSTOLIC BLOOD PRESSURE: 126 MMHG | WEIGHT: 176.38 LBS | DIASTOLIC BLOOD PRESSURE: 78 MMHG | HEIGHT: 64 IN | BODY MASS INDEX: 30.11 KG/M2 | OXYGEN SATURATION: 97 % | TEMPERATURE: 98 F | HEART RATE: 97 BPM

## 2018-12-17 DIAGNOSIS — L02.91 ABSCESS: Primary | ICD-10-CM

## 2018-12-17 PROCEDURE — 10160 PNXR ASPIR ABSC HMTMA BULLA: CPT | Mod: PBBFAC,PO | Performed by: FAMILY MEDICINE

## 2018-12-17 PROCEDURE — 10160 PNXR ASPIR ABSC HMTMA BULLA: CPT | Mod: S$PBB,,, | Performed by: FAMILY MEDICINE

## 2018-12-17 PROCEDURE — 99213 OFFICE O/P EST LOW 20 MIN: CPT | Mod: S$PBB,25,, | Performed by: FAMILY MEDICINE

## 2018-12-17 RX ORDER — MUPIROCIN 20 MG/G
OINTMENT TOPICAL 2 TIMES DAILY
Qty: 22 G | Refills: 0 | Status: SHIPPED | OUTPATIENT
Start: 2018-12-17 | End: 2019-01-29

## 2018-12-17 RX ORDER — SULFAMETHOXAZOLE AND TRIMETHOPRIM 800; 160 MG/1; MG/1
1 TABLET ORAL 2 TIMES DAILY
Qty: 20 TABLET | Refills: 0 | Status: SHIPPED | OUTPATIENT
Start: 2018-12-17 | End: 2018-12-27

## 2018-12-17 NOTE — PROGRESS NOTES
Subjective:       Patient ID: Sharlene Smith is a 53 y.o. female.    Chief Complaint: Cyst (inner left thigh)    HPI 53-year-old white female presents to clinic today secondary to a complaint of a boil to her left inner thigh since this weekend.  She notes increased pain but denies any drainage to the area.  She has attempted no treatment.  Review of Systems   Constitutional: Negative for appetite change, chills, fatigue and fever.   HENT: Negative for congestion, ear pain, hearing loss, postnasal drip, rhinorrhea, sinus pressure, sore throat and tinnitus.    Eyes: Negative for redness, itching and visual disturbance.   Respiratory: Negative for cough, chest tightness and shortness of breath.    Cardiovascular: Negative for chest pain and palpitations.   Gastrointestinal: Negative for abdominal pain, constipation, diarrhea, nausea and vomiting.   Genitourinary: Negative for decreased urine volume, difficulty urinating, dysuria, frequency, hematuria and urgency.   Musculoskeletal: Negative for back pain, myalgias, neck pain and neck stiffness.   Skin: Positive for rash (Boil to left thigh).   Neurological: Negative for dizziness, light-headedness and headaches.   Psychiatric/Behavioral: Negative.        Objective:      Physical Exam   Constitutional: She is oriented to person, place, and time. She appears well-developed and well-nourished. No distress.   HENT:   Head: Normocephalic and atraumatic.   Right Ear: External ear normal.   Left Ear: External ear normal.   Nose: Nose normal.   Mouth/Throat: Oropharynx is clear and moist. No oropharyngeal exudate.   Eyes: Conjunctivae and EOM are normal. Pupils are equal, round, and reactive to light. Right eye exhibits no discharge. Left eye exhibits no discharge. No scleral icterus.   Neck: Normal range of motion. Neck supple. No JVD present. No tracheal deviation present. No thyromegaly present.   Genitourinary:         Musculoskeletal: Normal range of motion. She  exhibits no edema or tenderness.   Lymphadenopathy:     She has no cervical adenopathy.   Neurological: She is alert and oriented to person, place, and time.   Skin: Skin is warm and dry. No rash noted. She is not diaphoretic. No erythema. No pallor.   Psychiatric: She has a normal mood and affect. Her behavior is normal. Judgment and thought content normal.   Nursing note and vitals reviewed.      Assessment:       1. Abscess        Plan:       1.  Verbal consent for incision and drainage was done.  The left thigh at the area of the abscess was prepped with alcohol.  Numbing spray was used for anesthesia.  An 18 gauge needle was used to de roof the abscess and copious amounts of drainage was expelled.  Bactroban ointment and a sterile dressing was placed.  Patient tolerated the procedure well.  2.  Bactroban ointment 1 application to the affected area 2 times a day.  3.  Bactrim DS 1 tablet p.o. b.i.d. for 10 days.  4.  Wound care instructions given.  5.  Return to clinic as needed if symptoms persist worsen.

## 2018-12-19 ENCOUNTER — DOCUMENTATION ONLY (OUTPATIENT)
Dept: REHABILITATION | Facility: OTHER | Age: 53
End: 2018-12-19

## 2018-12-19 NOTE — PROGRESS NOTES
Outpatient Physical Therapy Discharge Summary    Date: 12/19/2018      Date of PT eval: 03/16/18  Number of PT visits: 7  Date of last visit: 06/01/18    Assessment:  Unable to assess if goals met secondary to lack of attendance. Pt has multiple cancellations. Per charge review, pt had onset of tachycardia and was seeking alterantive treatment.     Short term goals met: 0/4  Long term goals met: 0/5    Plan: Discharge from outpatient physical therapy due to other medical needs.

## 2019-01-29 DIAGNOSIS — M25.559 ARTHRALGIA OF HIP, UNSPECIFIED LATERALITY: ICD-10-CM

## 2019-01-29 RX ORDER — TRAMADOL HYDROCHLORIDE 50 MG/1
50 TABLET ORAL EVERY 6 HOURS PRN
Qty: 120 TABLET | Refills: 4 | Status: CANCELLED | OUTPATIENT
Start: 2019-01-29 | End: 2020-01-29

## 2019-01-29 RX ORDER — TRAMADOL HYDROCHLORIDE 50 MG/1
50 TABLET ORAL EVERY 6 HOURS PRN
Qty: 120 TABLET | Refills: 4 | OUTPATIENT
Start: 2019-01-29 | End: 2020-01-29

## 2019-01-29 RX ORDER — OMEPRAZOLE 40 MG/1
CAPSULE, DELAYED RELEASE ORAL
COMMUNITY
Start: 2018-12-02 | End: 2021-02-16

## 2019-01-29 NOTE — TELEPHONE ENCOUNTER
Patient noticed the following per [facility], requested medications d/c'ed per medlist.   Patient Review of Clinical Information     Problems   This clinical information was not verified.   Medications   This clinical information was not verified, but there are updates pending review:   No Longer Applicable Medications Start Date Reported By  Comments   mupirocin 2 % ointment 12/17/2018 Sharlene Smith    metoprolol succinate 50 MG 24 hr tablet 7/27/2018 Sharlene Smith    Allergies   This clinical information was not verified.

## 2019-02-01 ENCOUNTER — OFFICE VISIT (OUTPATIENT)
Dept: FAMILY MEDICINE | Facility: CLINIC | Age: 54
End: 2019-02-01
Payer: OTHER GOVERNMENT

## 2019-02-01 VITALS
WEIGHT: 181.19 LBS | TEMPERATURE: 98 F | HEART RATE: 109 BPM | HEIGHT: 65 IN | DIASTOLIC BLOOD PRESSURE: 84 MMHG | SYSTOLIC BLOOD PRESSURE: 138 MMHG | BODY MASS INDEX: 30.19 KG/M2

## 2019-02-01 DIAGNOSIS — Z23 NEED FOR PROPHYLACTIC VACCINATION AND INOCULATION AGAINST INFLUENZA: Primary | ICD-10-CM

## 2019-02-01 DIAGNOSIS — M25.559 ARTHRALGIA OF HIP, UNSPECIFIED LATERALITY: ICD-10-CM

## 2019-02-01 DIAGNOSIS — M53.3 SACRO-ILIAC PAIN: Primary | ICD-10-CM

## 2019-02-01 DIAGNOSIS — M46.1 SACROILIITIS: ICD-10-CM

## 2019-02-01 DIAGNOSIS — Z12.39 SCREENING BREAST EXAMINATION: ICD-10-CM

## 2019-02-01 LAB
BILIRUB SERPL-MCNC: NORMAL MG/DL
BLOOD URINE, POC: NORMAL
COLOR, POC UA: YELLOW
GLUCOSE UR QL STRIP: NORMAL
KETONES UR QL STRIP: NORMAL
LEUKOCYTE ESTERASE URINE, POC: NORMAL
NITRITE, POC UA: NORMAL
PH, POC UA: 6
PROTEIN, POC: NORMAL
SPECIFIC GRAVITY, POC UA: 1.02
UROBILINOGEN, POC UA: NORMAL

## 2019-02-01 PROCEDURE — 81002 URINALYSIS NONAUTO W/O SCOPE: CPT | Mod: PBBFAC,PO | Performed by: FAMILY MEDICINE

## 2019-02-01 PROCEDURE — 99214 OFFICE O/P EST MOD 30 MIN: CPT | Mod: S$PBB,,, | Performed by: FAMILY MEDICINE

## 2019-02-01 PROCEDURE — 99999 PR PBB SHADOW E&M-EST. PATIENT-LVL IV: CPT | Mod: PBBFAC,,, | Performed by: FAMILY MEDICINE

## 2019-02-01 PROCEDURE — 99214 OFFICE O/P EST MOD 30 MIN: CPT | Mod: PBBFAC,PO | Performed by: FAMILY MEDICINE

## 2019-02-01 PROCEDURE — 99214 PR OFFICE/OUTPT VISIT, EST, LEVL IV, 30-39 MIN: ICD-10-PCS | Mod: S$PBB,,, | Performed by: FAMILY MEDICINE

## 2019-02-01 PROCEDURE — 90686 IIV4 VACC NO PRSV 0.5 ML IM: CPT | Mod: PBBFAC,PO

## 2019-02-01 PROCEDURE — 99999 PR PBB SHADOW E&M-EST. PATIENT-LVL IV: ICD-10-PCS | Mod: PBBFAC,,, | Performed by: FAMILY MEDICINE

## 2019-02-01 RX ORDER — TRAMADOL HYDROCHLORIDE 50 MG/1
50 TABLET ORAL EVERY 6 HOURS PRN
Qty: 120 TABLET | Refills: 4 | Status: SHIPPED | OUTPATIENT
Start: 2019-02-01 | End: 2019-07-02 | Stop reason: SDUPTHER

## 2019-02-04 NOTE — PROGRESS NOTES
Subjective:       Patient ID: Sharlene Smith is a 53 y.o. female.  Answers for HPI/ROS submitted by the patient on 2/1/2019   Back pain  genital pain: No  Chief Complaint: Back Pain and Mammogram   Patient in for follow-up of back pain and refill of pain medications.  Pain medications still working no dosage adjustment needed  HPIReview of Systems   Constitutional: Negative for fever.   Cardiovascular: Negative for chest pain.   Gastrointestinal: Negative for abdominal pain.   Genitourinary: Negative for dysuria, hematuria and pelvic pain.   Musculoskeletal: Positive for back pain.   Neurological: Negative for weakness, numbness and headaches.       Objective:      Physical Exam   Constitutional: She is oriented to person, place, and time. She appears well-developed and well-nourished. No distress.   HENT:   Head: Normocephalic and atraumatic.   Right Ear: External ear normal.   Left Ear: External ear normal.   Nose: Nose normal.   Mouth/Throat: Oropharynx is clear and moist.   Eyes: Conjunctivae and EOM are normal. Pupils are equal, round, and reactive to light.   Neck: Normal range of motion. Neck supple. No JVD present. No tracheal deviation present. No thyromegaly present.   Cardiovascular: Normal rate, regular rhythm and normal heart sounds.   Pulmonary/Chest: Effort normal and breath sounds normal. No stridor. No respiratory distress. She has no wheezes.   Musculoskeletal:        Lumbar back: She exhibits decreased range of motion, tenderness, pain and spasm.        Back:    Tenderness to palpation of both sacroiliac joints   Neurological: She is alert and oriented to person, place, and time. She displays normal reflexes. No cranial nerve deficit or sensory deficit. She exhibits normal muscle tone. Coordination normal.   Skin: She is not diaphoretic.   Nursing note and vitals reviewed.      Assessment:       1. Need for prophylactic vaccination and inoculation against influenza    2. Sacroiliitis    3.  Screening breast examination        Plan:      see orders dated 2019   Ambulatory consult to Pain Clinic        Flu Vaccine - Quadrivalent (PF) (3 years & older)        Mammo Digital Screening Bilateral With CAD        POCT urine dipstick without microscope        X-Ray Pelvis Routine AP       Will contact patient with results of testing when available  See med card dated 2019   traMADol (ULTRAM) 50 mg tablet 50 mg, Every 6 hours PRN      Antidepressant - Selective Serotonin Reuptake Inhibitors (SSRIs)    paroxetine (PAXIL) 10 MG tablet () 10 mg, Every morning      Antihistamines - 2nd Generation, Antihistamines - 2nd Generation - Piperazines    cetirizine (ZYRTEC) 10 MG tablet 10 mg, Daily       Patient taking differently: 10 mg Oral Daily PRN, Informant: Self, Reported on 2018      Antihyperlipidemic - HMG CoA Reductase Inhibitors (statins)    atorvastatin (LIPITOR) 20 MG tablet       Calcium Channel Blockers - Benzothiazepines    diltiaZEM (CARDIZEM CD) 240 MG 24 hr capsule       Contraceptive Oral - Biphasic    CAMRESE LO 0.10 mg-20 mcg (84)/10 mcg (7) 3MPk       Gastric Acid Secretion Reducing Agents - Proton Pump Inhibitors (PPIs)    omeprazole (PRILOSEC) 40 MG capsule       Inflammatory Bowel Agent - Aminosalicylates and Related Agents    mesalamine (APRISO) 0.375 gram Cp24 1.5 g, Daily       Patient taking differently: 0.75 g Oral Daily, Informant: Self, Reported on 2018      Platelet Aggregation Inhibitors - Salicylates, Salicylate Analgesics    aspirin 81 MG Chew () 81 mg, Daily      Skeletal Muscle Relaxant - Central Muscle Relaxants    tiZANidine (ZANAFLEX) 4 MG tablet       Sodium Chloride, Parenteral    0.9%  NaCl infusion Continuous

## 2019-02-14 ENCOUNTER — OFFICE VISIT (OUTPATIENT)
Dept: FAMILY MEDICINE | Facility: CLINIC | Age: 54
End: 2019-02-14
Payer: OTHER GOVERNMENT

## 2019-02-14 ENCOUNTER — HOSPITAL ENCOUNTER (OUTPATIENT)
Dept: RADIOLOGY | Facility: HOSPITAL | Age: 54
Discharge: HOME | End: 2019-02-14
Attending: FAMILY MEDICINE
Payer: OTHER GOVERNMENT

## 2019-02-14 VITALS
BODY MASS INDEX: 30.3 KG/M2 | HEART RATE: 103 BPM | DIASTOLIC BLOOD PRESSURE: 80 MMHG | TEMPERATURE: 98 F | HEIGHT: 65 IN | SYSTOLIC BLOOD PRESSURE: 138 MMHG | WEIGHT: 181.88 LBS

## 2019-02-14 DIAGNOSIS — M25.562 LEFT KNEE PAIN, UNSPECIFIED CHRONICITY: ICD-10-CM

## 2019-02-14 DIAGNOSIS — M25.562 LEFT KNEE PAIN, UNSPECIFIED CHRONICITY: Primary | ICD-10-CM

## 2019-02-14 PROCEDURE — 99213 OFFICE O/P EST LOW 20 MIN: CPT | Mod: PBBFAC,25,PO | Performed by: FAMILY MEDICINE

## 2019-02-14 PROCEDURE — 99999 PR PBB SHADOW E&M-EST. PATIENT-LVL III: CPT | Mod: PBBFAC,,, | Performed by: FAMILY MEDICINE

## 2019-02-14 PROCEDURE — 73560 X-RAY EXAM OF KNEE 1 OR 2: CPT | Mod: 50,TC,FY,PO

## 2019-02-14 PROCEDURE — 99214 PR OFFICE/OUTPT VISIT, EST, LEVL IV, 30-39 MIN: ICD-10-PCS | Mod: S$PBB,,, | Performed by: FAMILY MEDICINE

## 2019-02-14 PROCEDURE — 99999 PR PBB SHADOW E&M-EST. PATIENT-LVL III: ICD-10-PCS | Mod: PBBFAC,,, | Performed by: FAMILY MEDICINE

## 2019-02-14 PROCEDURE — 99214 OFFICE O/P EST MOD 30 MIN: CPT | Mod: S$PBB,,, | Performed by: FAMILY MEDICINE

## 2019-02-14 PROCEDURE — 73560 X-RAY EXAM OF KNEE 1 OR 2: CPT | Mod: 26,50,, | Performed by: RADIOLOGY

## 2019-02-14 PROCEDURE — 73560 XR KNEE 1 OR 2 VIEW BILATERAL: ICD-10-PCS | Mod: 26,50,, | Performed by: RADIOLOGY

## 2019-02-14 RX ORDER — METHYLPREDNISOLONE 4 MG/1
TABLET ORAL
Qty: 1 PACKAGE | Refills: 0 | Status: SHIPPED | OUTPATIENT
Start: 2019-02-14 | End: 2019-03-07

## 2019-02-14 NOTE — PATIENT INSTRUCTIONS
Bursitis  You have bursitis. This is an inflammation of the bursa. These are small, fluid-filled sacs that surround the larger joints of the body. The bursa help the muscles and tendons move smoothly over the joints.  Bursitis often happens in the shoulder. But it can also affect the elbows, hips, pelvis, knees, toes, and heels. Bursitis can be caused by injury, overuse of the joint, or infection of the bursa. Symptoms include pain and tenderness over a joint. Symptoms get worse with movement.  Bursitis is treated with an anti-inflammatory medicine and by resting the joint. More severe cases require injection of medicine directly into the bursa.    Home care  · Rest the painful joint and protect it from movement. This will allow the inflammation to heal faster.  · Apply an ice pack over the injured area for no more than 15 to 20 minutes. Do this every 3 to 6 hours for the first 24 to 48 hours. Keep using ice packs 3 to 4 times a day until the pain and swelling improves.   · To make an ice pack, put ice cubes in a sealed plastic zip-lock bag. Wrap the bag in a clean, thin towel or cloth. Never put ice or an ice pack directly on the skin. As the ice melts, be careful to avoid getting any wrap or splint wet.  · You may take over-the-counter pain medicine to treat pain and inflammation, unless another medicine was prescribed. Anti-inflammatory pain medicines may be more effective. Talk with your provider beforeusing these medicines if you have chronic liver or kidney disease, or ever had a stomach ulcer or GI (gastrointestinal) bleeding.  · As your symptoms improve, slowly begin to move the joint. Do not overuse the joint. This may cause the symptoms to flare up again.  When to seek medical advice  Call your healthcare provider right away if any of these occur:  · Redness over the painful area  · Increasing pain or swelling at the joint  · Fever of 100.4°F (38°C) or above lasting for 24 to 48 hours  Date Last  Reviewed: 11/21/2015  © 2341-6337 [employer]. [address] [place] [address]. All rights reserved. This information is not intended as a substitute for professional medical care. Always follow your healthcare professional's instructions.        What is bursitis?  A bursa is a fluid-filled sac that helps cushion the muscles, tendons, and bones around a joint. When a bursa becomes inflamed, its called bursitis. Common symptoms of bursitis include pain, tenderness, and swelling that limits movement of the joint.  What causes bursitis?  Bursitis is most often caused by overuse of a joint. The repeated movements irritate the bursa and may cause it to swell. When that happens, other surrounding tissues may become inflamed or have less space to move. Bursitis is most common in large joints such as the knee, shoulder, and hip.       Nonsurgical treatment involves both rest and exercise.    How is bursitis treated?  To help reduce pain and swelling, your healthcare provider may recommend one or more of the following:   · Rest gives the bursa time to heal. This means limiting activities that put stress on the joint.  · Anti-inflammatory medications help reduce painful swelling. In some cases, this can include injections of cortisone or other steroid medicines into the bursa.  · Splints and support bandages improve your comfort and allow the bursa to heal.  · Physical therapy may be used to increase flexibility and strengthen muscles that support the joint.  · Aspiration removes extra fluid from the bursa using a needle. This can help your healthcare provider find out what is causing your bursitis. For example, it might be an infection or overuse.   · Surgery can be used to remove an inflamed or infected bursa. This is rarely needed.  Date Last Reviewed: 9/11/2015  © 2120-8708 [employer]. [address] [place] [id number]. All rights reserved. This information is not intended  as a substitute for professional medical care. Always follow your healthcare professional's instructions.

## 2019-02-15 NOTE — PROGRESS NOTES
Subjective:       Patient ID: Sharlene Smith is a 53 y.o. female.    Chief Complaint: Leg Pain (left)   Patient with sitting and when she went to get up noticed extreme pain and swelling in the   left knee making it difficult for her to bear weight, patient denies history of any type of trauma  Patient has a history of hip pain on the same side is having x-rays tomorrow to evaluate that.  HPI see above  Review of Systems   Constitutional: Negative.    HENT: Negative.    Eyes: Negative.    Respiratory: Negative.    Cardiovascular: Negative.    Gastrointestinal: Negative.    Endocrine: Negative.    Genitourinary: Negative.    Musculoskeletal: Positive for arthralgias and joint swelling.   Skin: Negative.    Allergic/Immunologic: Negative.    Neurological: Negative.    Hematological: Negative.    Psychiatric/Behavioral: Negative.        Objective:      Physical Exam   Constitutional: She is oriented to person, place, and time. She appears well-nourished. She appears distressed.   HENT:   Head: Normocephalic and atraumatic.   Right Ear: External ear normal.   Left Ear: External ear normal.   Eyes: Conjunctivae and EOM are normal. Pupils are equal, round, and reactive to light.   Neck: Normal range of motion. Neck supple. No JVD present.   Cardiovascular: Normal rate, regular rhythm and normal heart sounds.   Pulmonary/Chest: Effort normal and breath sounds normal.   Abdominal: Soft. Bowel sounds are normal.   Musculoskeletal: She exhibits edema and tenderness.        Right knee: Normal.        Left knee: She exhibits decreased range of motion, swelling, effusion and deformity. She exhibits normal patellar mobility. Tenderness found. Patellar tendon tenderness noted.   Neurological: She is alert and oriented to person, place, and time. She displays normal reflexes. No cranial nerve deficit or sensory deficit. She exhibits normal muscle tone. Coordination normal.   Skin: Skin is warm and dry.   Psychiatric: She has  a normal mood and affect. Her behavior is normal. Judgment and thought content normal.       Assessment:       1. Left knee pain, unspecified chronicity     2.   History of hip and low back pain     Plan:     see orders dated 2019      Appointment Orders   Order # Category Procedure Appointment Association   [id number] Im Diagnostic Imaging Orderables XR PELVIS COMPLETE MIN 3 VIEWS [IMG72] Unlinked      X-Ray Knee 1 or 2 View Bilateral   Will contact patient with results when available  See med card dated 2019   traMADol (ULTRAM) 50 mg tablet 50 mg, Every 6 hours PRN       Antidepressant - Selective Serotonin Reuptake Inhibitors (SSRIs)        Antihistamines - 2nd Generation, Antihistamines - 2nd Generation - Piperazines         Patient taking differently: 10 mg Oral Daily PRN, Informant: Self, Reported on 2018     Antihyperlipidemic - HMG CoA Reductase Inhibitors (statins)    atorvastatin (LIPITOR) 20 MG tablet        Calcium Channel Blockers - Benzothiazepines    diltiaZEM (CARDIZEM CD) 240 MG 24 hr capsule        Contraceptive Oral - Biphasic    CAMRESE LO 0.10 mg-20 mcg (84)/10 mcg (7) 3MPk        Gastric Acid Secretion Reducing Agents - Proton Pump Inhibitors (PPIs)    omeprazole (PRILOSEC) 40 MG capsule        Glucocorticoids    methylPREDNISolone (MEDROL DOSEPACK) 4 mg tablet        Inflammatory Bowel Agent - Aminosalicylates and Related Agents    mesalamine (APRISO) 0.375 gram Cp24 1.5 g, Daily        Patient taking differently: 0.75 g Oral Daily, Informant: Self, Reported on 2018      Platelet Aggregation Inhibitors - Salicylates, Salicylate Analgesics    aspirin 81 MG Chew () 81 mg, Daily       Skeletal Muscle Relaxant - Central Muscle Relaxants    tiZANidine (ZANAFLEX) 4 MG tablet

## 2019-02-18 ENCOUNTER — HOSPITAL ENCOUNTER (OUTPATIENT)
Dept: RADIOLOGY | Facility: HOSPITAL | Age: 54
Discharge: HOME | End: 2019-02-18
Attending: FAMILY MEDICINE
Payer: OTHER GOVERNMENT

## 2019-02-18 DIAGNOSIS — Z12.39 SCREENING BREAST EXAMINATION: ICD-10-CM

## 2019-02-18 DIAGNOSIS — M25.559 ARTHRALGIA OF HIP, UNSPECIFIED LATERALITY: ICD-10-CM

## 2019-02-18 DIAGNOSIS — M53.3 SACRO-ILIAC PAIN: ICD-10-CM

## 2019-02-18 DIAGNOSIS — M46.1 SACROILIITIS: ICD-10-CM

## 2019-02-18 PROCEDURE — 72170 XR PELVIS ROUTINE AP: ICD-10-PCS | Mod: 26,,, | Performed by: RADIOLOGY

## 2019-02-18 PROCEDURE — 77067 MAMMO DIGITAL SCREENING BILAT WITH CAD: ICD-10-PCS | Mod: 26,,, | Performed by: RADIOLOGY

## 2019-02-18 PROCEDURE — 72170 X-RAY EXAM OF PELVIS: CPT | Mod: TC,PO

## 2019-02-18 PROCEDURE — 77067 SCR MAMMO BI INCL CAD: CPT | Mod: TC,PO

## 2019-02-18 PROCEDURE — 72170 X-RAY EXAM OF PELVIS: CPT | Mod: 26,,, | Performed by: RADIOLOGY

## 2019-02-18 PROCEDURE — 77067 SCR MAMMO BI INCL CAD: CPT | Mod: 26,,, | Performed by: RADIOLOGY

## 2019-02-27 ENCOUNTER — PATIENT MESSAGE (OUTPATIENT)
Dept: FAMILY MEDICINE | Facility: CLINIC | Age: 54
End: 2019-02-27

## 2019-02-27 DIAGNOSIS — M25.569 KNEE PAIN, UNSPECIFIED CHRONICITY, UNSPECIFIED LATERALITY: Primary | ICD-10-CM

## 2019-03-22 ENCOUNTER — OFFICE VISIT (OUTPATIENT)
Dept: ORTHOPEDICS | Facility: CLINIC | Age: 54
End: 2019-03-22
Payer: OTHER GOVERNMENT

## 2019-03-22 VITALS — HEIGHT: 65 IN | BODY MASS INDEX: 30.16 KG/M2 | WEIGHT: 181 LBS

## 2019-03-22 DIAGNOSIS — M76.52 PATELLAR TENDINITIS OF LEFT KNEE: ICD-10-CM

## 2019-03-22 DIAGNOSIS — M76.52 PATELLAR TENDINITIS OF LEFT KNEE: Primary | ICD-10-CM

## 2019-03-22 PROCEDURE — 99202 OFFICE O/P NEW SF 15 MIN: CPT | Mod: S$PBB,,, | Performed by: ORTHOPAEDIC SURGERY

## 2019-03-22 PROCEDURE — 99999 PR PBB SHADOW E&M-EST. PATIENT-LVL II: CPT | Mod: PBBFAC,,, | Performed by: ORTHOPAEDIC SURGERY

## 2019-03-22 PROCEDURE — 99212 OFFICE O/P EST SF 10 MIN: CPT | Mod: PBBFAC,PN | Performed by: ORTHOPAEDIC SURGERY

## 2019-03-22 PROCEDURE — 99202 PR OFFICE/OUTPT VISIT, NEW, LEVL II, 15-29 MIN: ICD-10-PCS | Mod: S$PBB,,, | Performed by: ORTHOPAEDIC SURGERY

## 2019-03-22 PROCEDURE — 99999 PR PBB SHADOW E&M-EST. PATIENT-LVL II: ICD-10-PCS | Mod: PBBFAC,,, | Performed by: ORTHOPAEDIC SURGERY

## 2019-03-22 RX ORDER — NAPROXEN 500 MG/1
500 TABLET ORAL 2 TIMES DAILY WITH MEALS
Qty: 60 TABLET | Refills: 1 | Status: SHIPPED | OUTPATIENT
Start: 2019-03-22 | End: 2019-03-22 | Stop reason: SDUPTHER

## 2019-03-22 RX ORDER — NAPROXEN 500 MG/1
TABLET ORAL
Qty: 180 TABLET | Refills: 1 | Status: SHIPPED | OUTPATIENT
Start: 2019-03-22 | End: 2020-09-11

## 2019-03-22 NOTE — LETTER
March 22, 2019      Mara Chang MD  [address] [facility]  [address]  [facility] [address]           [place] [facility]  [address] [facility] [address]  [facility] [address]  Phone: [phone]          Patient: Sharlene Smith   MR Number: [id number]   YOB: 1965   Date of Visit: 3/22/2019       Dear Dr. Mara Chang:    Thank you for referring Sharlene Smith to me for evaluation. Attached you will find relevant portions of my assessment and plan of care.    If you have questions, please do not hesitate to call me. I look forward to following Sharlene Smith along with you.    Sincerely,    Arpan Cobb MD    Enclosure  CC:  No Recipients    If you would like to receive this communication electronically, please contact [email or web address] or [phone] to request more information on [facility] access.    For providers and/or their staff who would like to refer a patient to [facility], please contact us through our one-stop-shop provider referral line, [facility], at [phone].    If you feel you have received this communication in error or would no longer like to receive these types of communications, please e-mail [email or web address]

## 2019-03-22 NOTE — PROGRESS NOTES
Subjective:      Patient ID: Sharlene Smith is a 53 y.o. female.    Chief Complaint:   HPI     They have experienced problems with their left knee over the past 1 month. The patient reports relevant history of injury/aggravation.  Symptoms started after descending stairs.  Pain is located  anteriorly Associated symptoms include NA.  Symptoms are aggravated by flexion loading. They have been treated with Oral steroids and tramadol.   Symptoms have recently stayed the same. Ambulation reportedly has not been impaired. Self care ADLs are not painful.     Review of Systems   Constitution: Negative for fever and weight loss.   HENT: Negative for congestion.    Eyes: Negative for visual disturbance.   Cardiovascular: Negative for chest pain.   Respiratory: Negative for shortness of breath.    Hematologic/Lymphatic: Negative for bleeding problem. Does not bruise/bleed easily.   Skin: Negative for poor wound healing.   Musculoskeletal: Positive for joint pain.   Gastrointestinal: Negative for abdominal pain.   Genitourinary: Negative for dysuria.   Neurological: Negative for seizures.   Psychiatric/Behavioral: Negative for altered mental status.   Allergic/Immunologic: Negative for persistent infections.         Objective:            Ortho/SPM Exam  Left knee    [unfilled]    The patient is not in acute distress.   Sclerae normal  Body habitus is normal.  Respiratory distress:  none   The patient walks without a limp.  Hip irritability  negative.   The skin over the knee is intact.  Knee effusion 0  Tendernes is located on the patellar tendon  Range of motion- Flexion 140 deg, Extension 0 deg,   Ligament laxity exam:   MCL 0   Lachman 0   Post sag  0    LCL 0  Patellar apprehension negative.  Popliteal cyst negative  Patellar crepitation absent.  Flexion/pinch negative  Pulses DP present, PT present.  Motor normal 5/5 strength in all tested muscle groups.   Sensory normal.  I reviewed the relevant radiographic images  for the patient's condition:  I reviewed recent films of both knees.  They are normal for the patient's age.        Assessment:       Encounter Diagnosis   Name Primary?    Patellar tendinitis of left knee Yes          Plan:       Sharlene was seen today for pain, swelling, pain and swelling.    Diagnoses and all orders for this visit:    Patellar tendinitis of left knee          I explained my diagnostic impression and the reasoning behind it in detail, using layman's terms.  Models and/or pictures were used to help in the explanation.    Icing regimen explained    Activity modification discussed    Naprosyn-informed consent given    I explained to the patient that I am providing a prescription for anti-inflammatory medication.  I warned the patient that it should not be taken with other anti-inflammatory medications including over-the-counter products containing ibuprofen and naproxen.  I advised them to consult their primary physician or pharmacist if there is any question about this in the future.  I discussed the possible side effects including cardiovascular issues, renal issues and gastrointestinal problems.  I advised the patient to discontinue the medication should they develop persistent symptoms of dyspepsia.    I also explained that should they elect to continue this medication long-term, that is beyond the prescription that I provide at this time, they should contact their primary physician for refills and appropriate monitoring.

## 2019-05-06 DIAGNOSIS — E78.5 HYPERLIPIDEMIA: ICD-10-CM

## 2019-05-06 RX ORDER — ATORVASTATIN CALCIUM 20 MG/1
TABLET, FILM COATED ORAL
Qty: 90 TABLET | Refills: 1 | Status: SHIPPED | OUTPATIENT
Start: 2019-05-06 | End: 2020-04-27 | Stop reason: SDUPTHER

## 2019-05-13 ENCOUNTER — OFFICE VISIT (OUTPATIENT)
Dept: PRIMARY CARE CLINIC | Facility: CLINIC | Age: 54
End: 2019-05-13
Payer: OTHER GOVERNMENT

## 2019-05-13 VITALS
DIASTOLIC BLOOD PRESSURE: 102 MMHG | BODY MASS INDEX: 29.97 KG/M2 | TEMPERATURE: 98 F | WEIGHT: 179.88 LBS | HEIGHT: 65 IN | RESPIRATION RATE: 20 BRPM | SYSTOLIC BLOOD PRESSURE: 136 MMHG

## 2019-05-13 DIAGNOSIS — M77.8 RIGHT WRIST TENDONITIS: Primary | ICD-10-CM

## 2019-05-13 PROCEDURE — 99999 PR PBB SHADOW E&M-EST. PATIENT-LVL IV: CPT | Mod: PBBFAC,,, | Performed by: NURSE PRACTITIONER

## 2019-05-13 PROCEDURE — 99213 PR OFFICE/OUTPT VISIT, EST, LEVL III, 20-29 MIN: ICD-10-PCS | Mod: S$PBB,,, | Performed by: NURSE PRACTITIONER

## 2019-05-13 PROCEDURE — 99214 OFFICE O/P EST MOD 30 MIN: CPT | Mod: PBBFAC,PN | Performed by: NURSE PRACTITIONER

## 2019-05-13 PROCEDURE — 99999 PR PBB SHADOW E&M-EST. PATIENT-LVL IV: ICD-10-PCS | Mod: PBBFAC,,, | Performed by: NURSE PRACTITIONER

## 2019-05-13 PROCEDURE — 99213 OFFICE O/P EST LOW 20 MIN: CPT | Mod: S$PBB,,, | Performed by: NURSE PRACTITIONER

## 2019-05-13 RX ORDER — METHYLPREDNISOLONE 4 MG/1
TABLET ORAL
Qty: 1 PACKAGE | Refills: 0 | Status: SHIPPED | OUTPATIENT
Start: 2019-05-13 | End: 2019-09-04

## 2019-05-13 NOTE — PROGRESS NOTES
"Subjective:       Patient ID: Sharlene Smith is a 53 y.o. female.    Chief Complaint: Wrist Pain (right) and Arm Pain (right)    Ms. Monzon presents today for right wrist tenderness, which is worst around the anatomical snuff box and radiates up her arm. She denies numbness, tingling, or pain in the fingers or distal thumb. She denies trauma. She went to bed last night without pain and awoke with pain. The pain has steadily worsened over the course of the day. She uses a computer at work and has intentionally not used wrist supports for years because they "felt awkward."     Review of Systems   Constitutional: Negative for fever.   HENT: Negative for facial swelling.    Eyes: Negative for visual disturbance.   Respiratory: Negative for shortness of breath.    Cardiovascular: Negative for chest pain.   Gastrointestinal: Negative for vomiting.   Genitourinary: Negative for dysuria.   Musculoskeletal: Positive for arthralgias and joint swelling.   Skin: Negative for color change and rash.   Neurological: Negative for headaches.   Psychiatric/Behavioral: Negative for confusion.       Objective:      Physical Exam   Constitutional: She is oriented to person, place, and time. She appears well-developed and well-nourished. No distress.   HENT:   Head: Normocephalic.   Eyes: No scleral icterus.   Cardiovascular: Normal rate, regular rhythm and normal heart sounds.   Pulmonary/Chest: Effort normal and breath sounds normal. No stridor. No respiratory distress. She has no wheezes. She has no rales.   Musculoskeletal:        Right wrist: She exhibits decreased range of motion, tenderness, bony tenderness and swelling. She exhibits no effusion, no crepitus, no deformity and no laceration.        Arms:  Neurological: She is alert and oriented to person, place, and time.   Skin: Skin is warm and dry. She is not diaphoretic.   Psychiatric: She has a normal mood and affect. Her behavior is normal.   Nursing note and vitals " reviewed.      Assessment:       1. Right wrist tendonitis        Plan:   1. Right wrist tendonitis  - arm brace Misc; Wear wrist splint daily, including while sleeping. Remove to shower  - methylPREDNISolone (MEDROL DOSEPACK) 4 mg tablet; use as directed  Dispense: 1 Package; Refill: 0  - X-Ray Wrist 2 View Right; Future      Pt has been given instructions populated from [employer] database and has verbalized understanding of the after visit summary and information contained wherein.    Follow up with a primary care provider. May go to ER for acute shortness of breath, lightheadedness, fever, or any other emergent complaints or changes in condition.

## 2019-05-16 ENCOUNTER — TELEPHONE (OUTPATIENT)
Dept: FAMILY MEDICINE | Facility: CLINIC | Age: 54
End: 2019-05-16

## 2019-05-16 DIAGNOSIS — Z30.011 OCP (ORAL CONTRACEPTIVE PILLS) INITIATION: ICD-10-CM

## 2019-05-16 RX ORDER — LEVONORGESTREL/ETHINYL ESTRADIOL AND ETHINYL ESTRADIOL 100-20(84)
KIT ORAL
Qty: 91 TABLET | Refills: 0 | Status: SHIPPED | OUTPATIENT
Start: 2019-05-16 | End: 2019-08-14 | Stop reason: SDUPTHER

## 2019-05-16 NOTE — TELEPHONE ENCOUNTER
VM left advising that the Rx for CAMRESE LO has been sent for a 90 day supply & that an EEP with labs will be due prior to further refills. Advised a return call to schedule.

## 2019-05-22 ENCOUNTER — TELEPHONE (OUTPATIENT)
Dept: PRIMARY CARE CLINIC | Facility: CLINIC | Age: 54
End: 2019-05-22

## 2019-05-22 DIAGNOSIS — R73.09 ELEVATED GLUCOSE: ICD-10-CM

## 2019-05-22 DIAGNOSIS — Z00.00 ROUTINE GENERAL MEDICAL EXAMINATION AT A HEALTH CARE FACILITY: Primary | ICD-10-CM

## 2019-05-22 NOTE — TELEPHONE ENCOUNTER
----- Message from Pastora Gomes sent at 5/22/2019  2:17 PM CDT -----  Lab Orders Needed    I have scheduled the above patients annual physical and lab appointments. Lab orders need to be placed and linked.    Date of Annual Physical: 09/04/2019    Date of Lab appt: 08/30/2019    Thank You

## 2019-07-02 ENCOUNTER — OFFICE VISIT (OUTPATIENT)
Dept: PRIMARY CARE CLINIC | Facility: CLINIC | Age: 54
End: 2019-07-02
Payer: OTHER GOVERNMENT

## 2019-07-02 VITALS
HEIGHT: 65 IN | TEMPERATURE: 98 F | SYSTOLIC BLOOD PRESSURE: 124 MMHG | DIASTOLIC BLOOD PRESSURE: 86 MMHG | HEART RATE: 80 BPM | BODY MASS INDEX: 30.34 KG/M2 | WEIGHT: 182.13 LBS

## 2019-07-02 DIAGNOSIS — L81.9 PIGMENTED SKIN LESION SUSPICIOUS FOR MALIGNANT NEOPLASM: Primary | ICD-10-CM

## 2019-07-02 DIAGNOSIS — K51.20 ULCERATIVE PROCTITIS WITHOUT COMPLICATION: ICD-10-CM

## 2019-07-02 DIAGNOSIS — M25.559 ARTHRALGIA OF HIP, UNSPECIFIED LATERALITY: ICD-10-CM

## 2019-07-02 PROCEDURE — 99215 OFFICE O/P EST HI 40 MIN: CPT | Mod: PBBFAC,PN | Performed by: FAMILY MEDICINE

## 2019-07-02 PROCEDURE — 99999 PR PBB SHADOW E&M-EST. PATIENT-LVL V: ICD-10-PCS | Mod: PBBFAC,,, | Performed by: FAMILY MEDICINE

## 2019-07-02 PROCEDURE — 99214 PR OFFICE/OUTPT VISIT, EST, LEVL IV, 30-39 MIN: ICD-10-PCS | Mod: S$PBB,,, | Performed by: FAMILY MEDICINE

## 2019-07-02 PROCEDURE — 99999 PR PBB SHADOW E&M-EST. PATIENT-LVL V: CPT | Mod: PBBFAC,,, | Performed by: FAMILY MEDICINE

## 2019-07-02 PROCEDURE — 99214 OFFICE O/P EST MOD 30 MIN: CPT | Mod: S$PBB,,, | Performed by: FAMILY MEDICINE

## 2019-07-02 RX ORDER — TRAMADOL HYDROCHLORIDE 50 MG/1
50 TABLET ORAL EVERY 6 HOURS PRN
Qty: 120 TABLET | Refills: 4 | Status: SHIPPED | OUTPATIENT
Start: 2019-07-02 | End: 2019-11-22 | Stop reason: SDUPTHER

## 2019-07-31 ENCOUNTER — OFFICE VISIT (OUTPATIENT)
Dept: PAIN MEDICINE | Facility: CLINIC | Age: 54
End: 2019-07-31
Payer: OTHER GOVERNMENT

## 2019-07-31 ENCOUNTER — TELEPHONE (OUTPATIENT)
Dept: PAIN MEDICINE | Facility: CLINIC | Age: 54
End: 2019-07-31

## 2019-07-31 VITALS
HEART RATE: 86 BPM | SYSTOLIC BLOOD PRESSURE: 132 MMHG | DIASTOLIC BLOOD PRESSURE: 87 MMHG | WEIGHT: 182 LBS | BODY MASS INDEX: 30.29 KG/M2

## 2019-07-31 DIAGNOSIS — M25.552 PAIN OF BOTH HIP JOINTS: Primary | ICD-10-CM

## 2019-07-31 DIAGNOSIS — M25.551 CHRONIC HIP PAIN, BILATERAL: Primary | ICD-10-CM

## 2019-07-31 DIAGNOSIS — M25.552 CHRONIC HIP PAIN, BILATERAL: Primary | ICD-10-CM

## 2019-07-31 DIAGNOSIS — G89.29 CHRONIC HIP PAIN, BILATERAL: Primary | ICD-10-CM

## 2019-07-31 DIAGNOSIS — M25.551 PAIN OF BOTH HIP JOINTS: Primary | ICD-10-CM

## 2019-07-31 PROCEDURE — 99213 OFFICE O/P EST LOW 20 MIN: CPT | Mod: PBBFAC,PO | Performed by: PAIN MEDICINE

## 2019-07-31 PROCEDURE — 99999 PR PBB SHADOW E&M-EST. PATIENT-LVL III: ICD-10-PCS | Mod: PBBFAC,,, | Performed by: PAIN MEDICINE

## 2019-07-31 PROCEDURE — 99204 OFFICE O/P NEW MOD 45 MIN: CPT | Mod: S$PBB,,, | Performed by: PAIN MEDICINE

## 2019-07-31 PROCEDURE — 99204 PR OFFICE/OUTPT VISIT, NEW, LEVL IV, 45-59 MIN: ICD-10-PCS | Mod: S$PBB,,, | Performed by: PAIN MEDICINE

## 2019-07-31 PROCEDURE — 99999 PR PBB SHADOW E&M-EST. PATIENT-LVL III: CPT | Mod: PBBFAC,,, | Performed by: PAIN MEDICINE

## 2019-07-31 NOTE — LETTER
July 31, 2019      Mara Chang MD  [address] [facility] [address]           [place] [facility]  [address] [place] [address] [address]  [facility] [address]  Phone: [phone]          Patient: Sharlene Smith   MR Number: [id number]   YOB: 1965   Date of Visit: 7/31/2019       Dear Dr. Mara Chang:    Thank you for referring Sharlene Smith to me for evaluation. Attached you will find relevant portions of my assessment and plan of care.    If you have questions, please do not hesitate to call me. I look forward to following Sharlene Smith along with you.    Sincerely,    Chauncey Clay Jr., MD    Enclosure  CC:  No Recipients    If you would like to receive this communication electronically, please contact [email or web address] or [phone] to request more information on [employer] Link access.    For providers and/or their staff who would like to refer a patient to [facility], please contact us through our one-stop-shop provider referral line, [facility], at [phone].    If you feel you have received this communication in error or would no longer like to receive these types of communications, please e-mail [email or web address]

## 2019-07-31 NOTE — PROGRESS NOTES
[facility] New Patient Evaluation    Referred by: Mara Chang MD   Reason for referral: Arthralgia of hip, unspecified laterality     CC:   Chief Complaint   Patient presents with    Low-back Pain    Hip Pain     left     Last 3 PDI Scores 7/31/2019   Pain Disability Index (PDI) 42       HPI:   Sharlene Smith is a 54 y.o. female who complains of chronic, bilateral hip pain refractory to oral medications, physical therapy, and home exercise.  She reports a history of sacroiliac joint injection that failed to provide her relief stating that her pain is not coming from the back of the hips, but rather from the side and the front.    Location:  Hips  Onset: 2yrs  Current Pain Score: 6/10  Daily Pain of Range: 6-7/10  Quality: Aching and Throbbing  Radiation:  Toward the groin  Worsened by: nothing in particular  Improved by: nothing    Previous Therapies:  PT/OT:  Yes, without significant relief.  Patient states physical therapy exercises worsen the pain at times.  HEP:  Yes, without relief.  Interventions:  Yes, history of sacroiliac injection without improvement in pain.  Surgery:  Patient denies history of back or hip surgery  Medications:   - NSAIDS:   - MSK Relaxants:   - TCAs:   - SNRIs:   - Topicals:   - Anticonvulsants:  - Opioids:     Current Pain Medications:  1. Naproxen, Medrol Dosepak, tizanidine, tramadol     Review of Systems:  Review of Systems   Constitutional: Negative for chills and fever.   HENT: Negative for nosebleeds.    Eyes: Negative for blurred vision and pain.   Respiratory: Negative for hemoptysis.    Cardiovascular: Negative for chest pain and palpitations.   Gastrointestinal: Negative for heartburn, nausea and vomiting.   Genitourinary: Negative for dysuria and hematuria.   Musculoskeletal: Positive for back pain, joint pain and neck pain. Negative for falls and myalgias.   Skin: Negative for rash.   Neurological: Negative for seizures and loss of  consciousness.   Endo/Heme/Allergies: Does not bruise/bleed easily.   Psychiatric/Behavioral: Negative for hallucinations.       History:    Current Outpatient Medications:     arm brace Misc, Wear wrist splint daily, including while sleeping. Remove to shower, Disp: , Rfl:     aspirin 81 MG Chew, Take 81 mg by mouth once daily., Disp: , Rfl:     atorvastatin (LIPITOR) 20 MG tablet, TAKE 1 TABLET EVERY EVENING, Disp: 90 tablet, Rfl: 1    CAMRESE LO 0.10 mg-20 mcg (84)/10 mcg (7) 3MPk, TAKE 1 TABLET DAILY, Disp: 91 tablet, Rfl: 0    diltiaZEM (CARDIZEM CD) 240 MG 24 hr capsule, TAKE 1 CAPSULE DAILY, Disp: 90 capsule, Rfl: 3    mesalamine (APRISO) 0.375 gram Cp24, Take 4 capsules (1.5 g total) by mouth once daily. (Patient taking differently: Take 0.75 g by mouth once daily. ), Disp: 120 capsule, Rfl: 0    methylPREDNISolone (MEDROL DOSEPACK) 4 mg tablet, use as directed, Disp: 1 Package, Rfl: 0    naproxen (NAPROSYN) 500 MG tablet, TAKE 1 TABLET(500 MG) BY MOUTH TWICE DAILY WITH MEALS, Disp: 180 tablet, Rfl: 1    omeprazole (PRILOSEC) 40 MG capsule, , Disp: , Rfl:     tiZANidine (ZANAFLEX) 4 MG tablet, TAKE 1/2 TO 1 TABLET BY MOUTH EVERY 8 HOURS AS NEEDED, Disp: 270 tablet, Rfl: 0    traMADol (ULTRAM) 50 mg tablet, Take 1 tablet (50 mg total) by mouth every 6 (six) hours as needed., Disp: 120 tablet, Rfl: 4  No current facility-administered medications for this visit.     Facility-Administered Medications Ordered in Other Visits:     0.9%  NaCl infusion, , Intravenous, Continuous, Netta Amaro NP    Past Medical History:   Diagnosis Date    Abnormal Pap smear of vagina yrs ago    possible BX?    Allergy     Arthritis     Asthma     Hyperlipidemia     Supraventricular tachycardia     SVT (supraventricular tachycardia)     Tachycardia     Ulcerative colitis        Past Surgical History:   Procedure Laterality Date    ABLATION N/A 10/8/2018    Performed by [facility]  CATH LAB     SECTION, CLASSIC      x 2    COLONOSCOPY N/A 10/13/2017    [facility] ENDO (4TH F[facility])    COLONOSCOPY N/A 2015    [facility] by [facility] at [facility] ENDO (4TH FLR)    ESOPHAGOGASTRODUODENOSCOPY (EGD) N/A 10/13/2017    Performed by [facility] at [facility] ENDO (4TH FLR)    INJECTION-JOINT Bilateral 3/5/2018    [facility]       Family History   Problem Relation Age of Onset    Hyperlipidemia Mother     Cancer Mother     Cataracts Mother     Allergies Mother     Heart attack Father     Diabetes Father     Heart attack Paternal Grandmother     Glaucoma Maternal Grandfather     Heart disease Neg Hx     Hypertension Neg Hx     Colon cancer Neg Hx     Esophageal cancer Neg Hx     Amblyopia Neg Hx     Blindness Neg Hx     Macular degeneration Neg Hx     Retinal detachment Neg Hx     Strabismus Neg Hx     Stroke Neg Hx     Thyroid disease Neg Hx     Angioedema Neg Hx     Asthma Neg Hx     Atopy Neg Hx     Eczema Neg Hx     Immunodeficiency Neg Hx     Rhinitis Neg Hx     Urticaria Neg Hx        Social History     Socioeconomic History    Marital status:      Spouse name: Not on file    Number of children: Not on file    Years of education: Not on file    Highest education level: Not on file   Occupational History    Not on file   Social Needs    Financial resource strain: Not on file    Food insecurity:     Worry: Not on file     Inability: Not on file    Transportation needs:     Medical: Not on file     Non-medical: Not on file   Tobacco Use    Smoking status: Former Smoker     Types: Cigarettes     Last attempt to quit: 1999     Years since quittin.4    Smokeless tobacco: Never Used   Substance and Sexual Activity    Alcohol use: Yes     Alcohol/week: 4.2 oz     Types: 4 Cans of beer, 3 Shots of liquor per week     Comment: Occasionally    Drug use: No    Sexual  activity: Yes     Partners: Male   Lifestyle    Physical activity:     Days per week: Not on file     Minutes per session: Not on file    Stress: Not on file   Relationships    Social connections:     Talks on phone: Not on file     Gets together: Not on file     Attends [employer] service: Not on file     Active member of club or organization: Not on file     Attends meetings of clubs or organizations: Not on file     Relationship status: Not on file   Other Topics Concern    Not on file   Social History Narrative    Works for [employer]       Review of patient's allergies indicates:  No Known Allergies    Physical Exam:  Vitals:    07/31/19 1400   Weight: 82.6 kg (182 lb)   PainSc:   6     General    Nursing note and vitals reviewed.  Constitutional: She is oriented to person, place, and time. She appears well-developed and well-nourished. No distress.   HENT:   Head: Normocephalic and atraumatic.   Nose: Nose normal.   Eyes: Conjunctivae and EOM are normal. Pupils are equal, round, and reactive to light. Right eye exhibits no discharge. Left eye exhibits no discharge. No scleral icterus.   Neck: No JVD present.   Cardiovascular: Intact distal pulses.    Pulmonary/Chest: Effort normal. No respiratory distress.   Abdominal: She exhibits no distension.   Neurological: She is alert and oriented to person, place, and time. Coordination normal.   Psychiatric: She has a normal mood and affect. Her behavior is normal. Judgment and thought content normal.     General Musculoskeletal Exam   Gait: antalgic         Right Hip Exam   Right hip exam is normal.     Tenderness   The patient tender to palpation of the SI joint.    Tests   Pain w/ forced internal rotation (BAYRON): present  Log Roll: positive  Left Hip Exam   Left hip exam is normal.    Tenderness   The patient tender to palpation of the SI joint.    Tests   Pain w/ forced internal rotation (BAYRON): present  Log Roll: positive      Back (L-Spine & T-Spine) /  Neck (C-Spine) Exam     Tenderness Right paramedian tenderness of the Sacrum. Left paramedian tenderness of the Sacrum.         Imaging:  X-Ray Pelvis Routine AP   Order: [id number]   Status:  Final result   Visible to patient:  Yes (Patient Portal) Next appt:  08/30/2019 at 07:00 AM in Lab ([facility]) Dx:  Sacroiliitis   Details     Reading Physician Reading Date Result Priority   Kevyn Nicole MD 2/18/2019       Narrative     EXAMINATION:  XR PELVIS ROUTINE AP    CLINICAL HISTORY:  Sacroiliitis, not elsewhere classified    TECHNIQUE:  AP view of the pelvis was performed.    COMPARISON:  Two thousand seventeen    FINDINGS:  Sacral iliac joints show no significant interval change or sacroiliitis.  Mild DJD changes of hip joints.  Tiny bone island density right proximal femoral shaft.  Possible healed posttraumatic change left inferior medial symphysis pubis with adjacent DJD changes, stable.      Impression       No sacroiliitis change.      Electronically signed by: Kevyn Nicole MD  Date: 02/18/2019  Time: 11:19           MRI Lower Extremity Joint WO Cont Left   Order: [id number]   Status:  Final result   Visible to patient:  Yes (Patient Portal) Next appt:  08/30/2019 at 07:00 AM in Lab (LAB, [facility]) Dx:  Arthralgia of hip, unspecified latera...   Details     Reading Physician Reading Date Result Priority   Baldo Wooten MD 4/9/2017       Narrative     Multiplanar, multisequence imaging of the pelvis and LEFT hip without the use of contrast.    Comparison: Plain film examination 4/6/17     Results:     Osseous Structures: Normal.  No fracture, avascular necrosis or other abnormality.No marrow signal abnormality to suggest bone marrow replacement process.     Hip and Sacroiliac joints: Normal, without evidence of joint effusion or other abnormalities.No gross abnormalities of the acetabular jagdeep are shown.  MR arthrogram would be necessary for complete evaluation of the jagdeep, if  clinically indicated.    Bursae: No trochanteric or iliopsoas bursitis.    Soft Tissues: Muscles and tendons of the pelvis and both hips show no atrophy, edema, mass, tears or other abnormalities.      Impression         *  No evident abnormality.       Electronically signed by: Dr. Baldo Wooten MD  Date: 04/09/17  Time: 15:39                   Labs:  BMP  Lab Results   Component Value Date     10/01/2018    K 3.9 10/01/2018     10/01/2018    CO2 24 10/01/2018    BUN 12 10/01/2018    CREATININE 0.7 10/01/2018    CALCIUM 9.5 10/01/2018    ANIONGAP 10 10/01/2018    ESTGFRAFRICA >60.0 10/01/2018    EGFRNONAA >60.0 10/01/2018     Lab Results   Component Value Date    ALT 23 07/11/2018    AST 19 07/11/2018    ALKPHOS 54 (L) 07/11/2018    BILITOT 0.4 07/11/2018       Assessment:  Sharlene Smith is a 54 y.o. female with the following diagnoses based on history, exam, and imaging:    Problem List Items Addressed This Visit     Chronic hip pain, bilateral - Primary          7/31/19 - there may be some sort of impingement in her hip along with degenerative changes around the acetabular rim seen on plain film of the pelvis.  I was able to reproduce her pain with log roll as well as the BAYRON maneuver; however, the BAYRON maneuver did not produce pain in the typical location of the low back near the sacroiliac joint but rather produced pain in the anterior portion of the hip radiating toward the groin.  Given the lack of relief she experienced from the sacroiliac joint injection she received from an outside pain provider, I would not recommend repeating that injection; however, I would recommend a bilateral acetabulofemoral joint injection for diagnostic and therapeutic purposes.  Patient was educated on the diagnostic nature of this injection.  If she experiences significant relief from it, I may have a discussion with her orthopedic surgeon about possible treatment options or additional imaging.    :  Reviewed and consistent with medication use as prescribed.    Treatment Plan:   Procedures:  Bilateral acetabulofemoral steroid joint injection with fluoroscopic guidance  PT/OT/HEP:  No additional physical therapy advised at this time. Patient was advised to consider aquatic therapy toward minimizing joint impact while still maintaining cardiovascular function.  Medications: No changes recommended at this time.  Labs: reviewed and medications are appropriately dosed for current hepatorenal function.  Imaging: No additional recommended at this time.    Follow Up: RTC 4-8 weeks    Chauncey Clay Jr, MD  Interventional Pain Medicine / Anesthesiology    Disclaimer: This note was partly generated using dictation software which may occasionally result in transcription errors.

## 2019-08-14 DIAGNOSIS — Z30.011 OCP (ORAL CONTRACEPTIVE PILLS) INITIATION: ICD-10-CM

## 2019-08-14 RX ORDER — LEVONORGESTREL/ETHINYL ESTRADIOL AND ETHINYL ESTRADIOL 100-20(84)
KIT ORAL
Qty: 91 TABLET | Refills: 0 | Status: SHIPPED | OUTPATIENT
Start: 2019-08-14 | End: 2019-11-12 | Stop reason: SDUPTHER

## 2019-08-20 ENCOUNTER — PATIENT OUTREACH (OUTPATIENT)
Dept: ADMINISTRATIVE | Facility: HOSPITAL | Age: 54
End: 2019-08-20

## 2019-08-31 ENCOUNTER — LAB VISIT (OUTPATIENT)
Dept: LAB | Facility: HOSPITAL | Age: 54
End: 2019-08-31
Attending: FAMILY MEDICINE
Payer: OTHER GOVERNMENT

## 2019-08-31 DIAGNOSIS — R73.09 ELEVATED GLUCOSE: ICD-10-CM

## 2019-08-31 DIAGNOSIS — Z00.00 ROUTINE GENERAL MEDICAL EXAMINATION AT A HEALTH CARE FACILITY: ICD-10-CM

## 2019-08-31 LAB
ALBUMIN SERPL BCP-MCNC: 3.9 G/DL (ref 3.5–5.2)
ALP SERPL-CCNC: 72 U/L (ref 55–135)
ALT SERPL W/O P-5'-P-CCNC: 12 U/L (ref 10–44)
ANION GAP SERPL CALC-SCNC: 10 MMOL/L (ref 8–16)
AST SERPL-CCNC: 13 U/L (ref 10–40)
BASOPHILS # BLD AUTO: 0.04 K/UL (ref 0–0.2)
BASOPHILS NFR BLD: 0.4 % (ref 0–1.9)
BILIRUB SERPL-MCNC: 0.4 MG/DL (ref 0.1–1)
BUN SERPL-MCNC: 11 MG/DL (ref 6–20)
CALCIUM SERPL-MCNC: 9.3 MG/DL (ref 8.7–10.5)
CHLORIDE SERPL-SCNC: 103 MMOL/L (ref 95–110)
CHOLEST SERPL-MCNC: 183 MG/DL (ref 120–199)
CHOLEST/HDLC SERPL: 3.4 {RATIO} (ref 2–5)
CO2 SERPL-SCNC: 23 MMOL/L (ref 23–29)
CREAT SERPL-MCNC: 0.8 MG/DL (ref 0.5–1.4)
DIFFERENTIAL METHOD: ABNORMAL
EOSINOPHIL # BLD AUTO: 0.5 K/UL (ref 0–0.5)
EOSINOPHIL NFR BLD: 5.1 % (ref 0–8)
ERYTHROCYTE [DISTWIDTH] IN BLOOD BY AUTOMATED COUNT: 11.8 % (ref 11.5–14.5)
EST. GFR  (AFRICAN AMERICAN): >60 ML/MIN/1.73 M^2
EST. GFR  (NON AFRICAN AMERICAN): >60 ML/MIN/1.73 M^2
ESTIMATED AVG GLUCOSE: 120 MG/DL (ref 68–131)
GLUCOSE SERPL-MCNC: 92 MG/DL (ref 70–110)
HBA1C MFR BLD HPLC: 5.8 % (ref 4–5.6)
HCT VFR BLD AUTO: 39.1 % (ref 37–48.5)
HDLC SERPL-MCNC: 54 MG/DL (ref 40–75)
HDLC SERPL: 29.5 % (ref 20–50)
HGB BLD-MCNC: 13.2 G/DL (ref 12–16)
LDLC SERPL CALC-MCNC: 96.2 MG/DL (ref 63–159)
LYMPHOCYTES # BLD AUTO: 3.4 K/UL (ref 1–4.8)
LYMPHOCYTES NFR BLD: 38.3 % (ref 18–48)
MCH RBC QN AUTO: 30.6 PG (ref 27–31)
MCHC RBC AUTO-ENTMCNC: 33.8 G/DL (ref 32–36)
MCV RBC AUTO: 91 FL (ref 82–98)
MONOCYTES # BLD AUTO: 0.5 K/UL (ref 0.3–1)
MONOCYTES NFR BLD: 5 % (ref 4–15)
NEUTROPHILS # BLD AUTO: 4.6 K/UL (ref 1.8–7.7)
NEUTROPHILS NFR BLD: 51.2 % (ref 38–73)
NONHDLC SERPL-MCNC: 129 MG/DL
PLATELET # BLD AUTO: 406 K/UL (ref 150–350)
PMV BLD AUTO: 10.1 FL (ref 9.2–12.9)
POTASSIUM SERPL-SCNC: 4 MMOL/L (ref 3.5–5.1)
PROT SERPL-MCNC: 7.5 G/DL (ref 6–8.4)
RBC # BLD AUTO: 4.31 M/UL (ref 4–5.4)
SODIUM SERPL-SCNC: 136 MMOL/L (ref 136–145)
TRIGL SERPL-MCNC: 164 MG/DL (ref 30–150)
TSH SERPL DL<=0.005 MIU/L-ACNC: 0.91 UIU/ML (ref 0.4–4)
WBC # BLD AUTO: 8.94 K/UL (ref 3.9–12.7)

## 2019-08-31 PROCEDURE — 83036 HEMOGLOBIN GLYCOSYLATED A1C: CPT

## 2019-08-31 PROCEDURE — 36415 COLL VENOUS BLD VENIPUNCTURE: CPT

## 2019-08-31 PROCEDURE — 84443 ASSAY THYROID STIM HORMONE: CPT

## 2019-08-31 PROCEDURE — 85025 COMPLETE CBC W/AUTO DIFF WBC: CPT

## 2019-08-31 PROCEDURE — 80053 COMPREHEN METABOLIC PANEL: CPT

## 2019-08-31 PROCEDURE — 80061 LIPID PANEL: CPT

## 2019-09-04 ENCOUNTER — OFFICE VISIT (OUTPATIENT)
Dept: PRIMARY CARE CLINIC | Facility: CLINIC | Age: 54
End: 2019-09-04
Payer: OTHER GOVERNMENT

## 2019-09-04 VITALS
BODY MASS INDEX: 29.84 KG/M2 | HEIGHT: 64 IN | DIASTOLIC BLOOD PRESSURE: 82 MMHG | WEIGHT: 174.81 LBS | HEART RATE: 87 BPM | SYSTOLIC BLOOD PRESSURE: 138 MMHG | TEMPERATURE: 98 F

## 2019-09-04 DIAGNOSIS — Z23 NEED FOR PROPHYLACTIC VACCINATION AND INOCULATION AGAINST INFLUENZA: Primary | ICD-10-CM

## 2019-09-04 PROCEDURE — 90686 IIV4 VACC NO PRSV 0.5 ML IM: CPT | Mod: PBBFAC,PN

## 2019-09-04 PROCEDURE — 99214 OFFICE O/P EST MOD 30 MIN: CPT | Mod: PBBFAC,PN | Performed by: FAMILY MEDICINE

## 2019-09-04 PROCEDURE — 99396 PR PREVENTIVE VISIT,EST,40-64: ICD-10-PCS | Mod: S$PBB,,, | Performed by: FAMILY MEDICINE

## 2019-09-04 PROCEDURE — 99999 PR PBB SHADOW E&M-EST. PATIENT-LVL IV: CPT | Mod: PBBFAC,,, | Performed by: FAMILY MEDICINE

## 2019-09-04 PROCEDURE — 99396 PREV VISIT EST AGE 40-64: CPT | Mod: S$PBB,,, | Performed by: FAMILY MEDICINE

## 2019-09-04 PROCEDURE — 99999 PR PBB SHADOW E&M-EST. PATIENT-LVL IV: ICD-10-PCS | Mod: PBBFAC,,, | Performed by: FAMILY MEDICINE

## 2019-09-04 NOTE — PATIENT INSTRUCTIONS
Understanding Carbohydrates, Fats, and Protein  Food is a source of fuel and nourishment for your body. Its also a source of pleasure. Having diabetes doesnt mean you have to eat special foods or give up desserts. Instead, your dietitian can show you how to plan meals to suit your body. To start, learn how different foods affect blood sugar.  Carbohydrates  Carbohydrates are the main source of fuel for the body. Carbohydrates raise blood sugar. Many people think carbohydrates are only found in pasta or bread. But carbohydrates are actually in many kinds of foods:  · Sugars occur naturally in foods such as fruit, milk, honey, and molasses. Sugars can also be added to many foods, from cereals and yogurt to candy and desserts. Sugars raise blood sugar.  · Starches are found in bread, cereals, pasta, and dried beans. Theyre also found in corn, peas, potatoes, yam, acorn squash, and butternut squash. Starches also raise blood sugar.   · Fiber is found in foods such as vegetables, fruits, beans, and whole grains. Unlike other carbs, fiber isnt digested or absorbed. So it doesnt raise blood sugar. In fact, fiber can help keep blood sugar from rising too fast. It also helps keep blood cholesterol at a healthy level.  Did you know?  Even though carbohydrates raise blood sugar, its best to have some in every meal. They are an important part of a healthy diet.   Fat  Fat is an energy source that can be stored until needed. Fat does not raise blood sugar. However, it can raise blood cholesterol, increasing the risk of heart disease. Fat is also high in calories, which can cause weight gain. Not all types of fat are the same.  More Healthy:  · Monounsaturated fats are mostly found in vegetable oils, such as olive, canola, and peanut oils. They are also found in avocados and some nuts. Monounsaturated fats are healthy for your heart. Thats because they lower LDL (unhealthy) cholesterol.  · Polyunsaturated fats are mostly  found in vegetable oils, such as corn, safflower, and soybean oils. They are also found in some seeds, nuts, and fish. Polyunsaturated fats lower LDL (unhealthy) cholesterol. So, choosing them instead of saturated fats is healthy for your heart. Certain unsaturated fats can help lower triglycerides.   Less Healthy:  · Saturated fats are found in animal products, such as meat, poultry, whole milk, lard, and butter. Saturated fats raise LDL cholesterol and are not healthy for your heart.  · Hydrogenated oils and trans fats are formed when vegetable oils are processed into solid fats. They are found in many processed foods. Hydrogenated oils and trans fats raise LDL cholesterol and lower HDL (healthy) cholesterol. They are not healthy for your heart.  Protein  Protein helps the body build and repair muscle and other tissue. Protein has little or no effect on blood sugar. However, many foods that contain protein also contain saturated fat. By choosing low-fat protein sources, you can get the benefits of protein without the extra fat:  · Plant protein is found in dry beans and peas, nuts, and soy products, such as tofu and soymilk. These sources tend to be cholesterol-free and low in saturated fat.  · Animal protein is found in fish, poultry, meat, cheese, milk, and eggs. These contain cholesterol and can be high in saturated fat. Aim for lean, lower-fat choices.  Date Last Reviewed: 3/1/2016  © 1867-2151 [employer]. [address] [place] [address]. All rights reserved. This information is not intended as a substitute for professional medical care. Always follow your healthcare professional's instructions.        Diabetes: Meal Planning    You can help keep your blood sugar level in your target range by eating healthy foods. Your healthcare team can help you create a low-fat, nutritious meal plan. Take an active role in your diabetes management by following your meal plan and working with your  healthcare team.  Make your meal plan  A meal plan gives guidelines for the types and amounts of food you should eat. The goal is to balance food and insulin (or other diabetes medications) so your blood sugars will be in your target range. Your dietitian will help you make a flexible meal plan that includes many foods that you like.  Watch serving sizes  Your meal plan will group foods by servings. To learn how much a serving is, start by measuring food portions at each meal. Soon youll know what a serving looks like on your plate. Ask your healthcare provider about how to balance servings of different foods.  Eat from all the food groups  The basis of a healthy meal plan is variety (eating lots of different foods). Choose lean meats, fresh fruits and vegetables, whole grains, and low-fat or nonfat dairy products. Eating a wide variety of foods provides the nutrients your body needs. It can also keep you from getting bored with your meal plan.  Learn about carbohydrates, fats, and protein  · Carbohydrates are starches, sugars, and fiber. They are found in many foods, including fruit, bread, pasta, milk, and sweets. Of all the foods you eat, carbohydrates have the most effect on your blood sugar. Your dietitian may teach you about carb counting, a way to figure out the number of carbohydrates in a meal.  · Fats have the most calories. They also have the most effect on your weight and your risk of heart disease. When you have diabetes, its important to control your weight and protect your heart. Foods that are high in fat include whole milk, cheese, snack foods, and desserts.  · Protein is important for building and repairing muscles and bones. Choose low-fat protein sources, such as fish, egg whites, and skinless chicken.  Reduce liquid sugars  Extra calories from sodas, sports drinks, and fruit drinks make it hard to keep blood sugar in range. Cut as many liquid sugars from your meal plan as you can.  This  includes most fruit juices, which are often high in natural or added sugar. Instead, drink plenty of water and other sugar-free beverages.  Eat less fat  If you need to lose weight, try to reduce the amount of fat in your diet. This can also help lower your cholesterol level to keep blood vessels healthier. Cut fat by using only small amounts of liquid oil for cooking. Read food labels carefully to avoid foods with unhealthy trans fats.  Timing your meals  When it comes to blood sugar control, when you eat is as important as what you eat. You may need to eat several small meals spaced evenly throughout the day to stay in your target range. So dont skip breakfast or wait until late in the day to get most of your calories. Doing so can cause your blood sugar to rise too high or fall too low.   Date Last Reviewed: 3/1/2016  © 1639-3765 [employer]. [address] [place] [address], [place] [address]. All rights reserved. This information is not intended as a substitute for professional medical care. Always follow your healthcare professional's instructions.

## 2019-09-04 NOTE — PROGRESS NOTES
Two patient identifiers verified.  Allergies reviewed.  Flu 0.5 ml IM administered to Left Deltoid per MD order.  Patient tolerated injection well; no redness, bleeding, or bruising noted to injection site.  Patient instructed to remain in clinic setting for 15 minutes.  Verbalizes understanding.

## 2019-09-05 NOTE — PROGRESS NOTES
Sharlene Smith is a 54 y.o. female   Routine physical  Source of history: Patient  Past Medical History:   Diagnosis Date    Abnormal Pap smear of vagina yrs ago    possible BX?    Allergy     Arthritis     Asthma     Hyperlipidemia     Supraventricular tachycardia     SVT (supraventricular tachycardia)     Tachycardia     Ulcerative colitis      Patient  reports that she quit smoking about 20 years ago. Her smoking use included cigarettes. She has never used smokeless tobacco. She reports that she drinks about 4.2 oz of alcohol per week. She reports that she does not use drugs.  Family History   Problem Relation Age of Onset    Hyperlipidemia Mother     Cancer Mother     Cataracts Mother     Allergies Mother     Heart attack Father     Diabetes Father     Heart attack Paternal Grandmother     Glaucoma Maternal Grandfather     Heart disease Neg Hx     Hypertension Neg Hx     Colon cancer Neg Hx     Esophageal cancer Neg Hx     Amblyopia Neg Hx     Blindness Neg Hx     Macular degeneration Neg Hx     Retinal detachment Neg Hx     Strabismus Neg Hx     Stroke Neg Hx     Thyroid disease Neg Hx     Angioedema Neg Hx     Asthma Neg Hx     Atopy Neg Hx     Eczema Neg Hx     Immunodeficiency Neg Hx     Rhinitis Neg Hx     Urticaria Neg Hx      ROS:Answers for HPI/ROS submitted by the patient on 8/29/2019   activity change: No  unexpected weight change: No  neck pain: No  hearing loss: No  rhinorrhea: No  trouble swallowing: No  eye discharge: No  visual disturbance: No  chest tightness: No  wheezing: No  chest pain: No  palpitations: No  blood in stool: No  constipation: No  vomiting: No  diarrhea: No  polydipsia: No  polyuria: No  difficulty urinating: No  hematuria: No  menstrual problem: No  dysuria: No  joint swelling: No  arthralgias: No  headaches: No  weakness: No  confusion: No  dysphoric mood: No    GENERAL: No fever, chills, fatigability or weight loss.  SKIN: No rashes,  itching or changes in color or texture of skin.  HEAD: No headaches or recent head trauma.  EYES: Visual acuity fine. No photophobia, ocular pain or diplopia.  EARS: Denies ear pain, discharge or vertigo.  NOSE: No loss of smell, no epistaxis or postnasal drip.  MOUTH & THROAT: No hoarseness or change in voice. No excessive gum bleeding.  NODES: Denies swollen glands.  CHEST: Denies CONTRERAS, cyanosis, wheezing, cough and sputum production.  CARDIOVASCULAR: Denies chest pain, PND, orthopnea or reduced exercise tolerance.  ABDOMEN: Appetite fine. No weight loss. Denies diarrhea, abdominal pain, hematemesis or blood in stool.  URINARY: No flank pain, dysuria or hematuria.  PERIPHERAL VASCULAR: No claudication or cyanosis.  MUSCULOSKELETAL: No joint stiffness or swelling. Denies back pain.  NEUROLOGIC: No history of seizures, paralysis, alteration of gait or coordination.    OBJECTIVE:  APPEARANCE:  Normal appearance  Vitals:    09/04/19 1506   BP: 138/82   Pulse: 87   Temp: 98.3 °F (36.8 °C)     SKIN: Normal skin turgor, no lesions.  HEENT: Both external auditory canals clear. Both tympanic membranes intact. PERRL. EOMI.   Disk margins sharp. No tonsillar enlargement. No pharyngeal erythema or exudate. No stridor.  NECK: No bruits. No cervical spine tenderness. No cervical lymphadenopathy. No thyromegaly.  NODES: No cervical, axillary or inguinal lymph node enlargement.  CHEST: Breath sounds clear bilaterally. Lungs clear to auscultation & percussion.   Good air movement. No rales. No retractions. No rhonchi. No stridor. No wheezes.  CARDIOVASCULAR: Normal S1, S2. No murmurs. No edema.  BREASTS: no masses palpated in either breast or axillary area, symmetry noted.  ABDOMEN: Bowel sounds normal. No palpable aortic enlargement. No CVA tenderness.   No pulsatile mass. No rebound tenderness.  PERIPHERAL VASCULAR: Femoral pulses present and symmetrical. No edema.  MUSCULOSKELETAL: Degenerative changes of both ankles, foot,  knee, wrist and hand.  BACK: No CVA tenderness. There is no spasm, tenderness or radiculopathy noted with palpation and there is full range of motion.   NEUROLOGIC:   Cranial Nerves: II-XII grossly intact.  Motor: 5/5 strength major flexors/extensors. No tremor.  DTR's: Knees, Ankles 2+ and equal bilaterally; downgoing toes.  Sensory: Intact to light touch distally.  Gait & Posture: Normal gait and fine motion. No cerebellar signs.  MENTAL STATUS: Alert. Oriented x 3. Language skills normal. Memory intact. No suicidal ideation.   Normal affect. Normal cognitive functions. Well kept appearance.    ASSESSMENT/PLAN:   Sharlene was seen today for annual exam.    Diagnoses and all orders for this visit:    Need for prophylactic vaccination and inoculation against influenza  -     Influenza - Quadrivalent (6 months+) (PF)     MIgraines    CAMRESE LO 0.10 mg-20 mcg (84)/10 mcg (7) 3MPk     mesalamine (APRISO) 0.375 gram        SVT   diltiaZEM (CARDIZEM CD) 240 MG 24 hr capsule      hyperlipidemia  atorvastatin (LIPITOR) 20 MG tablet     GERD  omeprazole (PRILOSEC) 40 MG capsule    Hip pain     traMADol (ULTRAM) 50 mg tablet 50 mg, Every 6 hours PRN

## 2019-09-12 ENCOUNTER — TELEPHONE (OUTPATIENT)
Dept: PAIN MEDICINE | Facility: CLINIC | Age: 54
End: 2019-09-12

## 2019-09-12 NOTE — TELEPHONE ENCOUNTER
----- Message from Mic Costa MA sent at 9/12/2019  3:16 PM CDT -----  Contact: Pt  Pt is requesting an appt due to injection.         Pt can be reached at [phone].      Thanks

## 2019-09-16 ENCOUNTER — TELEPHONE (OUTPATIENT)
Dept: PAIN MEDICINE | Facility: CLINIC | Age: 54
End: 2019-09-16

## 2019-09-16 NOTE — TELEPHONE ENCOUNTER
Returned call john procedure on 9/26/19 at 1:15 pm  Pt verbalized understanding.    ----- Message from Arelis Allen sent at 9/16/2019 11:03 AM CDT -----  No. [phone]   Patient returned your call.

## 2019-09-25 NOTE — DISCHARGE INSTRUCTIONS
Home Care Instructions Pain Management:    1.  DIET:    You may resume your normal diet today.    2.  BATHING:    You may shower with luke warm water.    3.  DRESSING:    You may remove your bandage today.    4.  ACTIVITY LEVEL:      You may resume your normal activities 24 hours after your procedure.    5.  MEDICATIONS:    You may resume your normal medications today.    6.  SPECIAL INSTRUCTIONS:    No heat to the injection site for 24 hours including bath or shower, heating pad, moist heat or hot tubs.    Use an ice pack to the injection site for any pain or discomfort.  Apply ice packs for 20 minute intervals as needed.    If you have received any sedatives by mouth today, you can not drive for 12 hours.    If you have received sedation through an IV, you can not drive for 24 hours.    PLEASE CALL YOUR DOCTOR FOR THE FOLLOWIN.  Redness or swelling around the injection site.  2.  Fever of 101 degrees.  3.  Drainage (pus) from the injection site.  4.  For any continuous bleeding (some dried blood over the incision is normal.)    FOR EMERGENCIES:    If any unusual problems or difficulties occur during clinic hours, call [phone] or dial [phone].    Follow up with with your physician in 2-3 weeks.

## 2019-09-26 ENCOUNTER — HOSPITAL ENCOUNTER (OUTPATIENT)
Facility: HOSPITAL | Age: 54
Discharge: HOME | End: 2019-09-26
Attending: PAIN MEDICINE | Admitting: PAIN MEDICINE
Payer: OTHER GOVERNMENT

## 2019-09-26 VITALS
OXYGEN SATURATION: 95 % | SYSTOLIC BLOOD PRESSURE: 134 MMHG | BODY MASS INDEX: 29.02 KG/M2 | DIASTOLIC BLOOD PRESSURE: 73 MMHG | RESPIRATION RATE: 15 BRPM | TEMPERATURE: 99 F | WEIGHT: 170 LBS | HEART RATE: 78 BPM | HEIGHT: 64 IN

## 2019-09-26 DIAGNOSIS — G89.29 CHRONIC PAIN: ICD-10-CM

## 2019-09-26 DIAGNOSIS — G89.29 CHRONIC BILATERAL LOW BACK PAIN WITHOUT SCIATICA: Primary | ICD-10-CM

## 2019-09-26 DIAGNOSIS — M54.50 CHRONIC BILATERAL LOW BACK PAIN WITHOUT SCIATICA: Primary | ICD-10-CM

## 2019-09-26 PROCEDURE — 20610 DRAIN/INJ JOINT/BURSA W/O US: CPT | Mod: 50,,, | Performed by: PAIN MEDICINE

## 2019-09-26 PROCEDURE — 20610 DRAIN/INJ JOINT/BURSA W/O US: CPT | Mod: 50 | Performed by: PAIN MEDICINE

## 2019-09-26 PROCEDURE — 20610 PR DRAIN/INJECT LARGE JOINT/BURSA: ICD-10-PCS | Mod: 50,,, | Performed by: PAIN MEDICINE

## 2019-09-26 PROCEDURE — 63600175 PHARM REV CODE 636 W HCPCS: Performed by: PAIN MEDICINE

## 2019-09-26 PROCEDURE — 25000003 PHARM REV CODE 250: Performed by: PAIN MEDICINE

## 2019-09-26 PROCEDURE — 25500020 PHARM REV CODE 255: Performed by: PAIN MEDICINE

## 2019-09-26 RX ORDER — ALPRAZOLAM 0.5 MG/1
1 TABLET, ORALLY DISINTEGRATING ORAL ONCE AS NEEDED
Status: COMPLETED | OUTPATIENT
Start: 2019-09-26 | End: 2019-09-26

## 2019-09-26 RX ORDER — METHYLPREDNISOLONE ACETATE 40 MG/ML
INJECTION, SUSPENSION INTRA-ARTICULAR; INTRALESIONAL; INTRAMUSCULAR; SOFT TISSUE
Status: DISCONTINUED | OUTPATIENT
Start: 2019-09-26 | End: 2019-09-26 | Stop reason: HOSPADM

## 2019-09-26 RX ORDER — INDOMETHACIN 25 MG/1
CAPSULE ORAL
Status: DISCONTINUED | OUTPATIENT
Start: 2019-09-26 | End: 2019-09-26 | Stop reason: HOSPADM

## 2019-09-26 RX ORDER — LIDOCAINE HYDROCHLORIDE 10 MG/ML
INJECTION, SOLUTION EPIDURAL; INFILTRATION; INTRACAUDAL; PERINEURAL
Status: DISCONTINUED | OUTPATIENT
Start: 2019-09-26 | End: 2019-09-26 | Stop reason: HOSPADM

## 2019-09-26 RX ORDER — BUPIVACAINE HYDROCHLORIDE 5 MG/ML
INJECTION, SOLUTION EPIDURAL; INTRACAUDAL
Status: DISCONTINUED | OUTPATIENT
Start: 2019-09-26 | End: 2019-09-26 | Stop reason: HOSPADM

## 2019-09-26 RX ORDER — ROPIVACAINE HYDROCHLORIDE 2 MG/ML
INJECTION, SOLUTION EPIDURAL; INFILTRATION; PERINEURAL
Status: COMPLETED | OUTPATIENT
Start: 2019-09-26 | End: 2019-09-26

## 2019-09-26 RX ADMIN — ALPRAZOLAM 1 MG: 0.5 TABLET, ORALLY DISINTEGRATING ORAL at 10:09

## 2019-09-26 NOTE — OP NOTE
Greater Trochanter Bursa Injection     Pre-operative diagnosis: Chronic Hip Pain, Acetabulofemoral Osteoarthritis  Post-operative diagnosis: Chronic Hip Pain, Acetabulofemoral Osteoarthritis  Procedure Date: 09/26/2019  Procedure:  (1) BILATERAL Intra-articular Acetabulofemoral (Hip) Injection    (2) Intraoperative Fluoroscopy      Anesthesia: Local    Indication for procedure: Patient has a history of chronic hip pain.  The patient was deemed an appropriate candidate to undergo the planned procedure. Consent for procedure was obtained.     Findings: Final needle placement consistent with technically sucsessful procedure.    Complications: None     Procedure in Detail: Informed consent obtained after explaining the procedure and potential complications including local discomfort, infection, headache, temporary or permanent weakness and/or numbness of one or both legs, temporary or permanent paraplegia, heart attack and stroke. Patient was brought back to procedure room and placed in a prone position and head resting comfortably on a pillow. Prior to the initiation of the procedure, the patient's identity, the site, and the nature of the procedure were verified.     The skin overlying the hip joint was prepped with chloroprep and sterile drapes were applied. The Right acetabulofemoral joint was identified in an AP view and the skin overlying the femoral neck was marked.  The femoral pulse was palpated to ensure that it was not in the projected path of the procedure needle.  Lidocaine 1% 2 mL was used to anesthetize the skin at the skin entry point and subcutaneous tissue with 25g 1 1/2 in needle. A 22G 3.5 inch spinal needle was advanced under intermittent fluoroscopy until os was encountered at the femoral neck. There was no paresthesia with needle placement. Aspiration was negative for blood. Omnipaque 0.5 mL was injected demonstrating an appropriate arthrogram without direct vascular uptake. Next, 4 mL containing  40 mg of Depomedrol with 3 mL of  0.25% Bupivacaine was injected without difficulty or resistance. The procedure needle was removed with flush and bandaged placed over site of needle insertion.     The same procedure was repeated on the LEFT side.    Estimated blood loss: None     Disposition: The patient tolerated the procedure without complaint and was transported to the recovery room in stable condition.     Follow-up: RTC as scheduled    Chauncey Clay Jr, MD  Interventional Pain Medicine / Anesthesiology

## 2019-09-26 NOTE — DISCHARGE SUMMARY
[facility]  Discharge Note  Short Stay     Admit Date: 9/26/2019    Discharge Date: 9/26/2019     Attending Physician: Chauncey Clay Jr, MD    Diagnoses:  Active Hospital Problems    Diagnosis  POA    *Chronic hip pain, bilateral [M25.551, M25.552, G89.29]  Yes    Chronic pain [G89.29]  Yes      Resolved Hospital Problems   No resolved problems to display.     Discharged Condition: Good     Hospital Course: Patient was admitted for an outpatient interventional pain management procedure and tolerated the procedure well with no complications.     Final Diagnoses: Same as principal problem.     Disposition: Home or Self Care     Follow up/Patient Instructions:    Follow-up Information     Chauncey Clay Jr, MD In 2 weeks.    Specialty:  Pain Medicine  Why:  Post-procedural Follow Up As Scheduled, Call to make an appointment if you do not have one  Contact information:  [address]  [address]  [place] [address]  [phone]                   Reconciled Medications:     Medication List      CHANGE how you take these medications    mesalamine 0.375 gram Cp24  Commonly known as:  APRISO  Take 4 capsules (1.5 g total) by mouth once daily.  What changed:  how much to take        CONTINUE taking these medications    arm brace Misc  Wear wrist splint daily, including while sleeping. Remove to shower     aspirin 81 MG Chew  Take 81 mg by mouth once daily.     atorvastatin 20 MG tablet  Commonly known as:  LIPITOR  TAKE 1 TABLET EVERY EVENING     CAMRESE LO 0.10 mg-20 mcg (84)/10 mcg (7) 3mpk  Generic drug:  L norgest/e.estradiol-e.estrad  TAKE 1 TABLET DAILY     diltiaZEM 240 MG 24 hr capsule  Commonly known as:  CARDIZEM CD  TAKE 1 CAPSULE DAILY     naproxen 500 MG tablet  Commonly known as:  NAPROSYN  TAKE 1 TABLET(500 MG) BY MOUTH TWICE DAILY WITH MEALS     omeprazole 40 MG capsule  Commonly known as:  PRILOSEC     tiZANidine 4 MG tablet  Commonly known as:  ZANAFLEX  TAKE 1/2 TO 1 TABLET BY MOUTH  EVERY 8 HOURS AS NEEDED     traMADol 50 mg tablet  Commonly known as:  ULTRAM  Take 1 tablet (50 mg total) by mouth every 6 (six) hours as needed.           Discharge Procedure Orders (must include Diet, Follow-up, Activity)   Call MD for:  temperature >100.4     Call MD for:  severe uncontrolled pain     Call MD for:  redness, tenderness, or signs of infection (pain, swelling, redness, odor or green/yellow discharge around incision site)     Call MD for:  difficulty breathing or increased cough     Call MD for:  severe persistent headache     Call MD for:  worsening rash     Remove dressing in 24 hours       Chauncey Clay Jr, MD  Interventional Pain Medicine / Anesthesiology

## 2019-09-26 NOTE — H&P
[facility] New Patient Evaluation    Referred by: Mara Chang MD   Reason for referral: Arthralgia of hip, unspecified laterality     CC:   No chief complaint on file.    Last 3 PDI Scores 7/31/2019   Pain Disability Index (PDI) 42       HPI:   Sharlene Smith is a 54 y.o. female who complains of chronic, bilateral hip pain refractory to oral medications, physical therapy, and home exercise.  She reports a history of sacroiliac joint injection that failed to provide her relief stating that her pain is not coming from the back of the hips, but rather from the side and the front.    Location:  Hips  Onset: 2yrs  Current Pain Score: 6/10  Daily Pain of Range: 6-7/10  Quality: Aching and Throbbing  Radiation:  Toward the groin  Worsened by: nothing in particular  Improved by: nothing    Previous Therapies:  PT/OT:  Yes, without significant relief.  Patient states physical therapy exercises worsen the pain at times.  HEP:  Yes, without relief.  Interventions:  Yes, history of sacroiliac injection without improvement in pain.  Surgery:  Patient denies history of back or hip surgery  Medications:   - NSAIDS:   - MSK Relaxants:   - TCAs:   - SNRIs:   - Topicals:   - Anticonvulsants:  - Opioids:     Current Pain Medications:  1. Naproxen, Medrol Dosepak, tizanidine, tramadol     Review of Systems:  Review of Systems   Constitutional: Negative for chills and fever.   HENT: Negative for nosebleeds.    Eyes: Negative for blurred vision and pain.   Respiratory: Negative for hemoptysis.    Cardiovascular: Negative for chest pain and palpitations.   Gastrointestinal: Negative for heartburn, nausea and vomiting.   Genitourinary: Negative for dysuria and hematuria.   Musculoskeletal: Positive for back pain, joint pain and neck pain. Negative for falls and myalgias.   Skin: Negative for rash.   Neurological: Negative for seizures and loss of consciousness.   Endo/Heme/Allergies: Does not bruise/bleed  easily.   Psychiatric/Behavioral: Negative for hallucinations.       History:    Current Facility-Administered Medications:     bupivacaine (PF) 0.5% (5 mg/mL) injection, , , PRN, Chauncey Clay Jr., MD, 5 mL at 19 1039    iohexol (OMNIPAQUE 300) injection, , , PRN, Chauncey Clay Jr., MD, 3 mL at 19 1039    lidocaine (PF) 10 mg/ml (1%) injection, , , PRN, Chauncey Clay Jr., MD, 10 mL at 19 1039    sodium bicarbonate solution, , , PRN, Chauncey Clay Jr., MD, 50 mEq at 19 1039    Facility-Administered Medications Ordered in Other Encounters:     0.9%  NaCl infusion, , Intravenous, Continuous, Netta Amaro NP    Past Medical History:   Diagnosis Date    Abnormal Pap smear of vagina yrs ago    possible BX?    Allergy     Arthritis     Asthma     Hyperlipidemia     Supraventricular tachycardia     SVT (supraventricular tachycardia)     Tachycardia     Ulcerative colitis        Past Surgical History:   Procedure Laterality Date    ABLATION N/A 10/8/2018    Procedure: ABLATION;  Surgeon: Shabbir Clark MD;  Location: [facility] CATH LAB;  Service: Cardiology;  Laterality: N/A;  SVT,SVT-AVNRT RFA,KRISTI,MAC,FAS,3PREP     SECTION, CLASSIC      x 2    COLONOSCOPY N/A 2015    Procedure: COLONOSCOPY;  Surgeon: Petr Miller MD;  [facility]);  Service: Endoscopy;  Laterality: N/A;    COLONOSCOPY N/A 10/13/2017    Procedure: COLONOSCOPY;  Surgeon: Petr Miller MD;  [facility]);  Service: Endoscopy;  Laterality: N/A;       Family History   Problem Relation Age of Onset    Hyperlipidemia Mother     Cancer Mother     Cataracts Mother     Allergies Mother     Heart attack Father     Diabetes Father     Heart attack Paternal Grandmother     Glaucoma Maternal Grandfather     Heart disease Neg Hx     Hypertension Neg Hx     Colon cancer Neg Hx     Esophageal cancer Neg Hx     Amblyopia Neg Hx      Blindness Neg Hx     Macular degeneration Neg Hx     Retinal detachment Neg Hx     Strabismus Neg Hx     Stroke Neg Hx     Thyroid disease Neg Hx     Angioedema Neg Hx     Asthma Neg Hx     Atopy Neg Hx     Eczema Neg Hx     Immunodeficiency Neg Hx     Rhinitis Neg Hx     Urticaria Neg Hx        Social History     Socioeconomic History    Marital status:      Spouse name: Not on file    Number of children: Not on file    Years of education: Not on file    Highest education level: Not on file   Occupational History    Not on file   Social Needs    Financial resource strain: Not on file    Food insecurity:     Worry: Not on file     Inability: Not on file    Transportation needs:     Medical: Not on file     Non-medical: Not on file   Tobacco Use    Smoking status: Former Smoker     Types: Cigarettes     Last attempt to quit: 1999     Years since quittin.5    Smokeless tobacco: Never Used   Substance and Sexual Activity    Alcohol use: Yes     Alcohol/week: 7.0 standard drinks     Types: 4 Cans of beer, 3 Shots of liquor per week     Comment: Occasionally    Drug use: No    Sexual activity: Yes     Partners: Male   Lifestyle    Physical activity:     Days per week: Not on file     Minutes per session: Not on file    Stress: Not on file   Relationships    Social connections:     Talks on phone: Not on file     Gets together: Not on file     Attends [employer] service: Not on file     Active member of club or organization: Not on file     Attends meetings of clubs or organizations: Not on file     Relationship status: Not on file   Other Topics Concern    Not on file   Social History Narrative    Works for [employer]       Review of patient's allergies indicates:  No Known Allergies    Physical Exam:  Vitals:    19 1016 19 1030 19 1035   BP: (!) 147/81 (!) 135/90 122/83   Pulse: 82 78 79   Resp: 16 16 16   Temp: 98.4 °F (36.9 °C)     TempSrc: Oral     SpO2:  "98% 98% 97%   Weight: 77.1 kg (170 lb)     Height: 5' 4" (1.626 m)       General    Nursing note and vitals reviewed.  Constitutional: She is oriented to person, place, and time. She appears well-developed and well-nourished. No distress.   HENT:   Head: Normocephalic and atraumatic.   Nose: Nose normal.   Eyes: Conjunctivae and EOM are normal. Pupils are equal, round, and reactive to light. Right eye exhibits no discharge. Left eye exhibits no discharge. No scleral icterus.   Neck: No JVD present.   Cardiovascular: Intact distal pulses.    Pulmonary/Chest: Effort normal. No respiratory distress.   Abdominal: She exhibits no distension.   Neurological: She is alert and oriented to person, place, and time. Coordination normal.   Psychiatric: She has a normal mood and affect. Her behavior is normal. Judgment and thought content normal.     General Musculoskeletal Exam   Gait: antalgic         Right Hip Exam   Right hip exam is normal.     Tenderness   The patient tender to palpation of the SI joint.    Tests   Pain w/ forced internal rotation (BAYRON): present  Log Roll: positive  Left Hip Exam   Left hip exam is normal.    Tenderness   The patient tender to palpation of the SI joint.    Tests   Pain w/ forced internal rotation (BAYRON): present  Log Roll: positive      Back (L-Spine & T-Spine) / Neck (C-Spine) Exam     Tenderness Right paramedian tenderness of the Sacrum. Left paramedian tenderness of the Sacrum.         Imaging:  X-Ray Pelvis Routine AP   Order: [id number]   Status:  Final result   Visible to patient:  Yes (Patient Portal) Next appt:  08/30/2019 at 07:00 AM in Lab (LAB, [facility]) Dx:  Sacroiliitis   Details     Reading Physician Reading Date Result Priority   Kevyn Nicole MD 2/18/2019       Narrative     EXAMINATION:  XR PELVIS ROUTINE AP    CLINICAL HISTORY:  Sacroiliitis, not elsewhere classified    TECHNIQUE:  AP view of the pelvis was performed.    COMPARISON:  Two thousand " seventeen    FINDINGS:  Sacral iliac joints show no significant interval change or sacroiliitis.  Mild DJD changes of hip joints.  Tiny bone island density right proximal femoral shaft.  Possible healed posttraumatic change left inferior medial symphysis pubis with adjacent DJD changes, stable.      Impression       No sacroiliitis change.      Electronically signed by: Kevyn Nicole MD  Date: 02/18/2019  Time: 11:19           MRI Lower Extremity Joint WO Cont Left   Order: [id number]   Status:  Final result   Visible to patient:  Yes (Patient Portal) Next appt:  08/30/2019 at 07:00 AM in Lab (LAB, [facility]) Dx:  Arthralgia of hip, unspecified latera...   Details     Reading Physician Reading Date Result Priority   Baldo Wooten MD 4/9/2017       Narrative     Multiplanar, multisequence imaging of the pelvis and LEFT hip without the use of contrast.    Comparison: Plain film examination 4/6/17     Results:     Osseous Structures: Normal.  No fracture, avascular necrosis or other abnormality.No marrow signal abnormality to suggest bone marrow replacement process.     Hip and Sacroiliac joints: Normal, without evidence of joint effusion or other abnormalities.No gross abnormalities of the acetabular jagdeep are shown.  MR arthrogram would be necessary for complete evaluation of the jagdeep, if clinically indicated.    Bursae: No trochanteric or iliopsoas bursitis.    Soft Tissues: Muscles and tendons of the pelvis and both hips show no atrophy, edema, mass, tears or other abnormalities.      Impression         *  No evident abnormality.       Electronically signed by: Dr. Baldo Wooten MD  Date: 04/09/17  Time: 15:39                   Labs:  BMP  Lab Results   Component Value Date     08/31/2019    K 4.0 08/31/2019     08/31/2019    CO2 23 08/31/2019    BUN 11 08/31/2019    CREATININE 0.8 08/31/2019    CALCIUM 9.3 08/31/2019    ANIONGAP 10 08/31/2019    ESTGFRAFRICA >60.0 08/31/2019    EGFRNONAA  >60.0 08/31/2019     Lab Results   Component Value Date    ALT 12 08/31/2019    AST 13 08/31/2019    ALKPHOS 72 08/31/2019    BILITOT 0.4 08/31/2019       Assessment:  Sharlene Smith is a 54 y.o. female with the following diagnoses based on history, exam, and imaging:    Problem List Items Addressed This Visit     Chronic pain    Chronic bilateral low back pain without sciatica - Primary    Relevant Orders    Place in Outpatient    Vital signs    Verify informed consent    Notify physician     Notify physician     Notify physician (specify)    Diet NPO    Reason for no VTE Prophylaxis    FL Fluoro for Pain Management Ata          7/31/19 - there may be some sort of impingement in her hip along with degenerative changes around the acetabular rim seen on plain film of the pelvis.  I was able to reproduce her pain with log roll as well as the BAYRON maneuver; however, the BAYRON maneuver did not produce pain in the typical location of the low back near the sacroiliac joint but rather produced pain in the anterior portion of the hip radiating toward the groin.  Given the lack of relief she experienced from the sacroiliac joint injection she received from an outside pain provider, I would not recommend repeating that injection; however, I would recommend a bilateral acetabulofemoral joint injection for diagnostic and therapeutic purposes.  Patient was educated on the diagnostic nature of this injection.  If she experiences significant relief from it, I may have a discussion with her orthopedic surgeon about possible treatment options or additional imaging.    : Reviewed and consistent with medication use as prescribed.    Treatment Plan:   Procedures:  Bilateral acetabulofemoral steroid joint injection with fluoroscopic guidance  PT/OT/HEP:  No additional physical therapy advised at this time. Patient was advised to consider aquatic therapy toward minimizing joint impact while still maintaining cardiovascular  function.  Medications: No changes recommended at this time.  Labs: reviewed and medications are appropriately dosed for current hepatorenal function.  Imaging: No additional recommended at this time.    Follow Up: RTC 4-8 weeks    Chauncey Clay Jr, MD  Interventional Pain Medicine / Anesthesiology    Disclaimer: This note was partly generated using dictation software which may occasionally result in transcription errors.

## 2019-10-14 ENCOUNTER — OFFICE VISIT (OUTPATIENT)
Dept: PRIMARY CARE CLINIC | Facility: CLINIC | Age: 54
End: 2019-10-14
Payer: OTHER GOVERNMENT

## 2019-10-14 VITALS
TEMPERATURE: 98 F | WEIGHT: 172.19 LBS | HEART RATE: 72 BPM | DIASTOLIC BLOOD PRESSURE: 84 MMHG | SYSTOLIC BLOOD PRESSURE: 120 MMHG | HEIGHT: 64 IN | BODY MASS INDEX: 29.4 KG/M2

## 2019-10-14 DIAGNOSIS — H66.002 NON-RECURRENT ACUTE SUPPURATIVE OTITIS MEDIA OF LEFT EAR WITHOUT SPONTANEOUS RUPTURE OF TYMPANIC MEMBRANE: Primary | ICD-10-CM

## 2019-10-14 DIAGNOSIS — J30.2 SEASONAL ALLERGIES: ICD-10-CM

## 2019-10-14 PROCEDURE — 99999 PR PBB SHADOW E&M-EST. PATIENT-LVL IV: CPT | Mod: PBBFAC,,, | Performed by: NURSE PRACTITIONER

## 2019-10-14 PROCEDURE — 99213 PR OFFICE/OUTPT VISIT, EST, LEVL III, 20-29 MIN: ICD-10-PCS | Mod: S$PBB,,, | Performed by: NURSE PRACTITIONER

## 2019-10-14 PROCEDURE — 99213 OFFICE O/P EST LOW 20 MIN: CPT | Mod: S$PBB,,, | Performed by: NURSE PRACTITIONER

## 2019-10-14 PROCEDURE — 99214 OFFICE O/P EST MOD 30 MIN: CPT | Mod: PBBFAC,PN | Performed by: NURSE PRACTITIONER

## 2019-10-14 PROCEDURE — 99999 PR PBB SHADOW E&M-EST. PATIENT-LVL IV: ICD-10-PCS | Mod: PBBFAC,,, | Performed by: NURSE PRACTITIONER

## 2019-10-14 RX ORDER — FLUTICASONE PROPIONATE 50 MCG
1 SPRAY, SUSPENSION (ML) NASAL DAILY
Qty: 16 G | Refills: 0 | Status: SHIPPED | OUTPATIENT
Start: 2019-10-14 | End: 2020-03-16 | Stop reason: SDUPTHER

## 2019-10-14 RX ORDER — AMOXICILLIN 875 MG/1
875 TABLET, FILM COATED ORAL 2 TIMES DAILY WITH MEALS
Qty: 14 TABLET | Refills: 0 | Status: SHIPPED | OUTPATIENT
Start: 2019-10-14 | End: 2019-10-21

## 2019-10-14 NOTE — PROGRESS NOTES
Subjective:       Patient ID: Sharlene Smith is a 54 y.o. female.    Chief Complaint: Otalgia (left) and Headache    Ms. Smith presents today for left ear pain. She has severe allergies and sometimes they cause her ear problems. She flew home from [facility] early last week and has ear pain since that has been worsening.     Review of Systems   Constitutional: Negative for fever.   HENT: Negative for facial swelling.    Eyes: Negative for visual disturbance.   Respiratory: Negative for shortness of breath.    Cardiovascular: Negative for chest pain.   Gastrointestinal: Negative for diarrhea, nausea and vomiting.   Genitourinary: Negative for dysuria.   Musculoskeletal: Negative for gait problem.   Skin: Negative for rash.   Neurological: Negative for headaches.   Psychiatric/Behavioral: Negative for confusion.       Objective:      Physical Exam   Constitutional: She is oriented to person, place, and time. She appears well-developed and well-nourished. No distress.   HENT:   Head: Normocephalic and atraumatic.   Right Ear: Tympanic membrane and ear canal normal.   Left Ear: Tympanic membrane is erythematous and bulging. A middle ear effusion is present.   Nose: Mucosal edema and rhinorrhea present.   Eyes: No scleral icterus.   Cardiovascular: Normal rate, regular rhythm and normal heart sounds.   Pulmonary/Chest: Effort normal and breath sounds normal. No stridor. No respiratory distress. She has no wheezes. She has no rales.   Lymphadenopathy:     She has cervical adenopathy.   Neurological: She is alert and oriented to person, place, and time.   Skin: Skin is warm and dry. She is not diaphoretic.   Psychiatric: She has a normal mood and affect. Her behavior is normal.   Nursing note and vitals reviewed.      Assessment:       1. Non-recurrent acute suppurative otitis media of left ear without spontaneous rupture of tympanic membrane    2. Seasonal allergies        Plan:   1. Non-recurrent acute suppurative  otitis media of left ear without spontaneous rupture of tympanic membrane  - amoxicillin (AMOXIL) 875 MG tablet; Take 1 tablet (875 mg total) by mouth 2 (two) times daily with meals. for 7 days  Dispense: 14 tablet; Refill: 0    2. Seasonal allergies  - fluticasone propionate (FLONASE) 50 mcg/actuation nasal spray; 1 spray (50 mcg total) by Each Nostril route once daily.  Dispense: 16 g; Refill: 0      Pt has been given instructions populated from [employer] database and has verbalized understanding of the after visit summary and information contained wherein.    Follow up with a primary care provider. May go to ER for acute shortness of breath, lightheadedness, fever, or any other emergent complaints or changes in condition.

## 2019-10-22 ENCOUNTER — OFFICE VISIT (OUTPATIENT)
Dept: PRIMARY CARE CLINIC | Facility: CLINIC | Age: 54
End: 2019-10-22
Payer: OTHER GOVERNMENT

## 2019-10-22 VITALS
TEMPERATURE: 98 F | HEART RATE: 76 BPM | SYSTOLIC BLOOD PRESSURE: 118 MMHG | BODY MASS INDEX: 29.24 KG/M2 | DIASTOLIC BLOOD PRESSURE: 76 MMHG | WEIGHT: 171.31 LBS | HEIGHT: 64 IN

## 2019-10-22 DIAGNOSIS — H66.001 NON-RECURRENT ACUTE SUPPURATIVE OTITIS MEDIA OF RIGHT EAR WITHOUT SPONTANEOUS RUPTURE OF TYMPANIC MEMBRANE: Primary | ICD-10-CM

## 2019-10-22 PROCEDURE — 99999 PR PBB SHADOW E&M-EST. PATIENT-LVL III: ICD-10-PCS | Mod: PBBFAC,,, | Performed by: FAMILY MEDICINE

## 2019-10-22 PROCEDURE — 99999 PR PBB SHADOW E&M-EST. PATIENT-LVL III: CPT | Mod: PBBFAC,,, | Performed by: FAMILY MEDICINE

## 2019-10-22 PROCEDURE — 99213 OFFICE O/P EST LOW 20 MIN: CPT | Mod: S$PBB,,, | Performed by: FAMILY MEDICINE

## 2019-10-22 PROCEDURE — 99213 OFFICE O/P EST LOW 20 MIN: CPT | Mod: PBBFAC,PN | Performed by: FAMILY MEDICINE

## 2019-10-22 PROCEDURE — 99213 PR OFFICE/OUTPT VISIT, EST, LEVL III, 20-29 MIN: ICD-10-PCS | Mod: S$PBB,,, | Performed by: FAMILY MEDICINE

## 2019-10-22 RX ORDER — AZITHROMYCIN 250 MG/1
TABLET, FILM COATED ORAL
Qty: 6 TABLET | Refills: 0 | Status: SHIPPED | OUTPATIENT
Start: 2019-10-22 | End: 2020-09-11

## 2019-10-22 NOTE — PROGRESS NOTES
Subjective:       Patient ID: Sharlene Smith is a 54 y.o. female.    Chief Complaint: Otalgia (bilateral, worst in right ear)    55 yo recently seen for left otitis media recently finished course of Amoxicillin.  She flew on a trip over past week and noted decreased hearing followed by right ear pain x 2 days.  No fever or chills, sore throat or cough    Review of Systems   Constitutional: Negative for chills and fever.   HENT: Positive for ear pain. Negative for congestion, ear discharge, facial swelling, sinus pressure, sinus pain and sore throat.    Respiratory: Negative for cough.        Objective:      Physical Exam   Constitutional: She appears well-developed and well-nourished. No distress.   HENT:   Right Ear: Tympanic membrane is erythematous and retracted.   Left Ear: Tympanic membrane is retracted.   Neck: Normal range of motion. No JVD present. No thyromegaly present.   Pulmonary/Chest: Effort normal and breath sounds normal. She has no wheezes. She has no rales.   Lymphadenopathy:     She has no cervical adenopathy.       Assessment:       Right otitis media  Plan:        1.  Continue Flonase  2.  Zpak  3.  F/u 4-5 days if not improved

## 2019-11-01 ENCOUNTER — OFFICE VISIT (OUTPATIENT)
Dept: PRIMARY CARE CLINIC | Facility: CLINIC | Age: 54
End: 2019-11-01
Payer: OTHER GOVERNMENT

## 2019-11-01 VITALS
BODY MASS INDEX: 29.84 KG/M2 | TEMPERATURE: 98 F | HEART RATE: 60 BPM | SYSTOLIC BLOOD PRESSURE: 136 MMHG | HEIGHT: 64 IN | DIASTOLIC BLOOD PRESSURE: 82 MMHG | OXYGEN SATURATION: 100 % | WEIGHT: 174.81 LBS

## 2019-11-01 DIAGNOSIS — B37.9 YEAST INFECTION: Primary | ICD-10-CM

## 2019-11-01 DIAGNOSIS — H66.007 RECURRENT ACUTE SUPPURATIVE OTITIS MEDIA WITHOUT SPONTANEOUS RUPTURE OF TYMPANIC MEMBRANE, UNSPECIFIED LATERALITY: ICD-10-CM

## 2019-11-01 PROCEDURE — 99999 PR PBB SHADOW E&M-EST. PATIENT-LVL III: ICD-10-PCS | Mod: PBBFAC,,, | Performed by: FAMILY MEDICINE

## 2019-11-01 PROCEDURE — 99213 OFFICE O/P EST LOW 20 MIN: CPT | Mod: PBBFAC,PN,25 | Performed by: FAMILY MEDICINE

## 2019-11-01 PROCEDURE — 99214 PR OFFICE/OUTPT VISIT, EST, LEVL IV, 30-39 MIN: ICD-10-PCS | Mod: S$PBB,,, | Performed by: FAMILY MEDICINE

## 2019-11-01 PROCEDURE — 99214 OFFICE O/P EST MOD 30 MIN: CPT | Mod: S$PBB,,, | Performed by: FAMILY MEDICINE

## 2019-11-01 PROCEDURE — 99999 PR PBB SHADOW E&M-EST. PATIENT-LVL III: CPT | Mod: PBBFAC,,, | Performed by: FAMILY MEDICINE

## 2019-11-01 PROCEDURE — 96372 THER/PROPH/DIAG INJ SC/IM: CPT | Mod: PBBFAC,PN

## 2019-11-01 RX ORDER — FLUCONAZOLE 150 MG/1
150 TABLET ORAL DAILY
Qty: 7 TABLET | Refills: 3 | Status: SHIPPED | OUTPATIENT
Start: 2019-11-01 | End: 2019-11-02

## 2019-11-01 RX ORDER — CEFTRIAXONE 250 MG/1
250 INJECTION, POWDER, FOR SOLUTION INTRAMUSCULAR; INTRAVENOUS ONCE
Qty: 250 MG | Refills: 0 | Status: SHIPPED | OUTPATIENT
Start: 2019-11-01 | End: 2019-11-01

## 2019-11-01 RX ORDER — CEFTRIAXONE 250 MG/1
250 INJECTION, POWDER, FOR SOLUTION INTRAMUSCULAR; INTRAVENOUS ONCE
Status: COMPLETED | OUTPATIENT
Start: 2019-11-01 | End: 2019-11-01

## 2019-11-01 RX ORDER — FLUCONAZOLE 150 MG/1
150 TABLET ORAL DAILY
Qty: 7 TABLET | Refills: 2 | Status: SHIPPED | OUTPATIENT
Start: 2019-11-01 | End: 2019-11-02

## 2019-11-01 RX ORDER — LIDOCAINE HYDROCHLORIDE 10 MG/ML
1 INJECTION INFILTRATION; PERINEURAL
Status: COMPLETED | OUTPATIENT
Start: 2019-11-01 | End: 2019-11-01

## 2019-11-01 RX ADMIN — LIDOCAINE HYDROCHLORIDE 1 ML: 10 INJECTION INFILTRATION; PERINEURAL at 04:11

## 2019-11-01 RX ADMIN — CEFTRIAXONE SODIUM 250 MG: 250 INJECTION, POWDER, FOR SOLUTION INTRAMUSCULAR; INTRAVENOUS at 04:11

## 2019-11-04 ENCOUNTER — TELEPHONE (OUTPATIENT)
Dept: PRIMARY CARE CLINIC | Facility: CLINIC | Age: 54
End: 2019-11-04

## 2019-11-04 NOTE — PROGRESS NOTES
Subjective:       Patient ID: Sharlene Smith is a 54 y.o. female.    Chief Complaint: Vaginitis, yeast after 2 rounds of antibiotics  left ear pain pt has had two courses and antibiotics .  Still with decreased hearing  HPI  Review of Systems   Constitutional: Positive for fatigue.   HENT: Positive for ear pain and hearing loss.    Eyes: Negative.    Respiratory: Negative.    Cardiovascular: Negative.    Endocrine: Negative.    Genitourinary: Positive for vaginal discharge.   Musculoskeletal: Negative.    Allergic/Immunologic: Negative.    Neurological: Negative.    Hematological: Negative.    Psychiatric/Behavioral: Negative.        Objective:      Physical Exam   Constitutional: She is oriented to person, place, and time. She appears well-developed and well-nourished. She appears distressed.   HENT:   Head: Normocephalic and atraumatic.   Right Ear: Hearing, tympanic membrane, external ear and ear canal normal. Tympanic membrane is not bulging.   Left Ear: External ear normal. There is swelling and tenderness. Tympanic membrane is injected and erythematous. A middle ear effusion is present. Decreased hearing is noted.   Nose: Nose normal.   Mouth/Throat: Oropharynx is clear and moist.   Eyes: Pupils are equal, round, and reactive to light. Conjunctivae and EOM are normal.   Neck: Normal range of motion. Neck supple. No JVD present. No thyromegaly present.   Cardiovascular: Normal rate, regular rhythm, normal heart sounds and intact distal pulses.   Pulmonary/Chest: Effort normal and breath sounds normal. No respiratory distress.   Abdominal: Soft. Bowel sounds are normal. She exhibits no distension. There is no tenderness. There is no guarding.   Neurological: She is alert and oriented to person, place, and time. She displays normal reflexes. No cranial nerve deficit or sensory deficit. She exhibits normal muscle tone. Coordination normal.   Skin: Skin is warm and dry. Capillary refill takes less than 2  seconds. No rash noted. She is not diaphoretic. No erythema. No pallor.   Psychiatric: She has a normal mood and affect. Her behavior is normal. Judgment and thought content normal.   Nursing note and vitals reviewed.      Assessment:       1. Yeast infection    2. Recurrent acute suppurative otitis media without spontaneous rupture of tympanic membrane, unspecified laterality        Plan:     Sharlene was seen today for vaginitis.    Diagnoses and all orders for this visit:    Yeast infection  -     fluconazole (DIFLUCAN) 150 MG Tab; Take 1 tablet (150 mg total) by mouth once daily. for 1 day  -     fluconazole (DIFLUCAN) 150 MG Tab; Take 1 tablet (150 mg total) by mouth once daily. for 1 day  -     lidocaine HCL 10 mg/ml (1%) injection 1 mL    Recurrent acute suppurative otitis media without spontaneous rupture of tympanic membrane, unspecified laterality  -     cefTRIAXone (ROCEPHIN) 250 mg injection; Inject 250 mg into the muscle once. for 1 dose  -     cefTRIAXone injection 250 mg

## 2019-11-04 NOTE — TELEPHONE ENCOUNTER
Please see message and advise     ----- Message from Anne Kim sent at 11/4/2019  9:37 AM CST -----  [facility]   [facility] is calling to clarify an RX.  RX name:  fluconazole (DIFLUCAN) 150 MG Tab  What do they need to clarify:  directions  Comments:

## 2019-11-12 DIAGNOSIS — Z30.011 OCP (ORAL CONTRACEPTIVE PILLS) INITIATION: ICD-10-CM

## 2019-11-12 RX ORDER — LEVONORGESTREL/ETHINYL ESTRADIOL AND ETHINYL ESTRADIOL 100-20(84)
KIT ORAL
Qty: 91 TABLET | Refills: 4 | Status: SHIPPED | OUTPATIENT
Start: 2019-11-12 | End: 2021-02-16 | Stop reason: SDUPTHER

## 2019-11-14 NOTE — PROGRESS NOTES
[facility] New Patient Evaluation    Referred by: Mara Chang MD   Reason for referral: Arthralgia of hip, unspecified laterality     CC:   Chief Complaint   Patient presents with    Hip Pain     bilateral      Last 3 PDI Scores 11/15/2019 7/31/2019   Pain Disability Index (PDI) 42 42     Interval Update: 11/15/19 - Pt returns for follow up S/PBILATERAL Intra-articular Acetabulofemoral (Hip) Injection     with 0 % relief.    HPI:   Sharlene Smith is a 54 y.o. female who complains of chronic, bilateral hip pain refractory to oral medications, physical therapy, and home exercise.  She reports a history of sacroiliac joint injection that failed to provide her relief stating that her pain is not coming from the back of the hips, but rather from the side and the front.    Location:  Hips  Onset: 2yrs  Current Pain Score: 6/10  Daily Pain of Range: 6-7/10  Quality: Aching and Throbbing  Radiation:  Toward the groin  Worsened by: nothing in particular  Improved by: nothing    Previous Therapies:  PT/OT:  Yes, without significant relief.  Patient states physical therapy exercises worsen the pain at times.  HEP:  Yes, without relief.  Interventions:  Yes, history of sacroiliac injection without improvement in pain.  Surgery:  Patient denies history of back or hip surgery  Medications:   - NSAIDS:   - MSK Relaxants:   - TCAs:   - SNRIs:   - Topicals:   - Anticonvulsants:  - Opioids:     Current Pain Medications:  1. Naproxen, Medrol Dosepak, tizanidine, tramadol     Review of Systems:  Review of Systems   Constitutional: Negative for chills and fever.   HENT: Negative for nosebleeds.    Eyes: Negative for blurred vision and pain.   Respiratory: Negative for hemoptysis.    Cardiovascular: Negative for chest pain and palpitations.   Gastrointestinal: Negative for heartburn, nausea and vomiting.   Genitourinary: Negative for dysuria and hematuria.   Musculoskeletal: Positive for back pain, joint  pain and neck pain. Negative for falls and myalgias.   Skin: Negative for rash.   Neurological: Negative for seizures and loss of consciousness.   Endo/Heme/Allergies: Does not bruise/bleed easily.   Psychiatric/Behavioral: Negative for hallucinations.       History:    Current Outpatient Medications:     arm brace Misc, Wear wrist splint daily, including while sleeping. Remove to shower, Disp: , Rfl:     atorvastatin (LIPITOR) 20 MG tablet, TAKE 1 TABLET EVERY EVENING, Disp: 90 tablet, Rfl: 1    azithromycin (ZITHROMAX Z-JEANINE) 250 MG tablet, As directed, Disp: 6 tablet, Rfl: 0    CAMRESE LO 0.10 mg-20 mcg (84)/10 mcg (7) 3MPk, TAKE 1 TABLET DAILY, Disp: 91 tablet, Rfl: 4    diltiaZEM (CARDIZEM CD) 240 MG 24 hr capsule, TAKE 1 CAPSULE DAILY, Disp: 90 capsule, Rfl: 3    fluticasone propionate (FLONASE) 50 mcg/actuation nasal spray, 1 spray (50 mcg total) by Each Nostril route once daily., Disp: 16 g, Rfl: 0    mesalamine (APRISO) 0.375 gram Cp24, Take 4 capsules (1.5 g total) by mouth once daily. (Patient taking differently: Take 0.75 g by mouth once daily. ), Disp: 120 capsule, Rfl: 0    naproxen (NAPROSYN) 500 MG tablet, TAKE 1 TABLET(500 MG) BY MOUTH TWICE DAILY WITH MEALS, Disp: 180 tablet, Rfl: 1    omeprazole (PRILOSEC) 40 MG capsule, , Disp: , Rfl:     tiZANidine (ZANAFLEX) 4 MG tablet, TAKE 1/2 TO 1 TABLET BY MOUTH EVERY 8 HOURS AS NEEDED, Disp: 270 tablet, Rfl: 0    traMADol (ULTRAM) 50 mg tablet, Take 1 tablet (50 mg total) by mouth every 6 (six) hours as needed., Disp: 120 tablet, Rfl: 4    aspirin 81 MG Chew, Take 81 mg by mouth once daily., Disp: , Rfl:   No current facility-administered medications for this visit.     Facility-Administered Medications Ordered in Other Visits:     0.9%  NaCl infusion, , Intravenous, Continuous, Netta Amaro NP    Past Medical History:   Diagnosis Date    Abnormal Pap smear of vagina yrs ago    possible BX?    Allergy     Arthritis     Asthma      Hyperlipidemia     Supraventricular tachycardia     SVT (supraventricular tachycardia)     Tachycardia     Ulcerative colitis        Past Surgical History:   Procedure Laterality Date    ABLATION N/A 10/8/2018    Procedure: ABLATION;  Surgeon: Shabbir Clark MD;  Location: [facility] CATH LAB;  Service: Cardiology;  Laterality: N/A;  SVT,SVT-AVNRT RFA,KRISTI,MAC,FAS,3PREP     SECTION, CLASSIC      x 2    COLONOSCOPY N/A 2015    Procedure: COLONOSCOPY;  Surgeon: Petr Miller MD;  [facility]: [facility] ([facility]);  Service: Endoscopy;  Laterality: N/A;    COLONOSCOPY N/A 10/13/2017    Procedure: COLONOSCOPY;  Surgeon: Petr Miller MD;  Location: [facility] ([facility]);  Service: Endoscopy;  Laterality: N/A;    INJECTION OF JOINT Bilateral 2019    Procedure: Injection, Joint,  Hip--Bilateral;  Surgeon: Chauncey Clay Jr., MD;  Location: [facility] PAIN MGT;  Service: Pain Management;  Laterality: Bilateral;       Family History   Problem Relation Age of Onset    Hyperlipidemia Mother     Cancer Mother     Cataracts Mother     Allergies Mother     Heart attack Father     Diabetes Father     Heart attack Paternal Grandmother     Glaucoma Maternal Grandfather     Heart disease Neg Hx     Hypertension Neg Hx     Colon cancer Neg Hx     Esophageal cancer Neg Hx     Amblyopia Neg Hx     Blindness Neg Hx     Macular degeneration Neg Hx     Retinal detachment Neg Hx     Strabismus Neg Hx     Stroke Neg Hx     Thyroid disease Neg Hx     Angioedema Neg Hx     Asthma Neg Hx     Atopy Neg Hx     Eczema Neg Hx     Immunodeficiency Neg Hx     Rhinitis Neg Hx     Urticaria Neg Hx        Social History     Socioeconomic History    Marital status:      Spouse name: Not on file    Number of children: Not on file    Years of education: Not on file    Highest education level: Not on file   Occupational History    Not on file   Social Needs    Financial resource strain: Not  on file    Food insecurity:     Worry: Not on file     Inability: Not on file    Transportation needs:     Medical: Not on file     Non-medical: Not on file   Tobacco Use    Smoking status: Former Smoker     Types: Cigarettes     Last attempt to quit: 1999     Years since quittin.7    Smokeless tobacco: Never Used   Substance and Sexual Activity    Alcohol use: Yes     Alcohol/week: 7.0 standard drinks     Types: 4 Cans of beer, 3 Shots of liquor per week     Comment: Occasionally    Drug use: No    Sexual activity: Yes     Partners: Male   Lifestyle    Physical activity:     Days per week: Not on file     Minutes per session: Not on file    Stress: Not on file   Relationships    Social connections:     Talks on phone: Not on file     Gets together: Not on file     Attends Hinduism service: Not on file     Active member of club or organization: Not on file     Attends meetings of clubs or organizations: Not on file     Relationship status: Not on file   Other Topics Concern    Not on file   Social History Narrative    Works for [employer]       Review of patient's allergies indicates:  No Known Allergies    Physical Exam:  Vitals:    11/15/19 1309   BP: 123/70   Pulse: 84   Weight: 78.9 kg (174 lb)   PainSc:   6     General    Nursing note and vitals reviewed.  Constitutional: She is oriented to person, place, and time. She appears well-developed and well-nourished. No distress.   HENT:   Head: Normocephalic and atraumatic.   Nose: Nose normal.   Eyes: Conjunctivae and EOM are normal. Pupils are equal, round, and reactive to light. Right eye exhibits no discharge. Left eye exhibits no discharge. No scleral icterus.   Neck: No JVD present.   Cardiovascular: Intact distal pulses.    Pulmonary/Chest: Effort normal. No respiratory distress.   Abdominal: She exhibits no distension.   Neurological: She is alert and oriented to person, place, and time. Coordination normal.   Psychiatric: She has a  normal mood and affect. Her behavior is normal. Judgment and thought content normal.     General Musculoskeletal Exam   Gait: antalgic         Right Hip Exam   Right hip exam is normal.     Tenderness   The patient tender to palpation of the SI joint.    Tests   Pain w/ forced internal rotation (BAYRON): present  Log Roll: positive  Left Hip Exam   Left hip exam is normal.    Tenderness   The patient tender to palpation of the SI joint.    Tests   Pain w/ forced internal rotation (BAYRON): present  Log Roll: positive      Back (L-Spine & T-Spine) / Neck (C-Spine) Exam     Tenderness Right paramedian tenderness of the Sacrum. Left paramedian tenderness of the Sacrum.         Imaging:  X-Ray Pelvis Routine AP   Order: [id number]   Status:  Final result   Visible to patient:  Yes (Patient Portal) Next appt:  08/30/2019 at 07:00 AM in Lab (LAB, [facility]) Dx:  Sacroiliitis   Details     Reading Physician Reading Date Result Priority   Kevyn Nicole MD 2/18/2019       Narrative     EXAMINATION:  XR PELVIS ROUTINE AP    CLINICAL HISTORY:  Sacroiliitis, not elsewhere classified    TECHNIQUE:  AP view of the pelvis was performed.    COMPARISON:  Two thousand seventeen    FINDINGS:  Sacral iliac joints show no significant interval change or sacroiliitis.  Mild DJD changes of hip joints.  Tiny bone island density right proximal femoral shaft.  Possible healed posttraumatic change left inferior medial symphysis pubis with adjacent DJD changes, stable.      Impression       No sacroiliitis change.      Electronically signed by: Kevyn Nicole MD  Date: 02/18/2019  Time: 11:19           MRI Lower Extremity Joint WO Cont Left   Order: [id number]   Status:  Final result   Visible to patient:  Yes (Patient Portal) Next appt:  08/30/2019 at 07:00 AM in Lab (LAB, [facility]) Dx:  Arthralgia of hip, unspecified latera...   Details     Reading Physician Reading Date Result Priority   Baldo Wooten MD 4/9/2017        Narrative     Multiplanar, multisequence imaging of the pelvis and LEFT hip without the use of contrast.    Comparison: Plain film examination 4/6/17     Results:     Osseous Structures: Normal.  No fracture, avascular necrosis or other abnormality.No marrow signal abnormality to suggest bone marrow replacement process.     Hip and Sacroiliac joints: Normal, without evidence of joint effusion or other abnormalities.No gross abnormalities of the acetabular jagdeep are shown.  MR arthrogram would be necessary for complete evaluation of the jagdeep, if clinically indicated.    Bursae: No trochanteric or iliopsoas bursitis.    Soft Tissues: Muscles and tendons of the pelvis and both hips show no atrophy, edema, mass, tears or other abnormalities.      Impression         *  No evident abnormality.       Electronically signed by: Dr. Baldo Wooten MD  Date: 04/09/17  Time: 15:39                   Labs:  BMP  Lab Results   Component Value Date     08/31/2019    K 4.0 08/31/2019     08/31/2019    CO2 23 08/31/2019    BUN 11 08/31/2019    CREATININE 0.8 08/31/2019    CALCIUM 9.3 08/31/2019    ANIONGAP 10 08/31/2019    ESTGFRAFRICA >60.0 08/31/2019    EGFRNONAA >60.0 08/31/2019     Lab Results   Component Value Date    ALT 12 08/31/2019    AST 13 08/31/2019    ALKPHOS 72 08/31/2019    BILITOT 0.4 08/31/2019       Assessment:  Sharlene Smith is a 54 y.o. female with the following diagnoses based on history, exam, and imaging:    Problem List Items Addressed This Visit     Chronic bilateral low back pain without sciatica - Primary    Relevant Orders    Ambulatory Referral to Physical/Occupational Therapy    Ambulatory consult to Rheumatology    Chronic hip pain, bilateral    Relevant Orders    Ambulatory Referral to Physical/Occupational Therapy    Ambulatory consult to Rheumatology    Diffuse myofascial pain syndrome    Relevant Orders    Ambulatory Referral to Physical/Occupational Therapy    Ambulatory  consult to Rheumatology    Myofascial muscle pain    Relevant Orders    Ambulatory Referral to Physical/Occupational Therapy    Ambulatory consult to Rheumatology          7/31/19 - there may be some sort of impingement in her hip along with degenerative changes around the acetabular rim seen on plain film of the pelvis.  I was able to reproduce her pain with log roll as well as the BAYRON maneuver; however, the BAYRON maneuver did not produce pain in the typical location of the low back near the sacroiliac joint but rather produced pain in the anterior portion of the hip radiating toward the groin.  Given the lack of relief she experienced from the sacroiliac joint injection she received from an outside pain provider, I would not recommend repeating that injection; however, I would recommend a bilateral acetabulofemoral joint injection for diagnostic and therapeutic purposes.  Patient was educated on the diagnostic nature of this injection.  If she experiences significant relief from it, I may have a discussion with her orthopedic surgeon about possible treatment options or additional imaging.    11/15/19 - This is a 54-year-old female patient with him I have been focused on her chronic bilateral hip pain.  She received bilateral sacroiliac joint injections several years ago with no improvement symptoms and has now received bilateral acetabulofemoral hip injections from me, again with no relief.  Upon further chart review, I see that she has had numerous myofascial and joint related complaints apparently without any significant underlying pathology on imaging.  This is coupled with migraine headaches and ulcerative colitis.  I know that she has been previously evaluated for rheumatological disorders; however, I am concerned that she may be suffering from fibromyalgia.  I understand this to be a diagnosis of exclusion, which is why I am referring her to rheumatology for assistance with making that diagnosis and for  treatment.  I advised the patient against any additional injections, as she does not appear to have receive significant benefit from any of them.  During our discussion, the patient became visibly frustrated and stated that she is not making any of this up.  I responded that I believe she is experiencing pain but that I do not see any evidence of degenerative joint disease as the underlying cause.  I went on to say that a referral to rheumatology for a connective tissue disorder is most appropriate at this time.  Lastly, I recommended that she return to physical therapy.  Upon reviewing her physical therapy notes, I see that she did not complete physical therapy and that she did not meet any of the goals of physical therapy during the few sessions that she attended.  I strongly recommended that she return to physical therapy and remain in the program until its completion.  She feels that she can't perform the exercises at home.  I pointed out that physical therapists are professionals that can guide the therapeutic regimen to achieve certain goals and that she does not have appropriate training to make those changes in the therapeutic approach to maximize outcome.  I am hopeful that she will rely on the expertise of a physical therapist to help her achieve her goals of decreasing her pain, which appears to be primarily myofascial in my opinion.    New referral to external PT printed for patient today and signed.  New referral to Rheumatology placed today.    Follow Up: as needed    Chauncey Clay Jr, MD  Interventional Pain Medicine / Anesthesiology    Disclaimer: This note was partly generated using dictation software which may occasionally result in transcription errors.

## 2019-11-15 ENCOUNTER — PATIENT MESSAGE (OUTPATIENT)
Dept: PRIMARY CARE CLINIC | Facility: CLINIC | Age: 54
End: 2019-11-15

## 2019-11-15 ENCOUNTER — OFFICE VISIT (OUTPATIENT)
Dept: PAIN MEDICINE | Facility: CLINIC | Age: 54
End: 2019-11-15
Payer: OTHER GOVERNMENT

## 2019-11-15 VITALS
HEART RATE: 84 BPM | DIASTOLIC BLOOD PRESSURE: 70 MMHG | SYSTOLIC BLOOD PRESSURE: 123 MMHG | WEIGHT: 174 LBS | BODY MASS INDEX: 29.87 KG/M2

## 2019-11-15 DIAGNOSIS — M25.551 CHRONIC HIP PAIN, BILATERAL: ICD-10-CM

## 2019-11-15 DIAGNOSIS — M54.50 CHRONIC BILATERAL LOW BACK PAIN WITHOUT SCIATICA: Primary | ICD-10-CM

## 2019-11-15 DIAGNOSIS — M79.18 DIFFUSE MYOFASCIAL PAIN SYNDROME: ICD-10-CM

## 2019-11-15 DIAGNOSIS — M25.552 CHRONIC HIP PAIN, BILATERAL: ICD-10-CM

## 2019-11-15 DIAGNOSIS — G89.29 CHRONIC HIP PAIN, BILATERAL: ICD-10-CM

## 2019-11-15 DIAGNOSIS — G89.29 CHRONIC BILATERAL LOW BACK PAIN WITHOUT SCIATICA: Primary | ICD-10-CM

## 2019-11-15 DIAGNOSIS — M79.18 MYOFASCIAL MUSCLE PAIN: ICD-10-CM

## 2019-11-15 DIAGNOSIS — M25.559 ARTHRALGIA OF HIP, UNSPECIFIED LATERALITY: ICD-10-CM

## 2019-11-15 PROCEDURE — 99214 OFFICE O/P EST MOD 30 MIN: CPT | Mod: S$PBB,,, | Performed by: PAIN MEDICINE

## 2019-11-15 PROCEDURE — 99213 OFFICE O/P EST LOW 20 MIN: CPT | Mod: PBBFAC,PN | Performed by: PAIN MEDICINE

## 2019-11-15 PROCEDURE — 99999 PR PBB SHADOW E&M-EST. PATIENT-LVL III: CPT | Mod: PBBFAC,,, | Performed by: PAIN MEDICINE

## 2019-11-15 PROCEDURE — 99214 PR OFFICE/OUTPT VISIT, EST, LEVL IV, 30-39 MIN: ICD-10-PCS | Mod: S$PBB,,, | Performed by: PAIN MEDICINE

## 2019-11-15 PROCEDURE — 99999 PR PBB SHADOW E&M-EST. PATIENT-LVL III: ICD-10-PCS | Mod: PBBFAC,,, | Performed by: PAIN MEDICINE

## 2019-11-15 RX ORDER — TRAMADOL HYDROCHLORIDE 50 MG/1
50 TABLET ORAL EVERY 6 HOURS PRN
Qty: 120 TABLET | Refills: 4 | Status: CANCELLED | OUTPATIENT
Start: 2019-11-15 | End: 2020-11-14

## 2019-11-15 NOTE — TELEPHONE ENCOUNTER
Sent message advising pt per provider;Not due for a refill until 12-2-19.  Pt got 120 pills with 4 refills on 7-2-19

## 2019-11-20 ENCOUNTER — TELEPHONE (OUTPATIENT)
Dept: ADMINISTRATIVE | Facility: OTHER | Age: 54
End: 2019-11-20

## 2019-11-20 NOTE — TELEPHONE ENCOUNTER
Left voice message for patient to return call to schedule appointment from referral to Rheumatology department  Corinna BERGER [phone]

## 2019-11-22 ENCOUNTER — OFFICE VISIT (OUTPATIENT)
Dept: PRIMARY CARE CLINIC | Facility: CLINIC | Age: 54
End: 2019-11-22
Payer: OTHER GOVERNMENT

## 2019-11-22 VITALS
HEART RATE: 116 BPM | HEIGHT: 64 IN | BODY MASS INDEX: 29.06 KG/M2 | WEIGHT: 170.19 LBS | TEMPERATURE: 98 F | OXYGEN SATURATION: 96 % | DIASTOLIC BLOOD PRESSURE: 92 MMHG | SYSTOLIC BLOOD PRESSURE: 138 MMHG

## 2019-11-22 DIAGNOSIS — M25.559 ARTHRALGIA OF HIP, UNSPECIFIED LATERALITY: ICD-10-CM

## 2019-11-22 DIAGNOSIS — R00.0 TACHYCARDIA: ICD-10-CM

## 2019-11-22 DIAGNOSIS — G89.29 OTHER CHRONIC PAIN: ICD-10-CM

## 2019-11-22 DIAGNOSIS — G89.29 OTHER CHRONIC PAIN: Primary | ICD-10-CM

## 2019-11-22 PROCEDURE — 99999 PR PBB SHADOW E&M-EST. PATIENT-LVL III: CPT | Mod: PBBFAC,,, | Performed by: FAMILY MEDICINE

## 2019-11-22 PROCEDURE — 99214 OFFICE O/P EST MOD 30 MIN: CPT | Mod: S$PBB,,, | Performed by: FAMILY MEDICINE

## 2019-11-22 PROCEDURE — 99213 OFFICE O/P EST LOW 20 MIN: CPT | Mod: PBBFAC,25,PN | Performed by: FAMILY MEDICINE

## 2019-11-22 PROCEDURE — 99214 PR OFFICE/OUTPT VISIT, EST, LEVL IV, 30-39 MIN: ICD-10-PCS | Mod: S$PBB,,, | Performed by: FAMILY MEDICINE

## 2019-11-22 PROCEDURE — 93010 ELECTROCARDIOGRAM REPORT: CPT | Mod: S$PBB,,, | Performed by: INTERNAL MEDICINE

## 2019-11-22 PROCEDURE — 99999 PR PBB SHADOW E&M-EST. PATIENT-LVL III: ICD-10-PCS | Mod: PBBFAC,,, | Performed by: FAMILY MEDICINE

## 2019-11-22 PROCEDURE — 93005 ELECTROCARDIOGRAM TRACING: CPT | Mod: PBBFAC,PN | Performed by: INTERNAL MEDICINE

## 2019-11-22 PROCEDURE — 93010 EKG 12-LEAD: ICD-10-PCS | Mod: S$PBB,,, | Performed by: INTERNAL MEDICINE

## 2019-11-22 RX ORDER — GABAPENTIN 100 MG/1
100 CAPSULE ORAL 3 TIMES DAILY
Qty: 90 CAPSULE | Refills: 11 | Status: SHIPPED | OUTPATIENT
Start: 2019-11-22 | End: 2019-11-22 | Stop reason: SDUPTHER

## 2019-11-22 RX ORDER — TRAMADOL HYDROCHLORIDE 50 MG/1
50 TABLET ORAL EVERY 6 HOURS PRN
Qty: 120 TABLET | Refills: 4 | Status: SHIPPED | OUTPATIENT
Start: 2019-11-22 | End: 2020-04-16 | Stop reason: SDUPTHER

## 2019-11-22 RX ORDER — GABAPENTIN 100 MG/1
100 CAPSULE ORAL 3 TIMES DAILY
Qty: 90 CAPSULE | Refills: 11 | Status: SHIPPED | OUTPATIENT
Start: 2019-11-22 | End: 2020-09-11 | Stop reason: SDUPTHER

## 2019-11-22 RX ORDER — TIZANIDINE 4 MG/1
TABLET ORAL
Qty: 270 TABLET | Refills: 2 | Status: SHIPPED | OUTPATIENT
Start: 2019-11-22 | End: 2019-11-22 | Stop reason: SDUPTHER

## 2019-11-22 RX ORDER — TRAMADOL HYDROCHLORIDE 50 MG/1
50 TABLET ORAL EVERY 6 HOURS PRN
Qty: 120 TABLET | Refills: 4 | OUTPATIENT
Start: 2019-11-22 | End: 2020-11-21

## 2019-11-22 RX ORDER — TIZANIDINE 4 MG/1
TABLET ORAL
Qty: 270 TABLET | Refills: 2 | Status: SHIPPED | OUTPATIENT
Start: 2019-11-22 | End: 2022-06-23 | Stop reason: SDUPTHER

## 2019-11-22 NOTE — TELEPHONE ENCOUNTER
Please see message and advise    Pt is wanting medication sent to her local pharmacy and not her mail order.    ----- Message from Madi Lawrence sent at 11/22/2019  9:18 AM CST -----  Contact: Patient [phone]  RX request - refill or new RX.  Is this a refill or new RX:  Refill  RX name and strength: gabapentin (NEURONTIN) 100 MG capsule  Directions:   Is this a 30 day or 90 day RX:    Pharmacy name and phone #[facility] #[id number] [place] [facility], [place] - [address] AT [address] [phone] (Phone) [phone] (Fax)    Comment: patient just left office visit, requesting to have the three Rx's sent to local pharmacy instead of the mail order, please call to inform previous request has been stopped and sent to local location.  RX request - refill or new RX.  Is this a refill or new RX:  Refill  RX name and strength: tiZANidine (ZANAFLEX) 4 MG tablet  Directions:   Is this a 30 day or 90 day RX:    Pharmacy name and phone # [facility] #[id number] [facility], [place] - [address] AT [address] [phone] (Phone) [phone] (Fax)    RX request - refill or new RX.  Is this a refill or new RX:  Refill  RX name and strength: traMADol (ULTRAM) 50 mg tablet  Directions:   Is this a 30 day or 90 day RX:    Pharmacy name and phone # [facility] #[id number], [place] - [address] [phone] (Phone) [phone] (Fax)    Please call an advise  Thank you

## 2019-11-25 NOTE — PROGRESS NOTES
Subjective:       Patient ID: Sharlene Smith is a 54 y.o. female.    Chief Complaint: Medication Management   Patient trying to wean off of her tramadol and find something else to use for pain cannot use NSAIDs because of her history of ulcerative colitis  NSAID use the passes resulted in a significant GI bleeding.  Patient went to pain management for injection which did not seem to help for all  Patient has degenerative joint disease of both hips and has chronic pain issues especially worse with walking  HPIsee above  Review of Systems   Constitutional: Positive for fatigue.   HENT: Negative.    Eyes: Negative.    Respiratory: Negative.    Cardiovascular:        Fast heart rate mild palpitation   Gastrointestinal: Positive for abdominal distention and abdominal pain. Negative for constipation.   Endocrine: Negative.    Genitourinary: Negative.    Musculoskeletal: Positive for arthralgias, back pain and myalgias.   Allergic/Immunologic: Negative.    Neurological: Negative.    Hematological: Negative.    Psychiatric/Behavioral: Positive for agitation and sleep disturbance.       Objective:      Physical Exam   Constitutional: She is oriented to person, place, and time. She appears distressed.   HENT:   Head: Normocephalic and atraumatic.   Right Ear: External ear normal.   Left Ear: External ear normal.   Nose: Nose normal.   Mouth/Throat: Oropharynx is clear and moist.   Eyes: Pupils are equal, round, and reactive to light. Conjunctivae and EOM are normal.   Neck: Normal range of motion. Neck supple. No JVD present.   Cardiovascular: Regular rhythm, normal heart sounds and intact distal pulses.   Tachycardia   Pulmonary/Chest: Effort normal and breath sounds normal.   Abdominal: Soft. Bowel sounds are normal.   Musculoskeletal:        Right hip: She exhibits decreased range of motion and tenderness.        Left hip: She exhibits decreased range of motion and tenderness.        Lumbar back: She exhibits  decreased range of motion, tenderness, pain and spasm.   Neurological: She is alert and oriented to person, place, and time. She displays normal reflexes. No cranial nerve deficit or sensory deficit. She exhibits normal muscle tone. Coordination normal.   Skin: Skin is warm and dry. Capillary refill takes less than 2 seconds. No rash noted. No erythema. No pallor.   Psychiatric: She has a normal mood and affect. Her behavior is normal. Judgment and thought content normal.   Nursing note and vitals reviewed.      Assessment:       1. Other chronic pain    2. Tachycardia    3. Arthralgia of hip, unspecified laterality        Plan:     Sharlene was seen today for medication management.    Diagnoses and all orders for this visit:    Other chronic pain  -     gabapentin (NEURONTIN) 100 MG capsule; Take 1 capsule (100 mg total) by mouth 3 (three) times daily.  -     tiZANidine (ZANAFLEX) 4 MG tablet; TAKE 1/2 TO 1 TABLET BY MOUTH EVERY 8 HOURS AS NEEDED    Tachycardia  -     IN OFFICE EKG 12-LEAD (to Muse); Future  -     IN OFFICE EKG 12-LEAD (to Muse)    Arthralgia of hip, unspecified laterality  -     traMADol (ULTRAM) 50 mg tablet; Take 1 tablet (50 mg total) by mouth every 6 (six) hours as needed.        Will contact patient with results of pending test when available  Patient will attempt to wean off pain medications over time

## 2020-02-05 ENCOUNTER — PATIENT OUTREACH (OUTPATIENT)
Dept: ADMINISTRATIVE | Facility: OTHER | Age: 55
End: 2020-02-05

## 2020-02-06 ENCOUNTER — OFFICE VISIT (OUTPATIENT)
Dept: RHEUMATOLOGY | Facility: CLINIC | Age: 55
End: 2020-02-06
Payer: OTHER GOVERNMENT

## 2020-02-06 ENCOUNTER — LAB VISIT (OUTPATIENT)
Dept: LAB | Facility: HOSPITAL | Age: 55
End: 2020-02-06
Payer: OTHER GOVERNMENT

## 2020-02-06 VITALS
BODY MASS INDEX: 30.63 KG/M2 | HEART RATE: 86 BPM | WEIGHT: 179.44 LBS | SYSTOLIC BLOOD PRESSURE: 141 MMHG | HEIGHT: 64 IN | DIASTOLIC BLOOD PRESSURE: 81 MMHG

## 2020-02-06 DIAGNOSIS — M25.552 LEFT HIP PAIN: ICD-10-CM

## 2020-02-06 DIAGNOSIS — M25.551 RIGHT HIP PAIN: Primary | ICD-10-CM

## 2020-02-06 DIAGNOSIS — M25.551 RIGHT HIP PAIN: ICD-10-CM

## 2020-02-06 LAB
ALBUMIN SERPL BCP-MCNC: 4 G/DL (ref 3.5–5.2)
ALP SERPL-CCNC: 81 U/L (ref 55–135)
ALT SERPL W/O P-5'-P-CCNC: 9 U/L (ref 10–44)
ANION GAP SERPL CALC-SCNC: 10 MMOL/L (ref 8–16)
AST SERPL-CCNC: 9 U/L (ref 10–40)
BILIRUB SERPL-MCNC: 0.2 MG/DL (ref 0.1–1)
BUN SERPL-MCNC: 13 MG/DL (ref 6–20)
CALCIUM SERPL-MCNC: 9.9 MG/DL (ref 8.7–10.5)
CHLORIDE SERPL-SCNC: 105 MMOL/L (ref 95–110)
CO2 SERPL-SCNC: 24 MMOL/L (ref 23–29)
CREAT SERPL-MCNC: 0.8 MG/DL (ref 0.5–1.4)
EST. GFR  (AFRICAN AMERICAN): >60 ML/MIN/1.73 M^2
EST. GFR  (NON AFRICAN AMERICAN): >60 ML/MIN/1.73 M^2
GLUCOSE SERPL-MCNC: 107 MG/DL (ref 70–110)
POTASSIUM SERPL-SCNC: 4.6 MMOL/L (ref 3.5–5.1)
PROT SERPL-MCNC: 8.1 G/DL (ref 6–8.4)
SODIUM SERPL-SCNC: 139 MMOL/L (ref 136–145)

## 2020-02-06 PROCEDURE — 99204 OFFICE O/P NEW MOD 45 MIN: CPT | Mod: S$PBB,,, | Performed by: INTERNAL MEDICINE

## 2020-02-06 PROCEDURE — 99204 PR OFFICE/OUTPT VISIT, NEW, LEVL IV, 45-59 MIN: ICD-10-PCS | Mod: S$PBB,,, | Performed by: INTERNAL MEDICINE

## 2020-02-06 PROCEDURE — 80053 COMPREHEN METABOLIC PANEL: CPT

## 2020-02-06 PROCEDURE — 99999 PR PBB SHADOW E&M-EST. PATIENT-LVL IV: CPT | Mod: PBBFAC,,, | Performed by: INTERNAL MEDICINE

## 2020-02-06 PROCEDURE — 36415 COLL VENOUS BLD VENIPUNCTURE: CPT

## 2020-02-06 PROCEDURE — 99214 OFFICE O/P EST MOD 30 MIN: CPT | Mod: PBBFAC | Performed by: INTERNAL MEDICINE

## 2020-02-06 PROCEDURE — 99999 PR PBB SHADOW E&M-EST. PATIENT-LVL IV: ICD-10-PCS | Mod: PBBFAC,,, | Performed by: INTERNAL MEDICINE

## 2020-02-06 RX ORDER — FLUCONAZOLE 150 MG/1
TABLET ORAL
COMMUNITY
Start: 2020-02-05 | End: 2020-12-27

## 2020-02-06 ASSESSMENT — ROUTINE ASSESSMENT OF PATIENT INDEX DATA (RAPID3)
MDHAQ FUNCTION SCORE: .4
AM STIFFNESS SCORE: 1, YES
PATIENT GLOBAL ASSESSMENT SCORE: 6
PSYCHOLOGICAL DISTRESS SCORE: 0
TOTAL RAPID3 SCORE: 4.78
FATIGUE SCORE: 8.5
PAIN SCORE: 7

## 2020-02-06 NOTE — PROGRESS NOTES
"Clinic Note  2020      Subjective:       Patient ID:  Sharlene is a 54 y.o. female being seen for an established visit.    Chief Complaint: Disease Management    HPI    Sharlene Smith is a 54 year old female with Ulcerative colitis, HLD, and chronic back pain presents for evaluation of chronic hip pain.    She reports that she has had chronic hip pain for the past 3 years. The pain is worse in the right hip. It is described as a constant, throbbing, 6/10 pain that radiates to her lower thigh. It is not worse with movement. She takes tramadol for the pain which she gets from pain management for her chronic lower back pain which also helps the hip pain minimally. She has undergone a fluoroscopic intra-articular joint injection into the right hip joint without relief. She was referred to rheumatology as her hip xrays did not show evidence of arthritis. She was told that she could not take NSAIDs due to her Ulcerative Colitis.     She also reports morning stiffness in her hands and lower back which last for "a few hours." She states that the hand stiffness does not bother her or limit her daily activities.    She has chronic lower back pain since  and sees pain management.     She last had a c-scope in 2017 where biopsies showed colitis only in a small section of the colon. She was told to follow up in 1 year for repeat colonoscopy to monitor resolution but she was lost to follow up. Otherwise, she states that her UC is controlled and that she does not have symptoms of bloody diarrhea. She does note abdominal bloating which she continues to have.       Allergies: none   Medical History: UC, SVT, HLD  Surgical History: SVT ablation in 10/2018, c-sections x 2  Family History: crohn's disease, mother  of Multiple Myeloma, father with diabetes. Denies rheumatological disease such as lupus or RA.  Social history: smoked 15 years with 1/2 pack per day, quit in ; occasional alcohol use, denies illicit drug " use      Review of Systems   Constitutional: Negative for chills and fever.   HENT: Negative for hearing loss and sore throat.    Eyes: Negative for blurred vision, double vision and redness.   Respiratory: Negative for cough and shortness of breath.    Cardiovascular: Negative for chest pain and leg swelling.   Gastrointestinal: Negative for abdominal pain, constipation, diarrhea, nausea and vomiting.   Genitourinary: Negative for dysuria and hematuria.   Musculoskeletal: Positive for back pain and joint pain. Negative for falls and myalgias.   Skin: Negative for rash.   Neurological: Negative for dizziness, speech change, weakness and headaches.   Endo/Heme/Allergies: Does not bruise/bleed easily.   Psychiatric/Behavioral: Negative for depression. The patient is not nervous/anxious.        No skin rashes,malar rash,photosensitivity   No telangiectasias   No calcinosis   No psoriasis   No patchy alopecia   No oral and nasal ulcers   No dry eyes and dry mouth   No pleurisy or any cardiopulmonary complaints   No dysphagia,diplopia and dysphonia and muscle weakness   No n/v/d/c   No acid reflux+   No raynaud's+   No digital ulcers   No cytopenias   No renal issues   No blood clots   No fever,chills,night sweats,weight loss and loss of appetite   No pregnancy losses/pre term deliveries /pregnancy complications   No new onset headaches   No recurrent conjunctivitis or uveitis or scleritis or episcleritis   No chronic or bloody diarrhea with no u colitis or crohn's /inflammatory bowel disease   No vaginal or urethral  d/c/STDs/no ulcers   No joint pains       Past Medical History:   Diagnosis Date    Abnormal Pap smear of vagina yrs ago    possible BX?    Allergy     Arthritis     Asthma     Hyperlipidemia     Supraventricular tachycardia     SVT (supraventricular tachycardia)     Tachycardia     Ulcerative colitis        Family History   Problem Relation Age of Onset    Hyperlipidemia Mother     Cancer Mother      Cataracts Mother     Allergies Mother     Heart attack Father     Diabetes Father     Heart attack Paternal Grandmother     Glaucoma Maternal Grandfather     Heart disease Neg Hx     Hypertension Neg Hx     Colon cancer Neg Hx     Esophageal cancer Neg Hx     Amblyopia Neg Hx     Blindness Neg Hx     Macular degeneration Neg Hx     Retinal detachment Neg Hx     Strabismus Neg Hx     Stroke Neg Hx     Thyroid disease Neg Hx     Angioedema Neg Hx     Asthma Neg Hx     Atopy Neg Hx     Eczema Neg Hx     Immunodeficiency Neg Hx     Rhinitis Neg Hx     Urticaria Neg Hx         reports that she quit smoking about 20 years ago. Her smoking use included cigarettes. She has never used smokeless tobacco. She reports that she drinks about 7.0 standard drinks of alcohol per week. She reports that she does not use drugs.    Medication List with Changes/Refills   Current Medications    ARM BRACE MISC    Wear wrist splint daily, including while sleeping. Remove to shower    ASPIRIN 81 MG CHEW    Take 81 mg by mouth once daily.    ATORVASTATIN (LIPITOR) 20 MG TABLET    TAKE 1 TABLET EVERY EVENING    AZITHROMYCIN (ZITHROMAX Z-JEANINE) 250 MG TABLET    As directed    CAMRESE LO 0.10 MG-20 MCG (84)/10 MCG (7) 3MPK    TAKE 1 TABLET DAILY    DILTIAZEM (CARDIZEM CD) 240 MG 24 HR CAPSULE    TAKE 1 CAPSULE DAILY    FLUCONAZOLE (DIFLUCAN) 150 MG TAB        FLUTICASONE PROPIONATE (FLONASE) 50 MCG/ACTUATION NASAL SPRAY    1 spray (50 mcg total) by Each Nostril route once daily.    GABAPENTIN (NEURONTIN) 100 MG CAPSULE    Take 1 capsule (100 mg total) by mouth 3 (three) times daily.    MESALAMINE (APRISO) 0.375 GRAM CP24    Take 4 capsules (1.5 g total) by mouth once daily.    NAPROXEN (NAPROSYN) 500 MG TABLET    TAKE 1 TABLET(500 MG) BY MOUTH TWICE DAILY WITH MEALS    OMEPRAZOLE (PRILOSEC) 40 MG CAPSULE        TIZANIDINE (ZANAFLEX) 4 MG TABLET    TAKE 1/2 TO 1 TABLET BY MOUTH EVERY 8 HOURS AS NEEDED    TRAMADOL  "(ULTRAM) 50 MG TABLET    Take 1 tablet (50 mg total) by mouth every 6 (six) hours as needed.     Review of patient's allergies indicates:  No Known Allergies    Patient Active Problem List   Diagnosis    Menstrual cycle disorder    Migraine headache    Seasonal allergies    Asthma in remission    SVT (supraventricular tachycardia)    Hyperlipidemia    Otitis    Ulcerative colitis    Chronic pain    Chronic bilateral low back pain without sciatica    Chronic hip pain, bilateral    Diffuse myofascial pain syndrome    Myofascial muscle pain           Objective:      BP (!) 141/81   Pulse 86   Ht 5' 4" (1.626 m)   Wt 81.4 kg (179 lb 7.3 oz)   BMI 30.80 kg/m²   Estimated body mass index is 30.8 kg/m² as calculated from the following:    Height as of this encounter: 5' 4" (1.626 m).    Weight as of this encounter: 81.4 kg (179 lb 7.3 oz).  Physical Exam   Constitutional: She is oriented to person, place, and time and well-developed, well-nourished, and in no distress. No distress.   HENT:   Head: Normocephalic and atraumatic.   Right Ear: External ear normal.   Left Ear: External ear normal.   Nose: Nose normal.   Eyes: Conjunctivae and EOM are normal. Right eye exhibits no discharge. Left eye exhibits no discharge. No scleral icterus.   Cardiovascular: Normal rate, regular rhythm and normal heart sounds. Exam reveals no gallop and no friction rub.   No murmur heard.  Pulmonary/Chest: Effort normal and breath sounds normal. No respiratory distress. She has no wheezes. She has no rales.   Abdominal: Soft. Bowel sounds are normal. There is no tenderness. There is no rebound and no guarding.   Musculoskeletal: She exhibits no edema.   Neck: Full ROM without pain  Shoulders: no pain elicited on ROM  MCPs: no pain elicited on ROM, no erythema or synovitis  PIPs: no pain elicited on ROM, no erythema or synovitis  DIPs: no pain elicited on ROM, no erythema or synovitis  Hips: pain elicited on internal rotation, " external rotation, and flexion of hip more pronounced on right than left  Knees: no pain elicited on ROM, no erythema or synovitis, no joint line tenderness  Ankles: no pain elicited on ROM, no erythema or synovitis  MTPs: no pain elicited on ROM, no erythema or synovitis  Spine: pain with flexion and lateral flexion. No pain with extension or horizontal rotation.     Lymphadenopathy:     She has no cervical adenopathy.   Neurological: She is alert and oriented to person, place, and time. GCS score is 15.   Skin: Skin is warm and dry. She is not diaphoretic.   Psychiatric: Mood, memory, affect and judgment normal.   Nursing note and vitals reviewed.            Assessment and Plan:         Sharlene was seen today for disease management.    Diagnoses and all orders for this visit:    Chronic Hip Pain  Reports hip pain in R > L for 3 years. Follows with pain management for chronic lower back pain who has performed intraarticular steroid injections into the hip without relief. Pelvis xray in 2/2019 only showing mild DJD. MRI of right hip in 2017 was unremarkable. Physical Exam significant for pain of external and internal rotation of the hip bilaterally with left more pronounced than right. Etiology may be due to labial tears within the hip joint bilaterally. Low suspicion for rheumatological disease. Will get MRI of left and right hip joint to assess.     Right hip pain  -     MRI Arthrogram Hip With Contrast Right; Future  -     COMPREHENSIVE METABOLIC PANEL; Future    Left hip pain  -     COMPREHENSIVE METABOLIC PANEL; Future  -     MRI Arthrogram Hip With Contrast Left; Future      Other Orders Placed This Visit:  Orders Placed This Encounter   Procedures    MRI Arthrogram Hip With Contrast Right    MRI Arthrogram Hip With Contrast Left    COMPREHENSIVE METABOLIC PANEL         Carol Brady    Discussed with Dr. Cooper

## 2020-02-06 NOTE — PROGRESS NOTES
Rapid3 Question Responses and Scores 2/4/2020   MDHAQ Score 0.4   Psychologic Score 0   Pain Score 7   When you awakened in the morning OVER THE LAST WEEK, did you feel stiff? Yes   If Yes, please indicate the number of hours until you are as limber as you will be for the day 3   Fatigue Score 8.5   Global Health Score 6   RAPID3 Score 4.77       Answers for HPI/ROS submitted by the patient on 2/4/2020   fever: No  eye redness: No  headaches: No  shortness of breath: No  chest pain: No  trouble swallowing: No  diarrhea: No  constipation: No  unexpected weight change: No  genital sore: No  dysuria: No  During the last 3 days, have you had a skin rash?: No  Bruises or bleeds easily: No  cough: No

## 2020-02-06 NOTE — LETTER
February 6, 2020      Chauncey Clay Jr., MD  [address]  [address]  [facility] [address]           [facility]  [address]  [place] [address]  Phone: [phone]  Fax: [phone]          Patient: Sharlene Smith   MR Number: [id number]   YOB: 1965   Date of Visit: 2/6/2020       Dear Dr. Chauncey Clay Jr.:    Thank you for referring Sharlene Smith to me for evaluation. Attached you will find relevant portions of my assessment and plan of care.    If you have questions, please do not hesitate to call me. I look forward to following Sharlene Smith along with you.    Sincerely,    Carol Brady MD    Enclosure  CC:  No Recipients    If you would like to receive this communication electronically, please contact [email or web address] or [phone] to request more information on [employer] Link access.    For providers and/or their staff who would like to refer a patient to [facility], please contact us through our one-stop-shop provider referral line, [facility], at [phone].    If you feel you have received this communication in error or would no longer like to receive these types of communications, please e-mail [email or web address]

## 2020-02-06 NOTE — PROGRESS NOTES
54 year old female comes in with ulc colitis,HLD,chronic low back pain with pain management since 2003    On mesalamine    Comes with right hip pain  Severe pain in the groin  Present at rest and worse with external rotation    Fluoroscopy guided  Injections didn't help    Tramadol helps minimally    Xrays show hip deg changes  MRI hips ok    She has pain that throbs and radiates from the hip to the thigh  Walking doesn't aggravate the pain  She is stiff in the mornings,which gets better    Hands stiff    Low back stiff    LEELA neg  RF neg  Esr,crp nml    Ulc colitis not followed since 2017  But she claims to be in remission although the last colonoscopy shows mild colitis  She has no symptoms except for bloating now    Exam finding today : painful external rotation of b/l hips    Suspect labral tears    No synovitis elsewhere  Less likely this is a systemic inflammatory process  More limited to the b/l hips    Will get MR arthrogram b/l hips and then discuss treatment options        Answers for HPI/ROS submitted by the patient on 2/4/2020   fever: No  eye redness: No  headaches: No  shortness of breath: No  chest pain: No  trouble swallowing: No  diarrhea: No  constipation: No  unexpected weight change: No  genital sore: No  dysuria: No  During the last 3 days, have you had a skin rash?: No  Bruises or bleeds easily: No  cough: No

## 2020-02-11 ENCOUNTER — PATIENT MESSAGE (OUTPATIENT)
Dept: RHEUMATOLOGY | Facility: CLINIC | Age: 55
End: 2020-02-11

## 2020-02-11 ENCOUNTER — TELEPHONE (OUTPATIENT)
Dept: RHEUMATOLOGY | Facility: CLINIC | Age: 55
End: 2020-02-11

## 2020-02-11 DIAGNOSIS — M25.552 PAIN OF BOTH HIP JOINTS: Primary | ICD-10-CM

## 2020-02-11 DIAGNOSIS — M25.551 PAIN OF BOTH HIP JOINTS: Primary | ICD-10-CM

## 2020-02-11 NOTE — TELEPHONE ENCOUNTER
Called and left a message for the pt to call me back and let me know what day next week she can do the MRI and xray because the appointment day is not going to work for the MRI she needs done. I will message her on the portal also

## 2020-03-04 ENCOUNTER — TELEPHONE (OUTPATIENT)
Dept: RHEUMATOLOGY | Facility: CLINIC | Age: 55
End: 2020-03-04

## 2020-03-04 ENCOUNTER — PATIENT MESSAGE (OUTPATIENT)
Dept: RHEUMATOLOGY | Facility: CLINIC | Age: 55
End: 2020-03-04

## 2020-03-16 DIAGNOSIS — J30.2 SEASONAL ALLERGIES: ICD-10-CM

## 2020-03-16 RX ORDER — FLUTICASONE PROPIONATE 50 MCG
1 SPRAY, SUSPENSION (ML) NASAL DAILY
Qty: 16 G | Refills: 0 | Status: SHIPPED | OUTPATIENT
Start: 2020-03-16 | End: 2020-03-16

## 2020-03-16 RX ORDER — FLUTICASONE PROPIONATE 50 MCG
SPRAY, SUSPENSION (ML) NASAL
Qty: 48 G | Refills: 12 | Status: SHIPPED | OUTPATIENT
Start: 2020-03-16 | End: 2021-06-08

## 2020-03-31 ENCOUNTER — PATIENT MESSAGE (OUTPATIENT)
Dept: PRIMARY CARE CLINIC | Facility: CLINIC | Age: 55
End: 2020-03-31

## 2020-03-31 DIAGNOSIS — T78.40XD ALLERGIC STATE, SUBSEQUENT ENCOUNTER: Primary | ICD-10-CM

## 2020-03-31 RX ORDER — MONTELUKAST SODIUM 10 MG/1
10 TABLET ORAL DAILY PRN
Qty: 90 TABLET | Refills: 0 | Status: SHIPPED | OUTPATIENT
Start: 2020-03-31 | End: 2020-06-16

## 2020-03-31 RX ORDER — MONTELUKAST SODIUM 10 MG/1
10 TABLET ORAL DAILY PRN
COMMUNITY
End: 2020-03-31 | Stop reason: SDUPTHER

## 2020-03-31 NOTE — TELEPHONE ENCOUNTER
Please see message and advise   Pt is requesting a medication I could not pend due to me not seeing on pt current med list.

## 2020-04-10 ENCOUNTER — PATIENT MESSAGE (OUTPATIENT)
Dept: PRIMARY CARE CLINIC | Facility: CLINIC | Age: 55
End: 2020-04-10

## 2020-04-15 ENCOUNTER — TELEPHONE (OUTPATIENT)
Dept: PRIMARY CARE CLINIC | Facility: CLINIC | Age: 55
End: 2020-04-15

## 2020-04-15 NOTE — TELEPHONE ENCOUNTER
LMOR ok to keep 4/16/20 virtual appt as scheduled, but earliest refill date for Tramadol will be 4/21/20.

## 2020-04-16 ENCOUNTER — OFFICE VISIT (OUTPATIENT)
Dept: PRIMARY CARE CLINIC | Facility: CLINIC | Age: 55
End: 2020-04-16
Payer: OTHER GOVERNMENT

## 2020-04-16 DIAGNOSIS — M25.559 ARTHRALGIA OF HIP, UNSPECIFIED LATERALITY: ICD-10-CM

## 2020-04-16 PROCEDURE — 99213 PR OFFICE/OUTPT VISIT, EST, LEVL III, 20-29 MIN: ICD-10-PCS | Mod: 95,,, | Performed by: FAMILY MEDICINE

## 2020-04-16 PROCEDURE — 99213 OFFICE O/P EST LOW 20 MIN: CPT | Mod: 95,,, | Performed by: FAMILY MEDICINE

## 2020-04-16 RX ORDER — TRAMADOL HYDROCHLORIDE 50 MG/1
50 TABLET ORAL EVERY 6 HOURS PRN
Qty: 120 TABLET | Refills: 4 | Status: SHIPPED | OUTPATIENT
Start: 2020-04-16 | End: 2020-09-11 | Stop reason: SDUPTHER

## 2020-04-16 RX ORDER — TRAMADOL HYDROCHLORIDE 50 MG/1
50 TABLET ORAL EVERY 6 HOURS PRN
Qty: 120 TABLET | Refills: 4 | Status: SHIPPED | OUTPATIENT
Start: 2020-04-16 | End: 2020-04-16 | Stop reason: SDUPTHER

## 2020-04-16 NOTE — PROGRESS NOTES
Subjective:    The patient location is: [place]  The chief complaint leading to consultation is:  Chronic hip pain back pain refill of pain medication   Visit type: audiovisual  Total time spent with patient: 15 minutes  Each patient to whom he or she provides medical services by telemedicine is:  (1) informed of the relationship between the physician and patient and the respective role of any other health care provider with respect to management of the patient; and (2) notified that he or she may decline to receive medical services by telemedicine and may withdraw from such care at any time.   Patient ID: Sharlene Smith is a 54 y.o. female.    Chief Complaint:  Chronic hip pain and low back pain  Patient has been seen by pain management and the CT arthrogram ordered which had to be rescheduled because of COVID19  HPI see above  Review of Systems   Constitutional: Positive for fatigue.   HENT: Negative.    Eyes: Negative.    Respiratory: Negative.    Cardiovascular: Negative.    Gastrointestinal: Negative.    Endocrine: Negative.    Genitourinary: Negative.    Musculoskeletal: Positive for arthralgias, back pain and gait problem.   Skin: Negative.    Allergic/Immunologic: Negative.    Neurological: Negative for dizziness, tremors, seizures, syncope, facial asymmetry, speech difficulty, weakness, light-headedness, numbness and headaches.   Hematological: Negative.    Psychiatric/Behavioral: Negative.        Objective:      Physical Exam   Constitutional: She is oriented to person, place, and time. She appears well-developed and well-nourished. No distress.   HENT:   Head: Normocephalic and atraumatic.   Right Ear: External ear normal.   Left Ear: External ear normal.   Eyes: Pupils are equal, round, and reactive to light. Conjunctivae and EOM are normal.   Neck: Normal range of motion. No JVD present.   Pulmonary/Chest: Effort normal. No stridor. No respiratory distress.   Neurological: She is alert  and oriented to person, place, and time. No cranial nerve deficit. She exhibits normal muscle tone. Coordination normal.   Skin: Skin is dry. No rash noted. She is not diaphoretic. No erythema. No pallor.   Psychiatric: She has a normal mood and affect. Her behavior is normal. Judgment and thought content normal.       Assessment:       1. Arthralgia of hip, unspecified laterality     2.     Back pain  3.     longterm use of tramadol  Plan:     Diagnoses and all orders for this visit:    Arthralgia of hip, unspecified laterality  -     Discontinue: traMADoL (ULTRAM) 50 mg tablet; Take 1 tablet (50 mg total) by mouth every 6 (six) hours as needed.  -     traMADoL (ULTRAM) 50 mg tablet; Take 1 tablet (50 mg total) by mouth every 6 (six) hours as needed.     patient will call in 1 month to reschedule the CT arthrogram of the hip and follow-up with pain management  No dosage adjustments from a pain control adequate with meds prescribed

## 2020-04-27 DIAGNOSIS — E78.5 HYPERLIPIDEMIA: ICD-10-CM

## 2020-04-27 RX ORDER — ATORVASTATIN CALCIUM 20 MG/1
20 TABLET, FILM COATED ORAL NIGHTLY
Qty: 90 TABLET | Refills: 1 | Status: SHIPPED | OUTPATIENT
Start: 2020-04-27 | End: 2020-09-11 | Stop reason: SDUPTHER

## 2020-05-12 ENCOUNTER — LAB VISIT (OUTPATIENT)
Dept: PRIMARY CARE CLINIC | Facility: CLINIC | Age: 55
End: 2020-05-12
Payer: OTHER GOVERNMENT

## 2020-05-12 DIAGNOSIS — Z00.6 RESEARCH STUDY PATIENT: Primary | ICD-10-CM

## 2020-05-12 LAB — SARS-COV-2 IGG SERPLBLD QL IA.RAPID: NEGATIVE

## 2020-05-12 PROCEDURE — U0002 COVID-19 LAB TEST NON-CDC: HCPCS

## 2020-05-12 PROCEDURE — 86769 SARS-COV-2 COVID-19 ANTIBODY: CPT

## 2020-05-12 NOTE — RESEARCH
Date of Consent: 05/12/2020     Sponsor: [facility]    Study Title/IRB Number: Observational study of Sars-CoV2 Immunoglobulin G (IgG) seroprevalence among the [facility] population over time 2020.163  Principle Investigator: Yudelka Vazquez, PhD    Did the patient need translation services? No   name: N/A    Prior to the Informed Consent (IC) being signed, or any study protocol required data collection, testing, procedure, or intervention being performed, the following was done and/or discussed:   Patient was given a paper copy of the IC for review    Patient was given study FAQ   Purpose of the study and qualifications to participate    Study design and tests or procedures done at this visit   Confidentiality and HIPAA Authorization for Release of Medical Records for the research trial/ subject's rights/research related injury   Risk, Benefits, Alternative Treatments, Compensation and Costs   Participation in the research trial is voluntary and patient may withdraw at anytime   Contact information for study related questions    Patient verbalizes understanding of the above: Yes  Contact information for PI and IRB given to patient: Yes  Patient able to adequately summarize: the purpose of the study, the risks associated with the study, and all procedures, testing, and follow-ups associated with the study: Yes    The consent was discussed verbally with the patient and all questions were answered satisfactorily. Patient gave verbal consent for the Seroprevalence research study with an IRB approval date of 05/08/2020.      The Consent, Consent Witness and name of Clinical Research Coordinator consenting was captured and documented in REDCap.    All Inclusion and Exclusion Criteria reviewed, subject meets all Inclusion criteria and does not meet any Exclusion Criteria at this time.     Patient Eligibility was confirmed.    Patient responded to survey questions.    The following biospecimen  collection procedures were collected:    -Nasopharyngeal Swab Collection  -Blood collection

## 2020-05-12 NOTE — PROGRESS NOTES
Date of Consent: 05/12/2020      Sponsor: [facility]    Study Title/IRB Number: Observational study of Sars-CoV2 Immunoglobulin G (IgG) seroprevalence among the [facility] population over time 2020.163  Principle Investigator: Yudelka Vazquez, PhD    Did the patient need translation services? No   name: N/A    Prior to the Informed Consent (IC) being signed, or any study protocol required data collection, testing, procedure, or intervention being performed, the following was done and/or discussed:   Patient was given a paper copy of the IC for review    Patient was given study FAQ   Purpose of the study and qualifications to participate    Study design and tests or procedures done at this visit   Confidentiality and HIPAA Authorization for Release of Medical Records for the research trial/ subject's rights/research related injury   Risk, Benefits, Alternative Treatments, Compensation and Costs   Participation in the research trial is voluntary and patient may withdraw at anytime   Contact information for study related questions    Patient verbalizes understanding of the above: Yes  Contact information for PI and IRB given to patient: Yes  Patient able to adequately summarize: the purpose of the study, the risks associated with the study, and all procedures, testing, and follow-ups associated with the study: Yes    The consent was discussed verbally with the patient and all questions were answered satisfactorily. Patient gave verbal consent for the Seroprevalence research study with an IRB approval date of 05/08/2020.      The Consent, Consent Witness and name of Clinical Research Coordinator consenting was captured and documented in [employer]Cap.    All Inclusion and Exclusion Criteria reviewed, subject meets all Inclusion criteria and does not meet any Exclusion Criteria at this time.     Patient Eligibility was confirmed.    Patient responded to survey questions.    The following biospecimen  collection procedures were collected:    -Nasopharyngeal Swab Collection  -Blood collection

## 2020-05-15 LAB — SARS-COV-2 RNA RESP QL NAA+PROBE: NOT DETECTED

## 2020-05-22 ENCOUNTER — PATIENT MESSAGE (OUTPATIENT)
Dept: RHEUMATOLOGY | Facility: CLINIC | Age: 55
End: 2020-05-22

## 2020-07-21 ENCOUNTER — TELEPHONE (OUTPATIENT)
Dept: PRIMARY CARE CLINIC | Facility: CLINIC | Age: 55
End: 2020-07-21

## 2020-07-21 DIAGNOSIS — Z00.00 ANNUAL PHYSICAL EXAM: Primary | ICD-10-CM

## 2020-07-21 NOTE — TELEPHONE ENCOUNTER
Patient is scheduled for annual on 9/24, annual labs pended. Please add additional labs if needed.

## 2020-09-07 ENCOUNTER — PATIENT MESSAGE (OUTPATIENT)
Dept: PRIMARY CARE CLINIC | Facility: CLINIC | Age: 55
End: 2020-09-07

## 2020-09-07 DIAGNOSIS — Z00.00 ROUTINE GENERAL MEDICAL EXAMINATION AT A HEALTH CARE FACILITY: Primary | ICD-10-CM

## 2020-09-09 ENCOUNTER — LAB VISIT (OUTPATIENT)
Dept: LAB | Facility: HOSPITAL | Age: 55
End: 2020-09-09
Attending: FAMILY MEDICINE
Payer: OTHER GOVERNMENT

## 2020-09-09 DIAGNOSIS — Z00.00 ANNUAL PHYSICAL EXAM: ICD-10-CM

## 2020-09-09 DIAGNOSIS — Z00.00 ROUTINE GENERAL MEDICAL EXAMINATION AT A HEALTH CARE FACILITY: ICD-10-CM

## 2020-09-09 LAB
ALBUMIN SERPL BCP-MCNC: 3.7 G/DL (ref 3.5–5.2)
ALP SERPL-CCNC: 84 U/L (ref 55–135)
ALT SERPL W/O P-5'-P-CCNC: 10 U/L (ref 10–44)
ANION GAP SERPL CALC-SCNC: 9 MMOL/L (ref 8–16)
AST SERPL-CCNC: 11 U/L (ref 10–40)
BASOPHILS # BLD AUTO: 0.06 K/UL (ref 0–0.2)
BASOPHILS NFR BLD: 0.6 % (ref 0–1.9)
BILIRUB SERPL-MCNC: 0.4 MG/DL (ref 0.1–1)
BUN SERPL-MCNC: 8 MG/DL (ref 6–20)
CALCIUM SERPL-MCNC: 9.3 MG/DL (ref 8.7–10.5)
CHLORIDE SERPL-SCNC: 108 MMOL/L (ref 95–110)
CHOLEST SERPL-MCNC: 162 MG/DL (ref 120–199)
CHOLEST/HDLC SERPL: 2.9 {RATIO} (ref 2–5)
CO2 SERPL-SCNC: 23 MMOL/L (ref 23–29)
CREAT SERPL-MCNC: 0.7 MG/DL (ref 0.5–1.4)
DIFFERENTIAL METHOD: ABNORMAL
EOSINOPHIL # BLD AUTO: 0.3 K/UL (ref 0–0.5)
EOSINOPHIL NFR BLD: 2.4 % (ref 0–8)
ERYTHROCYTE [DISTWIDTH] IN BLOOD BY AUTOMATED COUNT: 12.1 % (ref 11.5–14.5)
EST. GFR  (AFRICAN AMERICAN): >60 ML/MIN/1.73 M^2
EST. GFR  (NON AFRICAN AMERICAN): >60 ML/MIN/1.73 M^2
GLUCOSE SERPL-MCNC: 113 MG/DL (ref 70–110)
HCT VFR BLD AUTO: 40.4 % (ref 37–48.5)
HDLC SERPL-MCNC: 55 MG/DL (ref 40–75)
HDLC SERPL: 34 % (ref 20–50)
HGB BLD-MCNC: 13 G/DL (ref 12–16)
IMM GRANULOCYTES # BLD AUTO: 0.02 K/UL (ref 0–0.04)
IMM GRANULOCYTES NFR BLD AUTO: 0.2 % (ref 0–0.5)
LDLC SERPL CALC-MCNC: 84 MG/DL (ref 63–159)
LYMPHOCYTES # BLD AUTO: 2.7 K/UL (ref 1–4.8)
LYMPHOCYTES NFR BLD: 24.9 % (ref 18–48)
MCH RBC QN AUTO: 30.2 PG (ref 27–31)
MCHC RBC AUTO-ENTMCNC: 32.2 G/DL (ref 32–36)
MCV RBC AUTO: 94 FL (ref 82–98)
MONOCYTES # BLD AUTO: 0.3 K/UL (ref 0.3–1)
MONOCYTES NFR BLD: 2.9 % (ref 4–15)
NEUTROPHILS # BLD AUTO: 7.4 K/UL (ref 1.8–7.7)
NEUTROPHILS NFR BLD: 69 % (ref 38–73)
NONHDLC SERPL-MCNC: 107 MG/DL
NRBC BLD-RTO: 0 /100 WBC
PLATELET # BLD AUTO: 487 K/UL (ref 150–350)
PMV BLD AUTO: 10.3 FL (ref 9.2–12.9)
POTASSIUM SERPL-SCNC: 4.1 MMOL/L (ref 3.5–5.1)
PROT SERPL-MCNC: 7.8 G/DL (ref 6–8.4)
RBC # BLD AUTO: 4.3 M/UL (ref 4–5.4)
SODIUM SERPL-SCNC: 140 MMOL/L (ref 136–145)
TRIGL SERPL-MCNC: 115 MG/DL (ref 30–150)
TSH SERPL DL<=0.005 MIU/L-ACNC: 0.96 UIU/ML (ref 0.4–4)
WBC # BLD AUTO: 10.71 K/UL (ref 3.9–12.7)

## 2020-09-09 PROCEDURE — 86703 HIV-1/HIV-2 1 RESULT ANTBDY: CPT

## 2020-09-09 PROCEDURE — 80061 LIPID PANEL: CPT

## 2020-09-09 PROCEDURE — 80053 COMPREHEN METABOLIC PANEL: CPT

## 2020-09-09 PROCEDURE — 85025 COMPLETE CBC W/AUTO DIFF WBC: CPT

## 2020-09-09 PROCEDURE — 86803 HEPATITIS C AB TEST: CPT

## 2020-09-09 PROCEDURE — 84443 ASSAY THYROID STIM HORMONE: CPT

## 2020-09-09 PROCEDURE — 36415 COLL VENOUS BLD VENIPUNCTURE: CPT | Mod: PN

## 2020-09-10 LAB
HCV AB SERPL QL IA: NEGATIVE
HIV 1+2 AB+HIV1 P24 AG SERPL QL IA: NEGATIVE

## 2020-09-11 ENCOUNTER — OFFICE VISIT (OUTPATIENT)
Dept: PRIMARY CARE CLINIC | Facility: CLINIC | Age: 55
End: 2020-09-11
Payer: OTHER GOVERNMENT

## 2020-09-11 VITALS
HEIGHT: 64 IN | DIASTOLIC BLOOD PRESSURE: 90 MMHG | SYSTOLIC BLOOD PRESSURE: 140 MMHG | HEART RATE: 100 BPM | WEIGHT: 176.38 LBS | TEMPERATURE: 98 F | BODY MASS INDEX: 30.11 KG/M2

## 2020-09-11 DIAGNOSIS — R73.9 ELEVATED BLOOD SUGAR: Primary | ICD-10-CM

## 2020-09-11 DIAGNOSIS — E78.01 FAMILIAL HYPERCHOLESTEROLEMIA: ICD-10-CM

## 2020-09-11 DIAGNOSIS — G89.29 OTHER CHRONIC PAIN: ICD-10-CM

## 2020-09-11 DIAGNOSIS — Z12.31 SCREENING MAMMOGRAM FOR HIGH-RISK PATIENT: ICD-10-CM

## 2020-09-11 DIAGNOSIS — Z12.4 ROUTINE PAPANICOLAOU SMEAR: ICD-10-CM

## 2020-09-11 DIAGNOSIS — M25.559 ARTHRALGIA OF HIP, UNSPECIFIED LATERALITY: ICD-10-CM

## 2020-09-11 DIAGNOSIS — K63.5 POLYP OF COLON, UNSPECIFIED PART OF COLON, UNSPECIFIED TYPE: ICD-10-CM

## 2020-09-11 DIAGNOSIS — T78.40XD ALLERGIC STATE, SUBSEQUENT ENCOUNTER: ICD-10-CM

## 2020-09-11 DIAGNOSIS — Z01.00 ROUTINE EYE EXAM: ICD-10-CM

## 2020-09-11 PROCEDURE — 99396 PREV VISIT EST AGE 40-64: CPT | Mod: S$PBB,,, | Performed by: FAMILY MEDICINE

## 2020-09-11 PROCEDURE — 88175 CYTOPATH C/V AUTO FLUID REDO: CPT

## 2020-09-11 PROCEDURE — 99999 PR PBB SHADOW E&M-EST. PATIENT-LVL IV: ICD-10-PCS | Mod: PBBFAC,,, | Performed by: FAMILY MEDICINE

## 2020-09-11 PROCEDURE — G0101 CA SCREEN;PELVIC/BREAST EXAM: HCPCS | Mod: PBBFAC,PN | Performed by: FAMILY MEDICINE

## 2020-09-11 PROCEDURE — 99214 OFFICE O/P EST MOD 30 MIN: CPT | Mod: PBBFAC,PN,25 | Performed by: FAMILY MEDICINE

## 2020-09-11 PROCEDURE — 99999 PR PBB SHADOW E&M-EST. PATIENT-LVL IV: CPT | Mod: PBBFAC,,, | Performed by: FAMILY MEDICINE

## 2020-09-11 PROCEDURE — 99396 PR PREVENTIVE VISIT,EST,40-64: ICD-10-PCS | Mod: S$PBB,,, | Performed by: FAMILY MEDICINE

## 2020-09-11 RX ORDER — LEVOCETIRIZINE DIHYDROCHLORIDE 5 MG/1
5 TABLET, FILM COATED ORAL NIGHTLY
Qty: 30 TABLET | Refills: 11 | Status: SHIPPED | OUTPATIENT
Start: 2020-09-11 | End: 2021-10-13

## 2020-09-11 RX ORDER — GABAPENTIN 100 MG/1
100 CAPSULE ORAL 3 TIMES DAILY
Qty: 90 CAPSULE | Refills: 11 | Status: SHIPPED | OUTPATIENT
Start: 2020-09-11 | End: 2022-06-23

## 2020-09-11 RX ORDER — TRAMADOL HYDROCHLORIDE 50 MG/1
50 TABLET ORAL EVERY 6 HOURS PRN
Qty: 120 TABLET | Refills: 4 | Status: SHIPPED | OUTPATIENT
Start: 2020-09-11 | End: 2021-02-12 | Stop reason: SDUPTHER

## 2020-09-11 RX ORDER — ATORVASTATIN CALCIUM 20 MG/1
20 TABLET, FILM COATED ORAL NIGHTLY
Qty: 90 TABLET | Refills: 1 | Status: SHIPPED | OUTPATIENT
Start: 2020-09-11 | End: 2021-06-08

## 2020-09-11 RX ORDER — MONTELUKAST SODIUM 10 MG/1
10 TABLET ORAL DAILY PRN
Qty: 90 TABLET | Refills: 3 | Status: SHIPPED | OUTPATIENT
Start: 2020-09-11 | End: 2021-07-26

## 2020-09-14 NOTE — PROGRESS NOTES
Sharlene Smith is a 55 y.o. female   Routine physical  Source of history: Patient  Past Medical History:   Diagnosis Date    Abnormal Pap smear of vagina yrs ago    possible BX?    Allergy     Arthritis     Asthma     Hyperlipidemia     Supraventricular tachycardia     SVT (supraventricular tachycardia)     Tachycardia     Ulcerative colitis      Patient  reports that she quit smoking about 21 years ago. Her smoking use included cigarettes. She has never used smokeless tobacco. She reports current alcohol use of about 7.0 standard drinks of alcohol per week. She reports that she does not use drugs.  Family History   Problem Relation Age of Onset    Hyperlipidemia Mother     Cancer Mother     Cataracts Mother     Allergies Mother     Heart attack Father     Diabetes Father     Heart attack Paternal Grandmother     Glaucoma Maternal Grandfather     Heart disease Neg Hx     Hypertension Neg Hx     Colon cancer Neg Hx     Esophageal cancer Neg Hx     Amblyopia Neg Hx     Blindness Neg Hx     Macular degeneration Neg Hx     Retinal detachment Neg Hx     Strabismus Neg Hx     Stroke Neg Hx     Thyroid disease Neg Hx     Angioedema Neg Hx     Asthma Neg Hx     Atopy Neg Hx     Eczema Neg Hx     Immunodeficiency Neg Hx     Rhinitis Neg Hx     Urticaria Neg Hx      ROS:Answers for HPI/ROS submitted by the patient on 9/8/2020   activity change: No  unexpected weight change: No  neck pain: No  hearing loss: No  rhinorrhea: No  trouble swallowing: No  eye discharge: No  visual disturbance: No  chest tightness: No  wheezing: No  chest pain: No  palpitations: No  blood in stool: No  constipation: No  vomiting: No  diarrhea: No  polydipsia: No  polyuria: No  difficulty urinating: No  hematuria: No  menstrual problem: No  dysuria: No  joint swelling: No  arthralgias: No  headaches: No  weakness: No  confusion: No  dysphoric mood: No    GENERAL: No fever, chills, fatigability or weight  loss.  SKIN: No rashes, itching or changes in color or texture of skin.  HEAD: No headaches or recent head trauma.  EYES: Visual acuity fine. No photophobia, ocular pain or diplopia.  EARS: Denies ear pain, discharge or vertigo.  NOSE: No loss of smell, no epistaxis or postnasal drip.  MOUTH & THROAT: No hoarseness or change in voice. No excessive gum bleeding.  NODES: Denies swollen glands.  CHEST: Denies CONTRERAS, cyanosis, wheezing, cough and sputum production.  CARDIOVASCULAR: Denies chest pain, PND, orthopnea or reduced exercise tolerance.  ABDOMEN: Appetite fine. No weight loss. Denies diarrhea, abdominal pain, hematemesis or blood in stool.  URINARY: No flank pain, dysuria or hematuria.  PERIPHERAL VASCULAR: No claudication or cyanosis.  MUSCULOSKELETAL: No joint stiffness or swelling. Denies back pain.  NEUROLOGIC: No history of seizures, paralysis, alteration of gait or coordination.    OBJECTIVE:  APPEARANCE: normal appearance  Vitals:    09/11/20 1624   BP: (!) 140/90   Pulse:    Temp:      SKIN: Normal skin turgor, no lesions.  HEENT: Both external auditory canals clear. Both tympanic membranes intact.   PERRL. EOMI. Disk margins sharp. No tonsillar enlargement. No pharyngeal erythema or exudate. No stridor.  NECK: No bruits. No cervical spine tenderness. No cervical lymphadenopathy. No thyromegaly.  NODES: No cervical, axillary or inguinal lymph node enlargement.  CHEST: Breath sounds clear bilaterally. Lungs clear to auscultation   & percussion. Good air movement. No rales. No retractions. No rhonchi. No stridor. No wheezes.  CARDIOVASCULAR: Normal S1, S2. No murmurs. No edema.  BREASTS: no masses palpated in either breast or axillary area, symmetry noted.  ABDOMEN: Bowel sounds normal. No palpable aortic enlargement. No CVA tenderness.   No pulsatile mass. No rebound tenderness.  PELVIC: bimanual exam uterus normal size consistancy and mobility.  Speculum exam: cervix normal appearance pap performed and  pending  GENITALIA: normal exam  PERIPHERAL VASCULAR: Femoral pulses present and symmetrical. No edema.  MUSCULOSKELETAL: Degenerative changes of both ankles, foot, knee, wrist and hand.  BACK: No CVA tenderness. There is no spasm, tenderness or radiculopathy noted with palpation and there is full range of motion.   NEUROLOGIC:   Cranial Nerves: II-XII grossly intact.  Motor: 5/5 strength major flexors/extensors. No tremor.  DTR's: Knees, Ankles 2+ and equal bilaterally; downgoing toes.  Sensory: Intact to light touch distally.  Gait & Posture: Normal gait and fine motion. No cerebellar signs.  MENTAL STATUS: Alert. Oriented x 3. Language skills normal.   Memory intact. No suicidal ideation. Normal affect. Normal cognitive functions. Well kept appearance.    ASSESSMENT/PLAN:   Sharlene was seen today for annual exam.    Diagnoses and all orders for this visit:    Elevated blood sugar  -     Hemoglobin A1C; Future    Polyp of colon, unspecified part of colon, unspecified type  -     Case request GI: COLONOSCOPY    Screening mammogram for high-risk patient  -     Mammo Digital Screening Bilat; Future    Routine Papanicolaou smear  -     Liquid-Based Pap Smear, Screening    Arthralgia of hip, unspecified laterality  -     traMADoL (ULTRAM) 50 mg tablet; Take 1 tablet (50 mg total) by mouth every 6 (six) hours as needed.    Other chronic pain  -     gabapentin (NEURONTIN) 100 MG capsule; Take 1 capsule (100 mg total) by mouth 3 (three) times daily.    Hyperlipidemia  -     atorvastatin (LIPITOR) 20 MG tablet; Take 1 tablet (20 mg total) by mouth every evening.    Allergic state, subsequent encounter  -     montelukast (SINGULAIR) 10 mg tablet; Take 1 tablet (10 mg total) by mouth daily as needed.  -     levocetirizine (XYZAL) 5 MG tablet; Take 1 tablet (5 mg total) by mouth every evening.    Routine eye exam  -     Ambulatory referral/consult to Optometry; Future    labs reviewed will contact pt with results of pending  tests when available.

## 2020-09-16 ENCOUNTER — PATIENT MESSAGE (OUTPATIENT)
Dept: PRIMARY CARE CLINIC | Facility: CLINIC | Age: 55
End: 2020-09-16

## 2020-09-16 DIAGNOSIS — Z12.11 SPECIAL SCREENING FOR MALIGNANT NEOPLASMS, COLON: Primary | ICD-10-CM

## 2020-09-16 DIAGNOSIS — R05.9 COUGH: ICD-10-CM

## 2020-09-16 DIAGNOSIS — R05.9 COUGH: Primary | ICD-10-CM

## 2020-09-16 DIAGNOSIS — Z01.818 PRE-OP TESTING: ICD-10-CM

## 2020-09-16 RX ORDER — BENZONATATE 200 MG/1
200 CAPSULE ORAL 3 TIMES DAILY PRN
Qty: 30 CAPSULE | Refills: 1 | Status: SHIPPED | OUTPATIENT
Start: 2020-09-16 | End: 2020-09-17 | Stop reason: SDUPTHER

## 2020-09-16 RX ORDER — SODIUM, POTASSIUM,MAG SULFATES 17.5-3.13G
1 SOLUTION, RECONSTITUTED, ORAL ORAL DAILY
Qty: 1 KIT | Refills: 0 | Status: SHIPPED | OUTPATIENT
Start: 2020-09-16 | End: 2020-09-18

## 2020-09-17 ENCOUNTER — HOSPITAL ENCOUNTER (OUTPATIENT)
Dept: RADIOLOGY | Facility: HOSPITAL | Age: 55
Discharge: HOME | End: 2020-09-17
Attending: FAMILY MEDICINE
Payer: OTHER GOVERNMENT

## 2020-09-17 ENCOUNTER — PATIENT MESSAGE (OUTPATIENT)
Dept: PRIMARY CARE CLINIC | Facility: CLINIC | Age: 55
End: 2020-09-17

## 2020-09-17 DIAGNOSIS — Z12.31 SCREENING MAMMOGRAM FOR HIGH-RISK PATIENT: ICD-10-CM

## 2020-09-17 PROCEDURE — 77067 SCR MAMMO BI INCL CAD: CPT | Mod: 26,,, | Performed by: RADIOLOGY

## 2020-09-17 PROCEDURE — 77063 MAMMO DIGITAL SCREENING BILAT WITH TOMO: ICD-10-PCS | Mod: 26,,, | Performed by: RADIOLOGY

## 2020-09-17 PROCEDURE — 77063 BREAST TOMOSYNTHESIS BI: CPT | Mod: 26,,, | Performed by: RADIOLOGY

## 2020-09-17 PROCEDURE — 77067 SCR MAMMO BI INCL CAD: CPT | Mod: TC,PN

## 2020-09-17 PROCEDURE — 77067 MAMMO DIGITAL SCREENING BILAT WITH TOMO: ICD-10-PCS | Mod: 26,,, | Performed by: RADIOLOGY

## 2020-09-17 RX ORDER — BENZONATATE 200 MG/1
200 CAPSULE ORAL 3 TIMES DAILY PRN
Qty: 30 CAPSULE | Refills: 1 | Status: SHIPPED | OUTPATIENT
Start: 2020-09-17 | End: 2020-09-27

## 2020-09-18 ENCOUNTER — PATIENT MESSAGE (OUTPATIENT)
Dept: PRIMARY CARE CLINIC | Facility: CLINIC | Age: 55
End: 2020-09-18

## 2020-09-18 DIAGNOSIS — R05.9 COUGH: Primary | ICD-10-CM

## 2020-09-18 RX ORDER — PROMETHAZINE HYDROCHLORIDE AND CODEINE PHOSPHATE 6.25; 1 MG/5ML; MG/5ML
5 SOLUTION ORAL EVERY 4 HOURS PRN
Qty: 240 ML | Refills: 0 | Status: SHIPPED | OUTPATIENT
Start: 2020-09-18 | End: 2020-09-28

## 2020-09-18 RX ORDER — PROMETHAZINE HYDROCHLORIDE AND CODEINE PHOSPHATE 6.25; 1 MG/5ML; MG/5ML
5 SOLUTION ORAL EVERY 4 HOURS PRN
Qty: 240 ML | Refills: 0 | Status: SHIPPED | OUTPATIENT
Start: 2020-09-18 | End: 2020-09-18

## 2020-09-28 LAB
FINAL PATHOLOGIC DIAGNOSIS: NORMAL
Lab: NORMAL

## 2020-10-13 ENCOUNTER — PATIENT OUTREACH (OUTPATIENT)
Dept: ADMINISTRATIVE | Facility: OTHER | Age: 55
End: 2020-10-13

## 2020-10-24 ENCOUNTER — LAB VISIT (OUTPATIENT)
Dept: URGENT CARE | Facility: CLINIC | Age: 55
End: 2020-10-24
Payer: OTHER GOVERNMENT

## 2020-10-24 VITALS — TEMPERATURE: 97 F

## 2020-10-24 DIAGNOSIS — Z01.818 PRE-OP TESTING: ICD-10-CM

## 2020-10-24 PROCEDURE — U0003 INFECTIOUS AGENT DETECTION BY NUCLEIC ACID (DNA OR RNA); SEVERE ACUTE RESPIRATORY SYNDROME CORONAVIRUS 2 (SARS-COV-2) (CORONAVIRUS DISEASE [COVID-19]), AMPLIFIED PROBE TECHNIQUE, MAKING USE OF HIGH THROUGHPUT TECHNOLOGIES AS DESCRIBED BY CMS-2020-01-R: HCPCS

## 2020-10-25 LAB — SARS-COV-2 RNA RESP QL NAA+PROBE: NOT DETECTED

## 2020-10-27 ENCOUNTER — HOSPITAL ENCOUNTER (OUTPATIENT)
Facility: HOSPITAL | Age: 55
Discharge: HOME | End: 2020-10-27
Attending: INTERNAL MEDICINE | Admitting: INTERNAL MEDICINE
Payer: OTHER GOVERNMENT

## 2020-10-27 ENCOUNTER — ANESTHESIA EVENT (OUTPATIENT)
Dept: ENDOSCOPY | Facility: HOSPITAL | Age: 55
End: 2020-10-27
Payer: OTHER GOVERNMENT

## 2020-10-27 ENCOUNTER — ANESTHESIA (OUTPATIENT)
Dept: ENDOSCOPY | Facility: HOSPITAL | Age: 55
End: 2020-10-27
Payer: OTHER GOVERNMENT

## 2020-10-27 VITALS
OXYGEN SATURATION: 98 % | TEMPERATURE: 98 F | HEART RATE: 90 BPM | RESPIRATION RATE: 14 BRPM | WEIGHT: 170 LBS | DIASTOLIC BLOOD PRESSURE: 78 MMHG | BODY MASS INDEX: 29.02 KG/M2 | SYSTOLIC BLOOD PRESSURE: 124 MMHG | HEIGHT: 64 IN

## 2020-10-27 DIAGNOSIS — K51.90 ULCERATIVE COLITIS: ICD-10-CM

## 2020-10-27 DIAGNOSIS — K51.919 ULCERATIVE COLITIS WITH COMPLICATION, UNSPECIFIED LOCATION: Primary | ICD-10-CM

## 2020-10-27 PROCEDURE — E9220 PRA ENDO ANESTHESIA: ICD-10-PCS | Mod: ,,, | Performed by: NURSE ANESTHETIST, CERTIFIED REGISTERED

## 2020-10-27 PROCEDURE — 88341 IMHCHEM/IMCYTCHM EA ADD ANTB: CPT | Mod: 26,,, | Performed by: PATHOLOGY

## 2020-10-27 PROCEDURE — 88342 IMHCHEM/IMCYTCHM 1ST ANTB: CPT | Performed by: PATHOLOGY

## 2020-10-27 PROCEDURE — 88342 CHG IMMUNOCYTOCHEMISTRY: ICD-10-PCS | Mod: 26,,, | Performed by: PATHOLOGY

## 2020-10-27 PROCEDURE — 88305 TISSUE EXAM BY PATHOLOGIST: CPT | Performed by: PATHOLOGY

## 2020-10-27 PROCEDURE — 88305 TISSUE EXAM BY PATHOLOGIST: CPT | Mod: 26,,, | Performed by: PATHOLOGY

## 2020-10-27 PROCEDURE — E9220 PRA ENDO ANESTHESIA: HCPCS | Mod: ,,, | Performed by: NURSE ANESTHETIST, CERTIFIED REGISTERED

## 2020-10-27 PROCEDURE — 88341 IMHCHEM/IMCYTCHM EA ADD ANTB: CPT | Mod: 59 | Performed by: PATHOLOGY

## 2020-10-27 PROCEDURE — 45380 COLONOSCOPY AND BIOPSY: CPT | Performed by: INTERNAL MEDICINE

## 2020-10-27 PROCEDURE — 25000003 PHARM REV CODE 250: Performed by: INTERNAL MEDICINE

## 2020-10-27 PROCEDURE — 00811 ANES LWR INTST NDSC NOS: CPT | Performed by: INTERNAL MEDICINE

## 2020-10-27 PROCEDURE — 45385 PR COLONOSCOPY,REMV LESN,SNARE: ICD-10-PCS | Mod: ,,, | Performed by: INTERNAL MEDICINE

## 2020-10-27 PROCEDURE — 88305 TISSUE EXAM BY PATHOLOGIST: ICD-10-PCS | Mod: 26,,, | Performed by: PATHOLOGY

## 2020-10-27 PROCEDURE — 37000008 HC ANESTHESIA 1ST 15 MINUTES: Performed by: INTERNAL MEDICINE

## 2020-10-27 PROCEDURE — 45380 COLONOSCOPY AND BIOPSY: CPT | Mod: 59,,, | Performed by: INTERNAL MEDICINE

## 2020-10-27 PROCEDURE — 25000003 PHARM REV CODE 250: Performed by: NURSE ANESTHETIST, CERTIFIED REGISTERED

## 2020-10-27 PROCEDURE — 25000003 PHARM REV CODE 250: Performed by: NURSE PRACTITIONER

## 2020-10-27 PROCEDURE — 88342 IMHCHEM/IMCYTCHM 1ST ANTB: CPT | Mod: 26,,, | Performed by: PATHOLOGY

## 2020-10-27 PROCEDURE — 27201012 HC FORCEPS, HOT/COLD, DISP: Performed by: INTERNAL MEDICINE

## 2020-10-27 PROCEDURE — 27201089 HC SNARE, DISP (ANY): Performed by: INTERNAL MEDICINE

## 2020-10-27 PROCEDURE — 45385 COLONOSCOPY W/LESION REMOVAL: CPT | Mod: ,,, | Performed by: INTERNAL MEDICINE

## 2020-10-27 PROCEDURE — 45385 COLONOSCOPY W/LESION REMOVAL: CPT | Performed by: INTERNAL MEDICINE

## 2020-10-27 PROCEDURE — 63600175 PHARM REV CODE 636 W HCPCS: Performed by: NURSE ANESTHETIST, CERTIFIED REGISTERED

## 2020-10-27 PROCEDURE — 37000009 HC ANESTHESIA EA ADD 15 MINS: Performed by: INTERNAL MEDICINE

## 2020-10-27 PROCEDURE — 45380 PR COLONOSCOPY,BIOPSY: ICD-10-PCS | Mod: 59,,, | Performed by: INTERNAL MEDICINE

## 2020-10-27 PROCEDURE — 88341 PR IHC OR ICC EACH ADD'L SINGLE ANTIBODY  STAINPR: ICD-10-PCS | Mod: 26,,, | Performed by: PATHOLOGY

## 2020-10-27 RX ORDER — SODIUM CHLORIDE 9 MG/ML
INJECTION, SOLUTION INTRAVENOUS CONTINUOUS
Status: DISCONTINUED | OUTPATIENT
Start: 2020-10-27 | End: 2020-10-27 | Stop reason: HOSPADM

## 2020-10-27 RX ORDER — LIDOCAINE HYDROCHLORIDE 20 MG/ML
INJECTION INTRAVENOUS
Status: DISCONTINUED | OUTPATIENT
Start: 2020-10-27 | End: 2020-10-27

## 2020-10-27 RX ORDER — PROPOFOL 10 MG/ML
VIAL (ML) INTRAVENOUS
Status: DISCONTINUED | OUTPATIENT
Start: 2020-10-27 | End: 2020-10-27

## 2020-10-27 RX ORDER — PROPOFOL 10 MG/ML
VIAL (ML) INTRAVENOUS CONTINUOUS PRN
Status: DISCONTINUED | OUTPATIENT
Start: 2020-10-27 | End: 2020-10-27

## 2020-10-27 RX ADMIN — SODIUM CHLORIDE: 0.9 INJECTION, SOLUTION INTRAVENOUS at 07:10

## 2020-10-27 RX ADMIN — PROPOFOL 100 MG: 10 INJECTION, EMULSION INTRAVENOUS at 07:10

## 2020-10-27 RX ADMIN — PROPOFOL 200 MCG/KG/MIN: 10 INJECTION, EMULSION INTRAVENOUS at 07:10

## 2020-10-27 RX ADMIN — LIDOCAINE HYDROCHLORIDE 50 MG: 20 INJECTION, SOLUTION INTRAVENOUS at 07:10

## 2020-10-27 NOTE — ANESTHESIA PREPROCEDURE EVALUATION
10/27/2020  Sharlene Smith is a 55 y.o., female.    Procedure Summary    Case: [id number] Date/Time: 10/27/20 0730   Procedure: COLONOSCOPY (N/A Abdomen) - covid test 10/24-[place] [facility]   prep ins. emailed- [facility]   Anesthesia type: General   Diagnosis: Encounter for colonoscopy due to history of colonic polyp [Z12.11, Z86.010]   Pre-op diagnosis: Encounter for colonoscopy due to history of colonic polyp [Z12.11,          Patient Active Problem List   Diagnosis    Menstrual cycle disorder    Migraine headache    Seasonal allergies    Asthma in remission    SVT (supraventricular tachycardia)    Hyperlipidemia    Otitis    Ulcerative colitis    Chronic pain    Chronic bilateral low back pain without sciatica    Chronic hip pain, bilateral    Diffuse myofascial pain syndrome    Myofascial muscle pain     Past Surgical History:   Procedure Laterality Date    ABLATION N/A 10/8/2018    Procedure: ABLATION;  Surgeon: Shabbir Clark MD;  Location: [facility] CATH LAB;  Service: Cardiology;  Laterality: N/A;  SVT,SVT-AVNRT RFA,KRISTI,MAC,FAS,3PREP     SECTION, CLASSIC      x 2    COLONOSCOPY N/A 2015    Procedure: COLONOSCOPY;  Surgeon: Petr Miller MD;  [facility]  Service: Endoscopy;  Laterality: N/A;    COLONOSCOPY N/A 10/13/2017    Procedure: COLONOSCOPY;  Surgeon: Petr Miller MD;  [address] [facility] [address]  Service: Endoscopy;  Laterality: N/A;    INJECTION OF JOINT Bilateral 2019    Procedure: Injection, Joint,  Hip--Bilateral;  Surgeon: Chauncey Clay Jr., MD;  Location: [facility] PAIN MGT;  Service: Pain Management;  Laterality: Bilateral;     Current Discharge Medication List      CONTINUE these medications which have NOT CHANGED    Details   arm brace Misc Wear wrist splint daily, including while sleeping. Remove to shower     Associated Diagnoses: Right wrist tendonitis      aspirin 81 MG Chew Take 81 mg by mouth once daily.      atorvastatin (LIPITOR) 20 MG tablet Take 1 tablet (20 mg total) by mouth every evening.  Qty: 90 tablet, Refills: 1    Associated Diagnoses: Familial hypercholesterolemia      CAMRESE LO 0.10 mg-20 mcg (84)/10 mcg (7) 3MPk TAKE 1 TABLET DAILY  Qty: 91 tablet, Refills: 4    Associated Diagnoses: OCP (oral contraceptive pills) initiation      diltiaZEM (CARDIZEM CD) 240 MG 24 hr capsule TAKE 1 CAPSULE DAILY  Qty: 90 capsule, Refills: 3    Associated Diagnoses: SVT (supraventricular tachycardia)      fluconazole (DIFLUCAN) 150 MG Tab       fluticasone propionate (FLONASE) 50 mcg/actuation nasal spray SHAKE LIQUID AND USE 1 SPRAY(50 MCG) IN EACH NOSTRIL EVERY DAY  Qty: 48 g, Refills: 12    Comments: **Patient requests 90 days supply**  Associated Diagnoses: Seasonal allergies      gabapentin (NEURONTIN) 100 MG capsule Take 1 capsule (100 mg total) by mouth 3 (three) times daily.  Qty: 90 capsule, Refills: 11    Associated Diagnoses: Other chronic pain      levocetirizine (XYZAL) 5 MG tablet Take 1 tablet (5 mg total) by mouth every evening.  Qty: 30 tablet, Refills: 11    Associated Diagnoses: Allergic state, subsequent encounter      mesalamine (APRISO) 0.375 gram Cp24 Take 4 capsules (1.5 g total) by mouth once daily.  Qty: 120 capsule, Refills: 0    Associated Diagnoses: Other ulcerative colitis without complication      montelukast (SINGULAIR) 10 mg tablet Take 1 tablet (10 mg total) by mouth daily as needed.  Qty: 90 tablet, Refills: 3    Associated Diagnoses: Allergic state, subsequent encounter      omeprazole (PRILOSEC) 40 MG capsule       tiZANidine (ZANAFLEX) 4 MG tablet TAKE 1/2 TO 1 TABLET BY MOUTH EVERY 8 HOURS AS NEEDED  Qty: 270 tablet, Refills: 2    Comments: **Patient requests 90 days supply**  Associated Diagnoses: Other chronic pain      traMADoL (ULTRAM) 50 mg tablet Take 1 tablet (50 mg total)  by mouth every 6 (six) hours as needed.  Qty: 120 tablet, Refills: 4    Comments: Quantity prescribed more than 7 day supply? Yes, quantity medically necessary  Associated Diagnoses: Arthralgia of hip, unspecified laterality             Pre-op Assessment    I have reviewed the Patient Summary Reports.     I have reviewed the Nursing Notes. I have reviewed the NPO Status.      Review of Systems      Physical Exam  General:  Well nourished    Airway/Jaw/Neck:  Airway Findings: Mouth Opening: Normal Tongue: Normal  General Airway Assessment: Adult  Mallampati: II  Improves to II with phonation.  TM Distance: Normal, at least 6 cm      Dental:  Dental Findings: In tact   Chest/Lungs:  Chest/Lungs Findings: Clear to auscultation     Heart/Vascular:  Heart Findings: Rate: Normal  Rhythm: Regular Rhythm  Sounds: Normal        Mental Status:  Mental Status Findings:  Cooperative, Alert and Oriented         Anesthesia Plan  Type of Anesthesia, risks & benefits discussed:  Anesthesia Type:  general  Patient's Preference: General  Intra-op Monitoring Plan: standard ASA monitors  Intra-op Monitoring Plan Comments:   Post Op Pain Control Plan: per primary service following discharge from PACU  Post Op Pain Control Plan Comments:   Induction:   IV  Beta Blocker:  Patient is not currently on a Beta-Blocker (No further documentation required).       Informed Consent: Patient understands risks and agrees with Anesthesia plan.  Questions answered. Anesthesia consent signed with patient.  ASA Score: 3     Day of Surgery Review of History & Physical:    H&P update referred to the surgeon.         Ready For Surgery From Anesthesia Perspective.

## 2020-10-27 NOTE — [employer] PATIENT INSTRUCTIONS
Discharge Summary/Instructions after an Endoscopic Procedure  Patient Name: Sharlene Smith  Patient MRN: [id number]  Patient YOB: 1965 Tuesday, October 27, 2020  Petr Miller MD  RESTRICTIONS:  During your procedure today, you received medications for sedation.  These   medications may affect your judgment, balance and coordination.  Therefore,   for 24 hours, you have the following restrictions:   - DO NOT drive a car, operate machinery, make legal/financial decisions,   sign important papers or drink alcohol.    ACTIVITY:  Today: no heavy lifting, straining or running due to procedural   sedation/anesthesia.  The following day: return to full activity including work.  DIET:  Eat and drink normally unless instructed otherwise.     TREATMENT FOR COMMON SIDE EFFECTS:  - Mild abdominal pain, nausea, belching, bloating or excessive gas:  rest,   eat lightly and use a heating pad.  - Sore Throat: treat with throat lozenges and/or gargle with warm salt   water.  - Because air was used during the procedure, expelling large amounts of air   from your rectum or belching is normal.  - If a bowel prep was taken, you may not have a bowel movement for 1-3 days.    This is normal.  SYMPTOMS TO WATCH FOR AND REPORT TO YOUR PHYSICIAN:  1. Abdominal pain or bloating, other than gas cramps.  2. Chest pain.  3. Back pain.  4. Signs of infection such as: chills or fever occurring within 24 hours   after the procedure.  5. Rectal bleeding, which would show as bright red, maroon, or black stools.   (A tablespoon of blood from the rectum is not serious, especially if   hemorrhoids are present.)  6. Vomiting.  7. Weakness or dizziness.  GO DIRECTLY TO THE NEAREST EMERGENCY ROOM IF YOU HAVE ANY OF THE FOLLOWING:      Difficulty breathing              Chills and/or fever over 101 F   Persistent vomiting and/or vomiting blood   Severe abdominal pain   Severe chest pain   Black, tarry stools   Bleeding- more than one  tablespoon   Any other symptom or condition that you feel may need urgent attention  Your doctor recommends these additional instructions:  If any biopsies were taken, your doctors clinic will contact you in 1 to 2   weeks with any results.  - Patient has a contact number available for emergencies.  The signs and   symptoms of potential delayed complications were discussed with the   patient.  Return to normal activities tomorrow.  Written discharge   instructions were provided to the patient.   - Discharge patient to home.   - Resume previous diet.   - Continue present medications.   - Await pathology results.   - Repeat colonoscopy in 2 years for surveillance.   For questions, problems or results please call your physician - Petr Miller MD at Work:  [phone].  [facility], EMERGENCY ROOM PHONE NUMBER: [phone]  IF A COMPLICATION OR EMERGENCY SITUATION ARISES AND YOU ARE UNABLE TO REACH   YOUR PHYSICIAN - GO DIRECTLY TO THE EMERGENCY ROOM.  Petr Miller MD  10/27/2020 8:13:56 AM  This report has been verified and signed electronically.  [employer]

## 2020-10-27 NOTE — H&P
Short Stay Endoscopy History and Physical    PCP - Mara Chang MD    Procedure - Colonoscopy  Sedation: GA  ASA - per anesthesia  Mallampati - per anesthesia  History of Anesthesia problems - no  Family history Anesthesia problems -  no     HPI:  This is a 55 y.o. female here for evaluation of : Ulcerative Colitis    Reflux - no  Dysphagia - no  Abdominal pain - no  Diarrhea - no    ROS:  Constitutional: No fevers, chills, No weight loss  ENT: No allergies  CV: No chest pain  Pulm: No cough, No shortness of breath  Ophtho: No vision changes  GI: see HPI  Medical History:  has a past medical history of Abnormal Pap smear of vagina (yrs ago), Allergy, Arthritis, Asthma, Hyperlipidemia, Supraventricular tachycardia, SVT (supraventricular tachycardia), Tachycardia, and Ulcerative colitis.    Surgical History:  has a past surgical history that includes Colonoscopy (N/A, 2015);  section, classic; Colonoscopy (N/A, 10/13/2017); Ablation (N/A, 10/8/2018); and Injection of joint (Bilateral, 2019).    Family History: family history includes Allergies in her mother; Breast cancer (age of onset: 47) in her other; Cancer in her mother; Cataracts in her mother; Diabetes in her father; Glaucoma in her maternal grandfather; Heart attack in her father and paternal grandmother; Hyperlipidemia in her mother.. Otherwise no colon cancer, inflammatory bowel disease, or GI malignancies.    Social History:  reports that she quit smoking about 21 years ago. Her smoking use included cigarettes. She has never used smokeless tobacco. She reports current alcohol use of about 7.0 standard drinks of alcohol per week. She reports that she does not use drugs.    Review of patient's allergies indicates:  No Known Allergies    Medications:   Medications Prior to Admission   Medication Sig Dispense Refill Last Dose    atorvastatin (LIPITOR) 20 MG tablet Take 1 tablet (20 mg total) by mouth every evening. 90 tablet 1  10/26/2020 at Unknown time    CAMRESE LO 0.10 mg-20 mcg (84)/10 mcg (7) 3MPk TAKE 1 TABLET DAILY 91 tablet 4 10/26/2020 at Unknown time    levocetirizine (XYZAL) 5 MG tablet Take 1 tablet (5 mg total) by mouth every evening. 30 tablet 11 10/26/2020 at Unknown time    montelukast (SINGULAIR) 10 mg tablet Take 1 tablet (10 mg total) by mouth daily as needed. 90 tablet 3 10/26/2020 at Unknown time    tiZANidine (ZANAFLEX) 4 MG tablet TAKE 1/2 TO 1 TABLET BY MOUTH EVERY 8 HOURS AS NEEDED 270 tablet 2 Past Month at Unknown time    traMADoL (ULTRAM) 50 mg tablet Take 1 tablet (50 mg total) by mouth every 6 (six) hours as needed. 120 tablet 4 10/26/2020 at Unknown time    arm brace Misc Wear wrist splint daily, including while sleeping. Remove to shower       aspirin 81 MG Chew Take 81 mg by mouth once daily.   More than a month at Unknown time    diltiaZEM (CARDIZEM CD) 240 MG 24 hr capsule TAKE 1 CAPSULE DAILY 90 capsule 3     fluconazole (DIFLUCAN) 150 MG Tab        fluticasone propionate (FLONASE) 50 mcg/actuation nasal spray SHAKE LIQUID AND USE 1 SPRAY(50 MCG) IN EACH NOSTRIL EVERY DAY 48 g 12     gabapentin (NEURONTIN) 100 MG capsule Take 1 capsule (100 mg total) by mouth 3 (three) times daily. 90 capsule 11 More than a month at Unknown time    mesalamine (APRISO) 0.375 gram Cp24 Take 4 capsules (1.5 g total) by mouth once daily. (Patient taking differently: Take 0.75 g by mouth once daily. ) 120 capsule 0 More than a month at Unknown time    omeprazole (PRILOSEC) 40 MG capsule    More than a month at Unknown time       Objective Findings:    Vital Signs: Per nursing notes.    Physical Exam:  General Appearance: Well appearing in no acute distress  Head:   Normocephalic, without obvious abnormality  Eyes:    No scleral icterus  Airway: Open  Neck: No restriction in mobility  Lungs: CTA bilaterally in anterior and posterior fields, no wheezes, no crackles.  Heart:  Regular rate and rhythm, S1, S2 normal,  no murmurs heard  Abdomen: Soft, non tender, non distended      Labs:  Lab Results   Component Value Date    WBC 10.71 09/09/2020    HGB 13.0 09/09/2020    HCT 40.4 09/09/2020     (H) 09/09/2020    CHOL 162 09/09/2020    TRIG 115 09/09/2020    HDL 55 09/09/2020    ALT 10 09/09/2020    AST 11 09/09/2020     09/09/2020    K 4.1 09/09/2020     09/09/2020    CREATININE 0.7 09/09/2020    BUN 8 09/09/2020    CO2 23 09/09/2020    TSH 0.957 09/09/2020    INR 0.9 10/01/2018    HGBA1C 5.8 (H) 08/31/2019         I have explained the risks and benefits of endoscopy procedures to the patient including but not limited to bleeding, perforation, infection, and death.    Thank you so much for allowing me to participate in the care of Sharlene Miller MD

## 2020-10-27 NOTE — TRANSFER OF CARE
"Anesthesia Transfer of Care Note    Patient: Sharlene Smith    Procedure(s) Performed: Procedure(s) (LRB):  COLONOSCOPY (N/A)    Patient location: PACU    Anesthesia Type: general    Transport from OR: Transported from OR on 6-10 L/min O2 by face mask with adequate spontaneous ventilation    Post pain: adequate analgesia    Post assessment: no apparent anesthetic complications and tolerated procedure well    Post vital signs: stable    Level of consciousness: awake, alert and oriented    Nausea/Vomiting: no nausea/vomiting    Complications: none    Transfer of care protocol was followed      Last vitals:   Visit Vitals  BP (!) 105/56 (BP Location: Left arm, Patient Position: Lying)   Pulse 97   Temp 36.9 °C (98.4 °F) (Temporal)   Resp 14   Ht 5' 4" (1.626 m)   Wt 77.1 kg (170 lb)   LMP 10/27/2019   SpO2 100%   Breastfeeding No   BMI 29.18 kg/m²     "

## 2020-10-27 NOTE — ANESTHESIA POSTPROCEDURE EVALUATION
Anesthesia Post Evaluation    Patient: Sharlene Smith    Procedure(s) Performed: Procedure(s) (LRB):  COLONOSCOPY (N/A)    Final Anesthesia Type: general    Patient location during evaluation: PACU  Patient participation: Yes- Able to Participate  Level of consciousness: awake and alert and oriented  Post-procedure vital signs: reviewed and stable  Pain management: adequate  Airway patency: patent    PONV status at discharge: No PONV  Anesthetic complications: no      Cardiovascular status: hemodynamically stable  Respiratory status: nasal cannula  Hydration status: euvolemic  Follow-up not needed.          Vitals Value Taken Time   /78 10/27/20 0844   Temp 36.9 °C (98.4 °F) 10/27/20 0844   Pulse 90 10/27/20 0844   Resp 14 10/27/20 0844   SpO2 98 % 10/27/20 0844         Event Time   Out of Recovery 08:45:46         Pain/Sulma Score: Sulma Score: 10 (10/27/2020  8:45 AM)

## 2020-11-03 LAB
FINAL PATHOLOGIC DIAGNOSIS: NORMAL
GROSS: NORMAL
Lab: NORMAL
MICROSCOPIC EXAM: NORMAL

## 2020-11-04 ENCOUNTER — TELEPHONE (OUTPATIENT)
Dept: GASTROENTEROLOGY | Facility: CLINIC | Age: 55
End: 2020-11-04

## 2020-11-04 DIAGNOSIS — K51.80 OTHER ULCERATIVE COLITIS WITHOUT COMPLICATION: ICD-10-CM

## 2020-11-04 RX ORDER — MESALAMINE 0.38 G/1
0.75 CAPSULE, EXTENDED RELEASE ORAL DAILY
Status: CANCELLED | OUTPATIENT
Start: 2020-11-04

## 2020-11-04 NOTE — TELEPHONE ENCOUNTER
----- Message from Petr Miller MD sent at 11/4/2020  9:01 AM CST -----  Colon biopsies are stable. Repeat colonoscopy in 2 years.

## 2020-11-04 NOTE — TELEPHONE ENCOUNTER
Spoke with patient , results given as written by Dr. Miller  Pt verbalizes understadning and appreciates the call.  Thanks MA

## 2020-11-19 ENCOUNTER — PATIENT MESSAGE (OUTPATIENT)
Dept: OTHER | Facility: OTHER | Age: 55
End: 2020-11-19

## 2021-01-12 ENCOUNTER — OFFICE VISIT (OUTPATIENT)
Dept: INTERNAL MEDICINE | Facility: CLINIC | Age: 56
End: 2021-01-12
Payer: OTHER GOVERNMENT

## 2021-01-12 ENCOUNTER — LAB VISIT (OUTPATIENT)
Dept: LAB | Facility: HOSPITAL | Age: 56
End: 2021-01-12
Attending: INTERNAL MEDICINE
Payer: OTHER GOVERNMENT

## 2021-01-12 VITALS
SYSTOLIC BLOOD PRESSURE: 120 MMHG | DIASTOLIC BLOOD PRESSURE: 80 MMHG | BODY MASS INDEX: 26.12 KG/M2 | TEMPERATURE: 97 F | HEART RATE: 94 BPM | WEIGHT: 176.38 LBS | HEIGHT: 69 IN | RESPIRATION RATE: 16 BRPM | OXYGEN SATURATION: 98 %

## 2021-01-12 DIAGNOSIS — N30.00 ACUTE CYSTITIS WITHOUT HEMATURIA: Primary | ICD-10-CM

## 2021-01-12 DIAGNOSIS — N30.00 ACUTE CYSTITIS WITHOUT HEMATURIA: ICD-10-CM

## 2021-01-12 LAB
BACTERIA #/AREA URNS AUTO: ABNORMAL /HPF
BILIRUB UR QL STRIP: NEGATIVE
CLARITY UR REFRACT.AUTO: ABNORMAL
COLOR UR AUTO: YELLOW
GLUCOSE UR QL STRIP: NEGATIVE
HGB UR QL STRIP: ABNORMAL
HYALINE CASTS UR QL AUTO: 0 /LPF
KETONES UR QL STRIP: NEGATIVE
LEUKOCYTE ESTERASE UR QL STRIP: ABNORMAL
MICROSCOPIC COMMENT: ABNORMAL
NITRITE UR QL STRIP: NEGATIVE
PH UR STRIP: 7 [PH] (ref 5–8)
PROT UR QL STRIP: ABNORMAL
RBC #/AREA URNS AUTO: >100 /HPF (ref 0–4)
SP GR UR STRIP: 1.02 (ref 1–1.03)
SQUAMOUS #/AREA URNS AUTO: 5 /HPF
URN SPEC COLLECT METH UR: ABNORMAL
WBC #/AREA URNS AUTO: >100 /HPF (ref 0–5)

## 2021-01-12 PROCEDURE — 87077 CULTURE AEROBIC IDENTIFY: CPT

## 2021-01-12 PROCEDURE — 99999 PR PBB SHADOW E&M-EST. PATIENT-LVL IV: CPT | Mod: PBBFAC,,, | Performed by: INTERNAL MEDICINE

## 2021-01-12 PROCEDURE — 99214 OFFICE O/P EST MOD 30 MIN: CPT | Mod: S$PBB,,, | Performed by: INTERNAL MEDICINE

## 2021-01-12 PROCEDURE — 99214 PR OFFICE/OUTPT VISIT, EST, LEVL IV, 30-39 MIN: ICD-10-PCS | Mod: S$PBB,,, | Performed by: INTERNAL MEDICINE

## 2021-01-12 PROCEDURE — 81001 URINALYSIS AUTO W/SCOPE: CPT

## 2021-01-12 PROCEDURE — 87088 URINE BACTERIA CULTURE: CPT

## 2021-01-12 PROCEDURE — 99999 PR PBB SHADOW E&M-EST. PATIENT-LVL IV: ICD-10-PCS | Mod: PBBFAC,,, | Performed by: INTERNAL MEDICINE

## 2021-01-12 PROCEDURE — 87186 SC STD MICRODIL/AGAR DIL: CPT

## 2021-01-12 PROCEDURE — 99214 OFFICE O/P EST MOD 30 MIN: CPT | Mod: PBBFAC,PO | Performed by: INTERNAL MEDICINE

## 2021-01-12 PROCEDURE — 87086 URINE CULTURE/COLONY COUNT: CPT

## 2021-01-12 RX ORDER — AMOXICILLIN AND CLAVULANATE POTASSIUM 875; 125 MG/1; MG/1
1 TABLET, FILM COATED ORAL 2 TIMES DAILY
Qty: 14 TABLET | Refills: 0 | Status: SHIPPED | OUTPATIENT
Start: 2021-01-12 | End: 2021-01-19

## 2021-01-15 LAB — BACTERIA UR CULT: ABNORMAL

## 2021-02-12 ENCOUNTER — OFFICE VISIT (OUTPATIENT)
Dept: PRIMARY CARE CLINIC | Facility: CLINIC | Age: 56
End: 2021-02-12
Payer: OTHER GOVERNMENT

## 2021-02-12 VITALS
BODY MASS INDEX: 26.94 KG/M2 | DIASTOLIC BLOOD PRESSURE: 88 MMHG | HEIGHT: 69 IN | OXYGEN SATURATION: 100 % | SYSTOLIC BLOOD PRESSURE: 142 MMHG | TEMPERATURE: 98 F | HEART RATE: 92 BPM | WEIGHT: 181.88 LBS

## 2021-02-12 DIAGNOSIS — Z78.0 MENOPAUSE: Primary | ICD-10-CM

## 2021-02-12 DIAGNOSIS — M25.559 ARTHRALGIA OF HIP, UNSPECIFIED LATERALITY: ICD-10-CM

## 2021-02-12 PROCEDURE — 99999 PR PBB SHADOW E&M-EST. PATIENT-LVL IV: ICD-10-PCS | Mod: PBBFAC,,, | Performed by: FAMILY MEDICINE

## 2021-02-12 PROCEDURE — 99214 OFFICE O/P EST MOD 30 MIN: CPT | Mod: PBBFAC,PN | Performed by: FAMILY MEDICINE

## 2021-02-12 PROCEDURE — 99214 OFFICE O/P EST MOD 30 MIN: CPT | Mod: S$PBB,,, | Performed by: FAMILY MEDICINE

## 2021-02-12 PROCEDURE — 99999 PR PBB SHADOW E&M-EST. PATIENT-LVL IV: CPT | Mod: PBBFAC,,, | Performed by: FAMILY MEDICINE

## 2021-02-12 PROCEDURE — 99214 PR OFFICE/OUTPT VISIT, EST, LEVL IV, 30-39 MIN: ICD-10-PCS | Mod: S$PBB,,, | Performed by: FAMILY MEDICINE

## 2021-02-12 RX ORDER — TRAMADOL HYDROCHLORIDE 50 MG/1
50 TABLET ORAL EVERY 6 HOURS PRN
Qty: 120 TABLET | Refills: 4 | Status: SHIPPED | OUTPATIENT
Start: 2021-02-12 | End: 2021-07-08 | Stop reason: SDUPTHER

## 2021-02-23 ENCOUNTER — PATIENT MESSAGE (OUTPATIENT)
Dept: RHEUMATOLOGY | Facility: CLINIC | Age: 56
End: 2021-02-23

## 2021-04-01 ENCOUNTER — PATIENT MESSAGE (OUTPATIENT)
Dept: PRIMARY CARE CLINIC | Facility: CLINIC | Age: 56
End: 2021-04-01

## 2021-04-01 DIAGNOSIS — J32.9 SINUSITIS, UNSPECIFIED CHRONICITY, UNSPECIFIED LOCATION: Primary | ICD-10-CM

## 2021-04-05 ENCOUNTER — TELEPHONE (OUTPATIENT)
Dept: OTOLARYNGOLOGY | Facility: CLINIC | Age: 56
End: 2021-04-05

## 2021-04-06 ENCOUNTER — TELEPHONE (OUTPATIENT)
Dept: OTOLARYNGOLOGY | Facility: CLINIC | Age: 56
End: 2021-04-06

## 2021-04-07 ENCOUNTER — OFFICE VISIT (OUTPATIENT)
Dept: OTOLARYNGOLOGY | Facility: CLINIC | Age: 56
End: 2021-04-07
Payer: OTHER GOVERNMENT

## 2021-04-07 DIAGNOSIS — J31.0 CHRONIC RHINITIS: Primary | ICD-10-CM

## 2021-04-07 DIAGNOSIS — H93.13 TINNITUS OF BOTH EARS: ICD-10-CM

## 2021-04-07 PROCEDURE — 99999 PR PBB SHADOW E&M-EST. PATIENT-LVL III: CPT | Mod: PBBFAC,,, | Performed by: OTOLARYNGOLOGY

## 2021-04-07 PROCEDURE — 99244 PR OFFICE CONSULTATION,LEVEL IV: ICD-10-PCS | Mod: S$PBB,,, | Performed by: OTOLARYNGOLOGY

## 2021-04-07 PROCEDURE — 99999 PR PBB SHADOW E&M-EST. PATIENT-LVL III: ICD-10-PCS | Mod: PBBFAC,,, | Performed by: OTOLARYNGOLOGY

## 2021-04-07 PROCEDURE — 99213 OFFICE O/P EST LOW 20 MIN: CPT | Mod: PBBFAC,PN | Performed by: OTOLARYNGOLOGY

## 2021-04-07 PROCEDURE — 99244 OFF/OP CNSLTJ NEW/EST MOD 40: CPT | Mod: S$PBB,,, | Performed by: OTOLARYNGOLOGY

## 2021-04-07 RX ORDER — AZELASTINE 1 MG/ML
1 SPRAY, METERED NASAL 2 TIMES DAILY
Qty: 30 ML | Refills: 11 | Status: SHIPPED | OUTPATIENT
Start: 2021-04-07 | End: 2022-06-23 | Stop reason: SDUPTHER

## 2021-04-07 RX ORDER — METHYLPREDNISOLONE 4 MG/1
TABLET ORAL
Qty: 1 PACKAGE | Refills: 0 | Status: SHIPPED | OUTPATIENT
Start: 2021-04-07 | End: 2021-06-07 | Stop reason: ALTCHOICE

## 2021-05-06 ENCOUNTER — PATIENT MESSAGE (OUTPATIENT)
Dept: PRIMARY CARE CLINIC | Facility: CLINIC | Age: 56
End: 2021-05-06

## 2021-05-06 ENCOUNTER — OFFICE VISIT (OUTPATIENT)
Dept: URGENT CARE | Facility: CLINIC | Age: 56
End: 2021-05-06
Payer: OTHER GOVERNMENT

## 2021-05-06 VITALS
RESPIRATION RATE: 19 BRPM | SYSTOLIC BLOOD PRESSURE: 128 MMHG | HEART RATE: 92 BPM | WEIGHT: 172 LBS | TEMPERATURE: 98 F | BODY MASS INDEX: 29.37 KG/M2 | HEIGHT: 64 IN | DIASTOLIC BLOOD PRESSURE: 73 MMHG | OXYGEN SATURATION: 99 %

## 2021-05-06 DIAGNOSIS — R30.0 DYSURIA: ICD-10-CM

## 2021-05-06 DIAGNOSIS — N30.01 ACUTE CYSTITIS WITH HEMATURIA: Primary | ICD-10-CM

## 2021-05-06 LAB
BILIRUB UR QL STRIP: POSITIVE
GLUCOSE UR QL STRIP: NEGATIVE
KETONES UR QL STRIP: POSITIVE
LEUKOCYTE ESTERASE UR QL STRIP: POSITIVE
PH, POC UA: 6.5
POC BLOOD, URINE: POSITIVE
POC NITRATES, URINE: NEGATIVE
PROT UR QL STRIP: POSITIVE
SP GR UR STRIP: 1.02 (ref 1–1.03)
UROBILINOGEN UR STRIP-ACNC: ABNORMAL (ref 0.1–1.1)

## 2021-05-06 PROCEDURE — 87186 SC STD MICRODIL/AGAR DIL: CPT | Performed by: NURSE PRACTITIONER

## 2021-05-06 PROCEDURE — 87088 URINE BACTERIA CULTURE: CPT | Performed by: NURSE PRACTITIONER

## 2021-05-06 PROCEDURE — 87077 CULTURE AEROBIC IDENTIFY: CPT | Performed by: NURSE PRACTITIONER

## 2021-05-06 PROCEDURE — 81003 POCT URINALYSIS, DIPSTICK, AUTOMATED, W/O SCOPE: ICD-10-PCS | Mod: QW,S$GLB,, | Performed by: NURSE PRACTITIONER

## 2021-05-06 PROCEDURE — 81003 URINALYSIS AUTO W/O SCOPE: CPT | Mod: QW,S$GLB,, | Performed by: NURSE PRACTITIONER

## 2021-05-06 PROCEDURE — 99203 PR OFFICE/OUTPT VISIT, NEW, LEVL III, 30-44 MIN: ICD-10-PCS | Mod: 25,S$GLB,, | Performed by: NURSE PRACTITIONER

## 2021-05-06 PROCEDURE — 87086 URINE CULTURE/COLONY COUNT: CPT | Performed by: NURSE PRACTITIONER

## 2021-05-06 PROCEDURE — 99203 OFFICE O/P NEW LOW 30 MIN: CPT | Mod: 25,S$GLB,, | Performed by: NURSE PRACTITIONER

## 2021-05-06 RX ORDER — SULFAMETHOXAZOLE AND TRIMETHOPRIM 800; 160 MG/1; MG/1
1 TABLET ORAL 2 TIMES DAILY
Qty: 14 TABLET | Refills: 0 | Status: SHIPPED | OUTPATIENT
Start: 2021-05-06 | End: 2021-05-13

## 2021-05-06 RX ORDER — PHENAZOPYRIDINE HYDROCHLORIDE 200 MG/1
200 TABLET, FILM COATED ORAL 3 TIMES DAILY PRN
Qty: 6 TABLET | Refills: 0 | Status: SHIPPED | OUTPATIENT
Start: 2021-05-06 | End: 2021-05-08

## 2021-05-09 LAB — BACTERIA UR CULT: ABNORMAL

## 2021-05-10 ENCOUNTER — TELEPHONE (OUTPATIENT)
Dept: URGENT CARE | Facility: CLINIC | Age: 56
End: 2021-05-10

## 2021-05-11 ENCOUNTER — TELEPHONE (OUTPATIENT)
Dept: URGENT CARE | Facility: CLINIC | Age: 56
End: 2021-05-11

## 2021-05-12 ENCOUNTER — TELEPHONE (OUTPATIENT)
Dept: URGENT CARE | Facility: CLINIC | Age: 56
End: 2021-05-12

## 2021-06-07 ENCOUNTER — OFFICE VISIT (OUTPATIENT)
Dept: INTERNAL MEDICINE | Facility: CLINIC | Age: 56
End: 2021-06-07
Payer: OTHER GOVERNMENT

## 2021-06-07 VITALS
HEIGHT: 64 IN | TEMPERATURE: 98 F | WEIGHT: 176.56 LBS | BODY MASS INDEX: 30.14 KG/M2 | DIASTOLIC BLOOD PRESSURE: 90 MMHG | OXYGEN SATURATION: 98 % | HEART RATE: 106 BPM | SYSTOLIC BLOOD PRESSURE: 120 MMHG

## 2021-06-07 DIAGNOSIS — G56.21 CUBITAL TUNNEL SYNDROME ON RIGHT: Primary | ICD-10-CM

## 2021-06-07 PROCEDURE — 99999 PR PBB SHADOW E&M-EST. PATIENT-LVL V: ICD-10-PCS | Mod: PBBFAC,,, | Performed by: STUDENT IN AN ORGANIZED HEALTH CARE EDUCATION/TRAINING PROGRAM

## 2021-06-07 PROCEDURE — 99213 PR OFFICE/OUTPT VISIT, EST, LEVL III, 20-29 MIN: ICD-10-PCS | Mod: S$PBB,,, | Performed by: STUDENT IN AN ORGANIZED HEALTH CARE EDUCATION/TRAINING PROGRAM

## 2021-06-07 PROCEDURE — 99215 OFFICE O/P EST HI 40 MIN: CPT | Mod: PBBFAC,PO | Performed by: STUDENT IN AN ORGANIZED HEALTH CARE EDUCATION/TRAINING PROGRAM

## 2021-06-07 PROCEDURE — 99213 OFFICE O/P EST LOW 20 MIN: CPT | Mod: S$PBB,,, | Performed by: STUDENT IN AN ORGANIZED HEALTH CARE EDUCATION/TRAINING PROGRAM

## 2021-06-07 PROCEDURE — 99999 PR PBB SHADOW E&M-EST. PATIENT-LVL V: CPT | Mod: PBBFAC,,, | Performed by: STUDENT IN AN ORGANIZED HEALTH CARE EDUCATION/TRAINING PROGRAM

## 2021-06-07 RX ORDER — PROMETHAZINE HYDROCHLORIDE AND DEXTROMETHORPHAN HYDROBROMIDE 6.25; 15 MG/5ML; MG/5ML
5 SYRUP ORAL EVERY 6 HOURS PRN
COMMUNITY
Start: 2021-03-08 | End: 2021-09-20

## 2021-06-07 RX ORDER — ALBUTEROL SULFATE 90 UG/1
AEROSOL, METERED RESPIRATORY (INHALATION)
COMMUNITY
Start: 2021-03-09 | End: 2022-01-03

## 2021-06-07 RX ORDER — PROMETHAZINE HYDROCHLORIDE AND DEXTROMETHORPHAN HYDROBROMIDE 6.25; 15 MG/5ML; MG/5ML
SYRUP ORAL
COMMUNITY
Start: 2021-03-08 | End: 2021-09-21

## 2021-06-07 RX ORDER — ALBUTEROL SULFATE 90 UG/1
AEROSOL, METERED RESPIRATORY (INHALATION)
COMMUNITY
Start: 2021-03-08 | End: 2022-01-03

## 2021-06-18 DIAGNOSIS — Z00.00 ANNUAL PHYSICAL EXAM: Primary | ICD-10-CM

## 2021-06-28 ENCOUNTER — LAB VISIT (OUTPATIENT)
Dept: LAB | Facility: HOSPITAL | Age: 56
End: 2021-06-28
Attending: INTERNAL MEDICINE
Payer: OTHER GOVERNMENT

## 2021-06-28 DIAGNOSIS — Z00.00 ANNUAL PHYSICAL EXAM: ICD-10-CM

## 2021-06-28 LAB
ALBUMIN SERPL BCP-MCNC: 3.5 G/DL (ref 3.5–5.2)
ALP SERPL-CCNC: 78 U/L (ref 55–135)
ALT SERPL W/O P-5'-P-CCNC: 11 U/L (ref 10–44)
ANION GAP SERPL CALC-SCNC: 11 MMOL/L (ref 8–16)
AST SERPL-CCNC: 11 U/L (ref 10–40)
BASOPHILS # BLD AUTO: 0.06 K/UL (ref 0–0.2)
BASOPHILS NFR BLD: 0.6 % (ref 0–1.9)
BILIRUB SERPL-MCNC: 0.4 MG/DL (ref 0.1–1)
BUN SERPL-MCNC: 12 MG/DL (ref 6–20)
CALCIUM SERPL-MCNC: 9.2 MG/DL (ref 8.7–10.5)
CHLORIDE SERPL-SCNC: 106 MMOL/L (ref 95–110)
CHOLEST SERPL-MCNC: 166 MG/DL (ref 120–199)
CHOLEST/HDLC SERPL: 3.1 {RATIO} (ref 2–5)
CO2 SERPL-SCNC: 21 MMOL/L (ref 23–29)
COMPLEXED PSA SERPL-MCNC: 0.04 NG/ML
CREAT SERPL-MCNC: 0.8 MG/DL (ref 0.5–1.4)
DIFFERENTIAL METHOD: ABNORMAL
EOSINOPHIL # BLD AUTO: 0.3 K/UL (ref 0–0.5)
EOSINOPHIL NFR BLD: 3.3 % (ref 0–8)
ERYTHROCYTE [DISTWIDTH] IN BLOOD BY AUTOMATED COUNT: 12.1 % (ref 11.5–14.5)
EST. GFR  (AFRICAN AMERICAN): >60 ML/MIN/1.73 M^2
EST. GFR  (NON AFRICAN AMERICAN): >60 ML/MIN/1.73 M^2
ESTIMATED AVG GLUCOSE: 120 MG/DL (ref 68–131)
GLUCOSE SERPL-MCNC: 96 MG/DL (ref 70–110)
HBA1C MFR BLD: 5.8 % (ref 4–5.6)
HCT VFR BLD AUTO: 37.3 % (ref 37–48.5)
HDLC SERPL-MCNC: 54 MG/DL (ref 40–75)
HDLC SERPL: 32.5 % (ref 20–50)
HGB BLD-MCNC: 11.9 G/DL (ref 12–16)
IMM GRANULOCYTES # BLD AUTO: 0.02 K/UL (ref 0–0.04)
IMM GRANULOCYTES NFR BLD AUTO: 0.2 % (ref 0–0.5)
LDLC SERPL CALC-MCNC: 90.8 MG/DL (ref 63–159)
LYMPHOCYTES # BLD AUTO: 4.2 K/UL (ref 1–4.8)
LYMPHOCYTES NFR BLD: 41.1 % (ref 18–48)
MCH RBC QN AUTO: 30 PG (ref 27–31)
MCHC RBC AUTO-ENTMCNC: 31.9 G/DL (ref 32–36)
MCV RBC AUTO: 94 FL (ref 82–98)
MONOCYTES # BLD AUTO: 0.4 K/UL (ref 0.3–1)
MONOCYTES NFR BLD: 3.9 % (ref 4–15)
NEUTROPHILS # BLD AUTO: 5.2 K/UL (ref 1.8–7.7)
NEUTROPHILS NFR BLD: 50.9 % (ref 38–73)
NONHDLC SERPL-MCNC: 112 MG/DL
NRBC BLD-RTO: 0 /100 WBC
PLATELET # BLD AUTO: 406 K/UL (ref 150–450)
PLATELET BLD QL SMEAR: ABNORMAL
PMV BLD AUTO: 10 FL (ref 9.2–12.9)
POTASSIUM SERPL-SCNC: 3.7 MMOL/L (ref 3.5–5.1)
PROT SERPL-MCNC: 7.1 G/DL (ref 6–8.4)
RBC # BLD AUTO: 3.97 M/UL (ref 4–5.4)
SODIUM SERPL-SCNC: 138 MMOL/L (ref 136–145)
TRIGL SERPL-MCNC: 106 MG/DL (ref 30–150)
TSH SERPL DL<=0.005 MIU/L-ACNC: 1.95 UIU/ML (ref 0.4–4)
WBC # BLD AUTO: 10.19 K/UL (ref 3.9–12.7)

## 2021-06-28 PROCEDURE — 80061 LIPID PANEL: CPT | Performed by: INTERNAL MEDICINE

## 2021-06-28 PROCEDURE — 83036 HEMOGLOBIN GLYCOSYLATED A1C: CPT | Performed by: INTERNAL MEDICINE

## 2021-06-28 PROCEDURE — 36415 COLL VENOUS BLD VENIPUNCTURE: CPT | Mod: PN | Performed by: INTERNAL MEDICINE

## 2021-06-28 PROCEDURE — 84153 ASSAY OF PSA TOTAL: CPT | Performed by: INTERNAL MEDICINE

## 2021-06-28 PROCEDURE — 84443 ASSAY THYROID STIM HORMONE: CPT | Performed by: INTERNAL MEDICINE

## 2021-06-28 PROCEDURE — 85025 COMPLETE CBC W/AUTO DIFF WBC: CPT | Performed by: INTERNAL MEDICINE

## 2021-06-28 PROCEDURE — 80053 COMPREHEN METABOLIC PANEL: CPT | Performed by: INTERNAL MEDICINE

## 2021-07-08 ENCOUNTER — OFFICE VISIT (OUTPATIENT)
Dept: INTERNAL MEDICINE | Facility: CLINIC | Age: 56
End: 2021-07-08
Payer: OTHER GOVERNMENT

## 2021-07-08 ENCOUNTER — LAB VISIT (OUTPATIENT)
Dept: LAB | Facility: HOSPITAL | Age: 56
End: 2021-07-08
Attending: INTERNAL MEDICINE
Payer: OTHER GOVERNMENT

## 2021-07-08 VITALS
RESPIRATION RATE: 18 BRPM | SYSTOLIC BLOOD PRESSURE: 130 MMHG | OXYGEN SATURATION: 98 % | BODY MASS INDEX: 29.96 KG/M2 | DIASTOLIC BLOOD PRESSURE: 88 MMHG | WEIGHT: 175.5 LBS | HEART RATE: 84 BPM | TEMPERATURE: 98 F | HEIGHT: 64 IN

## 2021-07-08 DIAGNOSIS — M25.559 ARTHRALGIA OF HIP, UNSPECIFIED LATERALITY: ICD-10-CM

## 2021-07-08 DIAGNOSIS — Z00.00 ANNUAL PHYSICAL EXAM: Primary | ICD-10-CM

## 2021-07-08 DIAGNOSIS — Z12.31 VISIT FOR SCREENING MAMMOGRAM: ICD-10-CM

## 2021-07-08 DIAGNOSIS — D64.9 ANEMIA, UNSPECIFIED TYPE: ICD-10-CM

## 2021-07-08 DIAGNOSIS — G89.29 OTHER CHRONIC PAIN: ICD-10-CM

## 2021-07-08 DIAGNOSIS — J30.2 SEASONAL ALLERGIES: ICD-10-CM

## 2021-07-08 LAB
BASOPHILS # BLD AUTO: 0.06 K/UL (ref 0–0.2)
BASOPHILS NFR BLD: 0.5 % (ref 0–1.9)
DIFFERENTIAL METHOD: ABNORMAL
EOSINOPHIL # BLD AUTO: 0.2 K/UL (ref 0–0.5)
EOSINOPHIL NFR BLD: 1.4 % (ref 0–8)
ERYTHROCYTE [DISTWIDTH] IN BLOOD BY AUTOMATED COUNT: 12.3 % (ref 11.5–14.5)
HCT VFR BLD AUTO: 40.1 % (ref 37–48.5)
HGB BLD-MCNC: 13 G/DL (ref 12–16)
IMM GRANULOCYTES # BLD AUTO: 0.02 K/UL (ref 0–0.04)
IMM GRANULOCYTES NFR BLD AUTO: 0.2 % (ref 0–0.5)
LYMPHOCYTES # BLD AUTO: 3.1 K/UL (ref 1–4.8)
LYMPHOCYTES NFR BLD: 25.5 % (ref 18–48)
MCH RBC QN AUTO: 30.4 PG (ref 27–31)
MCHC RBC AUTO-ENTMCNC: 32.4 G/DL (ref 32–36)
MCV RBC AUTO: 94 FL (ref 82–98)
MONOCYTES # BLD AUTO: 0.4 K/UL (ref 0.3–1)
MONOCYTES NFR BLD: 3.3 % (ref 4–15)
NEUTROPHILS # BLD AUTO: 8.5 K/UL (ref 1.8–7.7)
NEUTROPHILS NFR BLD: 69.1 % (ref 38–73)
NRBC BLD-RTO: 0 /100 WBC
PLATELET # BLD AUTO: 526 K/UL (ref 150–450)
PMV BLD AUTO: 10.1 FL (ref 9.2–12.9)
RBC # BLD AUTO: 4.28 M/UL (ref 4–5.4)
WBC # BLD AUTO: 12.26 K/UL (ref 3.9–12.7)

## 2021-07-08 PROCEDURE — 99999 PR PBB SHADOW E&M-EST. PATIENT-LVL V: CPT | Mod: PBBFAC,,, | Performed by: INTERNAL MEDICINE

## 2021-07-08 PROCEDURE — 99396 PREV VISIT EST AGE 40-64: CPT | Mod: S$PBB,,, | Performed by: INTERNAL MEDICINE

## 2021-07-08 PROCEDURE — 99396 PR PREVENTIVE VISIT,EST,40-64: ICD-10-PCS | Mod: S$PBB,,, | Performed by: INTERNAL MEDICINE

## 2021-07-08 PROCEDURE — 83540 ASSAY OF IRON: CPT | Performed by: INTERNAL MEDICINE

## 2021-07-08 PROCEDURE — 36415 COLL VENOUS BLD VENIPUNCTURE: CPT | Mod: PO | Performed by: INTERNAL MEDICINE

## 2021-07-08 PROCEDURE — 99215 OFFICE O/P EST HI 40 MIN: CPT | Mod: PBBFAC,PO | Performed by: INTERNAL MEDICINE

## 2021-07-08 PROCEDURE — 85025 COMPLETE CBC W/AUTO DIFF WBC: CPT | Performed by: INTERNAL MEDICINE

## 2021-07-08 PROCEDURE — 82728 ASSAY OF FERRITIN: CPT | Performed by: INTERNAL MEDICINE

## 2021-07-08 PROCEDURE — 99999 PR PBB SHADOW E&M-EST. PATIENT-LVL V: ICD-10-PCS | Mod: PBBFAC,,, | Performed by: INTERNAL MEDICINE

## 2021-07-08 RX ORDER — VARICELLA-ZOSTER GE VAC,2 OF 2 50 MCG
1 VIAL (EA) INTRAMUSCULAR ONCE
Qty: 1 EACH | Refills: 1 | Status: SHIPPED | OUTPATIENT
Start: 2021-07-08 | End: 2021-07-08

## 2021-07-08 RX ORDER — TRAMADOL HYDROCHLORIDE 50 MG/1
50 TABLET ORAL EVERY 6 HOURS PRN
Qty: 120 TABLET | Refills: 0 | Status: SHIPPED | OUTPATIENT
Start: 2021-07-08 | End: 2021-08-06 | Stop reason: SDUPTHER

## 2021-07-09 LAB
FERRITIN SERPL-MCNC: 68 NG/ML (ref 20–300)
IRON SERPL-MCNC: 87 UG/DL (ref 30–160)
SATURATED IRON: 20 % (ref 20–50)
TOTAL IRON BINDING CAPACITY: 431 UG/DL (ref 250–450)
TRANSFERRIN SERPL-MCNC: 291 MG/DL (ref 200–375)

## 2021-07-20 ENCOUNTER — OFFICE VISIT (OUTPATIENT)
Dept: OBSTETRICS AND GYNECOLOGY | Facility: CLINIC | Age: 56
End: 2021-07-20
Attending: INTERNAL MEDICINE
Payer: OTHER GOVERNMENT

## 2021-07-20 VITALS
DIASTOLIC BLOOD PRESSURE: 80 MMHG | WEIGHT: 175.63 LBS | BODY MASS INDEX: 30.14 KG/M2 | SYSTOLIC BLOOD PRESSURE: 136 MMHG

## 2021-07-20 DIAGNOSIS — Z78.0 MENOPAUSE: Primary | ICD-10-CM

## 2021-07-20 DIAGNOSIS — Z30.018 ENCOUNTER FOR INITIAL PRESCRIPTION OF OTHER CONTRACEPTIVES: ICD-10-CM

## 2021-07-20 DIAGNOSIS — Z71.89 ENCOUNTER FOR MEDICATION COUNSELING: ICD-10-CM

## 2021-07-20 PROCEDURE — 99999 PR PBB SHADOW E&M-EST. PATIENT-LVL III: CPT | Mod: PBBFAC,,, | Performed by: NURSE PRACTITIONER

## 2021-07-20 PROCEDURE — 99999 PR PBB SHADOW E&M-EST. PATIENT-LVL III: ICD-10-PCS | Mod: PBBFAC,,, | Performed by: NURSE PRACTITIONER

## 2021-07-20 PROCEDURE — 99213 OFFICE O/P EST LOW 20 MIN: CPT | Mod: PBBFAC,PN | Performed by: NURSE PRACTITIONER

## 2021-07-20 PROCEDURE — 99203 OFFICE O/P NEW LOW 30 MIN: CPT | Mod: S$PBB,,, | Performed by: NURSE PRACTITIONER

## 2021-07-20 PROCEDURE — 99203 PR OFFICE/OUTPT VISIT, NEW, LEVL III, 30-44 MIN: ICD-10-PCS | Mod: S$PBB,,, | Performed by: NURSE PRACTITIONER

## 2021-07-20 RX ORDER — LACTIC ACID, L-, CITRIC ACID MONOHYDRATE, AND POTASSIUM BITARTRATE 90; 50; 20 MG/5G; MG/5G; MG/5G
1 GEL VAGINAL
Qty: 60 G | Refills: 6 | Status: SHIPPED | OUTPATIENT
Start: 2021-07-20 | End: 2023-03-13

## 2021-07-22 ENCOUNTER — PATIENT MESSAGE (OUTPATIENT)
Dept: PRIMARY CARE CLINIC | Facility: CLINIC | Age: 56
End: 2021-07-22

## 2021-07-22 DIAGNOSIS — K51.80 OTHER ULCERATIVE COLITIS WITHOUT COMPLICATION: ICD-10-CM

## 2021-07-23 RX ORDER — MESALAMINE 0.38 G/1
1.5 CAPSULE, EXTENDED RELEASE ORAL DAILY
Qty: 120 CAPSULE | Refills: 0 | OUTPATIENT
Start: 2021-07-23

## 2021-07-26 ENCOUNTER — PATIENT MESSAGE (OUTPATIENT)
Dept: PRIMARY CARE CLINIC | Facility: CLINIC | Age: 56
End: 2021-07-26

## 2021-07-26 DIAGNOSIS — K51.20 UC (ULCERATIVE COLITIS CONFINED TO RECTUM): Primary | ICD-10-CM

## 2021-08-02 ENCOUNTER — CLINICAL SUPPORT (OUTPATIENT)
Dept: URGENT CARE | Facility: CLINIC | Age: 56
End: 2021-08-02
Payer: OTHER GOVERNMENT

## 2021-08-02 DIAGNOSIS — Z13.9 ENCOUNTER FOR SCREENING: Primary | ICD-10-CM

## 2021-08-02 LAB
CTP QC/QA: YES
SARS-COV-2 RDRP RESP QL NAA+PROBE: NEGATIVE

## 2021-08-02 PROCEDURE — U0002: ICD-10-PCS | Mod: QW,S$GLB,, | Performed by: NURSE PRACTITIONER

## 2021-08-02 PROCEDURE — U0002 COVID-19 LAB TEST NON-CDC: HCPCS | Mod: QW,S$GLB,, | Performed by: NURSE PRACTITIONER

## 2021-08-06 ENCOUNTER — PATIENT MESSAGE (OUTPATIENT)
Dept: PRIMARY CARE CLINIC | Facility: CLINIC | Age: 56
End: 2021-08-06

## 2021-08-06 DIAGNOSIS — M25.559 ARTHRALGIA OF HIP, UNSPECIFIED LATERALITY: ICD-10-CM

## 2021-08-06 RX ORDER — TRAMADOL HYDROCHLORIDE 50 MG/1
50 TABLET ORAL EVERY 6 HOURS PRN
Qty: 28 TABLET | Refills: 0 | Status: SHIPPED | OUTPATIENT
Start: 2021-08-06 | End: 2021-08-13

## 2021-08-09 ENCOUNTER — PATIENT MESSAGE (OUTPATIENT)
Dept: PRIMARY CARE CLINIC | Facility: CLINIC | Age: 56
End: 2021-08-09

## 2021-08-13 ENCOUNTER — PATIENT MESSAGE (OUTPATIENT)
Dept: PRIMARY CARE CLINIC | Facility: CLINIC | Age: 56
End: 2021-08-13

## 2021-08-13 DIAGNOSIS — M54.40 ACUTE BILATERAL LOW BACK PAIN WITH SCIATICA, SCIATICA LATERALITY UNSPECIFIED: Primary | ICD-10-CM

## 2021-08-13 RX ORDER — TRAMADOL HYDROCHLORIDE 50 MG/1
50 TABLET ORAL EVERY 6 HOURS
Qty: 120 TABLET | Refills: 0 | Status: SHIPPED | OUTPATIENT
Start: 2021-08-13 | End: 2021-09-17 | Stop reason: SDUPTHER

## 2021-08-14 ENCOUNTER — CLINICAL SUPPORT (OUTPATIENT)
Dept: URGENT CARE | Facility: CLINIC | Age: 56
End: 2021-08-14
Payer: OTHER GOVERNMENT

## 2021-08-14 DIAGNOSIS — Z11.59 ENCOUNTER FOR SCREENING FOR OTHER VIRAL DISEASES: Primary | ICD-10-CM

## 2021-08-14 LAB
CTP QC/QA: YES
SARS-COV-2 RDRP RESP QL NAA+PROBE: NEGATIVE

## 2021-08-14 PROCEDURE — 99211 PR OFFICE/OUTPT VISIT, EST, LEVL I: ICD-10-PCS | Mod: S$GLB,CS,, | Performed by: FAMILY MEDICINE

## 2021-08-14 PROCEDURE — U0002: ICD-10-PCS | Mod: QW,S$GLB,, | Performed by: FAMILY MEDICINE

## 2021-08-14 PROCEDURE — 99211 OFF/OP EST MAY X REQ PHY/QHP: CPT | Mod: S$GLB,CS,, | Performed by: FAMILY MEDICINE

## 2021-08-14 PROCEDURE — U0002 COVID-19 LAB TEST NON-CDC: HCPCS | Mod: QW,S$GLB,, | Performed by: FAMILY MEDICINE

## 2021-08-15 ENCOUNTER — PATIENT MESSAGE (OUTPATIENT)
Dept: PRIMARY CARE CLINIC | Facility: CLINIC | Age: 56
End: 2021-08-15

## 2021-08-15 DIAGNOSIS — M54.40 ACUTE BILATERAL LOW BACK PAIN WITH SCIATICA, SCIATICA LATERALITY UNSPECIFIED: ICD-10-CM

## 2021-08-16 ENCOUNTER — PATIENT MESSAGE (OUTPATIENT)
Dept: PRIMARY CARE CLINIC | Facility: CLINIC | Age: 56
End: 2021-08-16

## 2021-08-16 DIAGNOSIS — M54.40 ACUTE BILATERAL LOW BACK PAIN WITH SCIATICA, SCIATICA LATERALITY UNSPECIFIED: ICD-10-CM

## 2021-08-16 RX ORDER — TRAMADOL HYDROCHLORIDE 50 MG/1
50 TABLET ORAL EVERY 6 HOURS
Qty: 120 TABLET | Refills: 0 | OUTPATIENT
Start: 2021-08-16

## 2021-08-16 RX ORDER — TRAMADOL HYDROCHLORIDE 50 MG/1
50 TABLET ORAL EVERY 6 HOURS PRN
Qty: 120 TABLET | Refills: 0 | Status: SHIPPED | OUTPATIENT
Start: 2021-08-16 | End: 2021-09-17

## 2021-08-20 ENCOUNTER — LAB VISIT (OUTPATIENT)
Dept: LAB | Facility: HOSPITAL | Age: 56
End: 2021-08-20
Attending: NURSE PRACTITIONER
Payer: OTHER GOVERNMENT

## 2021-08-20 DIAGNOSIS — Z78.0 MENOPAUSE: ICD-10-CM

## 2021-08-20 LAB
ESTRADIOL SERPL-MCNC: <10 PG/ML
FSH SERPL-ACNC: 42.04 MIU/ML

## 2021-08-20 PROCEDURE — 36415 COLL VENOUS BLD VENIPUNCTURE: CPT | Mod: PN | Performed by: NURSE PRACTITIONER

## 2021-08-20 PROCEDURE — 82670 ASSAY OF TOTAL ESTRADIOL: CPT | Performed by: NURSE PRACTITIONER

## 2021-08-20 PROCEDURE — 83001 ASSAY OF GONADOTROPIN (FSH): CPT | Performed by: NURSE PRACTITIONER

## 2021-08-24 ENCOUNTER — PATIENT MESSAGE (OUTPATIENT)
Dept: OBSTETRICS AND GYNECOLOGY | Facility: CLINIC | Age: 56
End: 2021-08-24

## 2021-08-27 ENCOUNTER — PATIENT OUTREACH (OUTPATIENT)
Dept: ADMINISTRATIVE | Facility: OTHER | Age: 56
End: 2021-08-27

## 2021-09-15 ENCOUNTER — TELEPHONE (OUTPATIENT)
Dept: ADMINISTRATIVE | Facility: OTHER | Age: 56
End: 2021-09-15

## 2021-09-17 ENCOUNTER — PATIENT MESSAGE (OUTPATIENT)
Dept: PRIMARY CARE CLINIC | Facility: CLINIC | Age: 56
End: 2021-09-17

## 2021-09-17 ENCOUNTER — OFFICE VISIT (OUTPATIENT)
Dept: PRIMARY CARE CLINIC | Facility: CLINIC | Age: 56
End: 2021-09-17
Payer: OTHER GOVERNMENT

## 2021-09-17 DIAGNOSIS — M54.40 ACUTE BILATERAL LOW BACK PAIN WITH SCIATICA, SCIATICA LATERALITY UNSPECIFIED: ICD-10-CM

## 2021-09-17 PROCEDURE — 99214 OFFICE O/P EST MOD 30 MIN: CPT | Mod: 95,,, | Performed by: FAMILY MEDICINE

## 2021-09-17 PROCEDURE — 99214 PR OFFICE/OUTPT VISIT, EST, LEVL IV, 30-39 MIN: ICD-10-PCS | Mod: 95,,, | Performed by: FAMILY MEDICINE

## 2021-09-17 RX ORDER — TRAMADOL HYDROCHLORIDE 50 MG/1
50 TABLET ORAL EVERY 6 HOURS
Qty: 120 TABLET | Refills: 5 | Status: SHIPPED | OUTPATIENT
Start: 2021-09-17 | End: 2022-03-17 | Stop reason: SDUPTHER

## 2021-09-17 RX ORDER — TRAMADOL HYDROCHLORIDE 50 MG/1
50 TABLET ORAL EVERY 6 HOURS
Qty: 120 TABLET | Refills: 5 | Status: SHIPPED | OUTPATIENT
Start: 2021-09-17 | End: 2021-09-17 | Stop reason: SDUPTHER

## 2021-09-21 ENCOUNTER — OFFICE VISIT (OUTPATIENT)
Dept: GASTROENTEROLOGY | Facility: CLINIC | Age: 56
End: 2021-09-21
Payer: OTHER GOVERNMENT

## 2021-09-21 VITALS — HEIGHT: 64 IN | WEIGHT: 176.13 LBS | BODY MASS INDEX: 30.07 KG/M2

## 2021-09-21 DIAGNOSIS — K51.80 OTHER ULCERATIVE COLITIS WITHOUT COMPLICATION: ICD-10-CM

## 2021-09-21 DIAGNOSIS — K51.20 UC (ULCERATIVE COLITIS CONFINED TO RECTUM): ICD-10-CM

## 2021-09-21 DIAGNOSIS — K51.00 ULCERATIVE PANCOLITIS: Primary | ICD-10-CM

## 2021-09-21 PROCEDURE — 99999 PR PBB SHADOW E&M-EST. PATIENT-LVL III: CPT | Mod: PBBFAC,,, | Performed by: INTERNAL MEDICINE

## 2021-09-21 PROCEDURE — 99204 PR OFFICE/OUTPT VISIT, NEW, LEVL IV, 45-59 MIN: ICD-10-PCS | Mod: S$PBB,,, | Performed by: INTERNAL MEDICINE

## 2021-09-21 PROCEDURE — 99213 OFFICE O/P EST LOW 20 MIN: CPT | Mod: PBBFAC,PO | Performed by: INTERNAL MEDICINE

## 2021-09-21 PROCEDURE — 99999 PR PBB SHADOW E&M-EST. PATIENT-LVL III: ICD-10-PCS | Mod: PBBFAC,,, | Performed by: INTERNAL MEDICINE

## 2021-09-21 PROCEDURE — 99204 OFFICE O/P NEW MOD 45 MIN: CPT | Mod: S$PBB,,, | Performed by: INTERNAL MEDICINE

## 2021-09-21 RX ORDER — MESALAMINE 0.38 G/1
1.5 CAPSULE, EXTENDED RELEASE ORAL DAILY
Qty: 120 CAPSULE | Refills: 3 | Status: SHIPPED | OUTPATIENT
Start: 2021-09-21 | End: 2022-07-24

## 2021-09-28 ENCOUNTER — IMMUNIZATION (OUTPATIENT)
Dept: INTERNAL MEDICINE | Facility: CLINIC | Age: 56
End: 2021-09-28
Payer: OTHER GOVERNMENT

## 2021-09-28 DIAGNOSIS — Z23 NEED FOR VACCINATION: Primary | ICD-10-CM

## 2021-09-28 PROCEDURE — 0003A COVID-19, MRNA, LNP-S, PF, 30 MCG/0.3 ML DOSE VACCINE: CPT | Mod: PBBFAC | Performed by: INTERNAL MEDICINE

## 2021-09-28 PROCEDURE — 91300 COVID-19, MRNA, LNP-S, PF, 30 MCG/0.3 ML DOSE VACCINE: CPT | Mod: PBBFAC

## 2021-10-05 ENCOUNTER — PATIENT MESSAGE (OUTPATIENT)
Dept: ADMINISTRATIVE | Facility: HOSPITAL | Age: 56
End: 2021-10-05

## 2021-10-19 ENCOUNTER — HOSPITAL ENCOUNTER (OUTPATIENT)
Dept: RADIOLOGY | Facility: HOSPITAL | Age: 56
Discharge: HOME | End: 2021-10-19
Attending: INTERNAL MEDICINE
Payer: OTHER GOVERNMENT

## 2021-10-19 VITALS — HEIGHT: 64 IN | WEIGHT: 172 LBS | BODY MASS INDEX: 29.37 KG/M2

## 2021-10-19 DIAGNOSIS — Z12.31 VISIT FOR SCREENING MAMMOGRAM: ICD-10-CM

## 2021-10-19 PROCEDURE — 77067 SCR MAMMO BI INCL CAD: CPT | Mod: 26,,, | Performed by: RADIOLOGY

## 2021-10-19 PROCEDURE — 77067 MAMMO DIGITAL SCREENING BILAT WITH TOMO: ICD-10-PCS | Mod: 26,,, | Performed by: RADIOLOGY

## 2021-10-19 PROCEDURE — 77063 MAMMO DIGITAL SCREENING BILAT WITH TOMO: ICD-10-PCS | Mod: 26,,, | Performed by: RADIOLOGY

## 2021-10-19 PROCEDURE — 77067 SCR MAMMO BI INCL CAD: CPT | Mod: TC,PN

## 2021-10-19 PROCEDURE — 77063 BREAST TOMOSYNTHESIS BI: CPT | Mod: 26,,, | Performed by: RADIOLOGY

## 2021-12-30 ENCOUNTER — LAB VISIT (OUTPATIENT)
Dept: PRIMARY CARE CLINIC | Facility: OTHER | Age: 56
End: 2021-12-30
Payer: OTHER GOVERNMENT

## 2021-12-30 ENCOUNTER — PATIENT MESSAGE (OUTPATIENT)
Dept: ADMINISTRATIVE | Facility: OTHER | Age: 56
End: 2021-12-30
Payer: OTHER GOVERNMENT

## 2021-12-30 DIAGNOSIS — Z20.822 ENCOUNTER FOR LABORATORY TESTING FOR COVID-19 VIRUS: ICD-10-CM

## 2021-12-30 PROCEDURE — U0003 INFECTIOUS AGENT DETECTION BY NUCLEIC ACID (DNA OR RNA); SEVERE ACUTE RESPIRATORY SYNDROME CORONAVIRUS 2 (SARS-COV-2) (CORONAVIRUS DISEASE [COVID-19]), AMPLIFIED PROBE TECHNIQUE, MAKING USE OF HIGH THROUGHPUT TECHNOLOGIES AS DESCRIBED BY CMS-2020-01-R: HCPCS | Mod: ST72 | Performed by: INTERNAL MEDICINE

## 2022-01-03 ENCOUNTER — OFFICE VISIT (OUTPATIENT)
Dept: INTERNAL MEDICINE | Facility: CLINIC | Age: 57
End: 2022-01-03
Payer: OTHER GOVERNMENT

## 2022-01-03 DIAGNOSIS — J45.901 EXACERBATION OF ASTHMA, UNSPECIFIED ASTHMA SEVERITY, UNSPECIFIED WHETHER PERSISTENT: Primary | ICD-10-CM

## 2022-01-03 DIAGNOSIS — Z20.822 SUSPECTED COVID-19 VIRUS INFECTION: ICD-10-CM

## 2022-01-03 PROCEDURE — 99213 PR OFFICE/OUTPT VISIT, EST, LEVL III, 20-29 MIN: ICD-10-PCS | Mod: 95,,, | Performed by: FAMILY MEDICINE

## 2022-01-03 PROCEDURE — 99213 OFFICE O/P EST LOW 20 MIN: CPT | Mod: 95,,, | Performed by: FAMILY MEDICINE

## 2022-01-03 RX ORDER — ALBUTEROL SULFATE 90 UG/1
2 AEROSOL, METERED RESPIRATORY (INHALATION) EVERY 6 HOURS PRN
Qty: 18 G | Refills: 11 | Status: SHIPPED | OUTPATIENT
Start: 2022-01-03 | End: 2023-03-13 | Stop reason: SDUPTHER

## 2022-01-03 RX ORDER — METHYLPREDNISOLONE 4 MG/1
TABLET ORAL
Qty: 1 EACH | Refills: 0 | Status: SHIPPED | OUTPATIENT
Start: 2022-01-03 | End: 2022-01-24

## 2022-01-03 RX ORDER — PROMETHAZINE HYDROCHLORIDE AND CODEINE PHOSPHATE 6.25; 1 MG/5ML; MG/5ML
5 SOLUTION ORAL NIGHTLY PRN
Qty: 240 ML | Refills: 0 | Status: SHIPPED | OUTPATIENT
Start: 2022-01-03 | End: 2022-01-13

## 2022-01-03 RX ORDER — AZITHROMYCIN 250 MG/1
TABLET, FILM COATED ORAL
Qty: 6 TABLET | Refills: 0 | Status: SHIPPED | OUTPATIENT
Start: 2022-01-03 | End: 2022-09-12

## 2022-01-03 NOTE — PROGRESS NOTES
Subjective:       Patient ID: Sharlene Smith is a 56 y.o. female.    Chief Complaint: Cough and Otalgia    The patient location is:  [place]  The chief complaint leading to consultation is:  Cough and ear pain    Visit type: audiovisual    Face to Face time with patient:  9 minutes  16 minutes of total time spent on the encounter, which includes face to face time and non-face to face time preparing to see the patient (eg, review of tests), Obtaining and/or reviewing separately obtained history, Documenting clinical information in the electronic or other health record, Independently interpreting results (not separately reported) and communicating results to the patient/family/caregiver, or Care coordination (not separately reported).         Each patient to whom he or she provides medical services by telemedicine is:  (1) informed of the relationship between the physician and patient and the respective role of any other health care provider with respect to management of the patient; and (2) notified that he or she may decline to receive medical services by telemedicine and may withdraw from such care at any time.    Notes:  56-year-old female with allergy induced asthma patient of Dr. Lincoln but new patient to me is evaluated via telemedicine visit secondary to symptoms of worsening nasal congestion, ear pressure, postnasal drip, runny nose, sinus pressure, sore throat, chest congestion, coughing, and wheezing since December 29th.  She was tested for COVID on December 30th and is still awaiting results.  Since that time she has been using Tylenol Allergy and cold, Phenergan DM cough syrup, and Singulair with mild relief.    Review of Systems   Constitutional: Negative for appetite change, chills, fatigue and fever.   HENT: Positive for nasal congestion, ear pain, postnasal drip, rhinorrhea, sinus pressure/congestion and sore throat. Negative for hearing loss and tinnitus.    Eyes: Negative for redness,  itching and visual disturbance.   Respiratory: Positive for cough, shortness of breath and wheezing. Negative for chest tightness.    Cardiovascular: Negative for chest pain and palpitations.   Gastrointestinal: Negative for abdominal pain, constipation, diarrhea, nausea and vomiting.   Genitourinary: Negative for decreased urine volume, difficulty urinating, dysuria, frequency, hematuria and urgency.   Musculoskeletal: Negative for back pain, myalgias, neck pain and neck stiffness.   Integumentary:  Negative for rash.   Allergic/Immunologic: Positive for environmental allergies.   Neurological: Positive for headaches. Negative for dizziness and light-headedness.   Psychiatric/Behavioral: Negative.          Objective:      Limited physical exam is possible through via but the patient does cough throughout the visit.  Assessment:       Problem List Items Addressed This Visit    None     Visit Diagnoses     Exacerbation of asthma, unspecified asthma severity, unspecified whether persistent    -  Primary    Relevant Medications    albuterol (PROVENTIL/VENTOLIN HFA) 90 mcg/actuation inhaler    Suspected COVID-19 virus infection        Relevant Medications    azithromycin (ZITHROMAX Z-JEANINE) 250 MG tablet    methylPREDNISolone (MEDROL DOSEPACK) 4 mg tablet    promethazine-codeine 6.25-10 mg/5 ml (PHENERGAN WITH CODEINE) 6.25-10 mg/5 mL syrup          Plan:         1. COVID test is pending.  2. Continue use of albuterol HFA 2 inhalations every 4-6 hours as needed for shortness of breath or wheezing.  Refill sent.  3. Azithromycin 250 mg tablets to take 2 tablets on day 1, then 1 tab on days 2 through 5.  4. Medrol Dosepak use as directed.  5. Phenergan with codeine cough syrup 5 mL at bedtime as needed for cough.  6. Continue home quarantine.  7. Follow-up as needed if symptoms persist or worsen.

## 2022-01-04 LAB
SARS-COV-2 RNA RESP QL NAA+PROBE: NOT DETECTED
SARS-COV-2- CYCLE NUMBER: NORMAL

## 2022-03-11 ENCOUNTER — PATIENT MESSAGE (OUTPATIENT)
Dept: PRIMARY CARE CLINIC | Facility: CLINIC | Age: 57
End: 2022-03-11
Payer: OTHER GOVERNMENT

## 2022-03-11 DIAGNOSIS — M54.40 ACUTE BILATERAL LOW BACK PAIN WITH SCIATICA, SCIATICA LATERALITY UNSPECIFIED: ICD-10-CM

## 2022-03-11 DIAGNOSIS — E78.01 FAMILIAL HYPERCHOLESTEROLEMIA: ICD-10-CM

## 2022-03-11 NOTE — TELEPHONE ENCOUNTER
No new care gaps identified.  Powered by [employer]. Reference number: [id number].   3/11/2022 1:20:58 PM CST

## 2022-03-14 DIAGNOSIS — M54.40 ACUTE BILATERAL LOW BACK PAIN WITH SCIATICA, SCIATICA LATERALITY UNSPECIFIED: ICD-10-CM

## 2022-03-14 DIAGNOSIS — E78.01 FAMILIAL HYPERCHOLESTEROLEMIA: ICD-10-CM

## 2022-03-14 RX ORDER — TRAMADOL HYDROCHLORIDE 50 MG/1
50 TABLET ORAL EVERY 6 HOURS
Qty: 120 TABLET | Refills: 5 | OUTPATIENT
Start: 2022-03-14

## 2022-03-14 RX ORDER — ATORVASTATIN CALCIUM 20 MG/1
20 TABLET, FILM COATED ORAL NIGHTLY
Qty: 90 TABLET | Refills: 0 | Status: SHIPPED | OUTPATIENT
Start: 2022-03-14 | End: 2022-08-09 | Stop reason: SDUPTHER

## 2022-03-14 NOTE — TELEPHONE ENCOUNTER
No new care gaps identified.  Powered by [employer]. Reference number: [id number].   3/14/2022 8:40:03 AM CDT

## 2022-03-15 RX ORDER — ATORVASTATIN CALCIUM 20 MG/1
20 TABLET, FILM COATED ORAL NIGHTLY
Qty: 90 TABLET | Refills: 0 | OUTPATIENT
Start: 2022-03-15

## 2022-03-15 RX ORDER — TRAMADOL HYDROCHLORIDE 50 MG/1
50 TABLET ORAL EVERY 6 HOURS
Qty: 120 TABLET | Refills: 5 | OUTPATIENT
Start: 2022-03-15

## 2022-03-17 ENCOUNTER — OFFICE VISIT (OUTPATIENT)
Dept: PRIMARY CARE CLINIC | Facility: CLINIC | Age: 57
End: 2022-03-17
Payer: OTHER GOVERNMENT

## 2022-03-17 ENCOUNTER — PATIENT MESSAGE (OUTPATIENT)
Dept: PRIMARY CARE CLINIC | Facility: CLINIC | Age: 57
End: 2022-03-17

## 2022-03-17 DIAGNOSIS — M54.40 ACUTE BILATERAL LOW BACK PAIN WITH SCIATICA, SCIATICA LATERALITY UNSPECIFIED: ICD-10-CM

## 2022-03-17 PROCEDURE — 99214 OFFICE O/P EST MOD 30 MIN: CPT | Mod: 95,,, | Performed by: FAMILY MEDICINE

## 2022-03-17 PROCEDURE — 99214 PR OFFICE/OUTPT VISIT, EST, LEVL IV, 30-39 MIN: ICD-10-PCS | Mod: 95,,, | Performed by: FAMILY MEDICINE

## 2022-03-17 RX ORDER — TRAMADOL HYDROCHLORIDE 50 MG/1
50 TABLET ORAL EVERY 6 HOURS
Qty: 120 TABLET | Refills: 5 | Status: SHIPPED | OUTPATIENT
Start: 2022-03-17 | End: 2022-09-12 | Stop reason: SDUPTHER

## 2022-03-23 NOTE — PROGRESS NOTES
The patient location is: [facility]  The chief complaint leading to consultation is:   Chronic hip bursitis and low back pain  Visit type: audiovisual    Face to Face time with patient: 21 minutes   of total time spent on the encounter, which includes face to face time and non-face to face time preparing to see the patient (eg, review of tests), Obtaining and/or reviewing separately obtained history, Documenting clinical information in the electronic or other health record, Independently interpreting results (not separately reported) and communicating results to the patient/family/caregiver, or Care coordination (not separately reported).       Each patient to whom he or she provides medical services by telemedicine is:  (1) informed of the relationship between the physician and patient and the respective role of any other health care provider with respect to management of the patient; and (2) notified that he or she may decline to receive medical services by telemedicine and may withdraw from such care at any time.    Notes:   1. Acute bilateral low back pain with sciatica, sciatica laterality unspecified  - traMADoL (ULTRAM) 50 mg tablet; Take 1 tablet (50 mg total) by mouth every 6 (six) hours.  Dispense: 120 tablet; Refill: 5  2. F/u in 4 months for chronic pain med reassessment

## 2022-06-21 ENCOUNTER — TELEPHONE (OUTPATIENT)
Dept: PRIMARY CARE CLINIC | Facility: CLINIC | Age: 57
End: 2022-06-21
Payer: OTHER GOVERNMENT

## 2022-06-21 DIAGNOSIS — Z00.00 ROUTINE GENERAL MEDICAL EXAMINATION AT A HEALTH CARE FACILITY: Primary | ICD-10-CM

## 2022-06-21 NOTE — TELEPHONE ENCOUNTER
----- Message from Cuong Polanco sent at 6/21/2022 11:18 AM CDT -----  Contact: PT @ [phone]  Patient calling to schedule Annual labs prior to appointment scheduled for tomorrow. I was unable to schedule as no orders are available. Please call to schedule

## 2022-06-22 ENCOUNTER — LAB VISIT (OUTPATIENT)
Dept: LAB | Facility: HOSPITAL | Age: 57
End: 2022-06-22
Attending: FAMILY MEDICINE
Payer: OTHER GOVERNMENT

## 2022-06-22 DIAGNOSIS — Z00.00 ROUTINE GENERAL MEDICAL EXAMINATION AT A HEALTH CARE FACILITY: ICD-10-CM

## 2022-06-22 LAB
ALBUMIN SERPL BCP-MCNC: 3.8 G/DL (ref 3.5–5.2)
ALP SERPL-CCNC: 120 U/L (ref 55–135)
ALT SERPL W/O P-5'-P-CCNC: 27 U/L (ref 10–44)
ANION GAP SERPL CALC-SCNC: 11 MMOL/L (ref 8–16)
AST SERPL-CCNC: 22 U/L (ref 10–40)
BASOPHILS # BLD AUTO: 0.05 K/UL (ref 0–0.2)
BASOPHILS NFR BLD: 0.7 % (ref 0–1.9)
BILIRUB SERPL-MCNC: 0.6 MG/DL (ref 0.1–1)
BUN SERPL-MCNC: 10 MG/DL (ref 6–20)
CALCIUM SERPL-MCNC: 9.6 MG/DL (ref 8.7–10.5)
CHLORIDE SERPL-SCNC: 104 MMOL/L (ref 95–110)
CHOLEST SERPL-MCNC: 182 MG/DL (ref 120–199)
CHOLEST/HDLC SERPL: 2.2 {RATIO} (ref 2–5)
CO2 SERPL-SCNC: 25 MMOL/L (ref 23–29)
CREAT SERPL-MCNC: 0.7 MG/DL (ref 0.5–1.4)
DIFFERENTIAL METHOD: NORMAL
EOSINOPHIL # BLD AUTO: 0.4 K/UL (ref 0–0.5)
EOSINOPHIL NFR BLD: 5.8 % (ref 0–8)
ERYTHROCYTE [DISTWIDTH] IN BLOOD BY AUTOMATED COUNT: 11.9 % (ref 11.5–14.5)
EST. GFR  (AFRICAN AMERICAN): >60 ML/MIN/1.73 M^2
EST. GFR  (NON AFRICAN AMERICAN): >60 ML/MIN/1.73 M^2
GLUCOSE SERPL-MCNC: 97 MG/DL (ref 70–110)
HCT VFR BLD AUTO: 39.3 % (ref 37–48.5)
HDLC SERPL-MCNC: 82 MG/DL (ref 40–75)
HDLC SERPL: 45.1 % (ref 20–50)
HGB BLD-MCNC: 12.7 G/DL (ref 12–16)
IMM GRANULOCYTES # BLD AUTO: 0.01 K/UL (ref 0–0.04)
IMM GRANULOCYTES NFR BLD AUTO: 0.1 % (ref 0–0.5)
LDLC SERPL CALC-MCNC: 86 MG/DL (ref 63–159)
LYMPHOCYTES # BLD AUTO: 2.9 K/UL (ref 1–4.8)
LYMPHOCYTES NFR BLD: 38.2 % (ref 18–48)
MCH RBC QN AUTO: 30.9 PG (ref 27–31)
MCHC RBC AUTO-ENTMCNC: 32.3 G/DL (ref 32–36)
MCV RBC AUTO: 96 FL (ref 82–98)
MONOCYTES # BLD AUTO: 0.4 K/UL (ref 0.3–1)
MONOCYTES NFR BLD: 5.1 % (ref 4–15)
NEUTROPHILS # BLD AUTO: 3.7 K/UL (ref 1.8–7.7)
NEUTROPHILS NFR BLD: 50.1 % (ref 38–73)
NONHDLC SERPL-MCNC: 100 MG/DL
NRBC BLD-RTO: 0 /100 WBC
PLATELET # BLD AUTO: 370 K/UL (ref 150–450)
PMV BLD AUTO: 10.2 FL (ref 9.2–12.9)
POTASSIUM SERPL-SCNC: 4.1 MMOL/L (ref 3.5–5.1)
PROT SERPL-MCNC: 7.1 G/DL (ref 6–8.4)
RBC # BLD AUTO: 4.11 M/UL (ref 4–5.4)
SODIUM SERPL-SCNC: 140 MMOL/L (ref 136–145)
TRIGL SERPL-MCNC: 70 MG/DL (ref 30–150)
TSH SERPL DL<=0.005 MIU/L-ACNC: 1.54 UIU/ML (ref 0.4–4)
WBC # BLD AUTO: 7.46 K/UL (ref 3.9–12.7)

## 2022-06-22 PROCEDURE — 85025 COMPLETE CBC W/AUTO DIFF WBC: CPT | Performed by: FAMILY MEDICINE

## 2022-06-22 PROCEDURE — 36415 COLL VENOUS BLD VENIPUNCTURE: CPT | Mod: PN | Performed by: FAMILY MEDICINE

## 2022-06-22 PROCEDURE — 84443 ASSAY THYROID STIM HORMONE: CPT | Performed by: FAMILY MEDICINE

## 2022-06-22 PROCEDURE — 80061 LIPID PANEL: CPT | Performed by: FAMILY MEDICINE

## 2022-06-22 PROCEDURE — 80053 COMPREHEN METABOLIC PANEL: CPT | Performed by: FAMILY MEDICINE

## 2022-06-23 ENCOUNTER — PATIENT MESSAGE (OUTPATIENT)
Dept: PRIMARY CARE CLINIC | Facility: CLINIC | Age: 57
End: 2022-06-23
Payer: OTHER GOVERNMENT

## 2022-06-23 ENCOUNTER — OFFICE VISIT (OUTPATIENT)
Dept: PRIMARY CARE CLINIC | Facility: CLINIC | Age: 57
End: 2022-06-23
Payer: OTHER GOVERNMENT

## 2022-06-23 ENCOUNTER — PATIENT MESSAGE (OUTPATIENT)
Dept: PRIMARY CARE CLINIC | Facility: CLINIC | Age: 57
End: 2022-06-23

## 2022-06-23 ENCOUNTER — TELEPHONE (OUTPATIENT)
Dept: PRIMARY CARE CLINIC | Facility: CLINIC | Age: 57
End: 2022-06-23
Payer: OTHER GOVERNMENT

## 2022-06-23 VITALS
TEMPERATURE: 98 F | HEART RATE: 78 BPM | WEIGHT: 177 LBS | BODY MASS INDEX: 30.22 KG/M2 | HEIGHT: 64 IN | OXYGEN SATURATION: 97 % | DIASTOLIC BLOOD PRESSURE: 84 MMHG | RESPIRATION RATE: 18 BRPM | SYSTOLIC BLOOD PRESSURE: 136 MMHG

## 2022-06-23 DIAGNOSIS — G89.29 OTHER CHRONIC PAIN: ICD-10-CM

## 2022-06-23 DIAGNOSIS — J30.2 SEASONAL ALLERGIES: ICD-10-CM

## 2022-06-23 DIAGNOSIS — T78.40XD ALLERGY, SUBSEQUENT ENCOUNTER: ICD-10-CM

## 2022-06-23 DIAGNOSIS — Z12.31 OTHER SCREENING MAMMOGRAM: Primary | ICD-10-CM

## 2022-06-23 DIAGNOSIS — J31.0 CHRONIC RHINITIS: ICD-10-CM

## 2022-06-23 PROCEDURE — 99215 OFFICE O/P EST HI 40 MIN: CPT | Mod: PBBFAC,PN | Performed by: FAMILY MEDICINE

## 2022-06-23 PROCEDURE — 99999 PR PBB SHADOW E&M-EST. PATIENT-LVL V: CPT | Mod: PBBFAC,,, | Performed by: FAMILY MEDICINE

## 2022-06-23 PROCEDURE — 99396 PREV VISIT EST AGE 40-64: CPT | Mod: S$PBB,,, | Performed by: FAMILY MEDICINE

## 2022-06-23 PROCEDURE — 99999 PR PBB SHADOW E&M-EST. PATIENT-LVL V: ICD-10-PCS | Mod: PBBFAC,,, | Performed by: FAMILY MEDICINE

## 2022-06-23 PROCEDURE — 99396 PR PREVENTIVE VISIT,EST,40-64: ICD-10-PCS | Mod: S$PBB,,, | Performed by: FAMILY MEDICINE

## 2022-06-23 RX ORDER — LEVOCETIRIZINE DIHYDROCHLORIDE 5 MG/1
5 TABLET, FILM COATED ORAL NIGHTLY
Qty: 30 TABLET | Refills: 11 | Status: SHIPPED | OUTPATIENT
Start: 2022-06-23 | End: 2023-09-11

## 2022-06-23 RX ORDER — TIZANIDINE 4 MG/1
TABLET ORAL
Qty: 270 TABLET | Refills: 2 | Status: SHIPPED | OUTPATIENT
Start: 2022-06-23

## 2022-06-23 RX ORDER — FLUTICASONE PROPIONATE 50 MCG
SPRAY, SUSPENSION (ML) NASAL
Qty: 48 G | Refills: 0 | Status: SHIPPED | OUTPATIENT
Start: 2022-06-23 | End: 2023-03-13 | Stop reason: SDUPTHER

## 2022-06-23 RX ORDER — AZELASTINE 1 MG/ML
1 SPRAY, METERED NASAL 2 TIMES DAILY
Qty: 30 ML | Refills: 11 | Status: SHIPPED | OUTPATIENT
Start: 2022-06-23 | End: 2022-09-12

## 2022-06-23 RX ORDER — MONTELUKAST SODIUM 10 MG/1
TABLET ORAL
Qty: 90 TABLET | Refills: 0 | Status: SHIPPED | OUTPATIENT
Start: 2022-06-23 | End: 2022-09-12

## 2022-06-23 NOTE — TELEPHONE ENCOUNTER
----- Message from Temitope Major sent at 6/23/2022  2:12 PM CDT -----  Contact: Self/[phone]  Pt said that she is calling in regards to needing to let the nurse know that she left her cell phone in the exam room she was in on today. Please advise

## 2022-06-24 NOTE — PROGRESS NOTES
Sharlene Smith is a 57 y.o. female   Routine physical  Source of history: Patient  Past Medical History:   Diagnosis Date    Abnormal Pap smear of vagina yrs ago    possible BX?    Allergy     Arthritis     Asthma     Hyperlipidemia     Supraventricular tachycardia     SVT (supraventricular tachycardia)     Tachycardia     Ulcerative colitis      Patient  reports that she quit smoking about 23 years ago. Her smoking use included cigarettes. She smoked 0.00 packs per day for 10.00 years. She has never used smokeless tobacco. She reports current alcohol use. She reports that she does not use drugs.  Family History   Problem Relation Age of Onset    Hyperlipidemia Mother     Cancer Mother     Cataracts Mother     Allergies Mother     Hypertension Mother     Heart attack Father     Diabetes Father     Heart attack Paternal Grandmother     Arthritis Paternal Grandmother     Glaucoma Maternal Grandfather     Breast cancer Other 47    Arthritis Maternal Grandmother     Breast cancer Sister 63    Heart disease Neg Hx     Colon cancer Neg Hx     Esophageal cancer Neg Hx     Amblyopia Neg Hx     Blindness Neg Hx     Macular degeneration Neg Hx     Retinal detachment Neg Hx     Strabismus Neg Hx     Stroke Neg Hx     Thyroid disease Neg Hx     Angioedema Neg Hx     Asthma Neg Hx     Atopy Neg Hx     Eczema Neg Hx     Immunodeficiency Neg Hx     Rhinitis Neg Hx     Urticaria Neg Hx     Ovarian cancer Neg Hx      ROS:Answers for HPI/ROS submitted by the patient on 6/20/2022  activity change: No  unexpected weight change: No  neck pain: No  hearing loss: No  rhinorrhea: No  trouble swallowing: No  eye discharge: No  visual disturbance: No  chest tightness: No  wheezing: No  chest pain: No  palpitations: No  blood in stool: No  constipation: No  vomiting: No  diarrhea: No  polydipsia: No  polyuria: No  difficulty urinating: No  hematuria: No  menstrual problem: No  dysuria: No  joint  swelling: No  arthralgias: No  headaches: No  weakness: No  confusion: No  dysphoric mood: No  GENERAL: No fever, chills, fatigability or weight loss.  SKIN: No rashes, itching or changes in color or texture of skin.  HEAD: No headaches or recent head trauma.  EYES: Visual acuity fine. No photophobia, ocular pain or diplopia.  EARS: Denies ear pain, discharge or vertigo.  NOSE: No loss of smell, no epistaxis or postnasal drip.  MOUTH & THROAT: No hoarseness or change in voice. No excessive gum bleeding.  NODES: Denies swollen glands.  CHEST: Denies CONTRERAS, cyanosis, wheezing, cough and sputum production.  CARDIOVASCULAR: Denies chest pain, PND, orthopnea or reduced exercise tolerance.  ABDOMEN: Appetite fine. No weight loss. Denies diarrhea, abdominal pain, hematemesis or blood in stool.  URINARY: No flank pain, dysuria or hematuria.  PERIPHERAL VASCULAR: No claudication or cyanosis.  MUSCULOSKELETAL: No joint stiffness or swelling. Denies back pain.  NEUROLOGIC: No history of seizures, paralysis, alteration of gait or coordination.    OBJECTIVE:  APPEARANCE:  Normal appearance  Vitals:    06/23/22 1341   BP: 136/84   Pulse: 78   Resp: 20   Temp: 97.8     SKIN: Normal skin turgor, no lesions.  HEENT: Both external auditory canals clear. Both tympanic membranes intact  . PERRL. EOMI. Disk margins sharp. No tonsillar enlargement. No pharyngeal erythema or exudate. No stridor.  NECK: No bruits. No cervical spine tenderness. No cervical lymphadenopathy. No thyromegaly.  NODES: No cervical, axillary or inguinal lymph node enlargement.  CHEST: Breath sounds clear bilaterally. Lungs clear to auscultation & percussion.   Good air movement. No rales. No retractions. No rhonchi. No stridor. No wheezes.  CARDIOVASCULAR: Normal S1, S2. No murmurs. No edema.  BREASTS: no masses palpated in either breast or axillary area, symmetry noted.  ABDOMEN: Bowel sounds normal. No palpable aortic enlargement. No CVA tenderness. No pulsatile  mass. No rebound tenderness.  PERIPHERAL VASCULAR: Femoral pulses present and symmetrical. No edema.  MUSCULOSKELETAL: Degenerative changes of both ankles, foot, knee, wrist and hand.  BACK: No CVA tenderness. There is no spasm, tenderness or radiculopathy noted with palpation and there is full range of motion.   NEUROLOGIC:   Cranial Nerves: II-XII grossly intact.  Motor: 5/5 strength major flexors/extensors. No tremor.  DTR's: Knees, Ankles 2+ and equal bilaterally; downgoing toes.  Sensory: Intact to light touch distally.  Gait & Posture: Normal gait and fine motion. No cerebellar signs.  MENTAL STATUS: Alert. Oriented x 3. Language skills normal. Memory intact. No suicidal ideation.   Normal affect. Normal cognitive functions.  Well kept appearance.    ASSESSMENT/PLAN:   Sharlene was seen today for annual exam.    Diagnoses and all orders for this visit:    Other screening mammogram  -     Mammo Digital Screening Bilat; Future    Allergy, subsequent encounter  -     levocetirizine (XYZAL) 5 MG tablet; Take 1 tablet (5 mg total) by mouth every evening.  -     montelukast (SINGULAIR) 10 mg tablet; TAKE 1 TABLET(10 MG) BY MOUTH DAILY AS NEEDED    Chronic rhinitis  -     azelastine (ASTELIN) 137 mcg (0.1 %) nasal spray; 1 spray (137 mcg total) by Nasal route 2 (two) times daily.    Seasonal allergies  -     fluticasone propionate (FLONASE) 50 mcg/actuation nasal spray; SHAKE LIQUID AND USE 1 SPRAY(50 MCG) IN EACH NOSTRIL EVERY DAY    Other chronic pain  -     tiZANidine (ZANAFLEX) 4 MG tablet; TAKE 1/2 TO 1 TABLET BY MOUTH EVERY 8 HOURS AS NEEDED    labs reviewed. Will contact pt with results when available.

## 2022-07-22 DIAGNOSIS — K51.80 OTHER ULCERATIVE COLITIS WITHOUT COMPLICATION: ICD-10-CM

## 2022-07-22 DIAGNOSIS — E78.01 FAMILIAL HYPERCHOLESTEROLEMIA: ICD-10-CM

## 2022-07-22 DIAGNOSIS — J30.2 SEASONAL ALLERGIES: ICD-10-CM

## 2022-07-22 DIAGNOSIS — T78.40XD ALLERGY, SUBSEQUENT ENCOUNTER: ICD-10-CM

## 2022-07-22 RX ORDER — ATORVASTATIN CALCIUM 20 MG/1
TABLET, FILM COATED ORAL
Refills: 0 | OUTPATIENT
Start: 2022-07-22

## 2022-07-22 RX ORDER — FLUTICASONE PROPIONATE 50 MCG
SPRAY, SUSPENSION (ML) NASAL
Refills: 0 | OUTPATIENT
Start: 2022-07-22

## 2022-07-22 RX ORDER — MONTELUKAST SODIUM 10 MG/1
TABLET ORAL
Refills: 0 | OUTPATIENT
Start: 2022-07-22

## 2022-07-23 NOTE — TELEPHONE ENCOUNTER
No new care gaps identified.  [facility] Embedded Care Gaps. Reference number: [id number]. 7/22/2022   7:10:37 PM CDT

## 2022-07-23 NOTE — TELEPHONE ENCOUNTER
Refill Decision Note   Sharlene MccarthySarah  is requesting a refill authorization.  Brief Assessment and Rationale for Refill:  Quick Discontinue     Medication Therapy Plan:    Pharmacy is requesting new scripts for the following medications without required information, (sig/ frequency/qty/etc)       Medication Reconciliation Completed: No   Comments: Pharmacies have been requesting medications for patients without required information, (sig, frequency, qty, etc.). In addition, requests are sent for medication(s) pt. are currently not taking, and medications patients have never taken.    We have spoken to the pharmacies about these request types and advised their teams previously that we are unable to assess these New Script requests and require all details for these requests. This is a known issue and has been reported.        No Care Gaps recommended.     Note composed:10:28 PM 07/22/2022

## 2022-07-23 NOTE — TELEPHONE ENCOUNTER
No new care gaps identified.  [facility] Embedded Care Gaps. Reference number: [id number]. 7/22/2022   7:12:22 PM CDT

## 2022-07-23 NOTE — TELEPHONE ENCOUNTER
No new care gaps identified.  [facility] Embedded Care Gaps. Reference number: [id number]. 7/22/2022   7:11:14 PM CDT

## 2022-07-23 NOTE — TELEPHONE ENCOUNTER
Refill Decision Note   Sharlene MccarthySarah  is requesting a refill authorization.  Brief Assessment and Rationale for Refill:  Quick Discontinue     Medication Therapy Plan:    Pharmacy is requesting new scripts for the following medications without required information, (sig/ frequency/qty/etc)       Medication Reconciliation Completed: No   Comments: Pharmacies have been requesting medications for patients without required information, (sig, frequency, qty, etc.). In addition, requests are sent for medication(s) pt. are currently not taking, and medications patients have never taken.    We have spoken to the pharmacies about these request types and advised their teams previously that we are unable to assess these New Script requests and require all details for these requests. This is a known issue and has been reported.        No Care Gaps recommended.     Note composed:10:17 PM 07/22/2022

## 2022-07-24 ENCOUNTER — OFFICE VISIT (OUTPATIENT)
Dept: URGENT CARE | Facility: CLINIC | Age: 57
End: 2022-07-24
Payer: OTHER GOVERNMENT

## 2022-07-24 VITALS
RESPIRATION RATE: 16 BRPM | OXYGEN SATURATION: 99 % | DIASTOLIC BLOOD PRESSURE: 87 MMHG | SYSTOLIC BLOOD PRESSURE: 144 MMHG | TEMPERATURE: 98 F | WEIGHT: 175 LBS | HEART RATE: 84 BPM | BODY MASS INDEX: 29.88 KG/M2 | HEIGHT: 64 IN

## 2022-07-24 DIAGNOSIS — J06.9 VIRAL URI: Primary | ICD-10-CM

## 2022-07-24 DIAGNOSIS — J02.9 SORE THROAT: ICD-10-CM

## 2022-07-24 LAB
CTP QC/QA: YES
SARS-COV-2 RDRP RESP QL NAA+PROBE: NEGATIVE

## 2022-07-24 PROCEDURE — U0002: ICD-10-PCS | Mod: QW,S$GLB,,

## 2022-07-24 PROCEDURE — U0002 COVID-19 LAB TEST NON-CDC: HCPCS | Mod: QW,S$GLB,,

## 2022-07-24 PROCEDURE — 99213 OFFICE O/P EST LOW 20 MIN: CPT | Mod: S$GLB,,,

## 2022-07-24 PROCEDURE — 99213 PR OFFICE/OUTPT VISIT, EST, LEVL III, 20-29 MIN: ICD-10-PCS | Mod: S$GLB,,,

## 2022-07-24 RX ORDER — MESALAMINE 0.38 G/1
1.5 CAPSULE, EXTENDED RELEASE ORAL DAILY
Qty: 360 CAPSULE | Refills: 3 | Status: SHIPPED | OUTPATIENT
Start: 2022-07-24 | End: 2023-07-24

## 2022-07-24 NOTE — PROGRESS NOTES
"Subjective:       Patient ID: Sharlene Smith is a 57 y.o. female.    Vitals:  height is 5' 4" (1.626 m) and weight is 79.4 kg (175 lb). Her temperature is 98.1 °F (36.7 °C). Her blood pressure is 144/87 (abnormal) and her pulse is 84. Her respiration is 16 and oxygen saturation is 99%.     Chief Complaint: COVID-19 Concerns    56yo female presents with COVID exposure from her son. C/o mild sore throat and headache that began this morning. No fevers. She does have a hx of allergies     URI   This is a new problem. The current episode started today. The problem has been unchanged. There has been no fever. Associated symptoms include headaches and a sore throat. Pertinent negatives include no abdominal pain, chest pain, congestion, coughing, diarrhea, dysuria, ear pain, joint pain, joint swelling, nausea, neck pain, plugged ear sensation, rash, rhinorrhea, sinus pain, sneezing, swollen glands, vomiting or wheezing. She has tried acetaminophen for the symptoms. The treatment provided no relief.       HENT: Positive for sore throat. Negative for ear pain, congestion and sinus pain.    Neck: Negative for neck pain.   Cardiovascular: Negative for chest pain.   Respiratory: Negative for cough and wheezing.    Gastrointestinal: Negative for abdominal pain, nausea, vomiting and diarrhea.   Genitourinary: Negative for dysuria.   Skin: Negative for rash.   Allergic/Immunologic: Negative for sneezing.   Neurological: Positive for headaches.       Objective:      Physical Exam   Constitutional: She is oriented to person, place, and time. She appears well-developed. She is cooperative.  Non-toxic appearance. She does not appear ill. No distress.   HENT:   Head: Normocephalic and atraumatic.   Ears:   Right Ear: Hearing, tympanic membrane, external ear and ear canal normal.   Left Ear: Hearing, tympanic membrane, external ear and ear canal normal.   Nose: Nose normal. No mucosal edema, rhinorrhea or nasal deformity. No " epistaxis. Right sinus exhibits no maxillary sinus tenderness and no frontal sinus tenderness. Left sinus exhibits no maxillary sinus tenderness and no frontal sinus tenderness.   Mouth/Throat: Uvula is midline, oropharynx is clear and moist and mucous membranes are normal. No trismus in the jaw. Normal dentition. No uvula swelling. No oropharyngeal exudate, posterior oropharyngeal edema or posterior oropharyngeal erythema.      Comments: Cobblestoning present  Eyes: Conjunctivae and lids are normal. No scleral icterus.   Neck: Trachea normal and phonation normal. Neck supple. No edema present. No erythema present. No neck rigidity present.   Cardiovascular: Normal rate, regular rhythm, normal heart sounds and normal pulses.   Pulmonary/Chest: Effort normal and breath sounds normal. No respiratory distress. She has no decreased breath sounds. She has no rhonchi.   Abdominal: Normal appearance.   Musculoskeletal: Normal range of motion.         General: No deformity. Normal range of motion.   Neurological: She is alert and oriented to person, place, and time. She exhibits normal muscle tone. Coordination normal.   Skin: Skin is warm, dry, intact, not diaphoretic and not pale.   Psychiatric: Her speech is normal and behavior is normal. Judgment and thought content normal.   Nursing note and vitals reviewed.    Results for orders placed or performed in visit on 07/24/22   POCT COVID-19 Rapid Screening   Result Value Ref Range    POC Rapid COVID Negative Negative     Acceptable Yes            Assessment:       1. Viral URI    2. Sore throat          Plan:         Labs reviewed with patient. RTC and ED precautions discussed.       Viral URI    Sore throat  -     POCT COVID-19 Rapid Screening         Patient Instructions   PLEASE READ YOUR DISCHARGE INSTRUCTIONS ENTIRELY AS IT CONTAINS IMPORTANT INFORMATION.      Please drink plenty of fluids.    Please get plenty of rest.    Please return here or go to the  Emergency Department for any concerns or worsening of condition.      Tylenol or ibuprofen can also be used as directed for pain and fever unless you have an allergy to them or medical condition such as stomach ulcers, kidney or liver disease or blood thinners etc for which you should not be taking these type of medications.   YOU CAN ALTERNATE TYLENOL AND IBUPROFEN EVERY 3-4 HOURS. Take 1000mg (2 pills) of Extra Strength Acetaminophen (Tylenol) every 6 hours and 600mg (3 pills) of Ibuprofen (Motrin/Advil) every 6 hours, alternating the two so that every 3 hours you take one or the other. Start with the Tylenol, then 3 hours later take the Ibuprofen, then 3 hours later take the Tylenol again, and so on.         For your allergy symptoms and/or runny nose, sinus/ear pressure, congestion:        - You can take over-the-counter claritin, zyrtec, allegra, or xyzal as directed. These are antihistamines that can help with runny nose, nasal congestion, sneezing, and helps to dry up post-nasal drip, which usually causes sore throat and cough.               - If you do NOT have high blood pressure, you may use a decongestant form (D)  of this medication (ie. Claritin- D, zyrtec-D, allegra-D) or if you do not take the D form, you can take sudafed (pseudoephedrine) over the counter, which is a decongestant. Do NOT take two decongestant (D) medications at the same time (such as mucinex-D and claritin-D or plain sudafed and claritin D). Dextromethorphan (DM) is a cough suppressant over the counter (ie. mucinex DM, robitussin, delsym; dayquil/nyquil has DM as well.)    -If you DO have high blood pressure, AVOID DECONGESTANTS (I.e., Phenylephrine and Pseudoephedrine). You may take Coricidin HBP for sinus congestion and Delsym for cough.        - You can take plain Mucinex (guaifenesin) 1200 mg twice a day to help loosen mucous.       Use over the counter flonase or nasocort: one spray each nostril twice daily OR two sprays each  nostril once daily until nares dry out, unless you have Glaucoma.   If you find this dries your nose out or your nose bleeds, try using over the counter nasal saline a few minutes prior to using the flonase to moisten the lining of your nose and throughout the day as needed.   Flonase/nasal spray use directions:  1) Use once per day.  2) Blow nose first.  3) Close one nostril and spray flonase up the other nostril while inhaling gently.   4) If you inhale to aggressively, you will have a bitter taste in the back of your mouth.       You can try breathe right strips at night to help you breathe.  A cool mist humidifier in bedroom may help with cough and relieve stuffy nose.     Sinus rinses DO NOT USE TAP WATER, if you must, water must be a rolling boil for 1 minute, let it cool, then use.  May use distilled water, or over the counter nasal saline rinses.  Vics vapor rub in shower to help open nasal passages.  May use nasal gel to keep passages moisturized.  May use Nasal saline sprays during the day for added relief of congestion.   For those who go to the gym, please do not use the sauna or steam room now to clear sinuses.      Sore throat recommendations: Warm fluids, warm salt water gargles, throat lozenges, tea, honey, soup, rest, hydration.      Cough     Honey with elijah to soothe your throat    Robitussin or Delsyum for cough suppressant for dry cough.    Mucinex DM or products containing Guaifenesin or Dextromethorphan for expectorant (wet cough).    Take prescription cough meds (pills) as prescribed; take prescription cough syrup at night as needed for cough.  Do not take both the prescribed cough pills and syrup at the same time or within 6 hours of each other.  Do not take the cough syrup with any other sedative medication as it can can cause drowsiness. Do not operate any heavy machinery, drink or drive while taking the cough syrup.    Try taking half a dose first of the cough syrup to see how it  affects you.       Please follow up with your primary care doctor or specialist in the next 48-72hrs as needed and if no improvement    If you  smoke, please stop smoking.      Please return or see your primary care doctor if you develop new or worsening symptoms.     Please arrange follow up with your primary medical clinic as soon as possible. You must understand that you've received an Urgent Care treatment only and that you may be released before all of your medical problems are known or treated. You, the patient, will arrange for follow up as instructed. If your symptoms worsen or fail to improve you should go to the Emergency Room.

## 2022-07-24 NOTE — PATIENT INSTRUCTIONS
PLEASE READ YOUR DISCHARGE INSTRUCTIONS ENTIRELY AS IT CONTAINS IMPORTANT INFORMATION.      Please drink plenty of fluids.    Please get plenty of rest.    Please return here or go to the Emergency Department for any concerns or worsening of condition.      Tylenol or ibuprofen can also be used as directed for pain and fever unless you have an allergy to them or medical condition such as stomach ulcers, kidney or liver disease or blood thinners etc for which you should not be taking these type of medications.   YOU CAN ALTERNATE TYLENOL AND IBUPROFEN EVERY 3-4 HOURS. Take 1000mg (2 pills) of Extra Strength Acetaminophen (Tylenol) every 6 hours and 600mg (3 pills) of Ibuprofen (Motrin/Advil) every 6 hours, alternating the two so that every 3 hours you take one or the other. Start with the Tylenol, then 3 hours later take the Ibuprofen, then 3 hours later take the Tylenol again, and so on.         For your allergy symptoms and/or runny nose, sinus/ear pressure, congestion:        - You can take over-the-counter claritin, zyrtec, allegra, or xyzal as directed. These are antihistamines that can help with runny nose, nasal congestion, sneezing, and helps to dry up post-nasal drip, which usually causes sore throat and cough.               - If you do NOT have high blood pressure, you may use a decongestant form (D)  of this medication (ie. Claritin- D, zyrtec-D, allegra-D) or if you do not take the D form, you can take sudafed (pseudoephedrine) over the counter, which is a decongestant. Do NOT take two decongestant (D) medications at the same time (such as mucinex-D and claritin-D or plain sudafed and claritin D). Dextromethorphan (DM) is a cough suppressant over the counter (ie. mucinex DM, robitussin, delsym; dayquil/nyquil has DM as well.)    -If you DO have high blood pressure, AVOID DECONGESTANTS (I.e., Phenylephrine and Pseudoephedrine). You may take Coricidin HBP for sinus congestion and Delsym for cough.        -  You can take plain Mucinex (guaifenesin) 1200 mg twice a day to help loosen mucous.       Use over the counter flonase or nasocort: one spray each nostril twice daily OR two sprays each nostril once daily until nares dry out, unless you have Glaucoma.   If you find this dries your nose out or your nose bleeds, try using over the counter nasal saline a few minutes prior to using the flonase to moisten the lining of your nose and throughout the day as needed.   Flonase/nasal spray use directions:  1) Use once per day.  2) Blow nose first.  3) Close one nostril and spray flonase up the other nostril while inhaling gently.   4) If you inhale to aggressively, you will have a bitter taste in the back of your mouth.       You can try breathe right strips at night to help you breathe.  A cool mist humidifier in bedroom may help with cough and relieve stuffy nose.     Sinus rinses DO NOT USE TAP WATER, if you must, water must be a rolling boil for 1 minute, let it cool, then use.  May use distilled water, or over the counter nasal saline rinses.  Vics vapor rub in shower to help open nasal passages.  May use nasal gel to keep passages moisturized.  May use Nasal saline sprays during the day for added relief of congestion.   For those who go to the gym, please do not use the sauna or steam room now to clear sinuses.      Sore throat recommendations: Warm fluids, warm salt water gargles, throat lozenges, tea, honey, soup, rest, hydration.      Cough     Honey with elijah to soothe your throat    Robitussin or Delsyum for cough suppressant for dry cough.    Mucinex DM or products containing Guaifenesin or Dextromethorphan for expectorant (wet cough).    Take prescription cough meds (pills) as prescribed; take prescription cough syrup at night as needed for cough.  Do not take both the prescribed cough pills and syrup at the same time or within 6 hours of each other.  Do not take the cough syrup with any other sedative  medication as it can can cause drowsiness. Do not operate any heavy machinery, drink or drive while taking the cough syrup.    Try taking half a dose first of the cough syrup to see how it affects you.       Please follow up with your primary care doctor or specialist in the next 48-72hrs as needed and if no improvement    If you  smoke, please stop smoking.      Please return or see your primary care doctor if you develop new or worsening symptoms.     Please arrange follow up with your primary medical clinic as soon as possible. You must understand that you've received an Urgent Care treatment only and that you may be released before all of your medical problems are known or treated. You, the patient, will arrange for follow up as instructed. If your symptoms worsen or fail to improve you should go to the Emergency Room.

## 2022-08-05 ENCOUNTER — TELEPHONE (OUTPATIENT)
Dept: GASTROENTEROLOGY | Facility: CLINIC | Age: 57
End: 2022-08-05
Payer: OTHER GOVERNMENT

## 2022-08-05 NOTE — TELEPHONE ENCOUNTER
----- Message from Kindra Parker MA sent at 8/5/2022  9:51 AM CDT -----  Contact: [phone]    ----- Message -----  From: Margarita Scott  Sent: 8/5/2022   9:49 AM CDT  To: [facility] Staff    Type:  Patient Returning Call    Who Called:Pt  Who Left Message for Patient: Kalee  Does the patient know what this is regarding?:appt  Would the patient rather a call back or a response via [facility]? Call back  Best Call Back Number:[phone]

## 2022-09-06 ENCOUNTER — TELEPHONE (OUTPATIENT)
Dept: GASTROENTEROLOGY | Facility: HOSPITAL | Age: 57
End: 2022-09-06
Payer: OTHER GOVERNMENT

## 2022-09-06 DIAGNOSIS — K51.20 UC (ULCERATIVE COLITIS CONFINED TO RECTUM): Primary | ICD-10-CM

## 2022-09-06 NOTE — TELEPHONE ENCOUNTER
----- Message from Wilder Gottlieb MD sent at 9/21/2021  2:27 PM CDT -----  Regarding: colon for uc surveillance  Due for colon for chronic UC, around 10 /2022

## 2022-09-07 ENCOUNTER — TELEPHONE (OUTPATIENT)
Dept: ENDOSCOPY | Facility: HOSPITAL | Age: 57
End: 2022-09-07
Payer: OTHER GOVERNMENT

## 2022-09-07 NOTE — TELEPHONE ENCOUNTER
Attempted to contact Sharlene Smith  to schedule procedure , no answer, left message with call back number

## 2022-09-12 ENCOUNTER — OFFICE VISIT (OUTPATIENT)
Dept: PRIMARY CARE CLINIC | Facility: CLINIC | Age: 57
End: 2022-09-12
Payer: OTHER GOVERNMENT

## 2022-09-12 VITALS
HEART RATE: 86 BPM | WEIGHT: 176.69 LBS | RESPIRATION RATE: 16 BRPM | SYSTOLIC BLOOD PRESSURE: 130 MMHG | BODY MASS INDEX: 30.17 KG/M2 | HEIGHT: 64 IN | TEMPERATURE: 98 F | DIASTOLIC BLOOD PRESSURE: 78 MMHG | OXYGEN SATURATION: 98 %

## 2022-09-12 DIAGNOSIS — M54.40 ACUTE BILATERAL LOW BACK PAIN WITH SCIATICA, SCIATICA LATERALITY UNSPECIFIED: ICD-10-CM

## 2022-09-12 DIAGNOSIS — Z23 NEED FOR VACCINATION: ICD-10-CM

## 2022-09-12 DIAGNOSIS — E78.01 FAMILIAL HYPERCHOLESTEROLEMIA: ICD-10-CM

## 2022-09-12 DIAGNOSIS — M79.672 LEFT FOOT PAIN: Primary | ICD-10-CM

## 2022-09-12 DIAGNOSIS — G89.29 CHRONIC BILATERAL LOW BACK PAIN WITHOUT SCIATICA: ICD-10-CM

## 2022-09-12 DIAGNOSIS — I47.10 SVT (SUPRAVENTRICULAR TACHYCARDIA): ICD-10-CM

## 2022-09-12 DIAGNOSIS — J30.2 SEASONAL ALLERGIES: ICD-10-CM

## 2022-09-12 DIAGNOSIS — M54.50 CHRONIC BILATERAL LOW BACK PAIN WITHOUT SCIATICA: ICD-10-CM

## 2022-09-12 DIAGNOSIS — Z12.31 SCREENING MAMMOGRAM, ENCOUNTER FOR: ICD-10-CM

## 2022-09-12 DIAGNOSIS — K51.919 ULCERATIVE COLITIS WITH COMPLICATION, UNSPECIFIED LOCATION: ICD-10-CM

## 2022-09-12 PROCEDURE — 90686 IIV4 VACC NO PRSV 0.5 ML IM: CPT | Mod: PBBFAC,PN

## 2022-09-12 PROCEDURE — 99999 PR PBB SHADOW E&M-EST. PATIENT-LVL V: ICD-10-PCS | Mod: PBBFAC,,, | Performed by: STUDENT IN AN ORGANIZED HEALTH CARE EDUCATION/TRAINING PROGRAM

## 2022-09-12 PROCEDURE — 99214 OFFICE O/P EST MOD 30 MIN: CPT | Mod: S$PBB,,, | Performed by: STUDENT IN AN ORGANIZED HEALTH CARE EDUCATION/TRAINING PROGRAM

## 2022-09-12 PROCEDURE — 99214 PR OFFICE/OUTPT VISIT, EST, LEVL IV, 30-39 MIN: ICD-10-PCS | Mod: S$PBB,,, | Performed by: STUDENT IN AN ORGANIZED HEALTH CARE EDUCATION/TRAINING PROGRAM

## 2022-09-12 PROCEDURE — 99215 OFFICE O/P EST HI 40 MIN: CPT | Mod: PBBFAC,25,PN | Performed by: STUDENT IN AN ORGANIZED HEALTH CARE EDUCATION/TRAINING PROGRAM

## 2022-09-12 PROCEDURE — 99999 PR PBB SHADOW E&M-EST. PATIENT-LVL V: CPT | Mod: PBBFAC,,, | Performed by: STUDENT IN AN ORGANIZED HEALTH CARE EDUCATION/TRAINING PROGRAM

## 2022-09-12 RX ORDER — TRAMADOL HYDROCHLORIDE 50 MG/1
50 TABLET ORAL EVERY 6 HOURS
Qty: 120 TABLET | Refills: 5 | Status: SHIPPED | OUTPATIENT
Start: 2022-09-12 | End: 2023-03-13 | Stop reason: SDUPTHER

## 2022-09-12 NOTE — PROGRESS NOTES
Subjective:           Patient ID: Sharlene Smith is a 57 y.o. female who presents today with a chief complaint of est care.    Chief Complaint:   Establish Care, Low-back Pain, Foot Injury (Hit left foot on chair), and Tailbone Pain (Fell down stairs)      History of Present Illness:    56yo female presenting to establish care.    Ulcerative colitis dx in [place] in 2002.  Scope every 2-3 years.   Hx of SVT, had ablation in 2018 and has had improvement since.    Using Tramadol 50mg TID.  Has been having lower back pain for a long time.  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  Was having worsened UC and her colitis with use of NSAIDS.     Left Foot:  Hit on a chair about 3 weeks ago.  Has been hurting with activity or when lying in bed.  Pulsating to base of toes.  Jammed toes into the foot. No redness or swelling initially.       Tailbone:  States she has 14 stairs at home, was in socks, slid down the steps from top to bottom.  Had never had this happen before, states this happened at end of July.    Since has been getting up carefully due to pain at the base of the tailbone.   Ultram does not seem to help with the tailbone, but it does help with the lumbar spine.     Had COVID in July 2022 as well, never had it before.     Chronic Hip Pain  Reports hip pain in R > L for 3 years. Follows with pain management for chronic lower back pain who has performed intraarticular steroid injections into the hip without relief. Pelvis xray in 2/2019 only showing mild DJD. MRI of right hip in 2017 was unremarkable.    Review of Systems   Constitutional:  Negative for activity change, fatigue, fever and unexpected weight change.   HENT:  Negative for congestion, nosebleeds, sinus pressure and sneezing.    Respiratory:  Negative for cough, shortness of breath and wheezing.    Cardiovascular:  Negative for chest pain, palpitations and leg swelling.   Gastrointestinal:  Positive for  "abdominal pain. Negative for abdominal distention, constipation, diarrhea and nausea.   Genitourinary:  Negative for difficulty urinating and dysuria.   Musculoskeletal:  Positive for arthralgias and back pain. Negative for gait problem.   Skin:  Negative for pallor and rash.   Neurological:  Negative for weakness, numbness and headaches.   Psychiatric/Behavioral:  Negative for agitation. The patient is not nervous/anxious.          Objective:        Vitals:    09/12/22 1105   BP: 130/78   BP Location: Right arm   Patient Position: Sitting   BP Method: Medium (Manual)   Pulse: 86   Resp: 16   Temp: 97.9 °F (36.6 °C)   TempSrc: Oral   SpO2: 98%   Weight: 80.2 kg (176 lb 11.2 oz)   Height: 5' 4" (1.626 m)       Body mass index is 30.33 kg/m².      Physical Exam  Vitals reviewed.   Constitutional:       General: She is not in acute distress.     Appearance: Normal appearance. She is not ill-appearing.   HENT:      Head: Normocephalic and atraumatic.      Right Ear: External ear normal.      Left Ear: External ear normal.      Nose: No rhinorrhea.      Mouth/Throat:      Mouth: Mucous membranes are moist.   Eyes:      Extraocular Movements: Extraocular movements intact.      Conjunctiva/sclera: Conjunctivae normal.   Cardiovascular:      Rate and Rhythm: Normal rate and regular rhythm.      Heart sounds: No murmur heard.  Pulmonary:      Effort: Pulmonary effort is normal. No respiratory distress.      Breath sounds: No wheezing.   Abdominal:      Tenderness: There is no right CVA tenderness or left CVA tenderness.   Musculoskeletal:         General: Tenderness present.      Right lower leg: No edema.      Left lower leg: No edema.   Skin:     Capillary Refill: Capillary refill takes less than 2 seconds.      Coloration: Skin is not jaundiced.      Findings: No bruising or lesion.   Neurological:      General: No focal deficit present.      Mental Status: She is alert and oriented to person, place, and time.      Motor: " No weakness.      Gait: Gait normal.   Psychiatric:         Mood and Affect: Mood normal.           Lab Results   Component Value Date     06/22/2022    K 4.1 06/22/2022     06/22/2022    CO2 25 06/22/2022    BUN 10 06/22/2022    CREATININE 0.7 06/22/2022    ANIONGAP 11 06/22/2022     Lab Results   Component Value Date    HGBA1C 5.8 (H) 06/28/2021     Lab Results   Component Value Date    BNP <10 02/14/2017       Lab Results   Component Value Date    WBC 7.46 06/22/2022    HGB 12.7 06/22/2022    HCT 39.3 06/22/2022     06/22/2022    GRAN 3.7 06/22/2022    GRAN 50.1 06/22/2022     Lab Results   Component Value Date    CHOL 182 06/22/2022    HDL 82 (H) 06/22/2022    LDLCALC 86.0 06/22/2022    TRIG 70 06/22/2022          Current Outpatient Medications:     albuterol (PROVENTIL/VENTOLIN HFA) 90 mcg/actuation inhaler, Inhale 2 puffs into the lungs every 6 (six) hours as needed for Wheezing or Shortness of Breath. Rescue, Disp: 18 g, Rfl: 11    atorvastatin (LIPITOR) 20 MG tablet, Take 1 tablet (20 mg total) by mouth every evening., Disp: 90 tablet, Rfl: 3    fluticasone propionate (FLONASE) 50 mcg/actuation nasal spray, SHAKE LIQUID AND USE 1 SPRAY(50 MCG) IN EACH NOSTRIL EVERY DAY, Disp: 48 g, Rfl: 0    lactic acid-citric-potassium (PHEXXI) 1.8-1-0.4 % Gel, Place 1 Applicatorful vaginally as needed (immediately before or up to 1 hour before each act of vaginal intercourse)., Disp: 60 g, Rfl: 6    levocetirizine (XYZAL) 5 MG tablet, Take 1 tablet (5 mg total) by mouth every evening., Disp: 30 tablet, Rfl: 11    mesalamine (APRISO) 0.375 gram Cp24, Take 4 capsules (1.5 g total) by mouth once daily., Disp: 360 capsule, Rfl: 3    tiZANidine (ZANAFLEX) 4 MG tablet, TAKE 1/2 TO 1 TABLET BY MOUTH EVERY 8 HOURS AS NEEDED, Disp: 270 tablet, Rfl: 2    traMADoL (ULTRAM) 50 mg tablet, Take 1 tablet (50 mg total) by mouth every 6 (six) hours., Disp: 120 tablet, Rfl: 5  No current facility-administered medications  for this visit.    Facility-Administered Medications Ordered in Other Visits:     0.9%  NaCl infusion, , Intravenous, Continuous, Netta Amaro NP, New Bag at 10/27/20 0715     Outpatient Encounter Medications as of 9/12/2022   Medication Sig Dispense Refill    albuterol (PROVENTIL/VENTOLIN HFA) 90 mcg/actuation inhaler Inhale 2 puffs into the lungs every 6 (six) hours as needed for Wheezing or Shortness of Breath. Rescue 18 g 11    atorvastatin (LIPITOR) 20 MG tablet Take 1 tablet (20 mg total) by mouth every evening. 90 tablet 3    fluticasone propionate (FLONASE) 50 mcg/actuation nasal spray SHAKE LIQUID AND USE 1 SPRAY(50 MCG) IN EACH NOSTRIL EVERY DAY 48 g 0    lactic acid-citric-potassium (PHEXXI) 1.8-1-0.4 % Gel Place 1 Applicatorful vaginally as needed (immediately before or up to 1 hour before each act of vaginal intercourse). 60 g 6    levocetirizine (XYZAL) 5 MG tablet Take 1 tablet (5 mg total) by mouth every evening. 30 tablet 11    mesalamine (APRISO) 0.375 gram Cp24 Take 4 capsules (1.5 g total) by mouth once daily. 360 capsule 3    tiZANidine (ZANAFLEX) 4 MG tablet TAKE 1/2 TO 1 TABLET BY MOUTH EVERY 8 HOURS AS NEEDED 270 tablet 2    [DISCONTINUED] traMADoL (ULTRAM) 50 mg tablet Take 1 tablet (50 mg total) by mouth every 6 (six) hours. 120 tablet 5    traMADoL (ULTRAM) 50 mg tablet Take 1 tablet (50 mg total) by mouth every 6 (six) hours. 120 tablet 5    [DISCONTINUED] azelastine (ASTELIN) 137 mcg (0.1 %) nasal spray 1 spray (137 mcg total) by Nasal route 2 (two) times daily. 30 mL 11    [DISCONTINUED] azithromycin (ZITHROMAX Z-JEANINE) 250 MG tablet Take 2 tablets on day 1, then 1 tablet on days 2-5. 6 tablet 0    [DISCONTINUED] montelukast (SINGULAIR) 10 mg tablet TAKE 1 TABLET(10 MG) BY MOUTH DAILY AS NEEDED 90 tablet 0     Facility-Administered Encounter Medications as of 9/12/2022   Medication Dose Route Frequency Provider Last Rate Last Admin    0.9%  NaCl infusion   Intravenous Continuous  Netta Amaro NP   New Bag at 10/27/20 0715          Assessment:       1. Left foot pain    2. Chronic bilateral low back pain without sciatica    3. Ulcerative colitis with complication, unspecified location    4. SVT (supraventricular tachycardia)    5. Seasonal allergies    6. Familial hypercholesterolemia    7. Acute bilateral low back pain with sciatica, sciatica laterality unspecified    8. Screening mammogram, encounter for           Plan:       Left foot pain  -     X-Ray Foot Complete 3 view Left; Future; Expected date: 09/12/2022    Chronic bilateral low back pain without sciatica    Ulcerative colitis with complication, unspecified location    SVT (supraventricular tachycardia)    Seasonal allergies    Familial hypercholesterolemia    Acute bilateral low back pain with sciatica, sciatica laterality unspecified  -     traMADoL (ULTRAM) 50 mg tablet; Take 1 tablet (50 mg total) by mouth every 6 (six) hours.  Dispense: 120 tablet; Refill: 5    Screening mammogram, encounter for  -     Mammo Digital Screening Bilat; Future; Expected date: 09/12/2022             Establish care:   - new patient presenting to establish care with this provider.  Has been seen in our system before.  Has history of SVT, ulcerative colitis, chronic low back pain.  Additionally has new issues including left foot pain and tailbone pain.    Ulcerative colitis:   - patient has been getting colonoscopies every 2 years for UC, is due to have another scope in October, GI has been trying to contact patient.  Advised to make this connection and schedule appointment.    Chronic Lumbar Spine Pain:   - patient chronic pain to lumbar spine.  As well as to hip.  Has been evaluated by Pain Management, failed joint injection.  Additionally was seen by Rheumatology and was advised this is likely not a rheumatologic process.   - patient was previously seeing pain management getting Tramdol/Ultram 50 mg 3 times daily.  Refilling at this  time.   - signing controlled substance agreement.     Left foot pain:   - patient had a accidental injury to left foot, compressing her foot against a chair.  Pain located to dorsum of left foot overlying base of 4th metatarsal.   - discussed with patient possibility hairline fracture versus Hayward's neuroma.  Would advise NSAIDs if possible, but patient cannot tolerate due to UC.  Should get some benefit from use of her tramadol, as well as cool compresses.   - getting x-ray of left foot, 3 weeks after injury.    Tailbone pain:    - patient had slip and fall, resulting in slide down her stairs at home, 14 steps.  Had subsequent pain to the tailbone afterward.  This fall occurred approximately 2 months ago.   - discussed she should avoid sitting on hard surfaces.  Will monitor at this time, could use cushion or do not to help reduce pain in irritation.    Vaccine:   - patient due for flu vaccine today.    Mammogram:   - due to mammo, ordered today.

## 2022-09-12 NOTE — PROGRESS NOTES
Verified pt ID using name and . NKDA. Administered Influenza in right deltoid per physician order using aseptic technique. Aspirated and no blood return noted. Pt tolerated well with no adverse reactions noted.

## 2022-09-13 ENCOUNTER — TELEPHONE (OUTPATIENT)
Dept: ENDOSCOPY | Facility: HOSPITAL | Age: 57
End: 2022-09-13
Payer: OTHER GOVERNMENT

## 2022-09-26 ENCOUNTER — TELEPHONE (OUTPATIENT)
Dept: ENDOSCOPY | Facility: HOSPITAL | Age: 57
End: 2022-09-26
Payer: OTHER GOVERNMENT

## 2022-09-26 RX ORDER — SOD SULF/POT CHLORIDE/MAG SULF 1.479 G
12 TABLET ORAL DAILY
Qty: 24 TABLET | Refills: 0 | Status: ON HOLD | OUTPATIENT
Start: 2022-09-26 | End: 2023-03-10 | Stop reason: HOSPADM

## 2022-09-26 NOTE — TELEPHONE ENCOUNTER
Endoscopy Scheduling Questionnaire:         Are you taking any blood thinners? N               If Yes  Have you been on them for longer than one year?     2. Have you been diagnosed with Diverticulitis in past three months?  N    3. Are you on dialysis or have Kidney Disease? N    4. Previous Colonoscopy?  Y         If yes Do you have a history of colon polyps?  Y      6. Are you a diabetic?  Y    7. Do you have a history of constipation?        Procedure scheduled with Dr. Gottlieb on  11/30/2022    The prep being used is SuTab requested by patient    The patient's prep instructions were sent by [employer].

## 2022-09-27 ENCOUNTER — PATIENT MESSAGE (OUTPATIENT)
Dept: PRIMARY CARE CLINIC | Facility: CLINIC | Age: 57
End: 2022-09-27
Payer: OTHER GOVERNMENT

## 2022-10-07 ENCOUNTER — OFFICE VISIT (OUTPATIENT)
Dept: PRIMARY CARE CLINIC | Facility: CLINIC | Age: 57
End: 2022-10-07
Payer: OTHER GOVERNMENT

## 2022-10-07 DIAGNOSIS — N95.1 HOT FLASHES DUE TO MENOPAUSE: Primary | ICD-10-CM

## 2022-10-07 PROCEDURE — 99213 OFFICE O/P EST LOW 20 MIN: CPT | Mod: 95,,, | Performed by: STUDENT IN AN ORGANIZED HEALTH CARE EDUCATION/TRAINING PROGRAM

## 2022-10-07 PROCEDURE — 99213 PR OFFICE/OUTPT VISIT, EST, LEVL III, 20-29 MIN: ICD-10-PCS | Mod: 95,,, | Performed by: STUDENT IN AN ORGANIZED HEALTH CARE EDUCATION/TRAINING PROGRAM

## 2022-10-07 RX ORDER — VENLAFAXINE HYDROCHLORIDE 37.5 MG/1
CAPSULE, EXTENDED RELEASE ORAL
Qty: 60 CAPSULE | Refills: 5 | Status: SHIPPED | OUTPATIENT
Start: 2022-10-07 | End: 2023-03-13

## 2022-10-07 NOTE — PROGRESS NOTES
The patient location is: [place]  The chief complaint leading to consultation is: hot flashes    Visit type: audiovisual    Face to Face time with patient: 15  15 minutes of total time spent on the encounter, which includes face to face time and non-face to face time preparing to see the patient (eg, review of tests), Obtaining and/or reviewing separately obtained history, Documenting clinical information in the electronic or other health record, Independently interpreting results (not separately reported) and communicating results to the patient/family/caregiver, or Care coordination (not separately reported).         Each patient to whom he or she provides medical services by telemedicine is:  (1) informed of the relationship between the physician and patient and the respective role of any other health care provider with respect to management of the patient; and (2) notified that he or she may decline to receive medical services by telemedicine and may withdraw from such care at any time.    Notes:     57-year-old female presenting for audio visit.    Has been having hot flashes for last 3 days.  Has been hourly.     Is 58yo years old, was on birth control.   Has been taking OTC supplement for her hot flashes and does not find this to be helpful for her.    States that her mother went through this and used hormones, is interested in this possibility or other options.    Patient denies fevers or chills.  Denies nausea vomiting.  Denies any sick contacts.  No upper respiratory congestion or nasal drips.    Physical Exam  Constitutional:       General: She is not in acute distress.     Appearance: Normal appearance.   HENT:      Right Ear: External ear normal.      Left Ear: External ear normal.      Nose: No rhinorrhea.   Eyes:      Extraocular Movements: Extraocular movements intact.   Pulmonary:      Effort: Pulmonary effort is normal. No respiratory distress.   Neurological:      Mental Status: She is alert  and oriented to person, place, and time.   Psychiatric:         Mood and Affect: Mood normal.         Behavior: Behavior normal.     Postmenopausal hot flashes:   - discussed condition with patient.  Does not appear to be fever or other metabolic presentation.   - advised patient that I do not routinely give hormones for hot flashes, but we could try a nonhormonal option.   - discussed some options, and will attempt using Effexor.   - starting on Effexor 37.5 mg once a day, after week if not significantly improved can use twice a day.  Do not intend to suppress 75 mg daily dose.   - following up in 2 weeks to discuss effects.

## 2022-10-07 NOTE — PATIENT INSTRUCTIONS
Postmenopausal hot flashes:   - discussed condition with patient.  Does not appear to be fever or other metabolic presentation.   - advised patient that I do not routinely give hormones for hot flashes, but we could try a nonhormonal option.   - discussed some options, and will attempt using Effexor.   - starting on Effexor 37.5 mg once a day, after week if not significantly improved can use twice a day.  Do not intend to suppress 75 mg daily dose.   - following up in 2 weeks to discuss effects.

## 2022-10-20 ENCOUNTER — HOSPITAL ENCOUNTER (OUTPATIENT)
Dept: RADIOLOGY | Facility: HOSPITAL | Age: 57
Discharge: HOME | End: 2022-10-20
Attending: STUDENT IN AN ORGANIZED HEALTH CARE EDUCATION/TRAINING PROGRAM
Payer: OTHER GOVERNMENT

## 2022-10-20 VITALS — BODY MASS INDEX: 29.02 KG/M2 | HEIGHT: 64 IN | WEIGHT: 170 LBS

## 2022-10-20 DIAGNOSIS — Z12.31 SCREENING MAMMOGRAM, ENCOUNTER FOR: ICD-10-CM

## 2022-10-20 PROCEDURE — 77067 SCR MAMMO BI INCL CAD: CPT | Mod: 26,,, | Performed by: RADIOLOGY

## 2022-10-20 PROCEDURE — 77063 BREAST TOMOSYNTHESIS BI: CPT | Mod: TC,PO

## 2022-10-20 PROCEDURE — 77063 MAMMO DIGITAL SCREENING BILAT WITH TOMO: ICD-10-PCS | Mod: 26,,, | Performed by: RADIOLOGY

## 2022-10-20 PROCEDURE — 77067 MAMMO DIGITAL SCREENING BILAT WITH TOMO: ICD-10-PCS | Mod: 26,,, | Performed by: RADIOLOGY

## 2022-10-20 PROCEDURE — 77063 BREAST TOMOSYNTHESIS BI: CPT | Mod: 26,,, | Performed by: RADIOLOGY

## 2022-11-25 ENCOUNTER — TELEPHONE (OUTPATIENT)
Dept: ENDOSCOPY | Facility: HOSPITAL | Age: 57
End: 2022-11-25
Payer: OTHER GOVERNMENT

## 2022-11-25 NOTE — TELEPHONE ENCOUNTER
Left message instructing patient to call dept @ [phone] between 8am-4pm.    Arrival time to be given @ 0830  (Message sent via [facility] portal)

## 2023-02-05 ENCOUNTER — PATIENT MESSAGE (OUTPATIENT)
Dept: PRIMARY CARE CLINIC | Facility: CLINIC | Age: 58
End: 2023-02-05
Payer: OTHER GOVERNMENT

## 2023-02-05 DIAGNOSIS — N95.1 HOT FLASHES DUE TO MENOPAUSE: ICD-10-CM

## 2023-02-05 DIAGNOSIS — Z78.0 MENOPAUSE: Primary | ICD-10-CM

## 2023-02-07 ENCOUNTER — TELEPHONE (OUTPATIENT)
Dept: OBSTETRICS AND GYNECOLOGY | Facility: CLINIC | Age: 58
End: 2023-02-07
Payer: OTHER GOVERNMENT

## 2023-02-07 DIAGNOSIS — Z78.0 MENOPAUSE: Primary | ICD-10-CM

## 2023-02-08 ENCOUNTER — TELEPHONE (OUTPATIENT)
Dept: PRIMARY CARE CLINIC | Facility: CLINIC | Age: 58
End: 2023-02-08
Payer: OTHER GOVERNMENT

## 2023-02-08 NOTE — TELEPHONE ENCOUNTER
----- Message from Mariya Davis sent at 2/8/2023  1:17 PM CST -----  Contact: Pt [phone]  Patient is requesting that her orders for a colonoscopy be sent to the [facility].  She had to cancel her last appointment in Nov 2022.    Please call and advise.    Thank You

## 2023-02-13 ENCOUNTER — TELEPHONE (OUTPATIENT)
Dept: ENDOSCOPY | Facility: HOSPITAL | Age: 58
End: 2023-02-13
Payer: OTHER GOVERNMENT

## 2023-02-13 DIAGNOSIS — K51.20 UC (ULCERATIVE COLITIS CONFINED TO RECTUM): Primary | ICD-10-CM

## 2023-02-14 ENCOUNTER — TELEPHONE (OUTPATIENT)
Dept: ENDOSCOPY | Facility: HOSPITAL | Age: 58
End: 2023-02-14
Payer: OTHER GOVERNMENT

## 2023-02-14 VITALS — WEIGHT: 170 LBS | BODY MASS INDEX: 29.02 KG/M2 | HEIGHT: 64 IN

## 2023-02-14 DIAGNOSIS — K51.20 UC (ULCERATIVE COLITIS CONFINED TO RECTUM): Primary | ICD-10-CM

## 2023-03-10 ENCOUNTER — ANESTHESIA EVENT (OUTPATIENT)
Dept: ENDOSCOPY | Facility: HOSPITAL | Age: 58
End: 2023-03-10
Payer: OTHER GOVERNMENT

## 2023-03-10 ENCOUNTER — ANESTHESIA (OUTPATIENT)
Dept: ENDOSCOPY | Facility: HOSPITAL | Age: 58
End: 2023-03-10
Payer: OTHER GOVERNMENT

## 2023-03-10 ENCOUNTER — HOSPITAL ENCOUNTER (OUTPATIENT)
Facility: HOSPITAL | Age: 58
Discharge: HOME | End: 2023-03-10
Attending: INTERNAL MEDICINE | Admitting: INTERNAL MEDICINE
Payer: OTHER GOVERNMENT

## 2023-03-10 VITALS
SYSTOLIC BLOOD PRESSURE: 130 MMHG | TEMPERATURE: 98 F | RESPIRATION RATE: 18 BRPM | DIASTOLIC BLOOD PRESSURE: 78 MMHG | HEIGHT: 65 IN | WEIGHT: 176 LBS | OXYGEN SATURATION: 100 % | HEART RATE: 74 BPM | BODY MASS INDEX: 29.32 KG/M2

## 2023-03-10 DIAGNOSIS — K51.90 ULCERATIVE COLITIS: ICD-10-CM

## 2023-03-10 LAB
B-HCG UR QL: NEGATIVE
CTP QC/QA: YES

## 2023-03-10 PROCEDURE — 00811 ANES LWR INTST NDSC NOS: CPT | Performed by: INTERNAL MEDICINE

## 2023-03-10 PROCEDURE — 88305 TISSUE EXAM BY PATHOLOGIST: ICD-10-PCS | Mod: 26,,, | Performed by: PATHOLOGY

## 2023-03-10 PROCEDURE — 27201012 HC FORCEPS, HOT/COLD, DISP: Performed by: INTERNAL MEDICINE

## 2023-03-10 PROCEDURE — 45380 COLONOSCOPY AND BIOPSY: CPT | Mod: 33,59,, | Performed by: INTERNAL MEDICINE

## 2023-03-10 PROCEDURE — E9220 PRA ENDO ANESTHESIA: HCPCS | Mod: 33,,, | Performed by: NURSE ANESTHETIST, CERTIFIED REGISTERED

## 2023-03-10 PROCEDURE — 37000009 HC ANESTHESIA EA ADD 15 MINS: Performed by: INTERNAL MEDICINE

## 2023-03-10 PROCEDURE — 27201089 HC SNARE, DISP (ANY): Performed by: INTERNAL MEDICINE

## 2023-03-10 PROCEDURE — 81025 URINE PREGNANCY TEST: CPT | Performed by: INTERNAL MEDICINE

## 2023-03-10 PROCEDURE — 88305 TISSUE EXAM BY PATHOLOGIST: CPT | Performed by: PATHOLOGY

## 2023-03-10 PROCEDURE — 25000003 PHARM REV CODE 250: Performed by: INTERNAL MEDICINE

## 2023-03-10 PROCEDURE — 37000008 HC ANESTHESIA 1ST 15 MINUTES: Performed by: INTERNAL MEDICINE

## 2023-03-10 PROCEDURE — 45385 COLONOSCOPY W/LESION REMOVAL: CPT | Mod: PT | Performed by: INTERNAL MEDICINE

## 2023-03-10 PROCEDURE — 45380 PR COLONOSCOPY,BIOPSY: ICD-10-PCS | Mod: 33,59,, | Performed by: INTERNAL MEDICINE

## 2023-03-10 PROCEDURE — 88305 TISSUE EXAM BY PATHOLOGIST: CPT | Mod: 26,,, | Performed by: PATHOLOGY

## 2023-03-10 PROCEDURE — 45385 COLONOSCOPY W/LESION REMOVAL: CPT | Mod: 33,,, | Performed by: INTERNAL MEDICINE

## 2023-03-10 PROCEDURE — 45380 COLONOSCOPY AND BIOPSY: CPT | Mod: PT,59 | Performed by: INTERNAL MEDICINE

## 2023-03-10 PROCEDURE — E9220 PRA ENDO ANESTHESIA: ICD-10-PCS | Mod: 33,,, | Performed by: NURSE ANESTHETIST, CERTIFIED REGISTERED

## 2023-03-10 PROCEDURE — 45385 PR COLONOSCOPY,REMV LESN,SNARE: ICD-10-PCS | Mod: 33,,, | Performed by: INTERNAL MEDICINE

## 2023-03-10 RX ORDER — LIDOCAINE HCL/PF 100 MG/5ML
SYRINGE (ML) INTRAVENOUS
Status: DISCONTINUED | OUTPATIENT
Start: 2023-03-10 | End: 2023-03-10

## 2023-03-10 RX ORDER — SODIUM CHLORIDE 9 MG/ML
INJECTION, SOLUTION INTRAVENOUS CONTINUOUS
Status: DISCONTINUED | OUTPATIENT
Start: 2023-03-10 | End: 2023-03-10 | Stop reason: HOSPADM

## 2023-03-10 RX ORDER — PROPOFOL 10 MG/ML
INJECTION, EMULSION INTRAVENOUS CONTINUOUS PRN
Status: DISCONTINUED | OUTPATIENT
Start: 2023-03-10 | End: 2023-03-10

## 2023-03-10 RX ORDER — PROPOFOL 10 MG/ML
INJECTION, EMULSION INTRAVENOUS
Status: DISCONTINUED | OUTPATIENT
Start: 2023-03-10 | End: 2023-03-10

## 2023-03-10 RX ADMIN — SODIUM CHLORIDE: 0.9 INJECTION, SOLUTION INTRAVENOUS at 07:03

## 2023-03-10 RX ADMIN — PROPOFOL 30 MG: 10 INJECTION, EMULSION INTRAVENOUS at 08:03

## 2023-03-10 RX ADMIN — PROPOFOL 80 MG: 10 INJECTION, EMULSION INTRAVENOUS at 08:03

## 2023-03-10 RX ADMIN — Medication 100 MG: at 08:03

## 2023-03-10 RX ADMIN — PROPOFOL 150 MCG/KG/MIN: 10 INJECTION, EMULSION INTRAVENOUS at 08:03

## 2023-03-10 NOTE — H&P
Short Stay Endoscopy History and Physical    PCP - Dallas Delgado MD    Procedure - Colonoscopy  ASA - 2  Mallampati - per anesthesia  History of Anesthesia problems - no  Family history Anesthesia problems -  no     HPI:  This is a 57 y.o. female here for evaluation of :     Average Risk Screening: No  High risk screening: No  History of polyps: No  Anemia: No  Blood in stools: No  Diarrhea: No  Abdominal Pain: No  Other: UC surveillance    Review of Systems:  CONSTITUTIONAL: Denies weight change,  fatigue, fevers, chills, night sweats.  CARDIOVASCULAR: Denies chest pain, shortness of breath, orthopnea and edema.  RESPIRATORY: Denies cough, hemoptysis, dyspnea, and wheezing.  GI: See HPI.    Medical History:  Past Medical History:   Diagnosis Date    Abnormal Pap smear of vagina yrs ago    possible BX?    Allergy     Arthritis     Asthma     Hyperlipidemia     Supraventricular tachycardia     SVT (supraventricular tachycardia)     Tachycardia     Ulcerative colitis        Surgical History:   Past Surgical History:   Procedure Laterality Date    ABLATION N/A 10/8/2018    Procedure: ABLATION;  Surgeon: Shabbir Clark MD;  [facility] [facility];  Service: Cardiology;  Laterality: N/A;  SVT,SVT-AVNRT RFA,KRISTI,MAC,FAS,3PREP     SECTION, CLASSIC      x 2    COLONOSCOPY N/A 2015    Procedure: COLONOSCOPY;  Surgeon: Petr Miller MD;  Location: [address]);  Service: Endoscopy;  Laterality: N/A;    COLONOSCOPY N/A 10/13/2017    Procedure: COLONOSCOPY;  Surgeon: Petr Miller MD;  Location: [address]);  Service: Endoscopy;  Laterality: N/A;    COLONOSCOPY N/A 10/27/2020    Procedure: COLONOSCOPY;  Surgeon: Petr Miller MD;  Location: [address]);  Service: Endoscopy;  Laterality: N/A;  covid test 10/24-[facility]  prep ins. emailed- [facility]    INJECTION OF JOINT Bilateral 2019    Procedure: Injection, Joint,  Hip--Bilateral;  Surgeon: Chauncey BAY  Obed Alaniz MD;  Location: [facility] PAIN MGT;  Service: Pain Management;  Laterality: Bilateral;       Family History:   Family History   Problem Relation Age of Onset    Hyperlipidemia Mother     Multiple myeloma Mother 75        passsed at 85    Cataracts Mother     Allergies Mother     Hypertension Mother     Heart attack Father     Diabetes Father     Breast cancer Sister 63    Arthritis Maternal Grandmother     Glaucoma Maternal Grandfather     Heart attack Paternal Grandmother     Arthritis Paternal Grandmother     Breast cancer Other 47    Heart disease Neg Hx     Colon cancer Neg Hx     Esophageal cancer Neg Hx     Amblyopia Neg Hx     Blindness Neg Hx     Macular degeneration Neg Hx     Retinal detachment Neg Hx     Strabismus Neg Hx     Stroke Neg Hx     Thyroid disease Neg Hx     Angioedema Neg Hx     Asthma Neg Hx     Atopy Neg Hx     Eczema Neg Hx     Immunodeficiency Neg Hx     Rhinitis Neg Hx     Urticaria Neg Hx     Ovarian cancer Neg Hx     Lung cancer Neg Hx        Social History:   Social History     Tobacco Use    Smoking status: Former     Packs/day: 0.00     Years: 10.00     Pack years: 0.00     Types: Cigarettes     Quit date: 1999     Years since quittin.0    Smokeless tobacco: Never   Substance Use Topics    Alcohol use: Yes     Alcohol/week: 0.0 standard drinks     Comment: events - couple times a month    Drug use: No       Allergies: Reviewed.    Medications:  Current Facility-Administered Medications on File Prior to Encounter   Medication Dose Route Frequency Provider Last Rate Last Admin    0.9%  NaCl infusion   Intravenous Continuous Netta Amaro NP   New Bag at 10/27/20 0715     Current Outpatient Medications on File Prior to Encounter   Medication Sig Dispense Refill    atorvastatin (LIPITOR) 20 MG tablet Take 1 tablet (20 mg total) by mouth every evening. 90 tablet 3    mesalamine (APRISO) 0.375 gram Cp24 Take 4 capsules (1.5 g total) by mouth once daily. 360  capsule 3    traMADoL (ULTRAM) 50 mg tablet Take 1 tablet (50 mg total) by mouth every 6 (six) hours. 120 tablet 5    albuterol (PROVENTIL/VENTOLIN HFA) 90 mcg/actuation inhaler Inhale 2 puffs into the lungs every 6 (six) hours as needed for Wheezing or Shortness of Breath. Rescue 18 g 11    fluticasone propionate (FLONASE) 50 mcg/actuation nasal spray SHAKE LIQUID AND USE 1 SPRAY(50 MCG) IN EACH NOSTRIL EVERY DAY 48 g 0    lactic acid-citric-potassium (PHEXXI) 1.8-1-0.4 % Gel Place 1 Applicatorful vaginally as needed (immediately before or up to 1 hour before each act of vaginal intercourse). 60 g 6    levocetirizine (XYZAL) 5 MG tablet Take 1 tablet (5 mg total) by mouth every evening. 30 tablet 11    sod sulf-pot chloride-mag sulf (SUTAB) 1.479-0.188- 0.225 gram tablet Take 12 tablets by mouth once daily. Take according to package instructions with indicated amount of water. 24 tablet 0    tiZANidine (ZANAFLEX) 4 MG tablet TAKE 1/2 TO 1 TABLET BY MOUTH EVERY 8 HOURS AS NEEDED 270 tablet 2    venlafaxine (EFFEXOR-XR) 37.5 MG 24 hr capsule Take 1 tab daily for 1 week, then if symptoms continue increase to 1 tab twice daily. 60 capsule 5       Physical Exam:  Vital Signs:   Vitals:    03/10/23 0756   BP: 134/85   Pulse: 86   Resp: 16   Temp: 99.3 °F (37.4 °C)     General Appearance: Well appearing in no acute distress  ENT: OP clear  Chest: CTA B  CV: RRR, no m/r/g  Abd: s/nt/nd/nabs  Ext: no edema    Labs:  Reviewed    IMPRESSION:    UC surveillance    Plan:  I have explained the risks and benefits of colonoscopy to the patient including but not limited to bleeding, perforation, infection, and death. The patient wishes to proceed with colonoscopy.

## 2023-03-10 NOTE — ANESTHESIA PREPROCEDURE EVALUATION
[facility]  Anesthesia Pre-Operative Evaluation       Patient Name: Sharlene Smith  YOB: 1965  MRN: [id number]  [facility]: [id number]      Code Status: Prior   Date of Procedure: 3/10/2023  Anesthesia: Choice Procedure: Procedure(s) (LRB):  COLONOSCOPY (N/A)  Pre-Operative Diagnosis: UC (ulcerative colitis confined to rectum) [K51.20]  Proceduralist: Surgeon(s) and Role:     * Nehemias Molina MD - Primary Nurse: (Unknown)      SUBJECTIVE:   Sharlene Smith is a 57 y.o. female who  has a past medical history of Abnormal Pap smear of vagina (yrs ago), Allergy, Arthritis, Asthma, Hyperlipidemia, Supraventricular tachycardia, SVT (supraventricular tachycardia), Tachycardia, and Ulcerative colitis. No notes on file    No current facility-administered medications for this encounter.       she has a current medication list which includes the following long-term medication(s): albuterol, atorvastatin, levocetirizine, mesalamine, and venlafaxine.   ALLERGIES:   Review of patient's allergies indicates:  No Known Allergies  LDA:      Lines/Drains/Airways     None               MEDICATIONS:     Antibiotics (From admission, onward)    None        VTE Risk Mitigation (From admission, onward)    None        No current facility-administered medications for this encounter.     Current Outpatient Medications   Medication Sig Dispense Refill    albuterol (PROVENTIL/VENTOLIN HFA) 90 mcg/actuation inhaler Inhale 2 puffs into the lungs every 6 (six) hours as needed for Wheezing or Shortness of Breath. Rescue 18 g 11    atorvastatin (LIPITOR) 20 MG tablet Take 1 tablet (20 mg total) by mouth every evening. 90 tablet 3    fluticasone propionate (FLONASE) 50 mcg/actuation nasal spray SHAKE LIQUID AND USE 1 SPRAY(50 MCG) IN EACH NOSTRIL EVERY DAY 48 g 0    lactic acid-citric-potassium (PHEXXI) 1.8-1-0.4 % Gel Place 1 Applicatorful vaginally as needed (immediately before or up to 1 hour before  each act of vaginal intercourse). 60 g 6    levocetirizine (XYZAL) 5 MG tablet Take 1 tablet (5 mg total) by mouth every evening. 30 tablet 11    mesalamine (APRISO) 0.375 gram Cp24 Take 4 capsules (1.5 g total) by mouth once daily. 360 capsule 3    sod sulf-pot chloride-mag sulf (SUTAB) 1.479-0.188- 0.225 gram tablet Take 12 tablets by mouth once daily. Take according to package instructions with indicated amount of water. 24 tablet 0    tiZANidine (ZANAFLEX) 4 MG tablet TAKE 1/2 TO 1 TABLET BY MOUTH EVERY 8 HOURS AS NEEDED 270 tablet 2    traMADoL (ULTRAM) 50 mg tablet Take 1 tablet (50 mg total) by mouth every 6 (six) hours. 120 tablet 5    venlafaxine (EFFEXOR-XR) 37.5 MG 24 hr capsule Take 1 tab daily for 1 week, then if symptoms continue increase to 1 tab twice daily. 60 capsule 5     Facility-Administered Medications Ordered in Other Encounters   Medication Dose Route Frequency Provider Last Rate Last Admin    0.9%  NaCl infusion   Intravenous Continuous Netta Amaro, NP   New Bag at 10/27/20 0715          History:   There are no hospital problems to display for this patient.    Surgical History:    has a past surgical history that includes Colonoscopy (N/A, 2015);  section, classic; Colonoscopy (N/A, 10/13/2017); Ablation (N/A, 10/8/2018); Injection of joint (Bilateral, 2019); and Colonoscopy (N/A, 10/27/2020).   Social History:    reports being sexually active and has had partner(s) who are male. She reports using the following methods of birth control/protection: Post-menopausal and None.  reports that she quit smoking about 24 years ago. Her smoking use included cigarettes. She has never used smokeless tobacco. She reports current alcohol use. She reports that she does not use drugs.     OBJECTIVE:     Vital Signs (Most Recent):    Vital Signs Range (Last 24H):          There is no height or weight on file to calculate BMI.   Wt Readings from Last 4 Encounters:   23  77.1 kg (170 lb)   10/20/22 77.1 kg (170 lb)   09/12/22 80.2 kg (176 lb 11.2 oz)   07/24/22 79.4 kg (175 lb)     Significant Labs:  Lab Results   Component Value Date    WBC 7.46 06/22/2022    HGB 12.7 06/22/2022    HCT 39.3 06/22/2022     06/22/2022     06/22/2022    K 4.1 06/22/2022     06/22/2022    CREATININE 0.7 06/22/2022    BUN 10 06/22/2022    CO2 25 06/22/2022    GLU 97 06/22/2022    CALCIUM 9.6 06/22/2022    MG 2.0 10/06/2013    PHOS 3.5 10/06/2013    ALKPHOS 120 06/22/2022    ALT 27 06/22/2022    AST 22 06/22/2022    ALBUMIN 3.8 06/22/2022    INR 0.9 10/01/2018    APTT 22.4 10/01/2018    HGBA1C 5.8 (H) 06/28/2021    TROPONINI 0.040 (H) 02/14/2017    BNP <10 02/14/2017     Patient's last menstrual period was 06/29/2021.  No results found for this or any previous visit (from the past 72 hour(s)).    EKG:   Results for orders placed or performed in visit on 11/22/19   IN OFFICE EKG 12-LEAD (to Leslie)    Collection Time: 11/22/19  9:31 AM    Narrative    Test Reason : R00.0    Vent. Rate : 101 BPM     Atrial Rate : 101 BPM     P-R Int : 150 ms          QRS Dur : 078 ms      QT Int : 338 ms       P-R-T Axes : 065 017 032 degrees     QTc Int : 438 ms    Sinus tachycardia  Possible Inferior infarct (cited on or before 08-OCT-2018)  Nonspecific ST and/or T wave abnormalities  When compared with ECG of 08-OCT-2018 10:15,  No significant change was found  Confirmed by TOBY SELF MD ([id number]) on 11/22/2019 11:56:49 AM    Referred By: CHETNA MADSEN           Confirmed By:TOBY SELF MD       TTE:  No results found for this or any previous visit.  No results found for: EF   No results found for this or any previous visit.  ANDREA:  No results found for this or any previous visit.  Stress Test:  No results found for this or any previous visit.     LHC:  No results found for this or any previous visit.     PFT:  No results found for: FEV1, FVC, KTI6YTR, TLC, DLCO   ASSESSMENT/PLAN:         Pre-op  Assessment    I have reviewed the Patient Summary Reports.     I have reviewed the Nursing Notes.    I have reviewed the Medications.     Review of Systems  Anesthesia Hx:  No problems with previous Anesthesia    Hematology/Oncology:  Hematology Normal   Oncology Normal     EENT/Dental:EENT/Dental Normal   Cardiovascular:  Cardiovascular Normal Exercise tolerance: good     Pulmonary:   Asthma    Renal/:  Renal/ Normal     Hepatic/GI:   PUD,    Musculoskeletal:  Musculoskeletal Normal    Neurological:   Neuromuscular Disease, Headaches    Endocrine:  Endocrine Normal    Dermatological:  Skin Normal    Psych:  Psychiatric Normal           Physical Exam  General: Well nourished    Airway:  Mallampati: III / II  Mouth Opening: Normal  TM Distance: Normal  Tongue: Normal  Neck ROM: Normal ROM    Dental:  Intact        Anesthesia Plan  Type of Anesthesia, risks & benefits discussed:    Anesthesia Type: Gen ETT, Gen Natural Airway  Intra-op Monitoring Plan: Standard ASA Monitors  Induction:  IV  Informed Consent: Informed consent signed with the Patient and all parties understand the risks and agree with anesthesia plan.  All questions answered.   ASA Score: 2    Ready For Surgery From Anesthesia Perspective.     .

## 2023-03-10 NOTE — ANESTHESIA POSTPROCEDURE EVALUATION
Anesthesia Post Evaluation    Patient: Sharlene Smith    Procedure(s) Performed: Procedure(s) (LRB):  COLONOSCOPY (N/A)    Final Anesthesia Type: general      Patient location during evaluation: PACU  Patient participation: Yes- Able to Participate  Level of consciousness: awake and alert  Post-procedure vital signs: reviewed and stable  Pain management: adequate  Airway patency: patent    PONV status at discharge: No PONV  Anesthetic complications: no      Cardiovascular status: blood pressure returned to baseline  Respiratory status: unassisted  Hydration status: euvolemic  Follow-up not needed.          Vitals Value Taken Time   /78 03/10/23 0911   Temp 36.6 °C (97.9 °F) 03/10/23 0843   Pulse 74 03/10/23 0911   Resp 18 03/10/23 0911   SpO2 100 % 03/10/23 0911         Event Time   Out of Recovery 09:19:49         Pain/Sulma Score: Sulma Score: 10 (3/10/2023  8:51 AM)

## 2023-03-10 NOTE — TRANSFER OF CARE
"Anesthesia Transfer of Care Note    Patient: Sharlene Smith    Procedure(s) Performed: Procedure(s) (LRB):  COLONOSCOPY (N/A)    Patient location: GI    Anesthesia Type: general    Transport from OR: Transported from OR on room air with adequate spontaneous ventilation    Post pain: adequate analgesia    Post assessment: no apparent anesthetic complications    Post vital signs: stable    Level of consciousness: awake    Nausea/Vomiting: no nausea/vomiting    Complications: none    Transfer of care protocol was followed      Last vitals:   Visit Vitals  /69   Pulse 79   Temp 97.7   Resp 16   Ht 5' 5" (1.651 m)   Wt 79.8 kg (176 lb)   LMP 06/29/2021   SpO2 99%   Breastfeeding No   BMI 29.29 kg/m²     "

## 2023-03-10 NOTE — PROVATION PATIENT INSTRUCTIONS
Discharge Summary/Instructions after an Endoscopic Procedure  Patient Name: Sharlene Smith  Patient MRN: [id number]  Patient YOB: 1965  Friday, March 10, 2023  Nehemias Molina MD  Dear patient,  As a result of recent federal legislation (The Federal Cures Act), you may   receive lab or pathology results from your procedure in your [facility]   account before your physician is able to contact you. Your physician or   their representative will relay the results to you with their   recommendations at their soonest availability.  Thank you,  RESTRICTIONS:  During your procedure today, you received medications for sedation.  These   medications may affect your judgment, balance and coordination.  Therefore,   for 24 hours, you have the following restrictions:   - DO NOT drive a car, operate machinery, make legal/financial decisions,   sign important papers or drink alcohol.    ACTIVITY:  Today: no heavy lifting, straining or running due to procedural   sedation/anesthesia.  The following day: return to full activity including work.  DIET:  Eat and drink normally unless instructed otherwise.     TREATMENT FOR COMMON SIDE EFFECTS:  - Mild abdominal pain, nausea, belching, bloating or excessive gas:  rest,   eat lightly and use a heating pad.  - Sore Throat: treat with throat lozenges and/or gargle with warm salt   water.  - Because air was used during the procedure, expelling large amounts of air   from your rectum or belching is normal.  - If a bowel prep was taken, you may not have a bowel movement for 1-3 days.    This is normal.  SYMPTOMS TO WATCH FOR AND REPORT TO YOUR PHYSICIAN:  1. Abdominal pain or bloating, other than gas cramps.  2. Chest pain.  3. Back pain.  4. Signs of infection such as: chills or fever occurring within 24 hours   after the procedure.  5. Rectal bleeding, which would show as bright red, maroon, or black stools.   (A tablespoon of blood from the rectum is not serious, especially  if   hemorrhoids are present.)  6. Vomiting.  7. Weakness or dizziness.  GO DIRECTLY TO THE NEAREST EMERGENCY ROOM IF YOU HAVE ANY OF THE FOLLOWING:      Difficulty breathing              Chills and/or fever over 101 F   Persistent vomiting and/or vomiting blood   Severe abdominal pain   Severe chest pain   Black, tarry stools   Bleeding- more than one tablespoon   Any other symptom or condition that you feel may need urgent attention  Your doctor recommends these additional instructions:  If any biopsies were taken, your doctors clinic will contact you in 1 to 2   weeks with any results.  - Discharge patient to home.   - Resume previous diet today.   - Continue present medications.   - Await pathology results.   - Repeat colonoscopy in 1 year because the bowel preparation was suboptimal   and for surveillance.   - Return to referring physician as previously scheduled.   - If any of the cecal polyps have evidence of dysplasia would recommend   chromoendoscopy to reassess in 6 months. That would require a significantly   better preparation of the colon. Would advise against the use of Sutabs in   the future.  - Patient has a contact number available for emergencies.  The signs and   symptoms of potential delayed complications were discussed with the   patient.  Return to normal activities tomorrow.  Written discharge   instructions were provided to the patient.  For questions, problems or results please call your physician - Nehemias Molina MD at Work:  [phone].  [facility], EMERGENCY ROOM PHONE NUMBER: [phone]  IF A COMPLICATION OR EMERGENCY SITUATION ARISES AND YOU ARE UNABLE TO REACH   YOUR PHYSICIAN - GO DIRECTLY TO THE EMERGENCY ROOM.  Nehemias Molina MD  3/10/2023 8:42:48 AM  This report has been verified and signed electronically.  Dear patient,  As a result of recent federal legislation (The Federal Cures Act), you may   receive lab or pathology results from your procedure in  your [facility]   account before your physician is able to contact you. Your physician or   their representative will relay the results to you with their   recommendations at their soonest availability.  Thank you,  PROVATION

## 2023-03-13 ENCOUNTER — OFFICE VISIT (OUTPATIENT)
Dept: PRIMARY CARE CLINIC | Facility: CLINIC | Age: 58
End: 2023-03-13
Payer: OTHER GOVERNMENT

## 2023-03-13 VITALS
BODY MASS INDEX: 30.21 KG/M2 | HEART RATE: 80 BPM | OXYGEN SATURATION: 98 % | DIASTOLIC BLOOD PRESSURE: 82 MMHG | SYSTOLIC BLOOD PRESSURE: 132 MMHG | HEIGHT: 65 IN | RESPIRATION RATE: 16 BRPM | WEIGHT: 181.31 LBS | TEMPERATURE: 98 F

## 2023-03-13 DIAGNOSIS — M54.40 ACUTE BILATERAL LOW BACK PAIN WITH SCIATICA, SCIATICA LATERALITY UNSPECIFIED: ICD-10-CM

## 2023-03-13 DIAGNOSIS — N95.1 HOT FLASHES DUE TO MENOPAUSE: ICD-10-CM

## 2023-03-13 DIAGNOSIS — M19.049 ARTHRITIS PAIN OF HAND: Primary | ICD-10-CM

## 2023-03-13 DIAGNOSIS — J45.901 EXACERBATION OF ASTHMA, UNSPECIFIED ASTHMA SEVERITY, UNSPECIFIED WHETHER PERSISTENT: ICD-10-CM

## 2023-03-13 DIAGNOSIS — J30.2 SEASONAL ALLERGIES: ICD-10-CM

## 2023-03-13 PROCEDURE — 99214 PR OFFICE/OUTPT VISIT, EST, LEVL IV, 30-39 MIN: ICD-10-PCS | Mod: S$PBB,,, | Performed by: STUDENT IN AN ORGANIZED HEALTH CARE EDUCATION/TRAINING PROGRAM

## 2023-03-13 PROCEDURE — 99999 PR PBB SHADOW E&M-EST. PATIENT-LVL IV: ICD-10-PCS | Mod: PBBFAC,,, | Performed by: STUDENT IN AN ORGANIZED HEALTH CARE EDUCATION/TRAINING PROGRAM

## 2023-03-13 PROCEDURE — 99999 PR PBB SHADOW E&M-EST. PATIENT-LVL IV: CPT | Mod: PBBFAC,,, | Performed by: STUDENT IN AN ORGANIZED HEALTH CARE EDUCATION/TRAINING PROGRAM

## 2023-03-13 PROCEDURE — 99214 OFFICE O/P EST MOD 30 MIN: CPT | Mod: PBBFAC,PN | Performed by: STUDENT IN AN ORGANIZED HEALTH CARE EDUCATION/TRAINING PROGRAM

## 2023-03-13 PROCEDURE — 99214 OFFICE O/P EST MOD 30 MIN: CPT | Mod: S$PBB,,, | Performed by: STUDENT IN AN ORGANIZED HEALTH CARE EDUCATION/TRAINING PROGRAM

## 2023-03-13 RX ORDER — DICLOFENAC SODIUM 10 MG/G
2 GEL TOPICAL 2 TIMES DAILY PRN
Qty: 50 G | Refills: 1 | Status: SHIPPED | OUTPATIENT
Start: 2023-03-13

## 2023-03-13 RX ORDER — FLUTICASONE PROPIONATE 50 MCG
SPRAY, SUSPENSION (ML) NASAL
Qty: 48 G | Refills: 5 | Status: SHIPPED | OUTPATIENT
Start: 2023-03-13

## 2023-03-13 RX ORDER — TRAMADOL HYDROCHLORIDE 50 MG/1
50 TABLET ORAL EVERY 6 HOURS
Qty: 120 TABLET | Refills: 5 | Status: SHIPPED | OUTPATIENT
Start: 2023-03-13 | End: 2023-09-11 | Stop reason: SDUPTHER

## 2023-03-13 RX ORDER — ALBUTEROL SULFATE 90 UG/1
2 AEROSOL, METERED RESPIRATORY (INHALATION) EVERY 6 HOURS PRN
Qty: 18 G | Refills: 11 | Status: SHIPPED | OUTPATIENT
Start: 2023-03-13 | End: 2024-03-12

## 2023-03-13 NOTE — PROGRESS NOTES
Subjective:           Patient ID: Sharlene Smith is a 57 y.o. female who presents today with a chief complaint of hot flashes.    Chief Complaint:   Follow-up (Hot flashes)      History of Present Illness:    57-year-old female presenting to discuss hot flashes.  Was started on Effexor 37.5 once a day in October.  Advised that if not helpful initially, after week could increase to 2 tabs or 75 mg daily.  Follow-up was to be in 2 weeks, but did not have follow-up appointment until now 6 months later.    Patient states she tried 1 pill without much effect, then tried the 2 doses, her hot flashes got a bit better, but then was feeling strained eyes and was having significant insomnia.  So stopped meds.    Has a future appt on 4/4/23 w/ Dr. Le and 3/24/23 with Dr. Shoemaker.    Has albuterol inhailers that is used based on season.     Taking regular Tramadol 50mg q6hr PRN.   Taking for lumbar back pain, disc issues.  States this is the only think that helps.  Tried PT before and found that activity made the pain worse.    Review of Systems   Constitutional:  Negative for activity change, fatigue, fever and unexpected weight change.   HENT:  Negative for congestion, nosebleeds, sinus pressure and sneezing.    Respiratory:  Negative for cough, shortness of breath and wheezing.    Cardiovascular:  Negative for chest pain, palpitations and leg swelling.   Gastrointestinal:  Negative for abdominal distention, constipation, diarrhea and nausea.   Genitourinary:  Negative for difficulty urinating and dysuria.        Hot flashes   Musculoskeletal:  Positive for arthralgias, back pain and myalgias. Negative for gait problem.   Skin:  Negative for pallor and rash.   Neurological:  Negative for weakness, numbness and headaches.   Psychiatric/Behavioral:  Negative for agitation. The patient is not nervous/anxious.          Objective:        Vitals:    03/13/23 1109   BP: 132/82   BP Location: Right arm   Patient Position:  "Sitting   BP Method: Medium (Manual)   Pulse: 80   Resp: 16   Temp: 97.6 °F (36.4 °C)   TempSrc: Temporal   SpO2: 98%   Weight: 82.2 kg (181 lb 5.3 oz)   Height: 5' 5" (1.651 m)       Body mass index is 30.17 kg/m².      Physical Exam  Constitutional:       General: She is not in acute distress.     Appearance: Normal appearance.   HENT:      Head: Normocephalic and atraumatic.      Right Ear: External ear normal.      Left Ear: External ear normal.      Nose: No rhinorrhea.      Mouth/Throat:      Mouth: Mucous membranes are moist.   Eyes:      Extraocular Movements: Extraocular movements intact.      Conjunctiva/sclera: Conjunctivae normal.   Cardiovascular:      Rate and Rhythm: Normal rate and regular rhythm.      Pulses: Normal pulses.      Heart sounds: No murmur heard.  Pulmonary:      Effort: Pulmonary effort is normal. No respiratory distress.   Abdominal:      Tenderness: There is no right CVA tenderness or left CVA tenderness.   Musculoskeletal:      Right lower leg: No edema.      Left lower leg: No edema.   Skin:     Capillary Refill: Capillary refill takes less than 2 seconds.      Coloration: Skin is not jaundiced.      Findings: No bruising.   Neurological:      General: No focal deficit present.      Mental Status: She is alert and oriented to person, place, and time.      Motor: No weakness.      Gait: Gait normal.   Psychiatric:         Mood and Affect: Mood normal.           Lab Results   Component Value Date     06/22/2022    K 4.1 06/22/2022     06/22/2022    CO2 25 06/22/2022    BUN 10 06/22/2022    CREATININE 0.7 06/22/2022    ANIONGAP 11 06/22/2022     Lab Results   Component Value Date    HGBA1C 5.8 (H) 06/28/2021     Lab Results   Component Value Date    BNP <10 02/14/2017       Lab Results   Component Value Date    WBC 7.46 06/22/2022    HGB 12.7 06/22/2022    HCT 39.3 06/22/2022     06/22/2022    GRAN 3.7 06/22/2022    GRAN 50.1 06/22/2022     Lab Results   Component " Value Date    CHOL 182 06/22/2022    HDL 82 (H) 06/22/2022    LDLCALC 86.0 06/22/2022    TRIG 70 06/22/2022          Current Outpatient Medications:     atorvastatin (LIPITOR) 20 MG tablet, Take 1 tablet (20 mg total) by mouth every evening., Disp: 90 tablet, Rfl: 3    levocetirizine (XYZAL) 5 MG tablet, Take 1 tablet (5 mg total) by mouth every evening., Disp: 30 tablet, Rfl: 11    mesalamine (APRISO) 0.375 gram Cp24, Take 4 capsules (1.5 g total) by mouth once daily., Disp: 360 capsule, Rfl: 3    tiZANidine (ZANAFLEX) 4 MG tablet, TAKE 1/2 TO 1 TABLET BY MOUTH EVERY 8 HOURS AS NEEDED, Disp: 270 tablet, Rfl: 2    albuterol (PROVENTIL/VENTOLIN HFA) 90 mcg/actuation inhaler, Inhale 2 puffs into the lungs every 6 (six) hours as needed for Wheezing or Shortness of Breath. Rescue, Disp: 18 g, Rfl: 11    diclofenac sodium (VOLTAREN) 1 % Gel, Apply 2 g topically 2 (two) times daily as needed., Disp: 50 g, Rfl: 1    fluticasone propionate (FLONASE) 50 mcg/actuation nasal spray, SHAKE LIQUID AND USE 1 SPRAY(50 MCG) IN EACH NOSTRIL EVERY DAY, Disp: 48 g, Rfl: 5    traMADoL (ULTRAM) 50 mg tablet, Take 1 tablet (50 mg total) by mouth every 6 (six) hours., Disp: 120 tablet, Rfl: 5  No current facility-administered medications for this visit.    Facility-Administered Medications Ordered in Other Visits:     0.9%  NaCl infusion, , Intravenous, Continuous, Netta Amaro NP, New Bag at 10/27/20 0715     Outpatient Encounter Medications as of 3/13/2023   Medication Sig Dispense Refill    atorvastatin (LIPITOR) 20 MG tablet Take 1 tablet (20 mg total) by mouth every evening. 90 tablet 3    levocetirizine (XYZAL) 5 MG tablet Take 1 tablet (5 mg total) by mouth every evening. 30 tablet 11    mesalamine (APRISO) 0.375 gram Cp24 Take 4 capsules (1.5 g total) by mouth once daily. 360 capsule 3    tiZANidine (ZANAFLEX) 4 MG tablet TAKE 1/2 TO 1 TABLET BY MOUTH EVERY 8 HOURS AS NEEDED 270 tablet 2    [DISCONTINUED] albuterol  (PROVENTIL/VENTOLIN HFA) 90 mcg/actuation inhaler Inhale 2 puffs into the lungs every 6 (six) hours as needed for Wheezing or Shortness of Breath. Rescue 18 g 11    [DISCONTINUED] fluticasone propionate (FLONASE) 50 mcg/actuation nasal spray SHAKE LIQUID AND USE 1 SPRAY(50 MCG) IN EACH NOSTRIL EVERY DAY 48 g 0    [DISCONTINUED] traMADoL (ULTRAM) 50 mg tablet Take 1 tablet (50 mg total) by mouth every 6 (six) hours. 120 tablet 5    albuterol (PROVENTIL/VENTOLIN HFA) 90 mcg/actuation inhaler Inhale 2 puffs into the lungs every 6 (six) hours as needed for Wheezing or Shortness of Breath. Rescue 18 g 11    diclofenac sodium (VOLTAREN) 1 % Gel Apply 2 g topically 2 (two) times daily as needed. 50 g 1    fluticasone propionate (FLONASE) 50 mcg/actuation nasal spray SHAKE LIQUID AND USE 1 SPRAY(50 MCG) IN EACH NOSTRIL EVERY DAY 48 g 5    traMADoL (ULTRAM) 50 mg tablet Take 1 tablet (50 mg total) by mouth every 6 (six) hours. 120 tablet 5    [DISCONTINUED] lactic acid-citric-potassium (PHEXXI) 1.8-1-0.4 % Gel Place 1 Applicatorful vaginally as needed (immediately before or up to 1 hour before each act of vaginal intercourse). 60 g 6    [DISCONTINUED] sod sulf-pot chloride-mag sulf (SUTAB) 1.479-0.188- 0.225 gram tablet Take 12 tablets by mouth once daily. Take according to package instructions with indicated amount of water. 24 tablet 0    [DISCONTINUED] venlafaxine (EFFEXOR-XR) 37.5 MG 24 hr capsule Take 1 tab daily for 1 week, then if symptoms continue increase to 1 tab twice daily. 60 capsule 5     Facility-Administered Encounter Medications as of 3/13/2023   Medication Dose Route Frequency Provider Last Rate Last Admin    0.9%  NaCl infusion   Intravenous Continuous Netta Amaro NP   New Bag at 10/27/20 0715          Assessment:       1. Arthritis pain of hand    2. Seasonal allergies    3. Hot flashes due to menopause    4. Exacerbation of asthma, unspecified asthma severity, unspecified whether persistent     5. Acute bilateral low back pain with sciatica, sciatica laterality unspecified           Plan:       Arthritis pain of hand  -     diclofenac sodium (VOLTAREN) 1 % Gel; Apply 2 g topically 2 (two) times daily as needed.  Dispense: 50 g; Refill: 1    Seasonal allergies  -     fluticasone propionate (FLONASE) 50 mcg/actuation nasal spray; SHAKE LIQUID AND USE 1 SPRAY(50 MCG) IN EACH NOSTRIL EVERY DAY  Dispense: 48 g; Refill: 5    Hot flashes due to menopause    Exacerbation of asthma, unspecified asthma severity, unspecified whether persistent  -     albuterol (PROVENTIL/VENTOLIN HFA) 90 mcg/actuation inhaler; Inhale 2 puffs into the lungs every 6 (six) hours as needed for Wheezing or Shortness of Breath. Rescue  Dispense: 18 g; Refill: 11    Acute bilateral low back pain with sciatica, sciatica laterality unspecified  -     traMADoL (ULTRAM) 50 mg tablet; Take 1 tablet (50 mg total) by mouth every 6 (six) hours.  Dispense: 120 tablet; Refill: 5               Lumbar back pain:   - taking tramadol 50 mg up to 4 times daily, has been chronic use due to chronic pain.  Was not alleviated with NSAIDs or PT. Will continue at this time.    Hot flashes:   - discuss this in October of 2022, started on Effexor, tried 37 and 75 mg, found minimal relief of symptoms, and found side effects not well tolerated.  Patient has stopped using this several months ago.   - has an appointment with OB next month to discuss other possible interventions.    Sinusitis:   - patient having significant sinus irritation allergy at this time.  Requesting refills on albuterol, Flonase which have been provided and is continuing on her Xyzal daily.   - discuss use of Neti pot, discussed the safe practices uses of this device including avoiding use of any tap water to prevent risk of infection.

## 2023-03-13 NOTE — PATIENT INSTRUCTIONS
Lumbar back pain:   - taking tramadol 50 mg up to 4 times daily, has been chronic use due to chronic pain.  Was not alleviated with NSAIDs or PT. Will continue at this time.    Hot flashes:   - discuss this in October of 2022, started on Effexor, tried 37 and 75 mg, found minimal relief of symptoms, and found side effects not well tolerated.  Patient has stopped using this several months ago.   - has an appointment with OB next month to discuss other possible interventions.    Sinusitis:   - patient having significant sinus irritation allergy at this time.  Requesting refills on albuterol, Flonase which have been provided and is continuing on her Xyzal daily.   - discuss use of Neti pot, discussed the safe practices uses of this device including avoiding use of any tap water to prevent risk of infection.

## 2023-03-17 LAB
FINAL PATHOLOGIC DIAGNOSIS: NORMAL
GROSS: NORMAL
Lab: NORMAL

## 2023-04-04 ENCOUNTER — LAB VISIT (OUTPATIENT)
Dept: LAB | Facility: OTHER | Age: 58
End: 2023-04-04
Attending: PHYSICIAN ASSISTANT
Payer: OTHER GOVERNMENT

## 2023-04-04 ENCOUNTER — OFFICE VISIT (OUTPATIENT)
Dept: OBSTETRICS AND GYNECOLOGY | Facility: CLINIC | Age: 58
End: 2023-04-04
Payer: OTHER GOVERNMENT

## 2023-04-04 VITALS
SYSTOLIC BLOOD PRESSURE: 124 MMHG | HEIGHT: 64 IN | BODY MASS INDEX: 31.35 KG/M2 | HEART RATE: 84 BPM | DIASTOLIC BLOOD PRESSURE: 85 MMHG | WEIGHT: 183.63 LBS

## 2023-04-04 DIAGNOSIS — Z13.820 SCREENING FOR OSTEOPOROSIS: ICD-10-CM

## 2023-04-04 DIAGNOSIS — N95.1 MENOPAUSAL SYMPTOMS: ICD-10-CM

## 2023-04-04 DIAGNOSIS — N95.1 MENOPAUSAL SYMPTOMS: Primary | ICD-10-CM

## 2023-04-04 DIAGNOSIS — Z78.0 MENOPAUSE: ICD-10-CM

## 2023-04-04 DIAGNOSIS — N93.9 ABNORMAL UTERINE BLEEDING (AUB): ICD-10-CM

## 2023-04-04 LAB
ESTRADIOL SERPL-MCNC: <10 PG/ML
FSH SERPL-ACNC: 58.42 MIU/ML

## 2023-04-04 PROCEDURE — 99999 PR PBB SHADOW E&M-EST. PATIENT-LVL V: CPT | Mod: PBBFAC,,, | Performed by: PHYSICIAN ASSISTANT

## 2023-04-04 PROCEDURE — 82670 ASSAY OF TOTAL ESTRADIOL: CPT | Performed by: PHYSICIAN ASSISTANT

## 2023-04-04 PROCEDURE — 83001 ASSAY OF GONADOTROPIN (FSH): CPT | Performed by: PHYSICIAN ASSISTANT

## 2023-04-04 PROCEDURE — 36415 COLL VENOUS BLD VENIPUNCTURE: CPT | Performed by: PHYSICIAN ASSISTANT

## 2023-04-04 PROCEDURE — 99999 PR PBB SHADOW E&M-EST. PATIENT-LVL V: ICD-10-PCS | Mod: PBBFAC,,, | Performed by: PHYSICIAN ASSISTANT

## 2023-04-04 PROCEDURE — 99215 OFFICE O/P EST HI 40 MIN: CPT | Mod: PBBFAC | Performed by: PHYSICIAN ASSISTANT

## 2023-04-04 PROCEDURE — 99214 OFFICE O/P EST MOD 30 MIN: CPT | Mod: S$PBB,,, | Performed by: PHYSICIAN ASSISTANT

## 2023-04-04 PROCEDURE — 84402 ASSAY OF FREE TESTOSTERONE: CPT | Performed by: PHYSICIAN ASSISTANT

## 2023-04-04 PROCEDURE — 99214 PR OFFICE/OUTPT VISIT, EST, LEVL IV, 30-39 MIN: ICD-10-PCS | Mod: S$PBB,,, | Performed by: PHYSICIAN ASSISTANT

## 2023-04-04 RX ORDER — ESTRADIOL 0.05 MG/D
1 FILM, EXTENDED RELEASE TRANSDERMAL
Qty: 8 PATCH | Refills: 11 | Status: SHIPPED | OUTPATIENT
Start: 2023-04-06 | End: 2023-04-06

## 2023-04-04 RX ORDER — PROGESTERONE 100 MG/1
100 CAPSULE ORAL NIGHTLY
Qty: 30 CAPSULE | Refills: 11 | Status: SHIPPED | OUTPATIENT
Start: 2023-04-04 | End: 2023-12-20 | Stop reason: SDUPTHER

## 2023-04-04 NOTE — PROGRESS NOTES
Subjective:      Sharlene Smith is a 57 y.o. female who presents for HRT consult. Menarche at age 13. Stopped OCPs in 2020 at age 54 and started having menopausal symptoms. Has not gone a full year without her period. LMP 12/22 that lasted for 3 days, denies dysmenorrhea or menorrhagia. Before that, her last period was 6 month prior. She did have hormone levels in 2021 that were consistent with menopause. PCP started her on an antidepressant but did not help and higher doses caused insomnia. She is interested in starting HRT. She reports hot flashes all day long, mood swings, fatigue and low libido.   History of SVT s/p cardiac ablation in 2018 and has not had issues since.  No family history of MI prior to age 50, or personal history of gestational DM/pre-eclampsia. She denies the following contraindications to HRT:  Vaginal bleeding, history of VTE/PE, thrombophilia,  breast cancer, or active liver disease.     History of ulcerative colitis. Sister with breast cancer at age 64.    PCP: Dallas Delgado MD    Routine labs: 6/22/2022  WWE: 2020  Pap smear: 9/28/2020 negative  Mammogram: 10/10/2022 TC 17.3 %  DEXA: Never  Colonoscopy: 2020    Admission on 03/10/2023, Discharged on 03/10/2023   Component Date Value Ref Range Status    POC Preg Test, Ur 03/10/2023 Negative  Negative Final     Acceptable 03/10/2023 Yes   Final    Final Pathologic Diagnosis 03/10/2023    Final                    Value:1. Colon, cecum and ascending (biopsy):  Colonic mucosa with no significant histopathologic changes  No significant acute inflammation  No granulomas, no dysplasia  2. Colon, cecal polyps x 2 (polypectomy):  Colonic mucosa with hyperplastic surface changes  Multiple deeper levels examined  3. Colon, transverse (biopsy):  Colonic mucosa with no significant histopathologic changes  No significant acute inflammation  No granulomas, no dysplasia  4. Colon descending (biopsy):  Colonic mucosa with no  "significant histopathologic changes  No significant acute inflammation  No granulomas, no dysplasia  5. Colon, sigmoid (biopsy):  Colonic mucosa with no significant histopathologic changes  No significant acute inflammation  No granulomas, no dysplasia  6. Rectum (biopsy):  Rectal mucosa with no significant histopathologic changes  No significant acute inflammation  No granulomas, no dysplasia      Gross 03/10/2023    Final                    Value:Hospital/ clinic label MRN:  [id number]  Pathology label MRN: [id number]  Received in 6 parts:  Part 1:  Hospital/ clinic label MRN:  [id number]  Pathology label MRN: [id number]  Received in formalin, labeled "cecum and ascending", are multiple tan-white  soft fragments of tissue measuring 0.7 x 0.3 x 0.2 cm in aggregate. Submitted  entirely in cassette HSN--1-A  Part 2:  Hospital/ clinic label MRN:  [id number]  Pathology label MRN: [id number]  Received in formalin, labeled "cecal", are multiple tan-white soft fragments  of tissue measuring 0.6 x 0.6 x 0.3 cm in aggregate. Submitted entirely in  cassette WJU--2-A  Part 3:  Hospital/ clinic label MRN:  [id number]  Pathology label MRN: [id number]  Received in formalin, labeled "transverse", are multiple tan-white soft  fragments of tissue measuring 0.5 x 0.4 x 0.2 cm in aggregate. Submitted  entirely in cassette ZDP--3-A  Part 4:  Hospital/ clinic label MRN:  [id number]  Pathology label MRN: [id number]  Received in formalin, labeled "descending",                           are multiple tan-white soft  fragments of tissue measuring 0.6 x 0.4 x 0.2 cm in aggregate. Submitted  entirely in cassette UUC--4-A  Part 5:  Hospital/ clinic label MRN:  [id number]  Pathology label MRN: [id number]  Received in formalin, labeled "sigmoid", are multiple tan-white soft  fragments of tissue measuring 0.6 x 0.4 x 0.2 cm in aggregate. Submitted  entirely in cassette KCC--5-A  Part 6:  Hospital/ clinic label MRN:  [id number]  Pathology label MRN: " "[id number]  Received in formalin, labeled "rectal", are multiple tan-white soft fragments  of tissue measuring 0.6 x 0.5 x 0.2 cm in aggregate. Submitted entirely in  cassette ARA--6-A  Estela Acosta      Disclaimer 03/10/2023    Final                    Value:Unless the case is a 'gross only' or additional testing only, the final  diagnosis for each specimen is based on a microscopic examination of  appropriate tissue sections.         Past Medical History:   Diagnosis Date    Abnormal Pap smear of vagina yrs ago    possible BX?    Allergy     Arthritis     Asthma     Hyperlipidemia     Supraventricular tachycardia     SVT (supraventricular tachycardia)     Tachycardia     Ulcerative colitis      Past Surgical History:   Procedure Laterality Date    ABLATION N/A 10/8/2018    Procedure: ABLATION;  Surgeon: Shabbir Clark MD;  Location: [facility] CATH LAB;  Service: Cardiology;  Laterality: N/A;  SVT,SVT-AVNRT RFA,KRISTI,MAC,FAS,3PREP     SECTION, CLASSIC      x 2    COLONOSCOPY N/A 2015    Procedure: COLONOSCOPY;  Surgeon: Petr Miller MD;  [facility]  Service: Endoscopy;  Laterality: N/A;    COLONOSCOPY N/A 10/13/2017    Procedure: COLONOSCOPY;  Surgeon: Petr Miller MD;  [facility]  Service: Endoscopy;  Laterality: N/A;    COLONOSCOPY N/A 10/27/2020    Procedure: COLONOSCOPY;  Surgeon: Petr Miller MD;  [facility]  Service: Endoscopy;  Laterality: N/A;  covid test 10/24-[facility]  prep ins. emailed- [facility]    COLONOSCOPY N/A 3/10/2023    Procedure: COLONOSCOPY;  Surgeon: Nehemias Molina MD;  [facility] ([facility]);  Service: Endoscopy;  Laterality: N/A;  pt has sutab-inst portal-any md-tb    INJECTION OF JOINT Bilateral 2019    Procedure: Injection, Joint,  Hip--Bilateral;  Surgeon: Chauncey Clay Jr., MD;  Location: [facility] PAIN MGT;  Service: Pain Management;  Laterality: Bilateral;     Social History "     Tobacco Use    Smoking status: Former     Packs/day: 0.00     Years: 10.00     Pack years: 0.00     Types: Cigarettes     Quit date: 1999     Years since quittin.1    Smokeless tobacco: Never   Substance Use Topics    Alcohol use: Yes     Alcohol/week: 0.0 standard drinks     Comment: events - couple times a month    Drug use: No     Family History   Problem Relation Age of Onset    Hyperlipidemia Mother     Multiple myeloma Mother 75        passsed at 85    Cataracts Mother     Allergies Mother     Hypertension Mother     Heart attack Father     Diabetes Father     Breast cancer Sister 63    Arthritis Maternal Grandmother     Glaucoma Maternal Grandfather     Heart attack Paternal Grandmother     Arthritis Paternal Grandmother     Breast cancer Other 47    Heart disease Neg Hx     Colon cancer Neg Hx     Esophageal cancer Neg Hx     Amblyopia Neg Hx     Blindness Neg Hx     Macular degeneration Neg Hx     Retinal detachment Neg Hx     Strabismus Neg Hx     Stroke Neg Hx     Thyroid disease Neg Hx     Angioedema Neg Hx     Asthma Neg Hx     Atopy Neg Hx     Eczema Neg Hx     Immunodeficiency Neg Hx     Rhinitis Neg Hx     Urticaria Neg Hx     Ovarian cancer Neg Hx     Lung cancer Neg Hx      OB History    Para Term  AB Living   4 4 2     2   SAB IAB Ectopic Multiple Live Births           2      # Outcome Date GA Lbr Alfonso/2nd Weight Sex Delivery Anes PTL Lv   4 Term            3 Term            2 Para      CS-Unspec   GABBI   1 Para      CS-Unspec   GABBI       Current Outpatient Medications:     albuterol (PROVENTIL/VENTOLIN HFA) 90 mcg/actuation inhaler, Inhale 2 puffs into the lungs every 6 (six) hours as needed for Wheezing or Shortness of Breath. Rescue, Disp: 18 g, Rfl: 11    atorvastatin (LIPITOR) 20 MG tablet, Take 1 tablet (20 mg total) by mouth every evening., Disp: 90 tablet, Rfl: 3    diclofenac sodium (VOLTAREN) 1 % Gel, Apply 2 g topically 2 (two) times daily as needed., Disp: 50  g, Rfl: 1    [START ON 4/6/2023] estradiol 0.05 mg/24 hr td ptsw (VIVELLE-DOT) 0.05 mg/24 hr, Place 1 patch onto the skin twice a week. (Mondays and Thursday), Disp: 8 patch, Rfl: 11    fluticasone propionate (FLONASE) 50 mcg/actuation nasal spray, SHAKE LIQUID AND USE 1 SPRAY(50 MCG) IN EACH NOSTRIL EVERY DAY, Disp: 48 g, Rfl: 5    levocetirizine (XYZAL) 5 MG tablet, Take 1 tablet (5 mg total) by mouth every evening., Disp: 30 tablet, Rfl: 11    mesalamine (APRISO) 0.375 gram Cp24, Take 4 capsules (1.5 g total) by mouth once daily., Disp: 360 capsule, Rfl: 3    progesterone (PROMETRIUM) 100 MG capsule, Take 1 capsule (100 mg total) by mouth every evening., Disp: 30 capsule, Rfl: 11    tiZANidine (ZANAFLEX) 4 MG tablet, TAKE 1/2 TO 1 TABLET BY MOUTH EVERY 8 HOURS AS NEEDED, Disp: 270 tablet, Rfl: 2    traMADoL (ULTRAM) 50 mg tablet, Take 1 tablet (50 mg total) by mouth every 6 (six) hours., Disp: 120 tablet, Rfl: 5  No current facility-administered medications for this visit.    Facility-Administered Medications Ordered in Other Visits:     0.9%  NaCl infusion, , Intravenous, Continuous, Netta Amaro NP, [facility] at 10/27/20 0715    The 10-year ASCVD risk score (Rose DK, et al., 2019) is: 2.1%    Values used to calculate the score:      Age: 57 years      Sex: Female      Is Non- : Yes      Diabetic: No      Tobacco smoker: No      Systolic Blood Pressure: 124 mmHg      Is BP treated: No      HDL Cholesterol: 82 mg/dL      Total Cholesterol: 182 mg/dL    Review of Systems:  General: No fever, chills, or weight loss.  Chest: No chest pain, shortness of breath, or palpitations.  Breast: No pain, masses, or nipple discharge.  Vulva: No pain, lesions, or itching.  Vagina: No relaxation, itching, discharge, or lesions.  Abdomen: No pain, nausea, vomiting, diarrhea, or constipation.  Urinary: No incontinence, nocturia, frequency, or dysuria.  Extremities:  No leg cramps, edema, or calf  "pain.  Neurologic: No headaches, dizziness, or visual changes.    Objective:     Vitals:    04/04/23 1122   BP: 124/85   Pulse: 84   Weight: 83.3 kg (183 lb 9.6 oz)   Height: 5' 4" (1.626 m)   PainSc: 0-No pain     Body mass index is 31.51 kg/m².      Physical Exam: Deferred      Assessment:    Menopausal symptoms  -     Estradiol; Future; Expected date: 04/04/2023  -     Testosterone, free; Future; Expected date: 04/04/2023  -     Follicle Stimulating Hormone; Future; Expected date: 04/04/2023  -     estradiol 0.05 mg/24 hr td ptsw (VIVELLE-DOT) 0.05 mg/24 hr; Place 1 patch onto the skin twice a week. (Mondays and Thursday)  Dispense: 8 patch; Refill: 11  -     progesterone (PROMETRIUM) 100 MG capsule; Take 1 capsule (100 mg total) by mouth every evening.  Dispense: 30 capsule; Refill: 11    Menopause  -     Ambulatory referral/consult to [facility]    Screening for osteoporosis  -     DXA Bone Density with Vertebral Fracture Assesment; Future; Expected date: 04/04/2023    Abnormal uterine bleeding (AUB)  -     US Pelvis Comp with Transvag NON-OB (xpd); Future; Expected date: 04/04/2023        Plan:   Risks and benefits of hormone replacement therapy were discussed. No contraindication to HRT.  Reviewed cardiovascular risks and prefer transdermal estradiol for her.  Has not gone a full year without her period but labs in 2021 were consistent with menopause.  Discussed that levels can fluctuate.  Obtain pelvic US to evaluate.  Baseline hormone labs today.  Vivelle Dot 0.05mg BIW and Progesterone 100mg QHS.  Consider adding testosterone at follow up.  Schedule Dexa  F/u in 3 months for WWE or sooner PRN.    Instructed patient to call if she experiences any side effects or has any questions.    I spent a total of 35 minutes on the day of the visit.This includes face to face time and non-face to face time preparing to see the patient (eg, review of tests), obtaining and/or reviewing separately " obtained history, documenting clinical information in the electronic or other health record, independently interpreting results and communicating results to the patient/family/caregiver, or care coordinator.    A full discussion of the benefit-risk ratio of hormonal replacement therapy was carried out. Improvement in vasomotor and other climacteric symptoms is discussed, including possible improvements in sleep and mood. The range of side effects such as breast tenderness, weight gain and including possible increases in lifetime risk of breast cancer and possible thrombotic complications was discussed. All of her questions about this therapy were answered.

## 2023-04-07 LAB — TESTOST FREE SERPL-MCNC: 0.8 PG/ML

## 2023-04-14 ENCOUNTER — HOSPITAL ENCOUNTER (OUTPATIENT)
Dept: RADIOLOGY | Facility: HOSPITAL | Age: 58
Discharge: HOME | End: 2023-04-14
Attending: PHYSICIAN ASSISTANT
Payer: OTHER GOVERNMENT

## 2023-04-14 DIAGNOSIS — Z13.820 SCREENING FOR OSTEOPOROSIS: ICD-10-CM

## 2023-04-14 PROCEDURE — 77080 DXA BONE DENSITY AXIAL: CPT | Mod: 26,,, | Performed by: INTERNAL MEDICINE

## 2023-04-14 PROCEDURE — 77080 DXA BONE DENSITY AXIAL: CPT | Mod: TC

## 2023-04-14 PROCEDURE — 77080 DXA BONE DENSITY AXIAL SKELETON 1 OR MORE SITES: ICD-10-PCS | Mod: 26,,, | Performed by: INTERNAL MEDICINE

## 2023-04-18 ENCOUNTER — HOSPITAL ENCOUNTER (OUTPATIENT)
Dept: RADIOLOGY | Facility: OTHER | Age: 58
Discharge: HOME | End: 2023-04-18
Attending: PHYSICIAN ASSISTANT
Payer: OTHER GOVERNMENT

## 2023-04-18 DIAGNOSIS — N93.9 ABNORMAL UTERINE BLEEDING (AUB): ICD-10-CM

## 2023-04-18 PROCEDURE — 76830 TRANSVAGINAL US NON-OB: CPT | Mod: 26,,, | Performed by: RADIOLOGY

## 2023-04-18 PROCEDURE — 76830 US PELVIS COMP WITH TRANSVAG NON-OB (XPD): ICD-10-PCS | Mod: 26,,, | Performed by: RADIOLOGY

## 2023-04-18 PROCEDURE — 76856 US PELVIS COMP WITH TRANSVAG NON-OB (XPD): ICD-10-PCS | Mod: 26,,, | Performed by: RADIOLOGY

## 2023-04-18 PROCEDURE — 76856 US EXAM PELVIC COMPLETE: CPT | Mod: TC

## 2023-04-18 PROCEDURE — 76856 US EXAM PELVIC COMPLETE: CPT | Mod: 26,,, | Performed by: RADIOLOGY

## 2023-06-01 ENCOUNTER — TELEPHONE (OUTPATIENT)
Dept: OBSTETRICS AND GYNECOLOGY | Facility: CLINIC | Age: 58
End: 2023-06-01
Payer: OTHER GOVERNMENT

## 2023-06-01 NOTE — TELEPHONE ENCOUNTER
----- Message from Irais Singh sent at 6/1/2023 12:00 PM CDT -----  Regarding: Return Call  Who Called: KELLEE PISANO [[id number]]            Who Left Message for Patient: Asha            Does the patient know what this is regarding? Yes, rescheduling appt            Best Call Back Number: [phone]            Additional Information:

## 2023-06-04 ENCOUNTER — OFFICE VISIT (OUTPATIENT)
Dept: URGENT CARE | Facility: CLINIC | Age: 58
End: 2023-06-04
Payer: OTHER GOVERNMENT

## 2023-06-04 VITALS
OXYGEN SATURATION: 97 % | SYSTOLIC BLOOD PRESSURE: 125 MMHG | BODY MASS INDEX: 30.39 KG/M2 | TEMPERATURE: 97 F | DIASTOLIC BLOOD PRESSURE: 85 MMHG | HEART RATE: 104 BPM | HEIGHT: 64 IN | RESPIRATION RATE: 16 BRPM | WEIGHT: 178 LBS

## 2023-06-04 DIAGNOSIS — J01.90 ACUTE BACTERIAL SINUSITIS: Primary | ICD-10-CM

## 2023-06-04 DIAGNOSIS — B96.89 ACUTE BACTERIAL SINUSITIS: Primary | ICD-10-CM

## 2023-06-04 PROCEDURE — 99213 OFFICE O/P EST LOW 20 MIN: CPT | Mod: S$GLB,,, | Performed by: FAMILY MEDICINE

## 2023-06-04 PROCEDURE — 99213 PR OFFICE/OUTPT VISIT, EST, LEVL III, 20-29 MIN: ICD-10-PCS | Mod: S$GLB,,, | Performed by: FAMILY MEDICINE

## 2023-06-04 RX ORDER — AMOXICILLIN AND CLAVULANATE POTASSIUM 875; 125 MG/1; MG/1
1 TABLET, FILM COATED ORAL EVERY 12 HOURS
Qty: 20 TABLET | Refills: 0 | Status: SHIPPED | OUTPATIENT
Start: 2023-06-04 | End: 2023-06-14

## 2023-06-04 RX ORDER — BENZONATATE 200 MG/1
200 CAPSULE ORAL 3 TIMES DAILY PRN
Qty: 30 CAPSULE | Refills: 0 | Status: SHIPPED | OUTPATIENT
Start: 2023-06-04 | End: 2023-06-14

## 2023-06-04 RX ORDER — IBUPROFEN 800 MG/1
800 TABLET ORAL 3 TIMES DAILY PRN
Qty: 21 TABLET | Refills: 0 | Status: SHIPPED | OUTPATIENT
Start: 2023-06-04 | End: 2023-06-11

## 2023-06-04 RX ORDER — LEVOCETIRIZINE DIHYDROCHLORIDE 5 MG/1
5 TABLET, FILM COATED ORAL NIGHTLY
Qty: 14 TABLET | Refills: 0 | Status: SHIPPED | OUTPATIENT
Start: 2023-06-04 | End: 2023-06-18

## 2023-06-04 NOTE — PROGRESS NOTES
"Subjective:      Patient ID: Sharlene Smith is a 58 y.o. female.    Vitals:  height is 5' 4" (1.626 m) and weight is 80.7 kg (178 lb). Her oral temperature is 97.2 °F (36.2 °C). Her blood pressure is 125/85 and her pulse is 104. Her respiration is 16 and oxygen saturation is 97%.     Chief Complaint: Sinus Problem    Patient reports sinus symptoms for 1 week.    Sinus Problem  This is a new problem. The problem has been gradually worsening since onset. There has been no fever. Her pain is at a severity of 5/10. Associated symptoms include coughing (productive), sinus pressure and a sore throat (from sinus drip). Pertinent negatives include no congestion or headaches. (Dizzy  Ear fullness  Sinus drip  Eye pain) Treatments tried: flonase;eye drops. The treatment provided no relief.     HENT:  Positive for sinus pressure and sore throat (from sinus drip). Negative for congestion.    Respiratory:  Positive for cough (productive).    Neurological:  Negative for headaches.    Objective:     Vitals:    06/04/23 1042   BP: 125/85   Pulse: 104   Resp: 16   Temp: 97.2 °F (36.2 °C)   TempSrc: Oral   SpO2: 97%   Weight: 80.7 kg (178 lb)   Height: 5' 4" (1.626 m)      Physical Exam   Constitutional: She is oriented to person, place, and time. She appears well-developed. She is cooperative.  Non-toxic appearance. She does not appear ill. No distress.   HENT:   Head: Normocephalic and atraumatic.   Ears:   Right Ear: Hearing, tympanic membrane, external ear and ear canal normal.   Left Ear: Hearing, tympanic membrane, external ear and ear canal normal.   Nose: Congestion present. No mucosal edema, rhinorrhea or nasal deformity. No epistaxis. Right sinus exhibits no maxillary sinus tenderness and no frontal sinus tenderness. Left sinus exhibits no maxillary sinus tenderness and no frontal sinus tenderness.   Mouth/Throat: Uvula is midline, oropharynx is clear and moist and mucous membranes are normal. No trismus in the jaw. " Normal dentition. No uvula swelling. No oropharyngeal exudate, posterior oropharyngeal edema or posterior oropharyngeal erythema.   Eyes: Conjunctivae and lids are normal. No scleral icterus.   Neck: Trachea normal and phonation normal. Neck supple. No edema present. No erythema present. No neck rigidity present.   Cardiovascular: Normal rate, regular rhythm, normal heart sounds and normal pulses.   Pulmonary/Chest: Effort normal and breath sounds normal. No respiratory distress. She has no decreased breath sounds. She has no rhonchi.   Abdominal: Normal appearance.   Neurological: She is alert and oriented to person, place, and time. She exhibits normal muscle tone. Coordination normal.   Skin: Skin is warm, intact and not diaphoretic.   Psychiatric: Her speech is normal and behavior is normal. Judgment and thought content normal.   Nursing note and vitals reviewed.      Assessment:     1. Acute bacterial sinusitis        Plan:       Acute bacterial sinusitis  -     amoxicillin-clavulanate 875-125mg (AUGMENTIN) 875-125 mg per tablet; Take 1 tablet by mouth every 12 (twelve) hours. for 10 days  Dispense: 20 tablet; Refill: 0  -     benzonatate (TESSALON) 200 MG capsule; Take 1 capsule (200 mg total) by mouth 3 (three) times daily as needed for Cough.  Dispense: 30 capsule; Refill: 0  -     levocetirizine (XYZAL) 5 MG tablet; Take 1 tablet (5 mg total) by mouth every evening. May substitute for Zyrtec 10 mg daily if Xyzal is not affordable. for 14 days  Dispense: 14 tablet; Refill: 0  -     ibuprofen (ADVIL,MOTRIN) 800 MG tablet; Take 1 tablet (800 mg total) by mouth 3 (three) times daily as needed for Pain. Take with meals  Dispense: 21 tablet; Refill: 0      Patient Instructions   Below are suggestions for symptomatic relief of your upper respiratory symptoms:              -Salt water gargles to soothe throat pain.              -Chloroseptic spray and Cepacol lozenges also help to numb throat pain.               -Warm herbal teas with honey/lemon/amie can help soothe sore throat and hoarseness              -Nasal saline spray reduces inflammation and dryness.              -Warm face compresses to help with facial sinus pain/pressure.              -Humidifiers and steam can help with nasal dryness and congestion              -Vicks vapor rub at night for chest congestion.              -Flonase OTC or Nasacort OTC for nasal congestion and post-nasal drip. Ok to use twice daily for the first week, then reduce to once daily after symptoms have begun to improve.              -Afrin is a nasal spray that can give immediate relief of nasal congestion but you cannot use this medication for more than 3 days              -Simple foods like chicken noodle soup.              - Mucinex for congestion or Mucinex DM for cough during the day time. Delsym helps with coughing at night. Mucinex-D if you have sinus pressure/sinus pain or chest congestion. (caution if history of high blood pressure or palpitations). You must increase your water intake when using expectorants (Mucinex).             -Zyrtec/Claritin/Allegra/Xyzal should help with allergies.  -If you DO NOT have Hypertension or any history of palpitations, it is ok to take over the counter Sudafed or Mucinex D or Allegra-D or Claritin-D or Zyrtec-D.  -If you do take one of the above, it is ok to combine that with plain over the counter Mucinex or Allegra or Claritin or Zyrtec. If, for example, you are taking Zyrtec -D, you can combine that with Mucinex, but not Mucinex-D.  If you are taking Mucinex-D, you can combine that with plain Allegra or Claritin or Zyrtec.   -If you DO have Hypertension or palpitations, it is safe to take Coricidin HBP for relief of sinus symptoms.

## 2023-06-04 NOTE — PATIENT INSTRUCTIONS
Below are suggestions for symptomatic relief of your upper respiratory symptoms:              -Salt water gargles to soothe throat pain.              -Chloroseptic spray and Cepacol lozenges also help to numb throat pain.              -Warm herbal teas with honey/lemon/amie can help soothe sore throat and hoarseness              -Nasal saline spray reduces inflammation and dryness.              -Warm face compresses to help with facial sinus pain/pressure.              -Humidifiers and steam can help with nasal dryness and congestion              -Vicks vapor rub at night for chest congestion.              -Flonase OTC or Nasacort OTC for nasal congestion and post-nasal drip. Ok to use twice daily for the first week, then reduce to once daily after symptoms have begun to improve.              -Afrin is a nasal spray that can give immediate relief of nasal congestion but you cannot use this medication for more than 3 days              -Simple foods like chicken noodle soup.              - Mucinex for congestion or Mucinex DM for cough during the day time. Delsym helps with coughing at night. Mucinex-D if you have sinus pressure/sinus pain or chest congestion. (caution if history of high blood pressure or palpitations). You must increase your water intake when using expectorants (Mucinex).             -Zyrtec/Claritin/Allegra/Xyzal should help with allergies.  -If you DO NOT have Hypertension or any history of palpitations, it is ok to take over the counter Sudafed or Mucinex D or Allegra-D or Claritin-D or Zyrtec-D.  -If you do take one of the above, it is ok to combine that with plain over the counter Mucinex or Allegra or Claritin or Zyrtec. If, for example, you are taking Zyrtec -D, you can combine that with Mucinex, but not Mucinex-D.  If you are taking Mucinex-D, you can combine that with plain Allegra or Claritin or Zyrtec.   -If you DO have Hypertension or palpitations, it is safe to take Coricidin HBP for  relief of sinus symptoms.

## 2023-06-23 ENCOUNTER — OFFICE VISIT (OUTPATIENT)
Dept: OBSTETRICS AND GYNECOLOGY | Facility: CLINIC | Age: 58
End: 2023-06-23
Payer: OTHER GOVERNMENT

## 2023-06-23 VITALS
SYSTOLIC BLOOD PRESSURE: 136 MMHG | DIASTOLIC BLOOD PRESSURE: 92 MMHG | BODY MASS INDEX: 30.9 KG/M2 | HEIGHT: 64 IN | WEIGHT: 181 LBS | HEART RATE: 76 BPM

## 2023-06-23 DIAGNOSIS — Z12.31 SCREENING MAMMOGRAM, ENCOUNTER FOR: Primary | ICD-10-CM

## 2023-06-23 DIAGNOSIS — N95.1 MENOPAUSAL SYMPTOMS: ICD-10-CM

## 2023-06-23 PROCEDURE — 99999 PR PBB SHADOW E&M-EST. PATIENT-LVL IV: ICD-10-PCS | Mod: PBBFAC,,, | Performed by: PHYSICIAN ASSISTANT

## 2023-06-23 PROCEDURE — 99999 PR PBB SHADOW E&M-EST. PATIENT-LVL IV: CPT | Mod: PBBFAC,,, | Performed by: PHYSICIAN ASSISTANT

## 2023-06-23 PROCEDURE — 99214 OFFICE O/P EST MOD 30 MIN: CPT | Mod: PBBFAC | Performed by: PHYSICIAN ASSISTANT

## 2023-06-23 PROCEDURE — 99213 OFFICE O/P EST LOW 20 MIN: CPT | Mod: S$PBB,,, | Performed by: PHYSICIAN ASSISTANT

## 2023-06-23 PROCEDURE — 99213 PR OFFICE/OUTPT VISIT, EST, LEVL III, 20-29 MIN: ICD-10-PCS | Mod: S$PBB,,, | Performed by: PHYSICIAN ASSISTANT

## 2023-09-11 ENCOUNTER — OFFICE VISIT (OUTPATIENT)
Dept: PRIMARY CARE CLINIC | Facility: CLINIC | Age: 58
End: 2023-09-11
Payer: OTHER GOVERNMENT

## 2023-09-11 DIAGNOSIS — M54.50 CHRONIC BILATERAL LOW BACK PAIN WITHOUT SCIATICA: ICD-10-CM

## 2023-09-11 DIAGNOSIS — M79.18 MYOFASCIAL MUSCLE PAIN: ICD-10-CM

## 2023-09-11 DIAGNOSIS — J30.2 SEASONAL ALLERGIES: ICD-10-CM

## 2023-09-11 DIAGNOSIS — E78.01 FAMILIAL HYPERCHOLESTEROLEMIA: ICD-10-CM

## 2023-09-11 DIAGNOSIS — Z00.00 ANNUAL PHYSICAL EXAM: Primary | ICD-10-CM

## 2023-09-11 DIAGNOSIS — G89.29 CHRONIC BILATERAL LOW BACK PAIN WITHOUT SCIATICA: ICD-10-CM

## 2023-09-11 DIAGNOSIS — M54.40 ACUTE BILATERAL LOW BACK PAIN WITH SCIATICA, SCIATICA LATERALITY UNSPECIFIED: ICD-10-CM

## 2023-09-11 PROCEDURE — 99214 PR OFFICE/OUTPT VISIT, EST, LEVL IV, 30-39 MIN: ICD-10-PCS | Mod: 95,,, | Performed by: STUDENT IN AN ORGANIZED HEALTH CARE EDUCATION/TRAINING PROGRAM

## 2023-09-11 PROCEDURE — 99214 OFFICE O/P EST MOD 30 MIN: CPT | Mod: 95,,, | Performed by: STUDENT IN AN ORGANIZED HEALTH CARE EDUCATION/TRAINING PROGRAM

## 2023-09-11 RX ORDER — LORATADINE 10 MG/1
10 TABLET ORAL DAILY
Qty: 90 TABLET | Refills: 3 | Status: SHIPPED | OUTPATIENT
Start: 2023-09-11 | End: 2024-03-11

## 2023-09-11 RX ORDER — ATORVASTATIN CALCIUM 20 MG/1
20 TABLET, FILM COATED ORAL NIGHTLY
Qty: 90 TABLET | Refills: 3 | Status: SHIPPED | OUTPATIENT
Start: 2023-09-11

## 2023-09-11 RX ORDER — TRAMADOL HYDROCHLORIDE 50 MG/1
50 TABLET ORAL EVERY 6 HOURS
Qty: 120 TABLET | Refills: 5 | Status: SHIPPED | OUTPATIENT
Start: 2023-09-11 | End: 2024-03-11 | Stop reason: SDUPTHER

## 2023-09-11 NOTE — PROGRESS NOTES
The patient location is: [place]  The chief complaint leading to consultation is: getting sick    Visit type: audiovisual    Face to Face time with patient: 20  25 minutes of total time spent on the encounter, which includes face to face time and non-face to face time preparing to see the patient (eg, review of tests), Obtaining and/or reviewing separately obtained history, Documenting clinical information in the electronic or other health record, Independently interpreting results (not separately reported) and communicating results to the patient/family/caregiver, or Care coordination (not separately reported).         Each patient to whom he or she provides medical services by telemedicine is:  (1) informed of the relationship between the physician and patient and the respective role of any other health care provider with respect to management of the patient; and (2) notified that he or she may decline to receive medical services by telemedicine and may withdraw from such care at any time.    Notes:     59yo female presenting for appt, states she thinks she is getting a bit sick recently.    Has been going to the office about 2 times a week.   Will be going to [place] to help son with the baby.      State is not having congestion.  But feels worn out.  Like she might be getting something.  No increased body aches at this time.     Does have chronic back pain, states has been taking Tramadol 50mg up to 4 x daily and has been controlled with this.      Physical Exam  Constitutional:       General: She is not in acute distress.     Appearance: Normal appearance. She is not ill-appearing.   HENT:      Right Ear: External ear normal.      Left Ear: External ear normal.      Nose: No rhinorrhea.   Eyes:      Extraocular Movements: Extraocular movements intact.   Pulmonary:      Effort: Pulmonary effort is normal. No respiratory distress.   Neurological:      Mental Status: She is alert and oriented to person,  place, and time.   Psychiatric:         Mood and Affect: Mood normal.         Behavior: Behavior normal.         URI/Illness:   - feeling like she is starting to get ill.   - discussed use of Emergent-C, Vit D, rest, hydration.   - advised to get good sunlight.     Sinusitis:   - has been using Flonase PRN, is helpful.   - was taking Xyzal but make too sleepy, states Claritin works better.   - states was a very bad summer for allergies.    Cholesterol:   - taking statin as needed.    Annual Labs:   - orders placed for annual labs.    Chronic Back pain:   - has lower back pain chronically.   - has continued, would like to get off Ultram but not ready to change at this time.    - Ultram 50mg q6hrs.

## 2023-09-11 NOTE — PATIENT INSTRUCTIONS
URI/Illness:   - feeling like she is starting to get ill.   - discussed use of Emergent-C, Vit D, rest, hydration.   - advised to get good sunlight.     Sinusitis:   - has been using Flonase PRN, is helpful.   - was taking Xyzal but make too sleepy, states Claritin works better.   - states was a very bad summer for allergies.    Cholesterol:   - taking statin as needed.    Annual Labs:   - orders placed for annual labs.    Chronic Back pain:   - has lower back pain chronically.   - has continued, would like to get off Ultram but not ready to change at this time.    - Ultram 50mg q6hrs.

## 2023-09-18 ENCOUNTER — PATIENT MESSAGE (OUTPATIENT)
Dept: PRIMARY CARE CLINIC | Facility: CLINIC | Age: 58
End: 2023-09-18
Payer: OTHER GOVERNMENT

## 2023-10-12 ENCOUNTER — OFFICE VISIT (OUTPATIENT)
Dept: URGENT CARE | Facility: CLINIC | Age: 58
End: 2023-10-12
Payer: OTHER GOVERNMENT

## 2023-10-12 VITALS
HEIGHT: 64 IN | TEMPERATURE: 98 F | WEIGHT: 181 LBS | DIASTOLIC BLOOD PRESSURE: 80 MMHG | RESPIRATION RATE: 19 BRPM | BODY MASS INDEX: 30.9 KG/M2 | OXYGEN SATURATION: 98 % | HEART RATE: 74 BPM | SYSTOLIC BLOOD PRESSURE: 130 MMHG

## 2023-10-12 DIAGNOSIS — B96.89 ACUTE BACTERIAL SINUSITIS: ICD-10-CM

## 2023-10-12 DIAGNOSIS — J01.90 ACUTE BACTERIAL SINUSITIS: ICD-10-CM

## 2023-10-12 DIAGNOSIS — R09.81 SINUS CONGESTION: Primary | ICD-10-CM

## 2023-10-12 LAB
CTP QC/QA: YES
SARS-COV-2 AG RESP QL IA.RAPID: NEGATIVE

## 2023-10-12 PROCEDURE — 87811 SARS CORONAVIRUS 2 ANTIGEN POCT, MANUAL READ: ICD-10-PCS | Mod: QW,S$GLB,, | Performed by: FAMILY MEDICINE

## 2023-10-12 PROCEDURE — 99213 OFFICE O/P EST LOW 20 MIN: CPT | Mod: S$GLB,,, | Performed by: FAMILY MEDICINE

## 2023-10-12 PROCEDURE — 99213 PR OFFICE/OUTPT VISIT, EST, LEVL III, 20-29 MIN: ICD-10-PCS | Mod: S$GLB,,, | Performed by: FAMILY MEDICINE

## 2023-10-12 PROCEDURE — 87811 SARS-COV-2 COVID19 W/OPTIC: CPT | Mod: QW,S$GLB,, | Performed by: FAMILY MEDICINE

## 2023-10-12 RX ORDER — METHYLPREDNISOLONE 4 MG/1
TABLET ORAL
Qty: 1 EACH | Refills: 0 | Status: SHIPPED | OUTPATIENT
Start: 2023-10-12 | End: 2024-03-11

## 2023-10-12 RX ORDER — AZITHROMYCIN 250 MG/1
TABLET, FILM COATED ORAL
Qty: 6 TABLET | Refills: 0 | Status: SHIPPED | OUTPATIENT
Start: 2023-10-12 | End: 2023-10-17

## 2023-10-12 NOTE — PROGRESS NOTES
"Subjective:      Patient ID: Sharlene Smith is a 58 y.o. female.    Vitals:  height is 5' 4" (1.626 m) and weight is 82.1 kg (181 lb). Her temperature is 97.6 °F (36.4 °C). Her blood pressure is 130/80 and her pulse is 74. Her respiration is 19 and oxygen saturation is 98%.     Chief Complaint: Sinus Problem    Sinus Problem  This is a new problem. The current episode started in the past 7 days (3 days). The problem is unchanged. There has been no fever. Her pain is at a severity of 5/10. The pain is mild. Associated symptoms include ear pain (both), headaches and sinus pressure. Pertinent negatives include no chills, congestion, coughing, diaphoresis, hoarse voice, neck pain, shortness of breath, sneezing, sore throat or swollen glands. Past treatments include oral decongestants.       Constitution: Negative for chills and sweating.   HENT:  Positive for ear pain (both) and sinus pressure. Negative for congestion and sore throat.    Neck: Negative for neck pain.   Respiratory:  Negative for cough and shortness of breath.    Allergic/Immunologic: Negative for sneezing.   Neurological:  Positive for headaches.      Objective:     Vitals:    10/12/23 1337   BP: 130/80   BP Location: Right arm   Patient Position: Sitting   BP Method: Large (Automatic)   Pulse: 74   Resp: 19   Temp: 97.6 °F (36.4 °C)   SpO2: 98%   Weight: 82.1 kg (181 lb)   Height: 5' 4" (1.626 m)      Physical Exam   Constitutional: She is oriented to person, place, and time. She appears well-developed. She is cooperative.  Non-toxic appearance. She does not appear ill. No distress.   HENT:   Head: Normocephalic and atraumatic.   Ears:   Right Ear: Hearing, tympanic membrane, external ear and ear canal normal.   Left Ear: Hearing, tympanic membrane, external ear and ear canal normal.      Comments: Sinus tenderness  Nose: Congestion present. No mucosal edema, rhinorrhea or nasal deformity. No epistaxis. Right sinus exhibits no maxillary sinus " tenderness and no frontal sinus tenderness. Left sinus exhibits no maxillary sinus tenderness and no frontal sinus tenderness.   Mouth/Throat: Uvula is midline, oropharynx is clear and moist and mucous membranes are normal. No trismus in the jaw. Normal dentition. No uvula swelling. No oropharyngeal exudate, posterior oropharyngeal edema or posterior oropharyngeal erythema.   Eyes: Conjunctivae and lids are normal. No scleral icterus.   Neck: Trachea normal and phonation normal. Neck supple. No edema present. No erythema present. No neck rigidity present.   Cardiovascular: Normal rate, regular rhythm, normal heart sounds and normal pulses.   Pulmonary/Chest: Effort normal and breath sounds normal. No respiratory distress. She has no decreased breath sounds. She has no rhonchi.   Abdominal: Normal appearance.   Musculoskeletal: Normal range of motion.         General: Normal range of motion.   Neurological: She is alert and oriented to person, place, and time. She exhibits normal muscle tone. Coordination normal.   Skin: Skin is warm, intact and not diaphoretic.   Psychiatric: Her speech is normal and behavior is normal. Judgment and thought content normal.   Nursing note and vitals reviewed.    Results for orders placed or performed in visit on 10/12/23   SARS Coronavirus 2 Antigen, POCT Manual Read   Result Value Ref Range    SARS Coronavirus 2 Antigen Negative Negative     Acceptable Yes       Assessment:     1. Sinus congestion    2. Acute bacterial sinusitis        Plan:       Sinus congestion  -     SARS Coronavirus 2 Antigen, POCT Manual Read  -     Ambulatory referral/consult to ENT    2. Acute bacterial sinusitis  -     methylPREDNISolone (MEDROL DOSEPACK) 4 mg tablet; use as directed  Dispense: 1 each; Refill: 0  -     azithromycin (Z-JEANINE) 250 MG tablet; Take 2 tablets by mouth on day 1; Take 1 tablet by mouth on days 2-5  Dispense: 6 tablet; Refill: 0      Patient Instructions   Below are  suggestions for symptomatic relief of your upper respiratory symptoms:              -Salt water gargles to soothe throat pain.              -Chloroseptic spray and Cepacol lozenges also help to numb throat pain.              -Warm herbal teas with honey/lemon/amie can help soothe sore throat and hoarseness              -Nasal saline spray reduces inflammation and dryness.              -Warm face compresses to help with facial sinus pain/pressure.              -Humidifiers and steam can help with nasal dryness and congestion              -Vicks vapor rub at night for chest congestion.              -Flonase OTC or Nasacort OTC for nasal congestion and post-nasal drip. Ok to use twice daily for the first week, then reduce to once daily after symptoms have begun to improve.              -Afrin is a nasal spray that can give immediate relief of nasal congestion but you cannot use this medication for more than 3 days              -Simple foods like chicken noodle soup.              - Mucinex for congestion or Mucinex DM for cough during the day time. Delsym helps with coughing at night. Mucinex-D if you have sinus pressure/sinus pain or chest congestion. (caution if history of high blood pressure or palpitations). You must increase your water intake when using expectorants (Mucinex).             -Zyrtec/Claritin/Allegra/Xyzal should help with allergies.  -If you DO NOT have Hypertension or any history of palpitations, it is ok to take over the counter Sudafed or Mucinex D or Allegra-D or Claritin-D or Zyrtec-D.  -If you do take one of the above, it is ok to combine that with plain over the counter Mucinex or Allegra or Claritin or Zyrtec. If, for example, you are taking Zyrtec -D, you can combine that with Mucinex, but not Mucinex-D.  If you are taking Mucinex-D, you can combine that with plain Allegra or Claritin or Zyrtec.   -If you DO have Hypertension or palpitations, it is safe to take Coricidin HBP for relief of  sinus symptoms.     I have placed ENT referral  Call to schedule an appointment: [phone]

## 2023-10-12 NOTE — PATIENT INSTRUCTIONS
Below are suggestions for symptomatic relief of your upper respiratory symptoms:              -Salt water gargles to soothe throat pain.              -Chloroseptic spray and Cepacol lozenges also help to numb throat pain.              -Warm herbal teas with honey/lemon/amie can help soothe sore throat and hoarseness              -Nasal saline spray reduces inflammation and dryness.              -Warm face compresses to help with facial sinus pain/pressure.              -Humidifiers and steam can help with nasal dryness and congestion              -Vicks vapor rub at night for chest congestion.              -Flonase OTC or Nasacort OTC for nasal congestion and post-nasal drip. Ok to use twice daily for the first week, then reduce to once daily after symptoms have begun to improve.              -Afrin is a nasal spray that can give immediate relief of nasal congestion but you cannot use this medication for more than 3 days              -Simple foods like chicken noodle soup.              - Mucinex for congestion or Mucinex DM for cough during the day time. Delsym helps with coughing at night. Mucinex-D if you have sinus pressure/sinus pain or chest congestion. (caution if history of high blood pressure or palpitations). You must increase your water intake when using expectorants (Mucinex).             -Zyrtec/Claritin/Allegra/Xyzal should help with allergies.  -If you DO NOT have Hypertension or any history of palpitations, it is ok to take over the counter Sudafed or Mucinex D or Allegra-D or Claritin-D or Zyrtec-D.  -If you do take one of the above, it is ok to combine that with plain over the counter Mucinex or Allegra or Claritin or Zyrtec. If, for example, you are taking Zyrtec -D, you can combine that with Mucinex, but not Mucinex-D.  If you are taking Mucinex-D, you can combine that with plain Allegra or Claritin or Zyrtec.   -If you DO have Hypertension or palpitations, it is safe to take Coricidin HBP for  relief of sinus symptoms.     I have placed ENT referral  Call to schedule an appointment: [phone]

## 2023-10-18 ENCOUNTER — PATIENT MESSAGE (OUTPATIENT)
Dept: CARDIOLOGY | Facility: CLINIC | Age: 58
End: 2023-10-18
Payer: OTHER GOVERNMENT

## 2023-10-31 ENCOUNTER — HOSPITAL ENCOUNTER (OUTPATIENT)
Dept: RADIOLOGY | Facility: HOSPITAL | Age: 58
Discharge: HOME | End: 2023-10-31
Attending: FAMILY MEDICINE
Payer: OTHER GOVERNMENT

## 2023-10-31 DIAGNOSIS — Z12.31 SCREENING MAMMOGRAM, ENCOUNTER FOR: ICD-10-CM

## 2023-10-31 PROCEDURE — 77067 MAMMO DIGITAL SCREENING BILAT WITH TOMO: ICD-10-PCS | Mod: 26,,, | Performed by: RADIOLOGY

## 2023-10-31 PROCEDURE — 77063 BREAST TOMOSYNTHESIS BI: CPT | Mod: 26,,, | Performed by: RADIOLOGY

## 2023-10-31 PROCEDURE — 77063 MAMMO DIGITAL SCREENING BILAT WITH TOMO: ICD-10-PCS | Mod: 26,,, | Performed by: RADIOLOGY

## 2023-10-31 PROCEDURE — 77067 SCR MAMMO BI INCL CAD: CPT | Mod: 26,,, | Performed by: RADIOLOGY

## 2023-10-31 PROCEDURE — 77067 SCR MAMMO BI INCL CAD: CPT | Mod: TC

## 2023-11-29 ENCOUNTER — PATIENT MESSAGE (OUTPATIENT)
Dept: ADMINISTRATIVE | Facility: HOSPITAL | Age: 58
End: 2023-11-29
Payer: OTHER GOVERNMENT

## 2023-12-20 ENCOUNTER — OFFICE VISIT (OUTPATIENT)
Dept: OBSTETRICS AND GYNECOLOGY | Facility: CLINIC | Age: 58
End: 2023-12-20
Payer: OTHER GOVERNMENT

## 2023-12-20 VITALS
HEIGHT: 64 IN | SYSTOLIC BLOOD PRESSURE: 145 MMHG | WEIGHT: 180.38 LBS | DIASTOLIC BLOOD PRESSURE: 90 MMHG | BODY MASS INDEX: 30.8 KG/M2

## 2023-12-20 DIAGNOSIS — Z12.31 SCREENING MAMMOGRAM, ENCOUNTER FOR: ICD-10-CM

## 2023-12-20 DIAGNOSIS — N95.1 MENOPAUSAL SYMPTOMS: ICD-10-CM

## 2023-12-20 DIAGNOSIS — Z01.419 ENCOUNTER FOR GYNECOLOGICAL EXAMINATION WITHOUT ABNORMAL FINDING: Primary | ICD-10-CM

## 2023-12-20 PROCEDURE — 87624 HPV HI-RISK TYP POOLED RSLT: CPT | Performed by: PHYSICIAN ASSISTANT

## 2023-12-20 PROCEDURE — 99396 PR PREVENTIVE VISIT,EST,40-64: ICD-10-PCS | Mod: S$PBB,,, | Performed by: PHYSICIAN ASSISTANT

## 2023-12-20 PROCEDURE — 99999 PR PBB SHADOW E&M-EST. PATIENT-LVL IV: CPT | Mod: PBBFAC,,, | Performed by: PHYSICIAN ASSISTANT

## 2023-12-20 PROCEDURE — 88175 CYTOPATH C/V AUTO FLUID REDO: CPT | Performed by: PHYSICIAN ASSISTANT

## 2023-12-20 PROCEDURE — 99214 OFFICE O/P EST MOD 30 MIN: CPT | Mod: PBBFAC | Performed by: PHYSICIAN ASSISTANT

## 2023-12-20 PROCEDURE — 99999 PR PBB SHADOW E&M-EST. PATIENT-LVL IV: ICD-10-PCS | Mod: PBBFAC,,, | Performed by: PHYSICIAN ASSISTANT

## 2023-12-20 PROCEDURE — 99396 PREV VISIT EST AGE 40-64: CPT | Mod: S$PBB,,, | Performed by: PHYSICIAN ASSISTANT

## 2023-12-20 RX ORDER — ESTRADIOL 0.5 MG/.5G
GEL TOPICAL
Qty: 30 PACKET | Refills: 11 | Status: SHIPPED | OUTPATIENT
Start: 2023-12-20

## 2023-12-20 RX ORDER — PROGESTERONE 100 MG/1
100 CAPSULE ORAL NIGHTLY
Qty: 30 CAPSULE | Refills: 11 | Status: SHIPPED | OUTPATIENT
Start: 2023-12-20 | End: 2024-12-19

## 2023-12-20 RX ORDER — TESTOSTERONE CYPIONATE 200 MG/ML
50 INJECTION, SOLUTION INTRAMUSCULAR
Status: COMPLETED | OUTPATIENT
Start: 2023-12-20 | End: 2024-01-11

## 2023-12-20 NOTE — PROGRESS NOTES
Subjective:      Sharlene Smith is a 58 y.o. female who presents for follow-up of hormone replacement therapy and WWE.  At her last visit on 2023, she reported that she was feeling much better. Hot flashes had resolved, mood improved and sleeping better. Continues to have low libido but not bothersome. Declined testosterone therapy.    PLAN on 2023:  Continue Vivelle Dot 0.05mg BIW and Progesterone 100mg QHS     The patient reports that she continues to feel well but has developed localized rash to the patch. Tried to change sites but continues to happen. She is also ready to restart testosterone injections. Libido is low and would like it to improve. Sexually active with one partner, her . Denies vaginal bleeding.      PCP: Dallas Delgado MD    Routine labs: 2022  WWE:   Pap smear: 2020 negative  Mammogram: 10/31/2023 TC 17.68 %   DEXA: 2023 normal  Colonoscopy: 3/10/2023 repeat in 6-12 months    Office Visit on 10/12/2023   Component Date Value Ref Range Status    SARS Coronavirus 2 Antigen 10/12/2023 Negative  Negative Final     Acceptable 10/12/2023 Yes   Final       Past Medical History:   Diagnosis Date    Abnormal Pap smear of vagina yrs ago    possible BX?    Allergy     Arthritis     Asthma     Hyperlipidemia     Supraventricular tachycardia     SVT (supraventricular tachycardia)     Tachycardia     Ulcerative colitis      Past Surgical History:   Procedure Laterality Date    ABLATION N/A 10/8/2018    Procedure: ABLATION;  Surgeon: Shabbir Clark MD;  Location: [facility] CATH LAB;  Service: Cardiology;  Laterality: N/A;  SVT,SVT-AVNRT RFA,KRISTI,MAC,FAS,3PREP     SECTION, CLASSIC      x 2    COLONOSCOPY N/A 2015    Procedure: COLONOSCOPY;  Surgeon: Petr Miller MD;  Location: [facility] ENDO ([address] [place] [address]);  Service: Endoscopy;  Laterality: N/A;    COLONOSCOPY N/A 10/13/2017    Procedure: COLONOSCOPY;  Surgeon: Petr Miller MD;   [facility]  Service: Endoscopy;  Laterality: N/A;    COLONOSCOPY N/A 10/27/2020    Procedure: COLONOSCOPY;  Surgeon: Petr Miller MD;  [facility]  Service: Endoscopy;  Laterality: N/A;  covid test 10/24-[place] [facility]  prep ins. emailed- ERW    COLONOSCOPY N/A 3/10/2023    Procedure: COLONOSCOPY;  Surgeon: Nehemias Molina MD;  [facility]  Service: Endoscopy;  Laterality: N/A;  pt has sutab-inst portal-any md-tb    INJECTION OF JOINT Bilateral 2019    Procedure: Injection, Joint,  Hip--Bilateral;  Surgeon: Chauncey Clay Jr., MD;  Location: [facility] PAIN T;  Service: Pain Management;  Laterality: Bilateral;     Social History     Tobacco Use    Smoking status: Former     Current packs/day: 0.00     Types: Cigarettes     Quit date: 1989     Years since quittin.8    Smokeless tobacco: Never   Substance Use Topics    Alcohol use: Yes     Alcohol/week: 0.0 standard drinks of alcohol     Comment: events - couple times a month    Drug use: No     Family History   Problem Relation Age of Onset    Hyperlipidemia Mother     Multiple myeloma Mother 75        passsed at 85    Cataracts Mother     Allergies Mother     Hypertension Mother     Heart attack Father     Diabetes Father     Breast cancer Sister 63    Arthritis Maternal Grandmother     Glaucoma Maternal Grandfather     Heart attack Paternal Grandmother     Arthritis Paternal Grandmother     Breast cancer Other 47    Heart disease Neg Hx     Colon cancer Neg Hx     Esophageal cancer Neg Hx     Amblyopia Neg Hx     Blindness Neg Hx     Macular degeneration Neg Hx     Retinal detachment Neg Hx     Strabismus Neg Hx     Stroke Neg Hx     Thyroid disease Neg Hx     Angioedema Neg Hx     Asthma Neg Hx     Atopy Neg Hx     Eczema Neg Hx     Immunodeficiency Neg Hx     Rhinitis Neg Hx     Urticaria Neg Hx     Ovarian cancer Neg Hx     Lung cancer Neg Hx      OB History    Para  Term  AB Living   2 2 0     2   SAB IAB Ectopic Multiple Live Births           2      # Outcome Date GA Lbr Alfonso/2nd Weight Sex Delivery Anes PTL Lv   2 Para      CS-Unspec   GABBI   1 Para      CS-Unspec   GABBI       Current Outpatient Medications:     albuterol (PROVENTIL/VENTOLIN HFA) 90 mcg/actuation inhaler, Inhale 2 puffs into the lungs every 6 (six) hours as needed for Wheezing or Shortness of Breath. Rescue, Disp: 18 g, Rfl: 11    atorvastatin (LIPITOR) 20 MG tablet, Take 1 tablet (20 mg total) by mouth every evening., Disp: 90 tablet, Rfl: 3    diclofenac sodium (VOLTAREN) 1 % Gel, Apply 2 g topically 2 (two) times daily as needed., Disp: 50 g, Rfl: 1    estradioL (DIVIGEL) 0.5 mg/0.5 gram (0.1 %) GlPk, Apply one packet to upper inner thigh daily, Disp: 30 packet, Rfl: 11    fluticasone propionate (FLONASE) 50 mcg/actuation nasal spray, SHAKE LIQUID AND USE 1 SPRAY(50 MCG) IN EACH NOSTRIL EVERY DAY, Disp: 48 g, Rfl: 5    loratadine (CLARITIN) 10 mg tablet, Take 1 tablet (10 mg total) by mouth once daily., Disp: 90 tablet, Rfl: 3    mesalamine (APRISO) 0.375 gram Cp24, Take 4 capsules (1.5 g total) by mouth once daily., Disp: 360 capsule, Rfl: 3    methylPREDNISolone (MEDROL DOSEPACK) 4 mg tablet, use as directed, Disp: 1 each, Rfl: 0    progesterone (PROMETRIUM) 100 MG capsule, Take 1 capsule (100 mg total) by mouth every evening., Disp: 30 capsule, Rfl: 11    tiZANidine (ZANAFLEX) 4 MG tablet, TAKE 1/2 TO 1 TABLET BY MOUTH EVERY 8 HOURS AS NEEDED, Disp: 270 tablet, Rfl: 2    traMADoL (ULTRAM) 50 mg tablet, Take 1 tablet (50 mg total) by mouth every 6 (six) hours., Disp: 120 tablet, Rfl: 5    Current Facility-Administered Medications:     testosterone cypionate injection 50 mg, 50 mg, Intramuscular, 1 time in Clinic/HOD, Estela Le PA-C    Facility-Administered Medications Ordered in Other Visits:     0.9%  NaCl infusion, , Intravenous, Continuous, Netta Amaro, NP, New Bag at 10/27/20  "0715    Review of Systems:  General: No fever, chills, or weight loss.  Chest: No chest pain, shortness of breath, or palpitations.  Breast: No pain, masses, or nipple discharge.  Vulva: No pain, lesions, or itching.  Vagina: No relaxation, itching, discharge, or lesions.  Abdomen: No pain, nausea, vomiting, diarrhea, or constipation.  Urinary: No incontinence, nocturia, frequency, or dysuria.  Extremities:  No leg cramps, edema, or calf pain.  Neurologic: No headaches, dizziness, or visual changes.    Objective:     Vitals:    12/20/23 1051   BP: (!) 145/90   Weight: 81.8 kg (180 lb 6.4 oz)   Height: 5' 4" (1.626 m)   PainSc: 0-No pain     Body mass index is 30.97 kg/m².      Physical Exam:   PHYSICAL EXAM:  APPEARANCE: Well nourished, well developed, in no acute distress.  AFFECT: WNL, alert and oriented x 3  CHEST: Good respiratory effect  ABDOMEN: Soft.  No tenderness or masses.  No hepatosplenomegaly.  No hernias.  BREASTS: Symmetrical, no skin changes or visible lesions.  No palpable masses, nipple discharge bilaterally.  PELVIC: Normal external genitalia without lesions.  Normal hair distribution.  Adequate perineal body, normal urethral meatus.  Vagina moist and well rugated without lesions or discharge.  Cervix pink, without lesions, discharge or tenderness.  No significant cystocele or rectocele.  Bimanual exam shows uterus to be normal size, regular, mobile and nontender.  Adnexa without masses or tenderness.    EXTREMITIES: No edema.       Assessment:    Encounter for gynecological examination without abnormal finding  -     Liquid-Based Pap Smear, Screening  -     HPV High Risk Genotypes, PCR    Menopausal symptoms  -     estradioL (DIVIGEL) 0.5 mg/0.5 gram (0.1 %) GlPk; Apply one packet to upper inner thigh daily  Dispense: 30 packet; Refill: 11  -     testosterone cypionate injection 50 mg  -     progesterone (PROMETRIUM) 100 MG capsule; Take 1 capsule (100 mg total) by mouth every evening.  Dispense: " 30 capsule; Refill: 11  -     Prior authorization Order    Screening mammogram, encounter for  -     Mammo Digital Screening Bilat w/ Filippo; Future; Expected date: 12/20/2023        Plan:   Pap/HPV  Annual mammogram  D/c vivelle dot 0.05mg BIW  Start Divigel 0.5mg QD - counseled on use  Continue progesterone 100mg QHS  Add testosterone 50mg IM [id number]  Reviewed side effects and risks with testosterone therapy.  Follow up in 3 month.    Instructed patient to call if she experiences any side effects or has any questions.    I spent a total of 25 minutes on the day of the visit.This includes face to face time and non-face to face time preparing to see the patient (eg, review of tests), obtaining and/or reviewing separately obtained history, documenting clinical information in the electronic or other health record, independently interpreting results and communicating results to the patient/family/caregiver, or care coordinator.

## 2023-12-22 LAB
HPV HR 12 DNA SPEC QL NAA+PROBE: NEGATIVE
HPV16 AG SPEC QL: NEGATIVE
HPV18 DNA SPEC QL NAA+PROBE: NEGATIVE

## 2024-01-04 LAB
FINAL PATHOLOGIC DIAGNOSIS: NORMAL
Lab: NORMAL

## 2024-01-08 ENCOUNTER — TELEPHONE (OUTPATIENT)
Dept: OBSTETRICS AND GYNECOLOGY | Facility: CLINIC | Age: 59
End: 2024-01-08
Payer: OTHER GOVERNMENT

## 2024-01-08 NOTE — TELEPHONE ENCOUNTER
Called patient to inform that her message has been forwarded to our injection nurse due to I am at the main campus location not [facility]. Patient said okay thanks.

## 2024-01-10 ENCOUNTER — TELEPHONE (OUTPATIENT)
Dept: OBSTETRICS AND GYNECOLOGY | Facility: CLINIC | Age: 59
End: 2024-01-10
Payer: OTHER GOVERNMENT

## 2024-01-11 ENCOUNTER — CLINICAL SUPPORT (OUTPATIENT)
Dept: OBSTETRICS AND GYNECOLOGY | Facility: CLINIC | Age: 59
End: 2024-01-11
Payer: OTHER GOVERNMENT

## 2024-01-11 DIAGNOSIS — N95.1 MENOPAUSAL SYMPTOMS: Primary | ICD-10-CM

## 2024-01-11 PROCEDURE — 99999 PR PBB SHADOW E&M-EST. PATIENT-LVL I: CPT | Mod: PBBFAC,,,

## 2024-01-11 NOTE — PROGRESS NOTES
Here for hormone therapy injection, no complaints at this time, Injection given as ordered, tolerated well, no report of pain prior to or after injection. Return to clinic as scheduled.     Site - [facility]    Testosterone  50 mg # 1    Clinic Supplied Medication

## 2024-01-24 ENCOUNTER — PATIENT MESSAGE (OUTPATIENT)
Dept: PRIMARY CARE CLINIC | Facility: CLINIC | Age: 59
End: 2024-01-24
Payer: OTHER GOVERNMENT

## 2024-02-08 ENCOUNTER — LAB VISIT (OUTPATIENT)
Dept: LAB | Facility: HOSPITAL | Age: 59
End: 2024-02-08
Attending: STUDENT IN AN ORGANIZED HEALTH CARE EDUCATION/TRAINING PROGRAM
Payer: OTHER GOVERNMENT

## 2024-02-08 DIAGNOSIS — G89.29 CHRONIC BILATERAL LOW BACK PAIN WITHOUT SCIATICA: ICD-10-CM

## 2024-02-08 DIAGNOSIS — M54.50 CHRONIC BILATERAL LOW BACK PAIN WITHOUT SCIATICA: ICD-10-CM

## 2024-02-08 DIAGNOSIS — M79.18 MYOFASCIAL MUSCLE PAIN: ICD-10-CM

## 2024-02-08 DIAGNOSIS — J30.2 SEASONAL ALLERGIES: ICD-10-CM

## 2024-02-08 DIAGNOSIS — Z00.00 ANNUAL PHYSICAL EXAM: ICD-10-CM

## 2024-02-08 LAB
ALBUMIN SERPL BCP-MCNC: 4.1 G/DL (ref 3.5–5.2)
ALP SERPL-CCNC: 96 U/L (ref 55–135)
ALT SERPL W/O P-5'-P-CCNC: 15 U/L (ref 10–44)
ANION GAP SERPL CALC-SCNC: 9 MMOL/L (ref 8–16)
AST SERPL-CCNC: 15 U/L (ref 10–40)
BASOPHILS # BLD AUTO: 0.06 K/UL (ref 0–0.2)
BASOPHILS NFR BLD: 0.9 % (ref 0–1.9)
BILIRUB SERPL-MCNC: 0.7 MG/DL (ref 0.1–1)
BUN SERPL-MCNC: 13 MG/DL (ref 6–20)
CALCIUM SERPL-MCNC: 9.4 MG/DL (ref 8.7–10.5)
CHLORIDE SERPL-SCNC: 107 MMOL/L (ref 95–110)
CHOLEST SERPL-MCNC: 171 MG/DL (ref 120–199)
CHOLEST/HDLC SERPL: 2.5 {RATIO} (ref 2–5)
CO2 SERPL-SCNC: 25 MMOL/L (ref 23–29)
CREAT SERPL-MCNC: 0.7 MG/DL (ref 0.5–1.4)
DIFFERENTIAL METHOD BLD: ABNORMAL
EOSINOPHIL # BLD AUTO: 0.2 K/UL (ref 0–0.5)
EOSINOPHIL NFR BLD: 3.4 % (ref 0–8)
ERYTHROCYTE [DISTWIDTH] IN BLOOD BY AUTOMATED COUNT: 11.8 % (ref 11.5–14.5)
EST. GFR  (NO RACE VARIABLE): >60 ML/MIN/1.73 M^2
ESTIMATED AVG GLUCOSE: 120 MG/DL (ref 68–131)
GLUCOSE SERPL-MCNC: 103 MG/DL (ref 70–110)
HBA1C MFR BLD: 5.8 % (ref 4–5.6)
HCT VFR BLD AUTO: 39.3 % (ref 37–48.5)
HDLC SERPL-MCNC: 69 MG/DL (ref 40–75)
HDLC SERPL: 40.4 % (ref 20–50)
HGB BLD-MCNC: 13.1 G/DL (ref 12–16)
IMM GRANULOCYTES # BLD AUTO: 0.02 K/UL (ref 0–0.04)
IMM GRANULOCYTES NFR BLD AUTO: 0.3 % (ref 0–0.5)
LDLC SERPL CALC-MCNC: 87.2 MG/DL (ref 63–159)
LYMPHOCYTES # BLD AUTO: 2.5 K/UL (ref 1–4.8)
LYMPHOCYTES NFR BLD: 37 % (ref 18–48)
MCH RBC QN AUTO: 30.7 PG (ref 27–31)
MCHC RBC AUTO-ENTMCNC: 33.3 G/DL (ref 32–36)
MCV RBC AUTO: 92 FL (ref 82–98)
MONOCYTES # BLD AUTO: 0.3 K/UL (ref 0.3–1)
MONOCYTES NFR BLD: 3.7 % (ref 4–15)
NEUTROPHILS # BLD AUTO: 3.7 K/UL (ref 1.8–7.7)
NEUTROPHILS NFR BLD: 54.7 % (ref 38–73)
NONHDLC SERPL-MCNC: 102 MG/DL
NRBC BLD-RTO: 0 /100 WBC
PLATELET # BLD AUTO: 333 K/UL (ref 150–450)
PMV BLD AUTO: 9.9 FL (ref 9.2–12.9)
POTASSIUM SERPL-SCNC: 4 MMOL/L (ref 3.5–5.1)
PROT SERPL-MCNC: 7.6 G/DL (ref 6–8.4)
RBC # BLD AUTO: 4.27 M/UL (ref 4–5.4)
SODIUM SERPL-SCNC: 141 MMOL/L (ref 136–145)
TRIGL SERPL-MCNC: 74 MG/DL (ref 30–150)
TSH SERPL DL<=0.005 MIU/L-ACNC: 0.89 UIU/ML (ref 0.4–4)
WBC # BLD AUTO: 6.78 K/UL (ref 3.9–12.7)

## 2024-02-08 PROCEDURE — 84443 ASSAY THYROID STIM HORMONE: CPT | Performed by: STUDENT IN AN ORGANIZED HEALTH CARE EDUCATION/TRAINING PROGRAM

## 2024-02-08 PROCEDURE — 83036 HEMOGLOBIN GLYCOSYLATED A1C: CPT | Performed by: STUDENT IN AN ORGANIZED HEALTH CARE EDUCATION/TRAINING PROGRAM

## 2024-02-08 PROCEDURE — 85025 COMPLETE CBC W/AUTO DIFF WBC: CPT | Performed by: STUDENT IN AN ORGANIZED HEALTH CARE EDUCATION/TRAINING PROGRAM

## 2024-02-08 PROCEDURE — 80061 LIPID PANEL: CPT | Performed by: STUDENT IN AN ORGANIZED HEALTH CARE EDUCATION/TRAINING PROGRAM

## 2024-02-08 PROCEDURE — 80053 COMPREHEN METABOLIC PANEL: CPT | Performed by: STUDENT IN AN ORGANIZED HEALTH CARE EDUCATION/TRAINING PROGRAM

## 2024-02-08 PROCEDURE — 36415 COLL VENOUS BLD VENIPUNCTURE: CPT | Mod: PN | Performed by: STUDENT IN AN ORGANIZED HEALTH CARE EDUCATION/TRAINING PROGRAM

## 2024-03-08 ENCOUNTER — CLINICAL SUPPORT (OUTPATIENT)
Dept: OBSTETRICS AND GYNECOLOGY | Facility: CLINIC | Age: 59
End: 2024-03-08
Payer: OTHER GOVERNMENT

## 2024-03-08 DIAGNOSIS — N95.1 MENOPAUSAL SYMPTOMS: Primary | ICD-10-CM

## 2024-03-08 PROCEDURE — 99999 PR PBB SHADOW E&M-EST. PATIENT-LVL I: CPT | Mod: PBBFAC,,,

## 2024-03-08 PROCEDURE — 96372 THER/PROPH/DIAG INJ SC/IM: CPT | Mod: PBBFAC

## 2024-03-08 PROCEDURE — 99999PBSHW PR PBB SHADOW TECHNICAL ONLY FILED TO HB: Mod: PBBFAC,,,

## 2024-03-08 RX ORDER — TESTOSTERONE CYPIONATE 200 MG/ML
50 INJECTION, SOLUTION INTRAMUSCULAR
Status: COMPLETED | OUTPATIENT
Start: 2024-03-08 | End: 2024-05-03

## 2024-03-08 RX ADMIN — TESTOSTERONE CYPIONATE 50 MG: 200 INJECTION INTRAMUSCULAR at 02:03

## 2024-03-11 ENCOUNTER — OFFICE VISIT (OUTPATIENT)
Dept: PRIMARY CARE CLINIC | Facility: CLINIC | Age: 59
End: 2024-03-11
Payer: OTHER GOVERNMENT

## 2024-03-11 ENCOUNTER — PATIENT MESSAGE (OUTPATIENT)
Dept: PRIMARY CARE CLINIC | Facility: CLINIC | Age: 59
End: 2024-03-11

## 2024-03-11 DIAGNOSIS — J32.0 CHRONIC MAXILLARY SINUSITIS: ICD-10-CM

## 2024-03-11 DIAGNOSIS — M79.18 MYOFASCIAL MUSCLE PAIN: ICD-10-CM

## 2024-03-11 DIAGNOSIS — K51.00 ULCERATIVE PANCOLITIS: ICD-10-CM

## 2024-03-11 DIAGNOSIS — G89.29 CHRONIC BILATERAL LOW BACK PAIN WITHOUT SCIATICA: ICD-10-CM

## 2024-03-11 DIAGNOSIS — M54.50 CHRONIC BILATERAL LOW BACK PAIN WITHOUT SCIATICA: ICD-10-CM

## 2024-03-11 DIAGNOSIS — K63.5 POLYP OF COLON, UNSPECIFIED PART OF COLON, UNSPECIFIED TYPE: Primary | ICD-10-CM

## 2024-03-11 DIAGNOSIS — M54.40 ACUTE BILATERAL LOW BACK PAIN WITH SCIATICA, SCIATICA LATERALITY UNSPECIFIED: ICD-10-CM

## 2024-03-11 PROCEDURE — 99214 OFFICE O/P EST MOD 30 MIN: CPT | Mod: 95,,, | Performed by: STUDENT IN AN ORGANIZED HEALTH CARE EDUCATION/TRAINING PROGRAM

## 2024-03-11 RX ORDER — TRAMADOL HYDROCHLORIDE 50 MG/1
50 TABLET ORAL EVERY 8 HOURS PRN
Qty: 90 TABLET | Refills: 2 | Status: SHIPPED | OUTPATIENT
Start: 2024-03-11 | End: 2024-06-07 | Stop reason: SDUPTHER

## 2024-03-11 NOTE — PROGRESS NOTES
The patient location is: [place]  The chief complaint leading to consultation is: med refill, ENT/Gastro    Visit type: audiovisual    Face to Face time with patient: 15  20 minutes of total time spent on the encounter, which includes face to face time and non-face to face time preparing to see the patient (eg, review of tests), Obtaining and/or reviewing separately obtained history, Documenting clinical information in the electronic or other health record, Independently interpreting results (not separately reported) and communicating results to the patient/family/caregiver, or Care coordination (not separately reported).         Each patient to whom he or she provides medical services by telemedicine is:  (1) informed of the relationship between the physician and patient and the respective role of any other health care provider with respect to management of the patient; and (2) notified that he or she may decline to receive medical services by telemedicine and may withdraw from such care at any time.    Notes:     Requesting medication refill and referral to ENT/gastro.     A1c is at 5.8%.  Has been at 5.8% in 2024, in 2021, in 2019, 2012 and in 2011.  So is pretty stable.     Cholesterol is excellent.    [facility] has been getting scopes every 3 years, would like to meet with a GI to discuss possibly upper GI.  Was advised to get one before Covid and did not est.     Colonoscopy was last on 3/10/2023.    Chronic sinusitis: [facility] sinuses are always stuffy and would like to see ENT for this issue.    Taking Statin daily, tolerating well.     Would like to stop the tramadol in a month.  [facility] has done PT. Was going to pain management before and did not complete with them.   Had done injections with them.    [facility] with hx of Ulcerative Colitis was not able to use Motrin or other NSAIDS, so was using Tramadol.      Physical Exam  Constitutional:       General: She is not in acute distress.     Appearance: Normal  appearance.   HENT:      Right Ear: External ear normal.      Left Ear: External ear normal.      Nose: No rhinorrhea.   Eyes:      Extraocular Movements: Extraocular movements intact.   Pulmonary:      Effort: Pulmonary effort is normal. No respiratory distress.   Neurological:      Mental Status: She is alert and oriented to person, place, and time.   Psychiatric:         Mood and Affect: Mood normal.         Behavior: Behavior normal.       Chronic Sinusitis:    - as per above patient reports continued sinus pressure/pain.    - not improved with current therapies.    -     Ulcerative Colitis and polyps:   - requesting f/u with GI, seems reasonable.  Referral placed.     Chronic Back Pain:   - advised will need to see chronic pain provider to keep getting Tramdol.   - cutting dose from 4 x daily to 3x daily, referral to pain med.

## 2024-03-11 NOTE — PATIENT INSTRUCTIONS
Chronic Sinusitis:    - as per above patient reports continued sinus pressure/pain.    - not improved with current therapies.    -     Ulcerative Colitis and polyps:   - requesting f/u with GI, seems reasonable.  Referral placed.     Chronic Back Pain:   - advised will need to see chronic pain provider to keep getting Tramdol.   - cutting dose from 4 x daily to 3x daily, referral to pain med.

## 2024-04-03 DIAGNOSIS — J30.2 SEASONAL ALLERGIES: ICD-10-CM

## 2024-04-03 NOTE — TELEPHONE ENCOUNTER
No care due was identified.  [facility] Embedded Care Due Messages. Reference number: [id number].   4/03/2024 1:04:56 PM CDT

## 2024-04-04 ENCOUNTER — PATIENT MESSAGE (OUTPATIENT)
Dept: PRIMARY CARE CLINIC | Facility: CLINIC | Age: 59
End: 2024-04-04
Payer: OTHER GOVERNMENT

## 2024-04-04 ENCOUNTER — CLINICAL SUPPORT (OUTPATIENT)
Dept: OBSTETRICS AND GYNECOLOGY | Facility: CLINIC | Age: 59
End: 2024-04-04
Payer: OTHER GOVERNMENT

## 2024-04-04 DIAGNOSIS — N95.1 MENOPAUSAL SYMPTOMS: ICD-10-CM

## 2024-04-04 DIAGNOSIS — N95.1 MENOPAUSAL SYMPTOMS: Primary | ICD-10-CM

## 2024-04-04 PROCEDURE — 96372 THER/PROPH/DIAG INJ SC/IM: CPT | Mod: PBBFAC

## 2024-04-04 PROCEDURE — 99999 PR PBB SHADOW E&M-EST. PATIENT-LVL I: CPT | Mod: PBBFAC,,,

## 2024-04-04 PROCEDURE — 99999PBSHW PR PBB SHADOW TECHNICAL ONLY FILED TO HB: Mod: PBBFAC,,,

## 2024-04-04 RX ORDER — FLUTICASONE PROPIONATE 50 MCG
SPRAY, SUSPENSION (ML) NASAL
Qty: 48 G | Refills: 3 | Status: SHIPPED | OUTPATIENT
Start: 2024-04-04

## 2024-04-04 RX ORDER — PROGESTERONE 100 MG/1
100 CAPSULE ORAL NIGHTLY
Qty: 30 CAPSULE | Refills: 11 | Status: SHIPPED | OUTPATIENT
Start: 2024-04-04

## 2024-04-04 RX ADMIN — TESTOSTERONE CYPIONATE 50 MG: 200 INJECTION INTRAMUSCULAR at 10:04

## 2024-04-04 NOTE — PROGRESS NOTES
Here for hormone therapy injection, no complaints at this time, Injection given as ordered, tolerated well, no report of pain prior to or after injection. Return to clinic as scheduled.     Site - [facility]    Testosterone  50 mg # 2    Clinic Supplied Medication

## 2024-04-04 NOTE — TELEPHONE ENCOUNTER
Refill Decision Note   Sharlene Smith  is requesting a refill authorization.  Brief Assessment and Rationale for Refill:  Approve     Medication Therapy Plan:        Comments:     Note composed:6:57 PM 04/04/2024

## 2024-04-22 ENCOUNTER — OFFICE VISIT (OUTPATIENT)
Dept: URGENT CARE | Facility: CLINIC | Age: 59
End: 2024-04-22
Payer: OTHER GOVERNMENT

## 2024-04-22 VITALS
DIASTOLIC BLOOD PRESSURE: 80 MMHG | OXYGEN SATURATION: 97 % | SYSTOLIC BLOOD PRESSURE: 124 MMHG | TEMPERATURE: 97 F | WEIGHT: 180 LBS | HEART RATE: 86 BPM | RESPIRATION RATE: 20 BRPM | BODY MASS INDEX: 30.73 KG/M2 | HEIGHT: 64 IN

## 2024-04-22 DIAGNOSIS — J06.9 UPPER RESPIRATORY TRACT INFECTION, UNSPECIFIED TYPE: ICD-10-CM

## 2024-04-22 DIAGNOSIS — R09.81 SINUS CONGESTION: Primary | ICD-10-CM

## 2024-04-22 LAB
CTP QC/QA: YES
SARS-COV-2 AG RESP QL IA.RAPID: NEGATIVE

## 2024-04-22 PROCEDURE — 96372 THER/PROPH/DIAG INJ SC/IM: CPT | Mod: S$GLB,,, | Performed by: NURSE PRACTITIONER

## 2024-04-22 PROCEDURE — 71046 X-RAY EXAM CHEST 2 VIEWS: CPT | Mod: S$GLB,,, | Performed by: RADIOLOGY

## 2024-04-22 PROCEDURE — 99213 OFFICE O/P EST LOW 20 MIN: CPT | Mod: 25,S$GLB,, | Performed by: NURSE PRACTITIONER

## 2024-04-22 PROCEDURE — 87811 SARS-COV-2 COVID19 W/OPTIC: CPT | Mod: QW,S$GLB,, | Performed by: NURSE PRACTITIONER

## 2024-04-22 RX ORDER — MONTELUKAST SODIUM 10 MG/1
10 TABLET ORAL NIGHTLY
Qty: 30 TABLET | Refills: 0 | Status: SHIPPED | OUTPATIENT
Start: 2024-04-22 | End: 2024-05-22

## 2024-04-22 RX ORDER — BETAMETHASONE SODIUM PHOSPHATE AND BETAMETHASONE ACETATE 3; 3 MG/ML; MG/ML
6 INJECTION, SUSPENSION INTRA-ARTICULAR; INTRALESIONAL; INTRAMUSCULAR; SOFT TISSUE
Status: COMPLETED | OUTPATIENT
Start: 2024-04-22 | End: 2024-04-22

## 2024-04-22 RX ADMIN — BETAMETHASONE SODIUM PHOSPHATE AND BETAMETHASONE ACETATE 6 MG: 3; 3 INJECTION, SUSPENSION INTRA-ARTICULAR; INTRALESIONAL; INTRAMUSCULAR; SOFT TISSUE at 10:04

## 2024-04-22 NOTE — PATIENT INSTRUCTIONS
Drink lots of fluids like water, juice, or broth. This will help replace any fluids lost if you have a runny nose or fever. Warm tea or soup can help soothe a sore throat.  If the air in your home feels dry, use a cool mist humidifier. This can help a stuffy nose and make it easier to breathe.  You can also use saline nose drops to relieve stuffiness.  If you decide to take over-the-counter cough or cold medicines, follow the directions on the label carefully. Be sure you do not take more than 1 medicine that contains acetaminophen. Also, if you have a heart problem or high blood pressure, check with your doctor before you take any of these medicines.  Wash your hands often. Cough or sneeze into a tissue or your elbow instead of your hands. This will help keep others healthy

## 2024-04-22 NOTE — PROGRESS NOTES
"Subjective:      Patient ID: Sharlene Smith is a 58 y.o. female.    Vitals:  height is 5' 4" (1.626 m) and weight is 81.6 kg (180 lb). Her oral temperature is 97.3 °F (36.3 °C). Her blood pressure is 124/80 and her pulse is 86. Her respiration is 20 and oxygen saturation is 97%.     Chief Complaint: Headache and Cough    Pt presents today with upper respiratory problems and is concerned about a possible uri. Pt say states that symptoms began 5 days ago and she has been self medicating using claritin d, mucinex, and flonase.    Sinus Problem  This is a new problem. The current episode started in the past 7 days. The problem has been gradually worsening since onset. There has been no fever. Her pain is at a severity of 0/10. She is experiencing no pain. Associated symptoms include congestion, coughing, headaches, a hoarse voice, shortness of breath, sinus pressure and a sore throat. Pertinent negatives include no chills, diaphoresis, ear pain, neck pain, sneezing or swollen glands. Past treatments include oral decongestants, nasal decongestants and saline nose sprays. The treatment provided no relief.       Constitution: Negative for chills and sweating.   HENT:  Positive for congestion, sinus pressure and sore throat. Negative for ear pain.    Neck: Negative for neck pain.   Cardiovascular: Negative.    Respiratory:  Positive for cough and shortness of breath.    Gastrointestinal: Negative.    Allergic/Immunologic: Negative for sneezing.   Neurological:  Positive for headaches.      Objective:     Physical Exam   Constitutional: No distress.   HENT:   Head: Normocephalic.   Nose: Nose normal.   Mouth/Throat: Mucous membranes are moist. Oropharynx is clear.   Cardiovascular: Normal rate.   Pulmonary/Chest: Effort normal. No respiratory distress. She has no wheezes. She has rhonchi.         Comments: + L Lobe rhonchi on exhalation    Abdominal: Normal appearance.   Musculoskeletal: Normal range of motion.        "  General: Normal range of motion.   Neurological: She is alert.       Assessment:     1. Sinus congestion        Plan:   Ms. Smith presents for cold symptoms onset 5 days ago. Hx. Asthma. Associated symptoms: cough, sinus pressure, sore throat, shortness of breath. Has tried claritin, mucinex without resolve. Denies any fever, chills, ear pain, headache or chest congestion.     Sinus congestion  -     SARS Coronavirus 2 Antigen, POCT Manual Read  -     XR CHEST PA AND LATERAL; Future; Expected date: 04/22/2024  -     betamethasone acetate-betamethasone sodium phosphate injection 6 mg  -     montelukast (SINGULAIR) 10 mg tablet; Take 1 tablet (10 mg total) by mouth every evening.  Dispense: 30 tablet; Refill: 0    Chest xray-Heart size normal. The lungs are clear. No pleural effusion   Results for orders placed or performed in visit on 04/22/24   SARS Coronavirus 2 Antigen, POCT Manual Read   Result Value Ref Range    SARS Coronavirus 2 Antigen Negative Negative     Acceptable Yes      Patient Instructions   Drink lots of fluids like water, juice, or broth. This will help replace any fluids lost if you have a runny nose or fever. Warm tea or soup can help soothe a sore throat.  If the air in your home feels dry, use a cool mist humidifier. This can help a stuffy nose and make it easier to breathe.  You can also use saline nose drops to relieve stuffiness.  If you decide to take over-the-counter cough or cold medicines, follow the directions on the label carefully. Be sure you do not take more than 1 medicine that contains acetaminophen. Also, if you have a heart problem or high blood pressure, check with your doctor before you take any of these medicines.  Wash your hands often. Cough or sneeze into a tissue or your elbow instead of your hands. This will help keep others healthy

## 2024-04-23 ENCOUNTER — PATIENT MESSAGE (OUTPATIENT)
Dept: PRIMARY CARE CLINIC | Facility: CLINIC | Age: 59
End: 2024-04-23
Payer: OTHER GOVERNMENT

## 2024-04-24 ENCOUNTER — TELEPHONE (OUTPATIENT)
Dept: PRIMARY CARE CLINIC | Facility: CLINIC | Age: 59
End: 2024-04-24

## 2024-04-24 NOTE — TELEPHONE ENCOUNTER
----- Message from Barbara Corado sent at 4/24/2024 12:11 PM CDT -----  Contact: [phone]  Patient is returning a phone call.  Who left a message for the patient: Trina  Does patient know what this is regarding:  yes   Would you like a call back, or a response through your [facility] portal?:   call back   Comments:

## 2024-04-29 ENCOUNTER — OFFICE VISIT (OUTPATIENT)
Dept: URGENT CARE | Facility: CLINIC | Age: 59
End: 2024-04-29
Payer: OTHER GOVERNMENT

## 2024-04-29 VITALS
SYSTOLIC BLOOD PRESSURE: 140 MMHG | OXYGEN SATURATION: 99 % | TEMPERATURE: 98 F | DIASTOLIC BLOOD PRESSURE: 89 MMHG | BODY MASS INDEX: 30.71 KG/M2 | RESPIRATION RATE: 18 BRPM | HEIGHT: 64 IN | WEIGHT: 179.88 LBS | HEART RATE: 82 BPM

## 2024-04-29 DIAGNOSIS — R06.2 WHEEZING: ICD-10-CM

## 2024-04-29 DIAGNOSIS — J22 ACUTE LOWER RESPIRATORY INFECTION: Primary | ICD-10-CM

## 2024-04-29 PROCEDURE — 71046 X-RAY EXAM CHEST 2 VIEWS: CPT | Mod: S$GLB,,, | Performed by: RADIOLOGY

## 2024-04-29 PROCEDURE — 99214 OFFICE O/P EST MOD 30 MIN: CPT | Mod: 25,S$GLB,, | Performed by: FAMILY MEDICINE

## 2024-04-29 PROCEDURE — 94640 AIRWAY INHALATION TREATMENT: CPT | Mod: S$GLB,,, | Performed by: FAMILY MEDICINE

## 2024-04-29 PROCEDURE — 96372 THER/PROPH/DIAG INJ SC/IM: CPT | Mod: 59,S$GLB,, | Performed by: FAMILY MEDICINE

## 2024-04-29 RX ORDER — ALBUTEROL SULFATE 0.83 MG/ML
2.5 SOLUTION RESPIRATORY (INHALATION)
Status: COMPLETED | OUTPATIENT
Start: 2024-04-29 | End: 2024-04-29

## 2024-04-29 RX ORDER — ALBUTEROL SULFATE 90 UG/1
2 AEROSOL, METERED RESPIRATORY (INHALATION) EVERY 6 HOURS PRN
Qty: 18 G | Refills: 0 | Status: SHIPPED | OUTPATIENT
Start: 2024-04-29 | End: 2025-04-29

## 2024-04-29 RX ORDER — IPRATROPIUM BROMIDE 0.5 MG/2.5ML
0.5 SOLUTION RESPIRATORY (INHALATION)
Status: COMPLETED | OUTPATIENT
Start: 2024-04-29 | End: 2024-04-29

## 2024-04-29 RX ORDER — AZITHROMYCIN 250 MG/1
TABLET, FILM COATED ORAL
Qty: 6 TABLET | Refills: 0 | Status: SHIPPED | OUTPATIENT
Start: 2024-04-29 | End: 2024-05-04

## 2024-04-29 RX ORDER — PREDNISONE 20 MG/1
40 TABLET ORAL DAILY
Qty: 10 TABLET | Refills: 0 | Status: SHIPPED | OUTPATIENT
Start: 2024-04-29 | End: 2024-05-04

## 2024-04-29 RX ADMIN — IPRATROPIUM BROMIDE 0.5 MG: 0.5 SOLUTION RESPIRATORY (INHALATION) at 11:04

## 2024-04-29 RX ADMIN — ALBUTEROL SULFATE 2.5 MG: 0.83 SOLUTION RESPIRATORY (INHALATION) at 11:04

## 2024-04-29 NOTE — PROGRESS NOTES
"Subjective:      Patient ID: Sharlene Smith is a 58 y.o. female.    Vitals:  height is 5' 4" (1.626 m) and weight is 81.6 kg (179 lb 14.3 oz). Her oral temperature is 97.9 °F (36.6 °C). Her blood pressure is 140/89 (abnormal) and her pulse is 82. Her respiration is 18 and oxygen saturation is 99%.     Chief Complaint: Cough    57 y/o female c/o cough, post nasal drip. Sx began 3 weeks ago. Pt was seen last week and was given celestone injection and rx singulair for her to restart (hx of allergic asthma) and told to continue claritin. Pt had chest xr done and told nml. .  Does not endorse any chest pain.  No fevers.  Persisting shortness a breath and wheezing.    Cough  The cough is Productive of sputum. Associated symptoms include postnasal drip, shortness of breath and weight loss.       HENT:  Positive for postnasal drip.    Respiratory:  Positive for cough and shortness of breath.       Objective:     Physical Exam  Constitutional: Pt oriented to person, place, and time.  Non-toxic appearance.   Patient does not appear ill. No distress. normal  HENT: No icterus or facial swelling appreciated  Head: Normocephalic and atraumatic.   Nose: No congestion.   Pulmonary/Chest: Effort normal. No stridor. No respiratory distress. There is moderate wheezing left upper lower lung fields and mild wheezing right lung fields.     Abdominal: Normal appearance. Abdomen exhibits no distension.   Musculoskeletal:         General: No swelling.   Neurological: no focal deficit. Patient is alert and oriented to person, place, and time.   Skin: Skin is not diaphoretic and not pale. no jaundice  Psychiatric: Patients behavior is normal. Mood, judgment and thought content normal.       Wheezing resolved after albuterol and Atrovent nebulizer treatment.  Patient reports symptoms had improved  Assessment:     1. Acute lower respiratory infection    2. Wheezing        Plan:       Acute lower respiratory infection  Refractory to " treatment earlier in course including IM steroid and INH albuterol and retarting singulair.     -     azithromycin (Z-JEANINE) 250 MG tablet; Take 2 tablets by mouth on day 1; Take 1 tablet by mouth on days 2-5  Dispense: 6 tablet; Refill: 0  -     Ambulatory referral/consult to Pulmonology for f/u since recurring issue    Wheezing  -     methylPREDNISolone sod suc(PF) injection 125 mg IM in clinic\    -   -  start tomorrow:   predniSONE (DELTASONE) 20 MG tablet; Take 2 tablets (40 mg total) by mouth once daily. for 5 days  Dispense: 10 tablet; Refill: 0    -     albuterol nebulizer solution 2.5 mg  -     ipratropium 0.02 % nebulizer solution 0.5 mg  -     XR CHEST PA AND LATERAL; Future; Expected date: 04/29/2024-- no acute radiographic findings        -     Ambulatory referral/consult to Pulmonology

## 2024-04-29 NOTE — PATIENT INSTRUCTIONS
Rest and hydrate!     Continue to use albuterol as needed for wheezing mild shortness a breath.  If you have any significant shortness a breath need to go to the ER immediately for further evaluation and management.      There was no pneumonia or fluid on the lung seen on x-ray however, like you said, bronchitis can not be seen.  At this point since it has been going on for some time that is go ahead and use an antibiotic to cover for bacterial bronchitis.  This is  azithromycin.      You can also start the prednisone tomorrow and use as instructed.      An as needed referral was placed for pulmonology.  If symptoms are persisting recommend following up with your primary care physician or even the pulmonology group. You can call our  line at [phone] and they can assist you in making this specialist appointment

## 2024-05-02 ENCOUNTER — HOSPITAL ENCOUNTER (OUTPATIENT)
Dept: RADIOLOGY | Facility: OTHER | Age: 59
Discharge: HOME | End: 2024-05-02
Attending: ANESTHESIOLOGY
Payer: OTHER GOVERNMENT

## 2024-05-02 ENCOUNTER — OFFICE VISIT (OUTPATIENT)
Dept: PAIN MEDICINE | Facility: CLINIC | Age: 59
End: 2024-05-02
Payer: OTHER GOVERNMENT

## 2024-05-02 VITALS
HEIGHT: 64 IN | SYSTOLIC BLOOD PRESSURE: 135 MMHG | HEART RATE: 99 BPM | BODY MASS INDEX: 30.41 KG/M2 | WEIGHT: 178.13 LBS | DIASTOLIC BLOOD PRESSURE: 86 MMHG

## 2024-05-02 DIAGNOSIS — M54.50 CHRONIC BILATERAL LOW BACK PAIN WITHOUT SCIATICA: ICD-10-CM

## 2024-05-02 DIAGNOSIS — M51.36 DDD (DEGENERATIVE DISC DISEASE), LUMBAR: ICD-10-CM

## 2024-05-02 DIAGNOSIS — G89.29 CHRONIC BILATERAL LOW BACK PAIN WITHOUT SCIATICA: ICD-10-CM

## 2024-05-02 DIAGNOSIS — F11.90 CHRONIC, CONTINUOUS USE OF OPIOIDS: Primary | ICD-10-CM

## 2024-05-02 DIAGNOSIS — M54.9 DORSALGIA, UNSPECIFIED: ICD-10-CM

## 2024-05-02 PROCEDURE — 72110 X-RAY EXAM L-2 SPINE 4/>VWS: CPT | Mod: 26,,, | Performed by: RADIOLOGY

## 2024-05-02 PROCEDURE — 99999 PR PBB SHADOW E&M-EST. PATIENT-LVL IV: CPT | Mod: PBBFAC,,, | Performed by: ANESTHESIOLOGY

## 2024-05-02 PROCEDURE — 99214 OFFICE O/P EST MOD 30 MIN: CPT | Mod: PBBFAC,25 | Performed by: ANESTHESIOLOGY

## 2024-05-02 PROCEDURE — 99204 OFFICE O/P NEW MOD 45 MIN: CPT | Mod: S$PBB,,, | Performed by: ANESTHESIOLOGY

## 2024-05-02 PROCEDURE — 72110 X-RAY EXAM L-2 SPINE 4/>VWS: CPT | Mod: TC,FY

## 2024-05-02 NOTE — PROGRESS NOTES
PCP: Dallas Delgado MD    REFERRING PHYSICIAN: Dallas Delgado MD    CHIEF COMPLAINT: lower back pain and bilateral hip pain    Original HISTORY OF PRESENT ILLNESS: Sharlene Smith presents to the clinic for the evaluation of the above pain. The back pain started 20 or more years ago.  The hip pain started 15 years ago.      Original Pain Description:  The pain is located in the center of the lower back and is radiating to the hips and groin . The pain is described as sharp. Exacerbating factors: Bending. Mitigating factors heat. Symptoms interfere with daily activity. The patient feels like symptoms have been unchanged. Patient denies night fever/night sweats, urinary incontinence, bowel incontinence, significant weight loss, significant motor weakness, and loss of sensations.    Original PAIN SCORES:  Best: Pain is 8  Worst: Pain is 8  Current: Pain is 8        2024    11:25 AM   Last 3 PDI Scores   Pain Disability Index (PDI) 18       INTERVAL HISTORY: (Newest visit at the bottom)   Interval History (Date):       6 weeks of Conservative therapy:  PT: unsure dates.  Many years ago   Chiro: none  HEP: none consistently      Treatments / Medications: (Ice/Heat/NSAIDS/APAP/etc):  Tramadol 50 mg Tid (recently dropped down from Q6h)  Pt cannot take NSAIDS (colitis)      Interventional Pain Procedures: (Previous injections)  2018 Bilat SI Joint   2019 Bilat Hip Injection    Past Medical History:   Diagnosis Date    Abnormal Pap smear of vagina yrs ago    possible BX?    Allergy     Arthritis     Asthma     Hyperlipidemia     Supraventricular tachycardia     SVT (supraventricular tachycardia)     Tachycardia     Ulcerative colitis      Past Surgical History:   Procedure Laterality Date    ABLATION N/A 10/8/2018    Procedure: ABLATION;  Surgeon: Shabbir Clark MD;  Location: [facility] CATH LAB;  Service: Cardiology;  Laterality: N/A;  SVT,SVT-AVNRT RFA,KRISTI,MAC,FAS,3PREP     SECTION, CLASSIC       x 2    COLONOSCOPY N/A 2015    Procedure: COLONOSCOPY;  Surgeon: Petr Miller MD;  Location: [facility] ENDO (4TH FLR);  Service: Endoscopy;  Laterality: N/A;    COLONOSCOPY N/A 10/13/2017    Procedure: COLONOSCOPY;  Surgeon: Petr Miller MD;  [facility]  Service: Endoscopy;  Laterality: N/A;    COLONOSCOPY N/A 10/27/2020    Procedure: COLONOSCOPY;  Surgeon: Petr Miller MD;  [facility]  Service: Endoscopy;  Laterality: N/A;  covid test 10/24-[place] [facility]  prep ins. emailed- [facility]    COLONOSCOPY N/A 3/10/2023    Procedure: COLONOSCOPY;  Surgeon: Nehemias Molina MD;  [facility]  Service: Endoscopy;  Laterality: N/A;  pt has sutab-inst portal-any md-tb    INJECTION OF JOINT Bilateral 2019    Procedure: Injection, Joint,  Hip--Bilateral;  Surgeon: Chauncey Clay Jr., MD;  Location: [facility] PAIN MGT;  Service: Pain Management;  Laterality: Bilateral;     Social History     Socioeconomic History    Marital status:    Tobacco Use    Smoking status: Former     Current packs/day: 0.00     Types: Cigarettes     Quit date: 1989     Years since quittin.2    Smokeless tobacco: Never   Substance and Sexual Activity    Alcohol use: Yes     Alcohol/week: 0.0 standard drinks of alcohol     Comment: events - couple times a month    Drug use: No    Sexual activity: Yes     Partners: Male     Birth control/protection: Post-menopausal, None     Comment: Pills   Social History Narrative    Works for [employer]     Social Determinants of Health     Financial Resource Strain: Low Risk  (2022)    Overall Financial Resource Strain (CARDIA)     Difficulty of Paying Living Expenses: Not hard at all   Food Insecurity: No Food Insecurity (2022)    Hunger Vital Sign     Worried About Running Out of Food in the Last Year: Never true     Ran Out of Food in the Last Year: Never true   Transportation Needs: No Transportation  Needs (6/20/2022)    PRAPARE - Transportation     Lack of Transportation (Medical): No     Lack of Transportation (Non-Medical): No   Physical Activity: Insufficiently Active (6/20/2022)    Exercise Vital Sign     Days of Exercise per Week: 3 days     Minutes of Exercise per Session: 20 min   Stress: No Stress Concern Present (6/20/2022)    [employer] - Occupational Stress Questionnaire     Feeling of Stress : Only a little   Housing Stability: Unknown (6/20/2022)    Housing Stability Vital Sign     Unable to Pay for Housing in the Last Year: No     Unstable Housing in the Last Year: No     Family History   Problem Relation Name Age of Onset    Hyperlipidemia Mother JDiaz     Multiple myeloma Mother JDiaz 75        passsed at 85    Cataracts Mother JDiaz     Allergies Mother JDiaz     Hypertension Mother JDiaz     Heart attack Father EDIAZ     Diabetes Father EDIAZ     Breast cancer Sister  63    Arthritis Maternal Grandmother SHaynes     Glaucoma Maternal Grandfather      Heart attack Paternal Grandmother CDiaz     Arthritis Paternal Grandmother CDiaz     Breast cancer Other niece 47    Heart disease Neg Hx      Colon cancer Neg Hx      Esophageal cancer Neg Hx      Amblyopia Neg Hx      Blindness Neg Hx      Macular degeneration Neg Hx      Retinal detachment Neg Hx      Strabismus Neg Hx      Stroke Neg Hx      Thyroid disease Neg Hx      Angioedema Neg Hx      Asthma Neg Hx      Atopy Neg Hx      Eczema Neg Hx      Immunodeficiency Neg Hx      Rhinitis Neg Hx      Urticaria Neg Hx      Ovarian cancer Neg Hx      Lung cancer Neg Hx         Review of patient's allergies indicates:  No Known Allergies    Current Outpatient Medications   Medication Sig Dispense Refill    albuterol (VENTOLIN HFA) 90 mcg/actuation inhaler Inhale 2 puffs into the lungs every 6 (six) hours as needed for Wheezing. Rescue 18 g 0    atorvastatin (LIPITOR) 20 MG tablet Take 1 tablet (20 mg total) by mouth every  evening. 90 tablet 3    azithromycin (Z-JEANINE) 250 MG tablet Take 2 tablets by mouth on day 1; Take 1 tablet by mouth on days 2-5 6 tablet 0    diclofenac sodium (VOLTAREN) 1 % Gel Apply 2 g topically 2 (two) times daily as needed. 50 g 1    estradioL (DIVIGEL) 0.5 mg/0.5 gram (0.1 %) GlPk Apply one packet to upper inner thigh daily 30 packet 11    fluticasone propionate (FLONASE) 50 mcg/actuation nasal spray SHAKE LIQUID AND USE 1 SPRAY IN EACH NOSTRIL EVERY DAY 48 g 3    montelukast (SINGULAIR) 10 mg tablet Take 1 tablet (10 mg total) by mouth every evening. 30 tablet 0    predniSONE (DELTASONE) 20 MG tablet Take 2 tablets (40 mg total) by mouth once daily. for 5 days 10 tablet 0    progesterone (PROMETRIUM) 100 MG capsule TAKE ONE CAPSULE BY MOUTH EVERY EVENING 30 capsule 11    tiZANidine (ZANAFLEX) 4 MG tablet TAKE 1/2 TO 1 TABLET BY MOUTH EVERY 8 HOURS AS NEEDED 270 tablet 2    traMADoL (ULTRAM) 50 mg tablet Take 1 tablet (50 mg total) by mouth every 8 (eight) hours as needed for Pain. 90 tablet 2    mesalamine (APRISO) 0.375 gram Cp24 Take 4 capsules (1.5 g total) by mouth once daily. 360 capsule 3     Current Facility-Administered Medications   Medication Dose Route Frequency Provider Last Rate Last Admin    testosterone cypionate injection 50 mg  50 mg Intramuscular Q28 Days Va Singleton MD   50 mg at 04/04/24 1040     Facility-Administered Medications Ordered in Other Visits   Medication Dose Route Frequency Provider Last Rate Last Admin    0.9%  NaCl infusion   Intravenous Continuous Netta Amaro NP   New Bag at 10/27/20 0715       ROS:  GENERAL: No fever. No chills. No fatigue. Denies weight loss. Denies weight gain.  HEENT: Denies headaches. Denies vision change. Denies eye pain. Denies double vision. Denies ear pain.   CV: Denies chest pain.   PULM: Denies of shortness of breath.  GI: Denies constipation. No diarrhea. No abdominal pain. Denies nausea. Denies vomiting. No blood in  "stool.  HEME: Denies bleeding problems.  : Denies urgency. No painful urination. No blood in urine.  MS: Denies joint stiffness. Denies joint swelling.  Endorses central lower back pain.  Endorses hip and groin pain.  SKIN: Denies rash.   NEURO: Denies seizures. No weakness.  PSYCH:  Denies difficulty sleeping. No anxiety. Denies depression. No suicidal thoughts.       VITALS:   Vitals:    05/02/24 1124   Weight: 80.8 kg (178 lb 2.1 oz)   Height: 5' 4" (1.626 m)   PainSc:   6         PHYSICAL EXAM:   GENERAL: Well appearing, in no acute distress, alert and oriented x3.  PSYCH:  Mood and affect appropriate.  SKIN: Skin color, texture, turgor normal, no rashes or lesions.  HEENT:  Normocephalic, atraumatic. Cranial nerves grossly intact.  NECK: No pain to palpation over the cervical paraspinous muscles. No pain to palpation over facets. No pain with neck flexion, extension, or lateral flexion.   PULM: No evidence of respiratory difficulty, symmetric chest rise.  GI:  Non-distended  BACK: Normal range of motion. No pain to palpation over the spinous processes. TTP over SI joints bilat.  TTP over lumbar facets bilat.    EXTREMITIES: No deformities, edema, or skin discoloration. TTP on GTB bilat.  Hip abduction limited by pain.   MUSCULOSKELETAL: Straight leg test neg.  Log roll neg.  BAYRON positive. No atrophy is noted.  Neurovascularly intact x4.  Motor and sensory intact x4  NEURO: Sensation is equal and appropriate bilaterally. Bilateral upper and lower extremity strength is normal and symmetric. Bilateral upper and lower extremity coordination and muscle stretch reflexes are physiologic and symmetric. Plantar response are downgoing. Straight leg raising in the supine position is negative to radicular pain.   GAIT: normal.      LABS: NA      IMAGING:  AP and lateral views as well as lateral flexion and extension images are performed through the lumbar spine.     COMPARISON:  None     FINDINGS:  Lumbar vertebral " body heights are maintained.     Disc spaces are maintained.  Facet arthropathy L3-4 through L5-S1.     AP alignment is anatomic.     Impression:     No acute osseous abnormality seen.  Degenerative facet arthropathy lower lumbar spine.      ASSESSMENT: 58 y.o. year old female with pain, consistent with:    Encounter Diagnoses   Name Primary?    Chronic bilateral low back pain without sciatica     Myofascial muscle pain        DISCUSSION: Sharlene Smith comes to us with lower back and bilat hip pain which is worst with bending forward. Xray shows degenerative facet arthropathy of the lower lumbar spine. Exam shows pain reproduced with direct palpation of the affected area. She has been prescribed tramadol for 5 years.  Dr. Delgado referred her to us to assist with management.     OPIOID MANAGEMENT:  MME: 15  Risk: Low  : Reviewed today  Naloxone:   Utox: 5/2/24  Violations: None  Contract: 5/2/24      PLAN:  Ordered lumbar XR and lumbar MRI without contrast.  Establish opiate management relationship (UTOX update and contract)  Follow up after imaging to discuss options      I would like to thank Dallas Delgado MD for the opportunity to assist in the care of this patient. We had a very nice visit and I look forward to continuing their care. Please let me know if I can be of further assistance.     Ruel Rivera  05/02/2024

## 2024-05-03 ENCOUNTER — CLINICAL SUPPORT (OUTPATIENT)
Dept: OBSTETRICS AND GYNECOLOGY | Facility: CLINIC | Age: 59
End: 2024-05-03
Payer: OTHER GOVERNMENT

## 2024-05-03 DIAGNOSIS — N95.1 MENOPAUSAL SYMPTOMS: Primary | ICD-10-CM

## 2024-05-03 PROCEDURE — 99999 PR PBB SHADOW E&M-EST. PATIENT-LVL I: CPT | Mod: PBBFAC,,,

## 2024-05-03 PROCEDURE — 96372 THER/PROPH/DIAG INJ SC/IM: CPT | Mod: PBBFAC

## 2024-05-03 PROCEDURE — 99999PBSHW PR PBB SHADOW TECHNICAL ONLY FILED TO HB: Mod: PBBFAC,,,

## 2024-05-03 RX ADMIN — TESTOSTERONE CYPIONATE 50 MG: 200 INJECTION INTRAMUSCULAR at 08:05

## 2024-05-03 NOTE — PROGRESS NOTES
Here for hormone therapy injection, no complaints at this time, Injection given as ordered, tolerated well, no report of pain prior to or after injection. Return to clinic as scheduled.     Site - RB    Testosterone  50 mg # 3    Clinic Supplied Medication

## 2024-05-09 ENCOUNTER — TELEPHONE (OUTPATIENT)
Dept: PRIMARY CARE CLINIC | Facility: CLINIC | Age: 59
End: 2024-05-09
Payer: OTHER GOVERNMENT

## 2024-05-09 NOTE — TELEPHONE ENCOUNTER
Patient needs a  referral to see Pain Management.  When I tried to get authorization it says that Dr. Delgado is not the primary physician.  Notified patient via voicemail

## 2024-05-09 NOTE — TELEPHONE ENCOUNTER
Left a message for patient to contact [facility] to change her PCP if she wants us to get referral from [facility]

## 2024-05-10 ENCOUNTER — HOSPITAL ENCOUNTER (OUTPATIENT)
Dept: RADIOLOGY | Facility: OTHER | Age: 59
Discharge: HOME | End: 2024-05-10
Attending: ANESTHESIOLOGY
Payer: OTHER GOVERNMENT

## 2024-05-10 DIAGNOSIS — M54.9 DORSALGIA, UNSPECIFIED: ICD-10-CM

## 2024-05-10 PROCEDURE — 72148 MRI LUMBAR SPINE W/O DYE: CPT | Mod: 26,,, | Performed by: RADIOLOGY

## 2024-05-10 PROCEDURE — 72148 MRI LUMBAR SPINE W/O DYE: CPT | Mod: TC

## 2024-05-14 ENCOUNTER — OFFICE VISIT (OUTPATIENT)
Dept: OTOLARYNGOLOGY | Facility: CLINIC | Age: 59
End: 2024-05-14
Payer: OTHER GOVERNMENT

## 2024-05-14 VITALS
DIASTOLIC BLOOD PRESSURE: 89 MMHG | HEIGHT: 64 IN | HEART RATE: 82 BPM | BODY MASS INDEX: 30.41 KG/M2 | WEIGHT: 178.13 LBS | SYSTOLIC BLOOD PRESSURE: 131 MMHG

## 2024-05-14 DIAGNOSIS — J30.9 ALLERGIC RHINITIS, UNSPECIFIED SEASONALITY, UNSPECIFIED TRIGGER: Primary | ICD-10-CM

## 2024-05-14 DIAGNOSIS — J32.9 CHRONIC RECURRENT SINUSITIS: ICD-10-CM

## 2024-05-14 PROCEDURE — 99244 OFF/OP CNSLTJ NEW/EST MOD 40: CPT | Mod: S$GLB,,, | Performed by: OTOLARYNGOLOGY

## 2024-05-14 NOTE — PROGRESS NOTES
Ms. Smith     Vitals:    24 1333   BP: 131/89   Pulse: 82       Chief Complaint:  Sinus Problem       HPI:   is a 58-year-old black female who presents with complaints of allergy symptoms and recurrent sinus infections.  She is referred by .  She states she is treated 2-3 times annually with antibiotics and steroids.  She has been on daily Flonase as well as Singulair for some time.  She has not however been able to tolerate any sinus rinses.  Her regular symptoms include facial pressure pain around her eyes.    SNOT22- 29 NOSE- 35    Review of Systems:  Constitutional:   weight loss or weight gain: Negative  Allergy/Immunologic:   Positive  Nasal Congestion/Obstruction:   Positive  Nosebleeds:   Negative  Sinus infections:   Positive as above  Headache/Facial Pain:   Positive as above as well as positive history of migraine headaches  Snoring/MAGDY:   Negative  Throat: Infections/Pain:   Negative  Hoarseness/Speech Disturbance:   Negative  Trauma Hx:  Negative    Cardiovascular:  M/I Angina: Negative  Hypertension: Negative  Endocrine:    DM/Steroids: Negative  GI:   Dysphagia/Reflux: Negative  :   GYN Pregnancy: Negative  Renal:   Dialysis: Negative  Lymphatic:   Neck Mass/Lymphadenopathy: Negative  Muscoloskeletal:   Negative  Hematologic:   Bleeding Disorders/Anemia: Negative  Neurologic:    Cranial/Neuralgia: Negative  Pulmonary:   Asthma/SOB/Cough: Negative  Skin Disorders: Negative    Past Medical/Surgical/Family/Social History:    ENT Surgery: Negative  Occupational Exposure: Negative   Problems: Negative  Cancer: Negative    Past Family History:   Family history of Cancer: Negative    Past Social History:   Tobacco: Nonsmoker   Alcohol:  Occasional Social Drinker      Allergies and medications: Reviewed per med card.    Physical Examination:  Ears:   External auditory canals:  Clear   Hearing: Grossly intact   Tympanic Membranes: Clear  Nose:   External: Normal  with good valve support   Intranasal:  Moderate septal deviation to the left with 2+ turbinates, airway clear at this time  Mouth:   Intraorally: Lips, teeth, and gums: Normal   Oropharynx: Normal   Mucosa: Normal   Tongue: Normal  Throat:      Palate: Normal palate with elevation   Tonsils:  Minimal   Posterior Pharynx: Normal  Fiberoptic exam: Not performed  Head/Face:     Inspection: Normal and atraumatic   Palpation/Percussion: Non tender   Facial strength: Normal and symmetric   Salivary glands: Normal  Neck: Supple  Thyroid: No masses  Lymphatics: No nodes  Respiratory:   Effort: Normal  Eyes:   Ocular Mobility: Normal   Vision: Grossly intact  Neuro/Psych:   Cranial Nerves: Grossly Intact   Orientation: Normal   Mood/Affect: Normal      Assessment/Plan:  I have discussed my findings with her in detail as well as my recommendations for treatment.  I have given her literature on saline sinus rinses including issues with distilled water only and I have described how this is to be used in detail.  I have also encouraged her to continue with her Flonase nasal sprays and I have described how this is to be administered as well.  I will order [employer] CT scan of her sinuses to evaluate these.  I will also place a referral for an allergy consultation for her.  She will contact us after her scans are completed to arrange for follow-up.

## 2024-05-17 ENCOUNTER — HOSPITAL ENCOUNTER (OUTPATIENT)
Dept: RADIOLOGY | Facility: OTHER | Age: 59
Discharge: HOME | End: 2024-05-17
Attending: OTOLARYNGOLOGY
Payer: OTHER GOVERNMENT

## 2024-05-17 DIAGNOSIS — J32.9 CHRONIC RECURRENT SINUSITIS: ICD-10-CM

## 2024-05-17 PROCEDURE — 70486 CT MAXILLOFACIAL W/O DYE: CPT | Mod: 26,,, | Performed by: RADIOLOGY

## 2024-05-17 PROCEDURE — 70486 CT MAXILLOFACIAL W/O DYE: CPT | Mod: TC

## 2024-05-19 ENCOUNTER — PATIENT MESSAGE (OUTPATIENT)
Dept: OBSTETRICS AND GYNECOLOGY | Facility: CLINIC | Age: 59
End: 2024-05-19
Payer: OTHER GOVERNMENT

## 2024-05-20 ENCOUNTER — DOCUMENTATION ONLY (OUTPATIENT)
Dept: OTOLARYNGOLOGY | Facility: CLINIC | Age: 59
End: 2024-05-20
Payer: OTHER GOVERNMENT

## 2024-05-20 ENCOUNTER — PATIENT MESSAGE (OUTPATIENT)
Dept: OTOLARYNGOLOGY | Facility: CLINIC | Age: 59
End: 2024-05-20
Payer: OTHER GOVERNMENT

## 2024-05-20 NOTE — PROGRESS NOTES
I have reviewed her CT scans in there entirety. These show minimal mucosal membrane thickening or retention cyst in the inferior portion of her right maxillary sinus. Remaining paranasal sinuses are clear and ostea open. She should continue with her saline sinus rinses and nasal steroid sprays as instructed. No surgical intervention warranted at this time.

## 2024-05-24 ENCOUNTER — OFFICE VISIT (OUTPATIENT)
Dept: PAIN MEDICINE | Facility: CLINIC | Age: 59
End: 2024-05-24
Payer: OTHER GOVERNMENT

## 2024-05-24 VITALS
SYSTOLIC BLOOD PRESSURE: 136 MMHG | WEIGHT: 178.13 LBS | BODY MASS INDEX: 30.41 KG/M2 | HEART RATE: 83 BPM | OXYGEN SATURATION: 100 % | RESPIRATION RATE: 18 BRPM | DIASTOLIC BLOOD PRESSURE: 87 MMHG | HEIGHT: 64 IN

## 2024-05-24 DIAGNOSIS — M47.816 LUMBAR SPONDYLOSIS: ICD-10-CM

## 2024-05-24 DIAGNOSIS — M51.36 DDD (DEGENERATIVE DISC DISEASE), LUMBAR: Primary | ICD-10-CM

## 2024-05-24 DIAGNOSIS — M47.816 LUMBAR SPONDYLOSIS: Primary | ICD-10-CM

## 2024-05-24 DIAGNOSIS — F11.90 CHRONIC, CONTINUOUS USE OF OPIOIDS: ICD-10-CM

## 2024-05-24 PROCEDURE — 99999 PR PBB SHADOW E&M-EST. PATIENT-LVL III: CPT | Mod: PBBFAC,,, | Performed by: ANESTHESIOLOGY

## 2024-05-24 PROCEDURE — 99214 OFFICE O/P EST MOD 30 MIN: CPT | Mod: S$PBB,,, | Performed by: ANESTHESIOLOGY

## 2024-05-24 PROCEDURE — 99213 OFFICE O/P EST LOW 20 MIN: CPT | Mod: PBBFAC | Performed by: ANESTHESIOLOGY

## 2024-05-24 NOTE — PROGRESS NOTES
PCP: Dallas Delgado MD    REFERRING PHYSICIAN: No ref. provider found    CHIEF COMPLAINT: lower back pain and bilateral hip pain    Original HISTORY OF PRESENT ILLNESS: Sharlene Smith presents to the clinic for the evaluation of the above pain. The back pain started 20 or more years ago.  The hip pain started 15 years ago.      Original Pain Description:  The pain is located in the center of the lower back and is radiating to the hips and groin . The pain is described as sharp. Exacerbating factors: Bending. Mitigating factors heat. Symptoms interfere with daily activity. The patient feels like symptoms have been unchanged. Patient denies night fever/night sweats, urinary incontinence, bowel incontinence, significant weight loss, significant motor weakness, and loss of sensations.    Original PAIN SCORES:  Best: Pain is 8  Worst: Pain is 8  Current: Pain is 8        5/2/2024    11:25 AM   Last 3 PDI Scores   Pain Disability Index (PDI) 18       INTERVAL HISTORY: (Newest visit at the bottom)   Interval History 5/24/24: Sharlene Smith returns for follow up. At our last visit she was having a lot of low back pain. She has been continuing her HEP and we ordered an MRI. We reviewed that MRI together today. It shows mainly facet arthropathy in the lower lumbar spine and one level L5/S1 of disc dessication. We reviewed the exam and today she was guarding on facet loading. She continues noting axial low back pain, 4-6/10, non-radiating, worse with household activities and improved with Tramadol. This is impacting her ability to do daily activities.       6 weeks of Conservative therapy:  PT: Years ago  Chiro: none  HEP: Continues HEP with her  3X/week      Treatments / Medications: (Ice/Heat/NSAIDS/APAP/etc):  Tramadol 50 mg Tid (recently dropped down from Q6h)  Pt cannot take NSAIDS (colitis)      Interventional Pain Procedures: (Previous injections)  2018 Bilat SI Joint   2019 Bilat Hip  Injection    Past Medical History:   Diagnosis Date    Abnormal Pap smear of vagina yrs ago    possible BX?    Allergy     Arthritis     Asthma     Hyperlipidemia     Supraventricular tachycardia     SVT (supraventricular tachycardia)     Tachycardia     Ulcerative colitis      Past Surgical History:   Procedure Laterality Date    ABLATION N/A 10/8/2018    Procedure: ABLATION;  Surgeon: Shabbir Clark MD;  Location: [facility] CATH LAB;  Service: Cardiology;  Laterality: N/A;  SVT,SVT-AVNRT RFA,KRISTI,MAC,FAS,3PREP     SECTION, CLASSIC      x 2    COLONOSCOPY N/A 2015    Procedure: COLONOSCOPY;  Surgeon: Petr Miller MD;  Location: [facility] ENDO ([facility] FLR);  Service: Endoscopy;  Laterality: N/A;    COLONOSCOPY N/A 10/13/2017    Procedure: COLONOSCOPY;  Surgeon: Petr Miller MD;  [facility]  Service: Endoscopy;  Laterality: N/A;    COLONOSCOPY N/A 10/27/2020    Procedure: COLONOSCOPY;  Surgeon: Petr Miller MD;  [address]  Service: Endoscopy;  Laterality: N/A;  covid test 10/24-[place] [facility]  prep ins. emailed- ERW    COLONOSCOPY N/A 3/10/2023    Procedure: COLONOSCOPY;  Surgeon: Nehemias Molina MD;  [facility]  Service: Endoscopy;  Laterality: N/A;  pt has sutab-inst portal-any md-tb    INJECTION OF JOINT Bilateral 2019    Procedure: Injection, Joint,  Hip--Bilateral;  Surgeon: Chauncey Clay Jr., MD;  Location: [facility] PAIN MGT;  Service: Pain Management;  Laterality: Bilateral;     Social History     Socioeconomic History    Marital status:    Tobacco Use    Smoking status: Former     Current packs/day: 0.00     Types: Cigarettes     Quit date: 1989     Years since quittin.2    Smokeless tobacco: Never   Substance and Sexual Activity    Alcohol use: Yes     Alcohol/week: 0.0 standard drinks of alcohol     Comment: events - couple times a month    Drug use: No    Sexual activity: Yes     Partners: Male      Birth control/protection: Post-menopausal, None     Comment: Pills   Social History Narrative    Works for [employer]     Social Determinants of Health     Financial Resource Strain: Low Risk  (6/20/2022)    Overall Financial Resource Strain (CARDIA)     Difficulty of Paying Living Expenses: Not hard at all   Food Insecurity: No Food Insecurity (6/20/2022)    Hunger Vital Sign     Worried About Running Out of Food in the Last Year: Never true     Ran Out of Food in the Last Year: Never true   Transportation Needs: No Transportation Needs (6/20/2022)    PRAPARE - Transportation     Lack of Transportation (Medical): No     Lack of Transportation (Non-Medical): No   Physical Activity: Insufficiently Active (6/20/2022)    Exercise Vital Sign     Days of Exercise per Week: 3 days     Minutes of Exercise per Session: 20 min   Stress: No Stress Concern Present (6/20/2022)    [employer] - Occupational Stress Questionnaire     Feeling of Stress : Only a little   Housing Stability: Unknown (6/20/2022)    Housing Stability Vital Sign     Unable to Pay for Housing in the Last Year: No     Unstable Housing in the Last Year: No     Family History   Problem Relation Name Age of Onset    Hyperlipidemia Mother JDiaz     Multiple myeloma Mother JDiaz 75        passsed at 85    Cataracts Mother JDiaz     Allergies Mother JDiaz     Hypertension Mother JDiaz     Heart attack Father EDIAZ     Diabetes Father EDIAZ     Breast cancer Sister  63    Arthritis Maternal Grandmother SHaynes     Glaucoma Maternal Grandfather      Heart attack Paternal Grandmother CDiaz     Arthritis Paternal Grandmother CDiaz     Breast cancer Other niece 47    Heart disease Neg Hx      Colon cancer Neg Hx      Esophageal cancer Neg Hx      Amblyopia Neg Hx      Blindness Neg Hx      Macular degeneration Neg Hx      Retinal detachment Neg Hx      Strabismus Neg Hx      Stroke Neg Hx      Thyroid disease Neg Hx      Angioedema  Neg Hx      Asthma Neg Hx      Atopy Neg Hx      Eczema Neg Hx      Immunodeficiency Neg Hx      Rhinitis Neg Hx      Urticaria Neg Hx      Ovarian cancer Neg Hx      Lung cancer Neg Hx         Review of patient's allergies indicates:  No Known Allergies    Current Outpatient Medications   Medication Sig    albuterol (VENTOLIN HFA) 90 mcg/actuation inhaler Inhale 2 puffs into the lungs every 6 (six) hours as needed for Wheezing. Rescue    atorvastatin (LIPITOR) 20 MG tablet Take 1 tablet (20 mg total) by mouth every evening.    diclofenac sodium (VOLTAREN) 1 % Gel Apply 2 g topically 2 (two) times daily as needed.    estradioL (DIVIGEL) 0.5 mg/0.5 gram (0.1 %) GlPk Apply one packet to upper inner thigh daily    fluticasone propionate (FLONASE) 50 mcg/actuation nasal spray SHAKE LIQUID AND USE 1 SPRAY IN EACH NOSTRIL EVERY DAY    progesterone (PROMETRIUM) 100 MG capsule TAKE ONE CAPSULE BY MOUTH EVERY EVENING    tiZANidine (ZANAFLEX) 4 MG tablet TAKE 1/2 TO 1 TABLET BY MOUTH EVERY 8 HOURS AS NEEDED    traMADoL (ULTRAM) 50 mg tablet Take 1 tablet (50 mg total) by mouth every 8 (eight) hours as needed for Pain.    mesalamine (APRISO) 0.375 gram Cp24 Take 4 capsules (1.5 g total) by mouth once daily.     No current facility-administered medications for this visit.     Facility-Administered Medications Ordered in Other Visits   Medication    0.9%  NaCl infusion       ROS:  GENERAL: No fever. No chills. No fatigue. Denies weight loss. Denies weight gain.  HEENT: Denies headaches. Denies vision change. Denies eye pain. Denies double vision. Denies ear pain.   CV: Denies chest pain.   PULM: Denies of shortness of breath.  GI: Denies constipation. No diarrhea. No abdominal pain. Denies nausea. Denies vomiting. No blood in stool.  HEME: Denies bleeding problems.  : Denies urgency. No painful urination. No blood in urine.  MS: Denies joint stiffness. Denies joint swelling.  Endorses central lower back pain.  Endorses hip and  "groin pain.  SKIN: Denies rash.   NEURO: Denies seizures. No weakness.  PSYCH:  Denies difficulty sleeping. No anxiety. Denies depression. No suicidal thoughts.       VITALS:   Vitals:    05/24/24 0922   BP: 136/87   Pulse: 83   Resp: 18   SpO2: 100%   Weight: 80.8 kg (178 lb 2.1 oz)   Height: 5' 4" (1.626 m)   PainSc:   6   PainLoc: Back           PHYSICAL EXAM:   GENERAL: Well appearing, in no acute distress, alert and oriented x3.  PSYCH:  Mood and affect appropriate.  SKIN: Skin color, texture, turgor normal, no rashes or lesions.  HEENT:  Normocephalic, atraumatic. Cranial nerves grossly intact.  NECK: No pain to palpation over the cervical paraspinous muscles. No pain to palpation over facets. No pain with neck flexion, extension, or lateral flexion.   PULM: No evidence of respiratory difficulty, symmetric chest rise.  GI:  Non-distended  BACK: Normal range of motion. No pain to palpation over the spinous processes. TTP over SI joints bilat.  TTP over lumbar facets bilat. Guarding with extension and facet loading.   EXTREMITIES: No deformities, edema, or skin discoloration. TTP on GTB bilat.  Hip abduction limited by pain.   MUSCULOSKELETAL: Straight leg test neg.  Log roll neg.  BAYRON positive. No atrophy is noted.  Neurovascularly intact x4.  Motor and sensory intact x4  NEURO: Sensation is equal and appropriate bilaterally. Bilateral upper and lower extremity strength is normal and symmetric. Bilateral upper and lower extremity coordination and muscle stretch reflexes are physiologic and symmetric. Plantar response are downgoing. Straight leg raising in the supine position is negative to radicular pain.   GAIT: normal.      LABS: NA      IMAGING:    MRI LUMBAR SPINE WITHOUT CONTRAST     CLINICAL HISTORY:  Low back pain, symptoms persist with > 6wks conservative treatment; Dorsalgia, unspecified     TECHNIQUE:  Multiplanar, multisequence MR images were acquired from the thoracolumbar junction to the sacrum " without the administration of contrast.     COMPARISON:  Lumbar spine radiographs 05/02/2024     FINDINGS:  The marrow demonstrates homogeneous signal.  Vertebral body heights are maintained.  Disc desiccation L5-S1.  Conus terminates appropriately at L1-2.     Multilevel degenerative change as diesel below:     T12-L1: No focal disc abnormality.  No significant canal or neural foraminal narrowing.     L1-2: No focal disc abnormality.  No significant canal or neural foraminal narrowing.     L2-3: No focal disc abnormality.  No significant canal or neural foraminal narrowing.     L3-4: Mild facet arthropathy with no significant canal or neural foraminal narrowing.     L4-5: Facet arthropathy with thickening of the ligamentum flavum.  No significant canal or neural foraminal narrowing.     L5-S1: Minimal posterior circumferential disc bulge with right posterior paracentral annular fissure.  Facet arthropathy with thickening of the ligamentum flavum without significant canal or neural foraminal narrowing.     Impression:     Mild multilevel degenerative change predominantly on the basis of facet arthropathy.  No significant canal or neural foraminal narrowing.        Electronically signed by:Elizabeth Hinton  Date:                                            05/10/2024  Time:                                           09:19      ASSESSMENT: 58 y.o. year old female with pain, consistent with:    Encounter Diagnoses   Name Primary?    DDD (degenerative disc disease), lumbar Yes    Chronic, continuous use of opioids     Lumbar spondylosis          DISCUSSION: Sharlene Smith comes to us with lower back and bilat hip pain which is worst with bending forward. Xray shows degenerative facet arthropathy of the lower lumbar spine. Exam shows pain reproduced with direct palpation of the affected area. She has been prescribed tramadol for 5 years.  Dr. Delgado referred her to us to assist with management.     OPIOID  MANAGEMENT:  MME: 15  Risk: Low  : Reviewed today  Naloxone:   Utox: 5/2/24  Violations: None  Contract: 5/2/24      PLAN:  Evaluated lumbar XR and lumbar MRI without contrast.  Utox today  Continue HEP  Schedule B/L L3-5 LMBB  Will proceed with RFA if appropriate  May consider discogram pending outcome of LMBB/RFA    Marleen Mcgill  05/24/2024

## 2024-05-29 ENCOUNTER — PATIENT MESSAGE (OUTPATIENT)
Dept: OTOLARYNGOLOGY | Facility: CLINIC | Age: 59
End: 2024-05-29
Payer: OTHER GOVERNMENT

## 2024-06-07 ENCOUNTER — PATIENT MESSAGE (OUTPATIENT)
Dept: PAIN MEDICINE | Facility: CLINIC | Age: 59
End: 2024-06-07
Payer: OTHER GOVERNMENT

## 2024-06-07 DIAGNOSIS — G89.29 CHRONIC BILATERAL LOW BACK PAIN WITHOUT SCIATICA: ICD-10-CM

## 2024-06-07 DIAGNOSIS — M79.18 MYOFASCIAL MUSCLE PAIN: ICD-10-CM

## 2024-06-07 DIAGNOSIS — M54.40 ACUTE BILATERAL LOW BACK PAIN WITH SCIATICA, SCIATICA LATERALITY UNSPECIFIED: ICD-10-CM

## 2024-06-07 DIAGNOSIS — M54.50 CHRONIC BILATERAL LOW BACK PAIN WITHOUT SCIATICA: ICD-10-CM

## 2024-06-07 RX ORDER — TRAMADOL HYDROCHLORIDE 50 MG/1
50 TABLET ORAL EVERY 8 HOURS PRN
Qty: 90 TABLET | Refills: 0 | Status: SHIPPED | OUTPATIENT
Start: 2024-06-08

## 2024-06-07 NOTE — TELEPHONE ENCOUNTER
Patient is requesting:Tramadol  Last visit: 05/24/2024  Pain contract: yes  :05/11/2024  UDS:urine was ordered but not collected

## 2024-06-14 ENCOUNTER — PATIENT MESSAGE (OUTPATIENT)
Dept: FAMILY MEDICINE | Facility: CLINIC | Age: 59
End: 2024-06-14
Payer: OTHER GOVERNMENT

## 2024-07-01 ENCOUNTER — PATIENT MESSAGE (OUTPATIENT)
Dept: PAIN MEDICINE | Facility: CLINIC | Age: 59
End: 2024-07-01
Payer: OTHER GOVERNMENT

## 2024-07-02 ENCOUNTER — PATIENT MESSAGE (OUTPATIENT)
Dept: PAIN MEDICINE | Facility: OTHER | Age: 59
End: 2024-07-02
Payer: OTHER GOVERNMENT

## 2024-07-02 DIAGNOSIS — M47.816 LUMBAR SPONDYLOSIS: Primary | ICD-10-CM

## 2024-07-02 DIAGNOSIS — M79.18 MYOFASCIAL MUSCLE PAIN: ICD-10-CM

## 2024-07-02 DIAGNOSIS — M54.40 ACUTE BILATERAL LOW BACK PAIN WITH SCIATICA, SCIATICA LATERALITY UNSPECIFIED: ICD-10-CM

## 2024-07-02 DIAGNOSIS — G89.29 CHRONIC BILATERAL LOW BACK PAIN WITHOUT SCIATICA: ICD-10-CM

## 2024-07-02 DIAGNOSIS — M54.50 CHRONIC BILATERAL LOW BACK PAIN WITHOUT SCIATICA: ICD-10-CM

## 2024-07-03 ENCOUNTER — PATIENT MESSAGE (OUTPATIENT)
Dept: PAIN MEDICINE | Facility: OTHER | Age: 59
End: 2024-07-03
Payer: OTHER GOVERNMENT

## 2024-07-03 RX ORDER — TRAMADOL HYDROCHLORIDE 50 MG/1
50 TABLET ORAL EVERY 8 HOURS PRN
Qty: 90 TABLET | Refills: 0 | Status: SHIPPED | OUTPATIENT
Start: 2024-07-06

## 2024-07-03 NOTE — TELEPHONE ENCOUNTER
Patients LOV 5/24/24 with an MBB scheduled for 7/30/2024. UDS was ordered on 5/2/24 but the results have not been generated.  compliant with last filled on 6-7-2024

## 2024-07-26 ENCOUNTER — PATIENT MESSAGE (OUTPATIENT)
Dept: PAIN MEDICINE | Facility: OTHER | Age: 59
End: 2024-07-26
Payer: OTHER GOVERNMENT

## 2024-07-30 ENCOUNTER — HOSPITAL ENCOUNTER (OUTPATIENT)
Facility: OTHER | Age: 59
Discharge: HOME | End: 2024-07-30
Attending: ANESTHESIOLOGY | Admitting: ANESTHESIOLOGY
Payer: OTHER GOVERNMENT

## 2024-07-30 VITALS
TEMPERATURE: 98 F | RESPIRATION RATE: 16 BRPM | HEIGHT: 64 IN | OXYGEN SATURATION: 95 % | DIASTOLIC BLOOD PRESSURE: 71 MMHG | SYSTOLIC BLOOD PRESSURE: 136 MMHG | BODY MASS INDEX: 29.53 KG/M2 | HEART RATE: 73 BPM | WEIGHT: 173 LBS

## 2024-07-30 DIAGNOSIS — M47.819 FACET ARTHROPATHY: Primary | ICD-10-CM

## 2024-07-30 PROCEDURE — 64494 INJ PARAVERT F JNT L/S 2 LEV: CPT | Mod: 50,,, | Performed by: ANESTHESIOLOGY

## 2024-07-30 PROCEDURE — 25000003 PHARM REV CODE 250: Performed by: ANESTHESIOLOGY

## 2024-07-30 PROCEDURE — 64493 INJ PARAVERT F JNT L/S 1 LEV: CPT | Mod: 50,,, | Performed by: ANESTHESIOLOGY

## 2024-07-30 PROCEDURE — 64494 INJ PARAVERT F JNT L/S 2 LEV: CPT | Mod: 50 | Performed by: ANESTHESIOLOGY

## 2024-07-30 PROCEDURE — 63600175 PHARM REV CODE 636 W HCPCS: Mod: JZ,JG | Performed by: ANESTHESIOLOGY

## 2024-07-30 PROCEDURE — 64493 INJ PARAVERT F JNT L/S 1 LEV: CPT | Mod: 50 | Performed by: ANESTHESIOLOGY

## 2024-07-30 RX ORDER — ALPRAZOLAM 0.5 MG/1
1 TABLET, ORALLY DISINTEGRATING ORAL ONCE
Status: COMPLETED | OUTPATIENT
Start: 2024-07-30 | End: 2024-07-30

## 2024-07-30 RX ORDER — BUPIVACAINE HYDROCHLORIDE 2.5 MG/ML
INJECTION, SOLUTION EPIDURAL; INFILTRATION; INTRACAUDAL
Status: DISCONTINUED | OUTPATIENT
Start: 2024-07-30 | End: 2024-07-30 | Stop reason: HOSPADM

## 2024-07-30 RX ORDER — LIDOCAINE HYDROCHLORIDE 20 MG/ML
INJECTION, SOLUTION INFILTRATION; PERINEURAL
Status: DISCONTINUED | OUTPATIENT
Start: 2024-07-30 | End: 2024-07-30 | Stop reason: HOSPADM

## 2024-07-30 RX ORDER — SODIUM CHLORIDE 9 MG/ML
INJECTION, SOLUTION INTRAVENOUS CONTINUOUS
Status: DISCONTINUED | OUTPATIENT
Start: 2024-07-30 | End: 2024-07-30 | Stop reason: HOSPADM

## 2024-07-30 RX ADMIN — ALPRAZOLAM 1 MG: 0.5 TABLET, ORALLY DISINTEGRATING ORAL at 02:07

## 2024-07-30 NOTE — OP NOTE
Diagnostic Lumbar Medial Branch Block Under Fluoroscopy    The procedure, risks, benefits, and options were discussed with the patient. There are no contraindications to the procedure. The patent expressed understanding and agreed to the procedure. Informed written consent was obtained prior to the start of the procedure and can be found in the patient's chart.    PATIENT NAME: Sharlene Smith   MRN: [id number]     DATE OF PROCEDURE: 07/30/2024                                           PROCEDURE:  Diagnostic Bilateral L3, L4, and L5 Lumbar Medial Branch Block under Fluoroscopy    PRE-OP DIAGNOSIS: Lumbar spondylosis [M47.816] Lumbar spondylosis [M47.816]    POST-OP DIAGNOSIS: Same    PHYSICIAN: Marleen Mcgill MD    ASSISTANTS: Dr. Hays    MEDICATIONS INJECTED:  Bupivicaine 0.25%    LOCAL ANESTHETIC INJECTED:   Xylocaine 2%    SEDATION: None    ESTIMATED BLOOD LOSS:  None    COMPLICATIONS:  None.    INTERVAL HISTORY: Patient has clinical and imaging findings suggestive of facet mediated pain.    TECHNIQUE: Time-out was performed to identify the patient and procedure to be performed. With the patient laying in a prone position, the surgical area was prepped and draped in the usual sterile fashion using ChloraPrep and fenestrated drape. The levels were determined under fluoroscopic guidance. Skin anesthesia was achieved by injecting Lidocaine 2% over the injection sites. A 25 gauge, 3.5 inch needle was introduced into the medial branch nerves at the junctions of the superior articular process and the transverse processes of the targeted sites using AP, lateral and/or contralateral oblique fluoroscopic imaging. After negative aspiration for blood or CSF was confirmed, 0.5 mL of the anesthetic listed above was then slowly injected at each site. The needles were removed and bleeding was nil. A sterile dressing was applied. No specimens collected. The patient tolerated the procedure well.      The patient was monitored  after the procedure in the recovery area. They were given post-procedure and discharge instructions to follow at home. The patient was discharged in a stable condition.      Marleen Mcgill MD

## 2024-07-30 NOTE — DISCHARGE INSTRUCTIONS

## 2024-07-30 NOTE — H&P
HPI  Patient presenting for Procedure(s) (LRB):  BLOCK, NERVE BILATERAL L3, 4, 5 MEDIAL BRANCH (Bilateral)     Patient on Anti-coagulation No    No health changes since previous encounter    Past Medical History:   Diagnosis Date    Abnormal Pap smear of vagina yrs ago    possible BX?    Allergy     Arthritis     Asthma     Hyperlipidemia     Supraventricular tachycardia     SVT (supraventricular tachycardia)     Tachycardia     Ulcerative colitis      Past Surgical History:   Procedure Laterality Date    ABLATION N/A 10/8/2018    Procedure: ABLATION;  Surgeon: Shabbir Clark MD;  Location: [facility] CATH LAB;  Service: Cardiology;  Laterality: N/A;  SVT,SVT-AVNRT RFA,KRISTI,MAC,FAS,3PREP     SECTION, CLASSIC      x 2    COLONOSCOPY N/A 2015    Procedure: COLONOSCOPY;  Surgeon: Petr Millre MD;  Location: [facility] (4TH FLR);  Service: Endoscopy;  Laterality: N/A;    COLONOSCOPY N/A 10/13/2017    Procedure: COLONOSCOPY;  Surgeon: Petr Miller MD;  [facility]  Service: Endoscopy;  Laterality: N/A;    COLONOSCOPY N/A 10/27/2020    Procedure: COLONOSCOPY;  Surgeon: Petr Miller MD;  Location: [facility] ENDO (4TH FLR);  Service: Endoscopy;  Laterality: N/A;  covid test 10/24-[place] [facility]  prep ins. emailed- ERW    COLONOSCOPY N/A 3/10/2023    Procedure: COLONOSCOPY;  Surgeon: Nehemias Molina MD;  Location: [facility] (4TH FLR);  Service: Endoscopy;  Laterality: N/A;  pt has sutab-inst portal-any md-tb    INJECTION OF JOINT Bilateral 2019    Procedure: Injection, Joint,  Hip--Bilateral;  Surgeon: Chauncey Clay Jr., MD;  Location: [facility] PAIN MGT;  Service: Pain Management;  Laterality: Bilateral;     Review of patient's allergies indicates:  No Known Allergies   Current Facility-Administered Medications   Medication    0.9%  NaCl infusion     Facility-Administered Medications Ordered in Other Encounters   Medication    0.9%  NaCl infusion       PMHx, PSHx,  Allergies, Medications reviewed in epic    ROS negative except pain complaints in HPI    OBJECTIVE:    LMP 12/14/2022 (Approximate)     PHYSICAL EXAMINATION:    GENERAL: Well appearing, in no acute distress, alert and oriented x3.  PSYCH:  Mood and affect appropriate.  SKIN: Skin color, texture, turgor normal, no rashes or lesions which will impact the procedure.  CV: RRR with palpation of the radial artery.  PULM: No evidence of respiratory difficulty, symmetric chest rise. Clear to auscultation.  NEURO: Cranial nerves grossly intact.    Plan:    Proceed with procedure as planned Procedure(s) (LRB):  BLOCK, NERVE BILATERAL L3, 4, 5 MEDIAL BRANCH (Bilateral)    Corina Cook  07/30/2024

## 2024-07-30 NOTE — DISCHARGE SUMMARY
Discharge Note  Short Stay      SUMMARY     Admit Date: 7/30/2024    Attending Physician: Marleen Mcgill      Discharge Physician: Marleen Mcgill      Discharge Date: 7/30/2024 3:30 PM    Procedure(s) (LRB):  BLOCK, NERVE BILATERAL L3, 4, 5 MEDIAL BRANCH (Bilateral)    Final Diagnosis: Lumbar spondylosis [M47.816]    Disposition: Home or self care    Patient Instructions:   Current Discharge Medication List        CONTINUE these medications which have NOT CHANGED    Details   albuterol (VENTOLIN HFA) 90 mcg/actuation inhaler Inhale 2 puffs into the lungs every 6 (six) hours as needed for Wheezing. Rescue  Qty: 18 g, Refills: 0      atorvastatin (LIPITOR) 20 MG tablet Take 1 tablet (20 mg total) by mouth every evening.  Qty: 90 tablet, Refills: 3    Associated Diagnoses: Familial hypercholesterolemia      diclofenac sodium (VOLTAREN) 1 % Gel Apply 2 g topically 2 (two) times daily as needed.  Qty: 50 g, Refills: 1    Associated Diagnoses: Arthritis pain of hand      estradioL (DIVIGEL) 0.5 mg/0.5 gram (0.1 %) GlPk Apply one packet to upper inner thigh daily  Qty: 30 packet, Refills: 11    Associated Diagnoses: Menopausal symptoms      fluticasone propionate (FLONASE) 50 mcg/actuation nasal spray SHAKE LIQUID AND USE 1 SPRAY IN EACH NOSTRIL EVERY DAY  Qty: 48 g, Refills: 3    Associated Diagnoses: Seasonal allergies      mesalamine (APRISO) 0.375 gram Cp24 Take 4 capsules (1.5 g total) by mouth once daily.  Qty: 360 capsule, Refills: 3    Associated Diagnoses: Other ulcerative colitis without complication      progesterone (PROMETRIUM) 100 MG capsule TAKE ONE CAPSULE BY MOUTH EVERY EVENING  Qty: 30 capsule, Refills: 11    Associated Diagnoses: Menopausal symptoms      tiZANidine (ZANAFLEX) 4 MG tablet TAKE 1/2 TO 1 TABLET BY MOUTH EVERY 8 HOURS AS NEEDED  Qty: 270 tablet, Refills: 2    Comments: **Patient requests 90 days supply**  Associated Diagnoses: Other chronic pain      traMADoL (ULTRAM) 50 mg tablet Take  1 tablet (50 mg total) by mouth every 8 (eight) hours as needed for Pain.  Qty: 90 tablet, Refills: 0    Comments: Quantity prescribed more than 7 day supply? Yes, quantity medically necessary  Associated Diagnoses: Acute bilateral low back pain with sciatica, sciatica laterality unspecified; Chronic bilateral low back pain without sciatica; Myofascial muscle pain                 Discharge Diagnosis: Lumbar spondylosis [M47.816]  Condition on Discharge: Stable with no complications to procedure   Diet on Discharge: Same as before.  Activity: as per instruction sheet.  Discharge to: Home with a responsible adult.  Follow up: 2-4 weeks       Please call my office or pager at [phone] if experienced any weakness or loss of sensation, fever > 101.5, pain uncontrolled with oral medications, persistent nausea/vomiting/or diarrhea, redness or drainage from the incisions, or any other worrisome concerns. If physician on call was not reached or could not communicate with our office for any reason please go to the nearest emergency department      Marleen Mcgill  07/30/2024

## 2024-08-01 ENCOUNTER — PATIENT MESSAGE (OUTPATIENT)
Dept: ADMINISTRATIVE | Facility: OTHER | Age: 59
End: 2024-08-01
Payer: OTHER GOVERNMENT

## 2024-08-01 ENCOUNTER — PATIENT MESSAGE (OUTPATIENT)
Dept: PAIN MEDICINE | Facility: CLINIC | Age: 59
End: 2024-08-01
Payer: OTHER GOVERNMENT

## 2024-08-02 ENCOUNTER — TELEPHONE (OUTPATIENT)
Dept: PAIN MEDICINE | Facility: CLINIC | Age: 59
End: 2024-08-02
Payer: OTHER GOVERNMENT

## 2024-08-02 ENCOUNTER — PATIENT MESSAGE (OUTPATIENT)
Dept: PAIN MEDICINE | Facility: OTHER | Age: 59
End: 2024-08-02
Payer: OTHER GOVERNMENT

## 2024-08-02 DIAGNOSIS — G89.29 CHRONIC BILATERAL LOW BACK PAIN WITHOUT SCIATICA: ICD-10-CM

## 2024-08-02 DIAGNOSIS — M79.18 MYOFASCIAL MUSCLE PAIN: ICD-10-CM

## 2024-08-02 DIAGNOSIS — M47.816 LUMBAR SPONDYLOSIS: Primary | ICD-10-CM

## 2024-08-02 DIAGNOSIS — M54.50 CHRONIC BILATERAL LOW BACK PAIN WITHOUT SCIATICA: ICD-10-CM

## 2024-08-02 DIAGNOSIS — M54.40 ACUTE BILATERAL LOW BACK PAIN WITH SCIATICA, SCIATICA LATERALITY UNSPECIFIED: ICD-10-CM

## 2024-08-02 RX ORDER — TRAMADOL HYDROCHLORIDE 50 MG/1
50 TABLET ORAL EVERY 8 HOURS PRN
Qty: 90 TABLET | Refills: 0 | Status: SHIPPED | OUTPATIENT
Start: 2024-08-06

## 2024-08-02 NOTE — TELEPHONE ENCOUNTER
Patient requesting refill on  traMADoL   Last office visit 5/24/24   shows last refill on 7/7/24  Patient does have a pain contract on file with [facility] department  Patient last UDS no uds on file was not consistent with current therapy

## 2024-08-05 ENCOUNTER — TELEPHONE (OUTPATIENT)
Dept: PAIN MEDICINE | Facility: CLINIC | Age: 59
End: 2024-08-05
Payer: OTHER GOVERNMENT

## 2024-08-05 ENCOUNTER — PATIENT MESSAGE (OUTPATIENT)
Dept: PAIN MEDICINE | Facility: CLINIC | Age: 59
End: 2024-08-05
Payer: OTHER GOVERNMENT

## 2024-08-08 ENCOUNTER — OFFICE VISIT (OUTPATIENT)
Dept: SPINE | Facility: CLINIC | Age: 59
End: 2024-08-08
Payer: OTHER GOVERNMENT

## 2024-08-08 VITALS
BODY MASS INDEX: 29.55 KG/M2 | RESPIRATION RATE: 18 BRPM | SYSTOLIC BLOOD PRESSURE: 143 MMHG | HEART RATE: 84 BPM | DIASTOLIC BLOOD PRESSURE: 91 MMHG | HEIGHT: 64 IN | WEIGHT: 173.06 LBS

## 2024-08-08 DIAGNOSIS — M47.817 LUMBOSACRAL SPONDYLOSIS WITHOUT MYELOPATHY: Primary | ICD-10-CM

## 2024-08-08 DIAGNOSIS — F11.20 UNCOMPLICATED OPIOID DEPENDENCE: ICD-10-CM

## 2024-08-08 PROCEDURE — 80326 AMPHETAMINES 5 OR MORE: CPT | Performed by: ANESTHESIOLOGY

## 2024-08-08 PROCEDURE — 99999 PR PBB SHADOW E&M-EST. PATIENT-LVL III: CPT | Mod: PBBFAC,,, | Performed by: ANESTHESIOLOGY

## 2024-08-08 PROCEDURE — 99214 OFFICE O/P EST MOD 30 MIN: CPT | Mod: S$PBB,,, | Performed by: ANESTHESIOLOGY

## 2024-08-08 PROCEDURE — 80355 GABAPENTIN NON-BLOOD: CPT | Performed by: ANESTHESIOLOGY

## 2024-08-08 PROCEDURE — 80364 OPIOID &OPIATE ANALOG 5/MORE: CPT | Performed by: ANESTHESIOLOGY

## 2024-08-08 PROCEDURE — 99213 OFFICE O/P EST LOW 20 MIN: CPT | Mod: PBBFAC | Performed by: ANESTHESIOLOGY

## 2024-08-08 NOTE — H&P (VIEW-ONLY)
PCP: Dallas Delgado MD    REFERRING PHYSICIAN: No ref. provider found    CHIEF COMPLAINT: lower back pain and bilateral hip pain    Original HISTORY OF PRESENT ILLNESS: Sharlene Smith presents to the clinic for the evaluation of the above pain. The back pain started 20 or more years ago.  The hip pain started 15 years ago.      Original Pain Description:  The pain is located in the center of the lower back and is radiating to the hips and groin . The pain is described as sharp. Exacerbating factors: Bending. Mitigating factors heat. Symptoms interfere with daily activity. The patient feels like symptoms have been unchanged. Patient denies night fever/night sweats, urinary incontinence, bowel incontinence, significant weight loss, significant motor weakness, and loss of sensations.    Original PAIN SCORES:  Best: Pain is 8  Worst: Pain is 8  Current: Pain is 8        8/8/2024     2:29 PM   Last 3 PDI Scores   Pain Disability Index (PDI) 42       INTERVAL HISTORY: (Newest visit at the bottom)   Interval History 5/24/24: Sharlene Smith returns for follow up. At our last visit she was having a lot of low back pain. She has been continuing her HEP and we ordered an MRI. We reviewed that MRI together today. It shows mainly facet arthropathy in the lower lumbar spine and one level L5/S1 of disc dessication. We reviewed the exam and today she was guarding on facet loading. She continues noting axial low back pain, 4-6/10, non-radiating, worse with household activities and improved with Tramadol. This is impacting her ability to do daily activities.       Interval History 08/08/2024  Patient is here for follow up of her back pain post BL L3,4,5 MBB (first injection) which she reported 80% pain relief for 1 day.  Pain has now returned to pre-injection level in the same quality of pain.  Denies new weakness, falls, numbness.  Pain continues to be worse with back extension, standing, sitting.      6  weeks of Conservative therapy:  PT: Years ago  Chiro: none  HEP: Continues HEP with her  3X/week      Treatments / Medications: (Ice/Heat/NSAIDS/APAP/etc):  Tramadol 50 mg Tid (recently dropped down from Q6h)  Pt cannot take NSAIDS (colitis)      Interventional Pain Procedures: (Previous injections)   Bilat SI Joint    Bilat Hip Injection  2024 - BL L3,4,5 MBB - 80% pain relief    Past Medical History:   Diagnosis Date    Abnormal Pap smear of vagina yrs ago    possible BX?    Allergy     Arthritis     Asthma     Hyperlipidemia     Supraventricular tachycardia     SVT (supraventricular tachycardia)     Tachycardia     Ulcerative colitis      Past Surgical History:   Procedure Laterality Date    ABLATION N/A 10/8/2018    Procedure: ABLATION;  Surgeon: Shabbir Clark MD;  Location: [facility] CATH LAB;  Service: Cardiology;  Laterality: N/A;  SVT,SVT-AVNRT RFA,KRISTI,MAC,FAS,3PREP     SECTION, CLASSIC      x 2    COLONOSCOPY N/A 2015    Procedure: COLONOSCOPY;  Surgeon: Petr Miller MD;  [facility]  Service: Endoscopy;  Laterality: N/A;    COLONOSCOPY N/A 10/13/2017    Procedure: COLONOSCOPY;  Surgeon: Petr Miller MD;  [facility]  Service: Endoscopy;  Laterality: N/A;    COLONOSCOPY N/A 10/27/2020    Procedure: COLONOSCOPY;  Surgeon: Petr Miller MD;  [facility]  Service: Endoscopy;  Laterality: N/A;  covid test 10/24-[facility]  prep ins. emailed- [facility]    COLONOSCOPY N/A 3/10/2023    Procedure: COLONOSCOPY;  Surgeon: Nehemias Molina MD;  [facility]  Service: Endoscopy;  Laterality: N/A;  pt has sutab-inst portal-any md-tb    INJECTION OF ANESTHETIC AGENT AROUND NERVE Bilateral 2024    Procedure: BLOCK, NERVE BILATERAL L3, 4, 5 MEDIAL BRANCH;  Surgeon: Marleen Mcgill MD;  Location: [facility] PAIN MGT;  Service: Pain Management;  Laterality: Bilateral;  [phone]    INJECTION  OF JOINT Bilateral 2019    Procedure: Injection, Joint,  Hip--Bilateral;  Surgeon: Chauncey Clay Jr., MD;  Location: [facility];  Service: Pain Management;  Laterality: Bilateral;     Social History     Socioeconomic History    Marital status:    Tobacco Use    Smoking status: Former     Current packs/day: 0.00     Types: Cigarettes     Quit date: 1989     Years since quittin.4    Smokeless tobacco: Never   Substance and Sexual Activity    Alcohol use: Yes     Alcohol/week: 0.0 standard drinks of alcohol     Comment: events - couple times a month    Drug use: No    Sexual activity: Yes     Partners: Male     Birth control/protection: Post-menopausal, None     Comment: Pills   Social History Narrative    Works for [employer]     Social Determinants of Health     Financial Resource Strain: Low Risk  (2024)    Overall Financial Resource Strain (CARDIA)     Difficulty of Paying Living Expenses: Not hard at all   Food Insecurity: No Food Insecurity (2024)    Hunger Vital Sign     Worried About Running Out of Food in the Last Year: Never true     Ran Out of Food in the Last Year: Never true   Transportation Needs: No Transportation Needs (2022)    PRAPARE - Transportation     Lack of Transportation (Medical): No     Lack of Transportation (Non-Medical): No   Physical Activity: Insufficiently Active (2024)    Exercise Vital Sign     Days of Exercise per Week: 2 days     Minutes of Exercise per Session: 30 min   Stress: No Stress Concern Present (2024)    [employer] [place] [employer] - Occupational Stress Questionnaire     Feeling of Stress : Only a little   Housing Stability: Unknown (2022)    Housing Stability Vital Sign     Unable to Pay for Housing in the Last Year: No     Unstable Housing in the Last Year: No     Family History   Problem Relation Name Age of Onset    Hyperlipidemia Mother JDiaz     Multiple myeloma Mother JDiaz 75        passsed at 85     Cataracts Mother JDiaz     Allergies Mother JDiaz     Hypertension Mother JDiaz     Heart attack Father EDIAZ     Diabetes Father EDIAZ     Breast cancer Sister  63    Arthritis Maternal Grandmother SHaynes     Glaucoma Maternal Grandfather      Heart attack Paternal Grandmother CDiaz     Arthritis Paternal Grandmother CDiaz     Breast cancer Other niece 47    Heart disease Neg Hx      Colon cancer Neg Hx      Esophageal cancer Neg Hx      Amblyopia Neg Hx      Blindness Neg Hx      Macular degeneration Neg Hx      Retinal detachment Neg Hx      Strabismus Neg Hx      Stroke Neg Hx      Thyroid disease Neg Hx      Angioedema Neg Hx      Asthma Neg Hx      Atopy Neg Hx      Eczema Neg Hx      Immunodeficiency Neg Hx      Rhinitis Neg Hx      Urticaria Neg Hx      Ovarian cancer Neg Hx      Lung cancer Neg Hx         Review of patient's allergies indicates:  No Known Allergies    Current Outpatient Medications   Medication Sig    albuterol (VENTOLIN HFA) 90 mcg/actuation inhaler Inhale 2 puffs into the lungs every 6 (six) hours as needed for Wheezing. Rescue    atorvastatin (LIPITOR) 20 MG tablet Take 1 tablet (20 mg total) by mouth every evening.    diclofenac sodium (VOLTAREN) 1 % Gel Apply 2 g topically 2 (two) times daily as needed.    estradioL (DIVIGEL) 0.5 mg/0.5 gram (0.1 %) GlPk Apply one packet to upper inner thigh daily    fluticasone propionate (FLONASE) 50 mcg/actuation nasal spray SHAKE LIQUID AND USE 1 SPRAY IN EACH NOSTRIL EVERY DAY    progesterone (PROMETRIUM) 100 MG capsule TAKE ONE CAPSULE BY MOUTH EVERY EVENING    tiZANidine (ZANAFLEX) 4 MG tablet TAKE 1/2 TO 1 TABLET BY MOUTH EVERY 8 HOURS AS NEEDED    traMADoL (ULTRAM) 50 mg tablet Take 1 tablet (50 mg total) by mouth every 8 (eight) hours as needed for Pain.    mesalamine (APRISO) 0.375 gram Cp24 Take 4 capsules (1.5 g total) by mouth once daily.     No current facility-administered medications for this visit.     Facility-Administered  "Medications Ordered in Other Visits   Medication    0.9%  NaCl infusion       ROS:  GENERAL: No fever. No chills. No fatigue. Denies weight loss. Denies weight gain.  HEENT: Denies headaches. Denies vision change. Denies eye pain. Denies double vision. Denies ear pain.   CV: Denies chest pain.   PULM: Denies of shortness of breath.  GI: Denies constipation. No diarrhea. No abdominal pain. Denies nausea. Denies vomiting. No blood in stool.  HEME: Denies bleeding problems.  : Denies urgency. No painful urination. No blood in urine.  MS: Denies joint stiffness. Denies joint swelling.  Endorses central lower back pain.  Endorses hip and groin pain.  SKIN: Denies rash.   NEURO: Denies seizures. No weakness.  PSYCH:  Denies difficulty sleeping. No anxiety. Denies depression. No suicidal thoughts.       VITALS:   Vitals:    08/08/24 1426   BP: (!) 143/91   Pulse: 84   Resp: 18   Weight: 78.5 kg (173 lb 1 oz)   Height: 5' 4" (1.626 m)   PainSc:   5   PainLoc: Back           PHYSICAL EXAM:   GENERAL: Well appearing, in no acute distress, alert and oriented x3.  PSYCH:  Mood and affect appropriate.  SKIN: Skin color, texture, turgor normal, no rashes or lesions.  HEENT:  Normocephalic, atraumatic. Cranial nerves grossly intact.  PULM: No evidence of respiratory difficulty, symmetric chest rise.  GI:  Non-distended  BACK: Normal range of motion. No pain to palpation over the spinous processes. TTP over SI joints bilat.  TTP over lumbar facets bilat. Guarding with extension and facet loading.   EXTREMITIES: No deformities, edema, or skin discoloration. TTP on GTB bilat.    MUSCULOSKELETAL: No atrophy is noted.  Neurovascularly intact x4.  Motor and sensory intact x4  NEURO: Sensation is equal and appropriate bilaterally. Bilateral upper and lower extremity strength is normal and symmetric. Bilateral upper and lower extremity coordination and muscle stretch reflexes are physiologic and symmetric.   GAIT: normal.      LABS: " NA      IMAGING:    MRI LUMBAR SPINE WITHOUT CONTRAST     CLINICAL HISTORY:  Low back pain, symptoms persist with > 6wks conservative treatment; Dorsalgia, unspecified     TECHNIQUE:  Multiplanar, multisequence MR images were acquired from the thoracolumbar junction to the sacrum without the administration of contrast.     COMPARISON:  Lumbar spine radiographs 05/02/2024     FINDINGS:  The marrow demonstrates homogeneous signal.  Vertebral body heights are maintained.  Disc desiccation L5-S1.  Conus terminates appropriately at L1-2.     Multilevel degenerative change as diesel below:     T12-L1: No focal disc abnormality.  No significant canal or neural foraminal narrowing.     L1-2: No focal disc abnormality.  No significant canal or neural foraminal narrowing.     L2-3: No focal disc abnormality.  No significant canal or neural foraminal narrowing.     L3-4: Mild facet arthropathy with no significant canal or neural foraminal narrowing.     L4-5: Facet arthropathy with thickening of the ligamentum flavum.  No significant canal or neural foraminal narrowing.     L5-S1: Minimal posterior circumferential disc bulge with right posterior paracentral annular fissure.  Facet arthropathy with thickening of the ligamentum flavum without significant canal or neural foraminal narrowing.     Impression:     Mild multilevel degenerative change predominantly on the basis of facet arthropathy.  No significant canal or neural foraminal narrowing.        Electronically signed by:Elizabeth Hinton  Date:                                            05/10/2024  Time:                                           09:19      ASSESSMENT: 59 y.o. year old female with pain, consistent with:    Encounter Diagnoses   Name Primary?    Lumbosacral spondylosis without myelopathy Yes    Uncomplicated opioid dependence            DISCUSSION: Sharlene Smith comes to us with lower back and bilat hip pain which is worst with bending forward. Xray  shows degenerative facet arthropathy of the lower lumbar spine. Exam shows pain reproduced with direct palpation of the affected area. She has been prescribed tramadol for 5 years.  Dr. Delgado referred her to us to assist with management.     OPIOID MANAGEMENT:  MME: 15  Risk: Low  : Reviewed today  Naloxone:   Utox: 8/8/2024  Violations: None  Contract: 5/2/24      PLAN:  Continue HEP  Continue tramadol, last refilled 8/2/2024  Patient with 80% pain relief after first Bilateral MBB L3,4,5, will proceed with 2nd MBB  Will proceed with RFA if appropriate  Utox today  Future consideration:  May consider discogram pending outcome of LMBB/RFA    Marleen Mcgill  08/08/2024

## 2024-08-19 ENCOUNTER — PATIENT MESSAGE (OUTPATIENT)
Dept: PAIN MEDICINE | Facility: OTHER | Age: 59
End: 2024-08-19
Payer: OTHER GOVERNMENT

## 2024-08-20 ENCOUNTER — HOSPITAL ENCOUNTER (OUTPATIENT)
Facility: OTHER | Age: 59
Discharge: HOME | End: 2024-08-20
Attending: ANESTHESIOLOGY | Admitting: ANESTHESIOLOGY
Payer: OTHER GOVERNMENT

## 2024-08-20 VITALS
TEMPERATURE: 98 F | DIASTOLIC BLOOD PRESSURE: 86 MMHG | OXYGEN SATURATION: 99 % | HEIGHT: 64 IN | WEIGHT: 172 LBS | BODY MASS INDEX: 29.37 KG/M2 | HEART RATE: 67 BPM | SYSTOLIC BLOOD PRESSURE: 122 MMHG | RESPIRATION RATE: 16 BRPM

## 2024-08-20 DIAGNOSIS — M47.816 FACET ARTHRITIS OF LUMBAR REGION: Primary | ICD-10-CM

## 2024-08-20 DIAGNOSIS — G89.29 CHRONIC PAIN: ICD-10-CM

## 2024-08-20 PROCEDURE — 63600175 PHARM REV CODE 636 W HCPCS: Mod: JZ,JG | Performed by: ANESTHESIOLOGY

## 2024-08-20 PROCEDURE — 25000003 PHARM REV CODE 250: Performed by: ANESTHESIOLOGY

## 2024-08-20 PROCEDURE — 64493 INJ PARAVERT F JNT L/S 1 LEV: CPT | Mod: 50,,, | Performed by: ANESTHESIOLOGY

## 2024-08-20 PROCEDURE — 64494 INJ PARAVERT F JNT L/S 2 LEV: CPT | Mod: 50,,, | Performed by: ANESTHESIOLOGY

## 2024-08-20 PROCEDURE — 64494 INJ PARAVERT F JNT L/S 2 LEV: CPT | Mod: 50 | Performed by: ANESTHESIOLOGY

## 2024-08-20 PROCEDURE — 64493 INJ PARAVERT F JNT L/S 1 LEV: CPT | Mod: 50 | Performed by: ANESTHESIOLOGY

## 2024-08-20 RX ORDER — ALPRAZOLAM 0.5 MG/1
1 TABLET, ORALLY DISINTEGRATING ORAL ONCE
Status: DISCONTINUED | OUTPATIENT
Start: 2024-08-20 | End: 2024-08-20

## 2024-08-20 RX ORDER — BUPIVACAINE HYDROCHLORIDE 2.5 MG/ML
INJECTION, SOLUTION EPIDURAL; INFILTRATION; INTRACAUDAL
Status: DISCONTINUED | OUTPATIENT
Start: 2024-08-20 | End: 2024-08-20 | Stop reason: HOSPADM

## 2024-08-20 RX ORDER — LIDOCAINE HYDROCHLORIDE 20 MG/ML
INJECTION, SOLUTION INFILTRATION; PERINEURAL
Status: DISCONTINUED | OUTPATIENT
Start: 2024-08-20 | End: 2024-08-20 | Stop reason: HOSPADM

## 2024-08-20 RX ORDER — ALPRAZOLAM 0.5 MG/1
0.5 TABLET, ORALLY DISINTEGRATING ORAL ONCE
Status: COMPLETED | OUTPATIENT
Start: 2024-08-20 | End: 2024-08-20

## 2024-08-20 RX ORDER — SODIUM CHLORIDE 9 MG/ML
INJECTION, SOLUTION INTRAVENOUS CONTINUOUS
Status: DISCONTINUED | OUTPATIENT
Start: 2024-08-20 | End: 2024-08-20 | Stop reason: HOSPADM

## 2024-08-20 RX ADMIN — ALPRAZOLAM 0.5 MG: 0.5 TABLET, ORALLY DISINTEGRATING ORAL at 12:08

## 2024-08-20 NOTE — OP NOTE
Diagnostic Lumbar Medial Branch Block Under Fluoroscopy    The procedure, risks, benefits, and options were discussed with the patient. There are no contraindications to the procedure. The patent expressed understanding and agreed to the procedure. Informed written consent was obtained prior to the start of the procedure and can be found in the patient's chart.    PATIENT NAME: Sharlene Smith   MRN: [id number]     DATE OF PROCEDURE: 08/20/2024                                           PROCEDURE:  Diagnostic Bilateral L3, L4, and L5 Lumbar Medial Branch Block under Fluoroscopy    PRE-OP DIAGNOSIS: Lumbar spondylosis [M47.816] Lumbar spondylosis [M47.816]    POST-OP DIAGNOSIS: Same    PHYSICIAN: Marleen Mcgill MD    ASSISTANTS: none    MEDICATIONS INJECTED:  Bupivicaine 0.25%    LOCAL ANESTHETIC INJECTED:   Xylocaine 2%    SEDATION: None    ESTIMATED BLOOD LOSS:  None    COMPLICATIONS:  None.    INTERVAL HISTORY: Patient has clinical and imaging findings suggestive of facet mediated pain.    TECHNIQUE: Time-out was performed to identify the patient and procedure to be performed. With the patient laying in a prone position, the surgical area was prepped and draped in the usual sterile fashion using ChloraPrep and fenestrated drape. The levels were determined under fluoroscopic guidance. Skin anesthesia was achieved by injecting Lidocaine 2% over the injection sites. A 25 gauge, 3.5 inch needle was introduced into the medial branch nerves at the junctions of the superior articular process and the transverse processes of the targeted sites using AP, lateral and/or contralateral oblique fluoroscopic imaging. After negative aspiration for blood or CSF was confirmed, 0.5 mL of the anesthetic listed above was then slowly injected at each site. The needles were removed and bleeding was nil. A sterile dressing was applied. No specimens collected. The patient tolerated the procedure well.    The patient was monitored after the  procedure in the recovery area. They were given post-procedure and discharge instructions to follow at home. The patient was discharged in a stable condition.      Marleen Mcgill MD

## 2024-08-20 NOTE — DISCHARGE INSTRUCTIONS

## 2024-08-20 NOTE — DISCHARGE SUMMARY
Discharge Note  Short Stay      SUMMARY     Admit Date: 8/20/2024    Attending Physician: Marleen Mcgill      Discharge Physician: Marleen Mcgill      Discharge Date: 8/20/2024 2:07 PM    Procedure(s) (LRB):  BLOCK, NERVE BILATERAL L3-5 MEDIAL BRANCH (Bilateral)    Final Diagnosis: Lumbar spondylosis [M47.816]    Disposition: Home or self care    Patient Instructions:   Current Discharge Medication List        CONTINUE these medications which have NOT CHANGED    Details   albuterol (VENTOLIN HFA) 90 mcg/actuation inhaler Inhale 2 puffs into the lungs every 6 (six) hours as needed for Wheezing. Rescue  Qty: 18 g, Refills: 0      atorvastatin (LIPITOR) 20 MG tablet Take 1 tablet (20 mg total) by mouth every evening.  Qty: 90 tablet, Refills: 3    Associated Diagnoses: Familial hypercholesterolemia      diclofenac sodium (VOLTAREN) 1 % Gel Apply 2 g topically 2 (two) times daily as needed.  Qty: 50 g, Refills: 1    Associated Diagnoses: Arthritis pain of hand      estradioL (DIVIGEL) 0.5 mg/0.5 gram (0.1 %) GlPk Apply one packet to upper inner thigh daily  Qty: 30 packet, Refills: 11    Associated Diagnoses: Menopausal symptoms      fluticasone propionate (FLONASE) 50 mcg/actuation nasal spray SHAKE LIQUID AND USE 1 SPRAY IN EACH NOSTRIL EVERY DAY  Qty: 48 g, Refills: 3    Associated Diagnoses: Seasonal allergies      mesalamine (APRISO) 0.375 gram Cp24 Take 4 capsules (1.5 g total) by mouth once daily.  Qty: 360 capsule, Refills: 3    Associated Diagnoses: Other ulcerative colitis without complication      progesterone (PROMETRIUM) 100 MG capsule TAKE ONE CAPSULE BY MOUTH EVERY EVENING  Qty: 30 capsule, Refills: 11    Associated Diagnoses: Menopausal symptoms      tiZANidine (ZANAFLEX) 4 MG tablet TAKE 1/2 TO 1 TABLET BY MOUTH EVERY 8 HOURS AS NEEDED  Qty: 270 tablet, Refills: 2    Comments: **Patient requests 90 days supply**  Associated Diagnoses: Other chronic pain      traMADoL (ULTRAM) 50 mg tablet Take 1  tablet (50 mg total) by mouth every 8 (eight) hours as needed for Pain.  Qty: 90 tablet, Refills: 0    Comments: Quantity prescribed more than 7 day supply? Yes, quantity medically necessary  Associated Diagnoses: Acute bilateral low back pain with sciatica, sciatica laterality unspecified; Chronic bilateral low back pain without sciatica; Myofascial muscle pain                 Discharge Diagnosis: Lumbar spondylosis [M47.816]  Condition on Discharge: Stable with no complications to procedure   Diet on Discharge: Same as before.  Activity: as per instruction sheet.  Discharge to: Home with a responsible adult.  Follow up: 2-4 weeks       Please call my office or pager at [phone] if experienced any weakness or loss of sensation, fever > 101.5, pain uncontrolled with oral medications, persistent nausea/vomiting/or diarrhea, redness or drainage from the incisions, or any other worrisome concerns. If physician on call was not reached or could not communicate with our office for any reason please go to the nearest emergency department      Marleen Mcgill  08/20/2024

## 2024-08-22 ENCOUNTER — PATIENT MESSAGE (OUTPATIENT)
Dept: PAIN MEDICINE | Facility: OTHER | Age: 59
End: 2024-08-22
Payer: OTHER GOVERNMENT

## 2024-08-22 ENCOUNTER — PATIENT MESSAGE (OUTPATIENT)
Dept: SPINE | Facility: CLINIC | Age: 59
End: 2024-08-22
Payer: OTHER GOVERNMENT

## 2024-08-22 DIAGNOSIS — M47.816 LUMBAR SPONDYLOSIS: Primary | ICD-10-CM

## 2024-08-22 NOTE — TELEPHONE ENCOUNTER
Medial Branch Block Diary   NERVE BLOCK    1    Pre procedure pain level 8  Percentage of relief within 24 hours of procedure- 80%  Post procedure level 2  Any Improvements in ADL: bathing, dressing, eating, transferring, using toilet, and walking

## 2024-08-27 ENCOUNTER — PATIENT MESSAGE (OUTPATIENT)
Dept: PAIN MEDICINE | Facility: OTHER | Age: 59
End: 2024-08-27
Payer: OTHER GOVERNMENT

## 2024-09-03 DIAGNOSIS — M54.40 ACUTE BILATERAL LOW BACK PAIN WITH SCIATICA, SCIATICA LATERALITY UNSPECIFIED: ICD-10-CM

## 2024-09-03 DIAGNOSIS — M79.18 MYOFASCIAL MUSCLE PAIN: ICD-10-CM

## 2024-09-03 DIAGNOSIS — G89.29 CHRONIC BILATERAL LOW BACK PAIN WITHOUT SCIATICA: ICD-10-CM

## 2024-09-03 DIAGNOSIS — M54.50 CHRONIC BILATERAL LOW BACK PAIN WITHOUT SCIATICA: ICD-10-CM

## 2024-09-03 RX ORDER — TRAMADOL HYDROCHLORIDE 50 MG/1
50 TABLET ORAL EVERY 8 HOURS PRN
Qty: 90 TABLET | Refills: 0 | Status: SHIPPED | OUTPATIENT
Start: 2024-09-03

## 2024-09-19 ENCOUNTER — PATIENT MESSAGE (OUTPATIENT)
Dept: PRIMARY CARE CLINIC | Facility: CLINIC | Age: 59
End: 2024-09-19
Payer: OTHER GOVERNMENT

## 2024-10-02 ENCOUNTER — PATIENT MESSAGE (OUTPATIENT)
Dept: SPINE | Facility: CLINIC | Age: 59
End: 2024-10-02
Payer: OTHER GOVERNMENT

## 2024-10-02 DIAGNOSIS — M54.40 ACUTE BILATERAL LOW BACK PAIN WITH SCIATICA, SCIATICA LATERALITY UNSPECIFIED: ICD-10-CM

## 2024-10-02 DIAGNOSIS — G89.29 CHRONIC BILATERAL LOW BACK PAIN WITHOUT SCIATICA: ICD-10-CM

## 2024-10-02 DIAGNOSIS — M54.50 CHRONIC BILATERAL LOW BACK PAIN WITHOUT SCIATICA: ICD-10-CM

## 2024-10-02 DIAGNOSIS — M79.18 MYOFASCIAL MUSCLE PAIN: ICD-10-CM

## 2024-10-02 RX ORDER — TRAMADOL HYDROCHLORIDE 50 MG/1
50 TABLET ORAL EVERY 8 HOURS PRN
Qty: 90 TABLET | Refills: 0 | Status: SHIPPED | OUTPATIENT
Start: 2024-10-03

## 2024-10-02 RX ORDER — TRAMADOL HYDROCHLORIDE 50 MG/1
50 TABLET ORAL EVERY 8 HOURS PRN
Qty: 90 TABLET | Refills: 0 | Status: CANCELLED | OUTPATIENT
Start: 2024-10-02

## 2024-10-08 ENCOUNTER — PATIENT MESSAGE (OUTPATIENT)
Dept: PRIMARY CARE CLINIC | Facility: CLINIC | Age: 59
End: 2024-10-08
Payer: OTHER GOVERNMENT

## 2024-10-08 ENCOUNTER — TELEPHONE (OUTPATIENT)
Dept: OBSTETRICS AND GYNECOLOGY | Facility: CLINIC | Age: 59
End: 2024-10-08
Payer: OTHER GOVERNMENT

## 2024-10-08 DIAGNOSIS — K51.00 ULCERATIVE PANCOLITIS: Primary | ICD-10-CM

## 2024-10-08 NOTE — TELEPHONE ENCOUNTER
Pt is requesting Gi referral for Inflammable Bowel Syndrome specifically Ulcerative Colitis.she would like to be seen by a gastroenterologist to discuss  current gastro symptoms.

## 2024-10-25 ENCOUNTER — PATIENT MESSAGE (OUTPATIENT)
Dept: PAIN MEDICINE | Facility: OTHER | Age: 59
End: 2024-10-25
Payer: OTHER GOVERNMENT

## 2024-10-25 ENCOUNTER — TELEPHONE (OUTPATIENT)
Dept: OBSTETRICS AND GYNECOLOGY | Facility: CLINIC | Age: 59
End: 2024-10-25
Payer: OTHER GOVERNMENT

## 2024-10-25 NOTE — TELEPHONE ENCOUNTER
----- Message from Nieves sent at 10/25/2024  3:00 PM CDT -----      Name of Who is Calling: KELLEE PISANO [[id number]]      What is the request in detail: Pt called regarding a change of medication and is open to virtual.Please contact to further discuss and advise.          Can the clinic reply by [facility]: Y      What Number to Call Back if not in [facility]:[phone]

## 2024-10-29 ENCOUNTER — HOSPITAL ENCOUNTER (OUTPATIENT)
Facility: OTHER | Age: 59
Discharge: HOME | End: 2024-10-29
Attending: ANESTHESIOLOGY | Admitting: ANESTHESIOLOGY
Payer: OTHER GOVERNMENT

## 2024-10-29 VITALS
BODY MASS INDEX: 29.53 KG/M2 | HEART RATE: 80 BPM | TEMPERATURE: 98 F | DIASTOLIC BLOOD PRESSURE: 79 MMHG | HEIGHT: 64 IN | OXYGEN SATURATION: 98 % | RESPIRATION RATE: 16 BRPM | SYSTOLIC BLOOD PRESSURE: 134 MMHG | WEIGHT: 173 LBS

## 2024-10-29 DIAGNOSIS — M48.061 SPINAL STENOSIS OF LUMBAR REGION, UNSPECIFIED WHETHER NEUROGENIC CLAUDICATION PRESENT: ICD-10-CM

## 2024-10-29 DIAGNOSIS — M25.552 CHRONIC HIP PAIN, BILATERAL: ICD-10-CM

## 2024-10-29 DIAGNOSIS — G89.29 CHRONIC BILATERAL LOW BACK PAIN WITHOUT SCIATICA: Primary | ICD-10-CM

## 2024-10-29 DIAGNOSIS — M25.551 CHRONIC HIP PAIN, BILATERAL: ICD-10-CM

## 2024-10-29 DIAGNOSIS — M54.50 CHRONIC BILATERAL LOW BACK PAIN WITHOUT SCIATICA: Primary | ICD-10-CM

## 2024-10-29 DIAGNOSIS — G89.29 CHRONIC HIP PAIN, BILATERAL: ICD-10-CM

## 2024-10-29 DIAGNOSIS — G89.29 CHRONIC PAIN: ICD-10-CM

## 2024-10-29 PROCEDURE — 64635 DESTROY LUMB/SAC FACET JNT: CPT | Mod: 50,,, | Performed by: ANESTHESIOLOGY

## 2024-10-29 PROCEDURE — 64635 DESTROY LUMB/SAC FACET JNT: CPT | Mod: 50 | Performed by: ANESTHESIOLOGY

## 2024-10-29 PROCEDURE — 64636 DESTROY L/S FACET JNT ADDL: CPT | Mod: 50 | Performed by: ANESTHESIOLOGY

## 2024-10-29 PROCEDURE — 99152 MOD SED SAME PHYS/QHP 5/>YRS: CPT | Performed by: ANESTHESIOLOGY

## 2024-10-29 PROCEDURE — 64636 DESTROY L/S FACET JNT ADDL: CPT | Mod: 50,,, | Performed by: ANESTHESIOLOGY

## 2024-10-29 PROCEDURE — 63600175 PHARM REV CODE 636 W HCPCS: Performed by: ANESTHESIOLOGY

## 2024-10-29 RX ORDER — DEXAMETHASONE SODIUM PHOSPHATE 10 MG/ML
INJECTION INTRAMUSCULAR; INTRAVENOUS
Status: DISCONTINUED | OUTPATIENT
Start: 2024-10-29 | End: 2024-10-29 | Stop reason: HOSPADM

## 2024-10-29 RX ORDER — MIDAZOLAM HYDROCHLORIDE 1 MG/ML
INJECTION INTRAMUSCULAR; INTRAVENOUS
Status: DISCONTINUED | OUTPATIENT
Start: 2024-10-29 | End: 2024-10-29 | Stop reason: HOSPADM

## 2024-10-29 RX ORDER — LIDOCAINE HYDROCHLORIDE 20 MG/ML
INJECTION, SOLUTION INFILTRATION; PERINEURAL
Status: DISCONTINUED | OUTPATIENT
Start: 2024-10-29 | End: 2024-10-29 | Stop reason: HOSPADM

## 2024-10-29 RX ORDER — BUPIVACAINE HYDROCHLORIDE 2.5 MG/ML
INJECTION, SOLUTION EPIDURAL; INFILTRATION; INTRACAUDAL
Status: DISCONTINUED | OUTPATIENT
Start: 2024-10-29 | End: 2024-10-29 | Stop reason: HOSPADM

## 2024-10-29 RX ORDER — FENTANYL CITRATE 50 UG/ML
INJECTION, SOLUTION INTRAMUSCULAR; INTRAVENOUS
Status: DISCONTINUED | OUTPATIENT
Start: 2024-10-29 | End: 2024-10-29 | Stop reason: HOSPADM

## 2024-10-29 RX ORDER — SODIUM CHLORIDE 9 MG/ML
INJECTION, SOLUTION INTRAVENOUS CONTINUOUS
Status: DISCONTINUED | OUTPATIENT
Start: 2024-10-29 | End: 2024-10-29 | Stop reason: HOSPADM

## 2024-10-31 ENCOUNTER — HOSPITAL ENCOUNTER (OUTPATIENT)
Dept: RADIOLOGY | Facility: HOSPITAL | Age: 59
Discharge: HOME | End: 2024-10-31
Attending: PHYSICIAN ASSISTANT
Payer: OTHER GOVERNMENT

## 2024-10-31 DIAGNOSIS — Z12.31 SCREENING MAMMOGRAM, ENCOUNTER FOR: ICD-10-CM

## 2024-10-31 DIAGNOSIS — M79.18 MYOFASCIAL MUSCLE PAIN: ICD-10-CM

## 2024-10-31 DIAGNOSIS — M54.40 ACUTE BILATERAL LOW BACK PAIN WITH SCIATICA, SCIATICA LATERALITY UNSPECIFIED: ICD-10-CM

## 2024-10-31 DIAGNOSIS — G89.29 CHRONIC BILATERAL LOW BACK PAIN WITHOUT SCIATICA: ICD-10-CM

## 2024-10-31 DIAGNOSIS — M54.50 CHRONIC BILATERAL LOW BACK PAIN WITHOUT SCIATICA: ICD-10-CM

## 2024-10-31 PROCEDURE — 77067 SCR MAMMO BI INCL CAD: CPT | Mod: TC,PO

## 2024-10-31 PROCEDURE — 77063 BREAST TOMOSYNTHESIS BI: CPT | Mod: TC,PO

## 2024-10-31 RX ORDER — TRAMADOL HYDROCHLORIDE 50 MG/1
50 TABLET ORAL EVERY 8 HOURS PRN
Qty: 90 TABLET | Refills: 0 | Status: SHIPPED | OUTPATIENT
Start: 2024-11-01

## 2024-11-13 ENCOUNTER — PATIENT MESSAGE (OUTPATIENT)
Dept: PRIMARY CARE CLINIC | Facility: CLINIC | Age: 59
End: 2024-11-13
Payer: OTHER GOVERNMENT

## 2024-11-20 ENCOUNTER — OFFICE VISIT (OUTPATIENT)
Dept: GASTROENTEROLOGY | Facility: CLINIC | Age: 59
End: 2024-11-20
Payer: OTHER GOVERNMENT

## 2024-11-20 VITALS
HEIGHT: 64 IN | DIASTOLIC BLOOD PRESSURE: 81 MMHG | HEART RATE: 83 BPM | WEIGHT: 179.38 LBS | SYSTOLIC BLOOD PRESSURE: 120 MMHG | BODY MASS INDEX: 30.63 KG/M2

## 2024-11-20 DIAGNOSIS — K51.00 ULCERATIVE PANCOLITIS: ICD-10-CM

## 2024-11-20 DIAGNOSIS — K30 UPSET STOMACH: ICD-10-CM

## 2024-11-20 DIAGNOSIS — R10.13 DYSPEPSIA: Primary | ICD-10-CM

## 2024-11-20 DIAGNOSIS — R11.0 CHRONIC NAUSEA: ICD-10-CM

## 2024-11-20 PROCEDURE — 99999 PR PBB SHADOW E&M-EST. PATIENT-LVL IV: CPT | Mod: PBBFAC,,, | Performed by: INTERNAL MEDICINE

## 2024-11-20 PROCEDURE — 99214 OFFICE O/P EST MOD 30 MIN: CPT | Mod: PBBFAC,PN | Performed by: INTERNAL MEDICINE

## 2024-11-20 PROCEDURE — 99204 OFFICE O/P NEW MOD 45 MIN: CPT | Mod: S$PBB,,, | Performed by: INTERNAL MEDICINE

## 2024-11-20 RX ORDER — SODIUM PICOSULFATE, MAGNESIUM OXIDE, AND ANHYDROUS CITRIC ACID 12; 3.5; 1 G/175ML; G/175ML; MG/175ML
175 LIQUID ORAL SEE ADMIN INSTRUCTIONS
Qty: 350 ML | Refills: 0 | Status: SHIPPED | OUTPATIENT
Start: 2024-11-20

## 2024-11-20 NOTE — PROGRESS NOTES
GASTROENTEROLOGY CLINIC NOTE    Reason for visit: The primary encounter diagnosis was Dyspepsia. Diagnoses of Ulcerative pancolitis, Chronic nausea, and Upset stomach were also pertinent to this visit.  Referring provider/PCP: Dallas Delgado MD    HPI:  Sharlene Smith is a 59 y.o. female here today for ulcerative colitis, new patient  Previously followed at [facility]. Last visit was with me > 3 years ago, thus new patient.    Upset stomach, nausea, 'yuck' on the stomach. Worse postprandial. Feels like can't eat much. Likes to take tums or pepto which can help. Worse with beef. Ongoing for months now  Not typical symptosm of her UC flare, (that is usually more diarrhea with cramps and not having this)  No diarrhea, no blood in stool  Not taking any meds for UC , previously was on apriso but didn't want to take anymore.    UC history  [place]x 2001 in [place]. Mainly cramps and diarrhea. No blood in the stool back then, diagnosed by colonoscopy.  Has been mesalamine in past (she thinks asacol) and has worked effectively in past.    Prior Endoscopy:  EGD:   2017 [facility]  Gastritis    Colon jess   3/2023  Impression:            - Preparation of the colon was poor.                          - The distal rectum and anal verge are normal on                          retroflexion view.                          - Diverticulosis in the sigmoid colon.                          - One 12 mm polyp in the cecum, removed with a hot                          snare. Resected and retrieved.                          - One 6 mm polyp in the cecum, removed with a cold                          snare. Resected and retrieved.                          - Normal mucosa in the rectum, in the sigmoid                          colon and in the descending colon.                          - Erythematous mucosa in the transverse colon, at                          the hepatic flexure, in the ascending colon and in                           the cecum.                          - The examined portion of the ileum was normal.                          - Biopsies for surveillance were taken from the                          entire colon.   Repeat 1 year because prep suboptimal      1. Colon, cecum and ascending (biopsy):  Colonic mucosa with no significant histopathologic changes  No significant acute inflammation  No granulomas, no dysplasia  2. Colon, cecal polyps x 2 (polypectomy):  Colonic mucosa with hyperplastic surface changes  Multiple deeper levels examined  3. Colon, transverse (biopsy):  Colonic mucosa with no significant histopathologic changes  No significant acute inflammation  No granulomas, no dysplasia  4. Colon descending (biopsy):  Colonic mucosa with no significant histopathologic changes  No significant acute inflammation  No granulomas, no dysplasia  5. Colon, sigmoid (biopsy):  Colonic mucosa with no significant histopathologic changes  No significant acute inflammation  No granulomas, no dysplasia  6. Rectum (biopsy):  Rectal mucosa with no significant histopathologic changes  No significant acute inflammation  No granulomas, no dysplasia     Colon:  10/2020 [facility]  Impression:           - Increased mucosa vascular pattern in the                         ascending colon and in the cecum, biopsied.                         - One 6 mm polyp in the transverse colon, was                         resected and retrieved.                         - The examined portion of the ileum was normal.    PATH  1. Cecum, biopsy:   Colonic mucosa with generally preserved crypt architecture and single focus   of acute cryptitis, consistent with minimally active colitis.   Negative for granulomas and dysplasia.   2. Ascending colon, biopsy:   Colonic mucosa with preserved crypt architecture; no diagnostic abnormality.   Negative for activity, granulomas, and dysplasia.   3. Transverse colon, polyp, biopsy:   Colonic mucosa with preserved crypt architecture  and dilated mucosal   lymphatics.   4. Transverse colon, biopsy:   Colonic mucosa with preserved crypt architecture; no diagnostic abnormality.   Negative for activity, granulomas, and dysplasia.   5. Descending colon, biopsy:   Colonic mucosa with preserved crypt architecture; no diagnostic abnormality.   Negative for activity, granulomas, and dysplasia.   6. Sigmoid colon, biopsy:   Colonic mucosa with preserved crypt architecture; no diagnostic abnormality.   Negative for activity, granulomas, and dysplasia.   7. Rectum, biopsy:   Rectal mucosa with preserved crypt architecture; no diagnostic abnormality.   Negative for activity, granulomas, and dysplasia.    (Portions of this note were dictated using voice recognition software and may contain dictation related errors in spelling/grammar/syntax not found on text review)    Review of Systems   Constitutional:  Negative for fever and malaise/fatigue.   Respiratory:  Negative for cough and shortness of breath.    Cardiovascular:  Negative for chest pain and palpitations.   Gastrointestinal:  Negative for abdominal pain, blood in stool, nausea and vomiting.   Neurological:  Negative for dizziness and headaches.       Past Medical History: has a past medical history of Abnormal Pap smear of vagina, Allergy, Arthritis, Asthma, Hyperlipidemia, Supraventricular tachycardia, SVT (supraventricular tachycardia), Tachycardia, and Ulcerative colitis.    Past Surgical History: has a past surgical history that includes Colonoscopy (N/A, 2015);  section, classic; Colonoscopy (N/A, 10/13/2017); Ablation (N/A, 10/8/2018); Injection of joint (Bilateral, 2019); Colonoscopy (N/A, 10/27/2020); Colonoscopy (N/A, 3/10/2023); Injection of anesthetic agent around nerve (Bilateral, 2024); Injection of anesthetic agent around nerve (Bilateral, 2024); and Radiofrequency ablation (Bilateral, 10/29/2024).    Family History:family history includes Allergies in her  mother; Arthritis in her maternal grandmother and paternal grandmother; Breast cancer (age of onset: 47) in an other family member; Breast cancer (age of onset: 63) in her sister; Cataracts in her mother; Diabetes in her father; Glaucoma in her maternal grandfather; Heart attack in her father and paternal grandmother; Hyperlipidemia in her mother; Hypertension in her mother; Multiple myeloma (age of onset: 75) in her mother.    Allergies: Review of patient's allergies indicates:  No Known Allergies    Social History: reports that she quit smoking about 35 years ago. Her smoking use included cigarettes. She has never used smokeless tobacco. She reports current alcohol use. She reports that she does not use drugs.    Home medications:   Current Outpatient Medications on File Prior to Visit   Medication Sig Dispense Refill    albuterol (VENTOLIN HFA) 90 mcg/actuation inhaler Inhale 2 puffs into the lungs every 6 (six) hours as needed for Wheezing. Rescue 18 g 0    atorvastatin (LIPITOR) 20 MG tablet Take 1 tablet (20 mg total) by mouth every evening. 90 tablet 3    diclofenac sodium (VOLTAREN) 1 % Gel Apply 2 g topically 2 (two) times daily as needed. 50 g 1    estradioL (DIVIGEL) 0.5 mg/0.5 gram (0.1 %) GlPk Apply one packet to upper inner thigh daily 30 packet 11    fluticasone propionate (FLONASE) 50 mcg/actuation nasal spray SHAKE LIQUID AND USE 1 SPRAY IN EACH NOSTRIL EVERY DAY 48 g 3    progesterone (PROMETRIUM) 100 MG capsule TAKE ONE CAPSULE BY MOUTH EVERY EVENING 30 capsule 11    tiZANidine (ZANAFLEX) 4 MG tablet TAKE 1/2 TO 1 TABLET BY MOUTH EVERY 8 HOURS AS NEEDED 270 tablet 2    traMADoL (ULTRAM) 50 mg tablet Take 1 tablet (50 mg total) by mouth every 8 (eight) hours as needed for Pain. 90 tablet 0    [DISCONTINUED] mesalamine (APRISO) 0.375 gram Cp24 Take 4 capsules (1.5 g total) by mouth once daily. 360 capsule 3     Current Facility-Administered Medications on File Prior to Visit   Medication Dose Route  "Frequency Provider Last Rate Last Admin    0.9%  NaCl infusion   Intravenous Continuous Prem Netta GIRONBrennon, NP   New Bag at 10/27/20 0715       Vital signs:  /81 (BP Location: Left arm, Patient Position: Sitting)   Pulse 83   Ht 5' 4" (1.626 m)   Wt 81.4 kg (179 lb 5.5 oz)   LMP 12/14/2022 (Approximate)   BMI 30.78 kg/m²     Physical Exam  Vitals reviewed.   Constitutional:       General: She is not in acute distress.  HENT:      Head: Normocephalic and atraumatic.   Eyes:      General: No scleral icterus.     Conjunctiva/sclera: Conjunctivae normal.   Skin:     General: Skin is warm.      Coloration: Skin is not pale.      Findings: No rash.   Neurological:      Mental Status: She is oriented to person, place, and time.      Gait: Gait normal.   Psychiatric:         Mood and Affect: Mood normal.         Behavior: Behavior normal.         I have reviewed prior labs, imaging, notes from last month    Assessment:  1. Dyspepsia    2. Ulcerative pancolitis    3. Chronic nausea    4. Upset stomach      currently OFF UC meds (apriso) but seems doing well from that standpoint, however having more dyspeptic symptoms    Will plan eval for upper gi symptoms as well as surveillance of her chronic UC   Last colon 2023 ,normal.      Plan:  Orders Placed This Encounter    US Abdomen Limited    sod picosulf-mag ox-citric ac (CLENPIQ) 10 mg-3.5 gram- 12 gram/175 mL Soln    Case Request Endoscopy: COLONOSCOPY, EGD (ESOPHAGOGASTRODUODENOSCOPY)       Plan egd and colon  Egd for dyspepsia, nausea, upper gi symptoms  Colon for surveillance of UC , eval UC (now off meds)    Will consider protonix therapy depending on results  May need to get back on apriso      RTC prn    Wilder Gottlieb MD  [facility]               "

## 2024-11-21 ENCOUNTER — PATIENT MESSAGE (OUTPATIENT)
Dept: GASTROENTEROLOGY | Facility: CLINIC | Age: 59
End: 2024-11-21
Payer: OTHER GOVERNMENT

## 2024-11-27 DIAGNOSIS — G89.29 CHRONIC BILATERAL LOW BACK PAIN WITHOUT SCIATICA: ICD-10-CM

## 2024-11-27 DIAGNOSIS — M54.40 ACUTE BILATERAL LOW BACK PAIN WITH SCIATICA, SCIATICA LATERALITY UNSPECIFIED: ICD-10-CM

## 2024-11-27 DIAGNOSIS — M54.50 CHRONIC BILATERAL LOW BACK PAIN WITHOUT SCIATICA: ICD-10-CM

## 2024-11-27 DIAGNOSIS — M79.18 MYOFASCIAL MUSCLE PAIN: ICD-10-CM

## 2024-11-27 RX ORDER — TRAMADOL HYDROCHLORIDE 50 MG/1
50 TABLET ORAL EVERY 8 HOURS PRN
Qty: 90 TABLET | Refills: 0 | Status: SHIPPED | OUTPATIENT
Start: 2024-11-30

## 2024-12-17 ENCOUNTER — OFFICE VISIT (OUTPATIENT)
Dept: PAIN MEDICINE | Facility: CLINIC | Age: 59
End: 2024-12-17
Payer: OTHER GOVERNMENT

## 2024-12-17 VITALS
WEIGHT: 180.75 LBS | DIASTOLIC BLOOD PRESSURE: 94 MMHG | SYSTOLIC BLOOD PRESSURE: 140 MMHG | OXYGEN SATURATION: 100 % | HEIGHT: 64 IN | RESPIRATION RATE: 12 BRPM | HEART RATE: 94 BPM | BODY MASS INDEX: 30.86 KG/M2

## 2024-12-17 DIAGNOSIS — M62.838 MUSCLE SPASM: ICD-10-CM

## 2024-12-17 DIAGNOSIS — G89.29 OTHER CHRONIC PAIN: ICD-10-CM

## 2024-12-17 DIAGNOSIS — M79.18 MYOFASCIAL MUSCLE PAIN: ICD-10-CM

## 2024-12-17 DIAGNOSIS — M51.360 DEGENERATION OF INTERVERTEBRAL DISC OF LUMBAR REGION WITH DISCOGENIC BACK PAIN: ICD-10-CM

## 2024-12-17 DIAGNOSIS — M47.819 FACET ARTHROPATHY: ICD-10-CM

## 2024-12-17 DIAGNOSIS — M46.1 BILATERAL SACROILIITIS: Primary | ICD-10-CM

## 2024-12-17 DIAGNOSIS — M47.816 LUMBAR SPONDYLOSIS: ICD-10-CM

## 2024-12-17 DIAGNOSIS — F11.90 CHRONIC, CONTINUOUS USE OF OPIOIDS: ICD-10-CM

## 2024-12-17 PROCEDURE — 99999 PR PBB SHADOW E&M-EST. PATIENT-LVL IV: CPT | Mod: PBBFAC,,,

## 2024-12-17 PROCEDURE — 99214 OFFICE O/P EST MOD 30 MIN: CPT | Mod: S$PBB,,,

## 2024-12-17 PROCEDURE — 99214 OFFICE O/P EST MOD 30 MIN: CPT | Mod: PBBFAC

## 2024-12-17 RX ORDER — METHOCARBAMOL 500 MG/1
500 TABLET, FILM COATED ORAL 4 TIMES DAILY PRN
Qty: 120 TABLET | Refills: 0 | Status: SHIPPED | OUTPATIENT
Start: 2024-12-17

## 2024-12-17 NOTE — PROGRESS NOTES
PCP: Dallas Delgado MD    REFERRING PHYSICIAN: No ref. provider found    CHIEF COMPLAINT: lower back pain and bilateral hip pain    Original HISTORY OF PRESENT ILLNESS: Sharlene Smith presents to the clinic for the evaluation of the above pain. The back pain started 20 or more years ago.  The hip pain started 15 years ago.      Original Pain Description:  The pain is located in the center of the lower back and is radiating to the hips and groin . The pain is described as sharp. Exacerbating factors: Bending. Mitigating factors heat. Symptoms interfere with daily activity. The patient feels like symptoms have been unchanged. Patient denies night fever/night sweats, urinary incontinence, bowel incontinence, significant weight loss, significant motor weakness, and loss of sensations.    Original PAIN SCORES:  Best: Pain is 8  Worst: Pain is 8  Current: Pain is 8        12/17/2024    11:05 AM   Last 3 PDI Scores   Pain Disability Index (PDI) 61       INTERVAL HISTORY: (Newest visit at the bottom)   Interval History 5/24/24: Sharlene Smith returns for follow up. At our last visit she was having a lot of low back pain. She has been continuing her HEP and we ordered an MRI. We reviewed that MRI together today. It shows mainly facet arthropathy in the lower lumbar spine and one level L5/S1 of disc dessication. We reviewed the exam and today she was guarding on facet loading. She continues noting axial low back pain, 4-6/10, non-radiating, worse with household activities and improved with Tramadol. This is impacting her ability to do daily activities.       Interval History 08/08/2024  Patient is here for follow up of her back pain post BL L3,4,5 MBB (first injection) which she reported 80% pain relief for 1 day.  Pain has now returned to pre-injection level in the same quality of pain.  Denies new weakness, falls, numbness.  Pain continues to be worse with back extension, standing,  sitting.    Interval History 2024:  Sharlene Smith returns to clinic for follow-up after bilateral L3,4,5 RFA on 10/29/2024. She reports some pain relief. She continues with left > right mid lower back pain. The pain is worse with walking, standing and cleaning with mopping and sweeping. She has done [employer] [facility] in 2018. She does not continue HEP. She takes Tramadol 50 mg TID PRN with some relief. She denies any perceived side effects. She hopes she can eventually wean off of her pain medication. she denies recent health changes. She denies recent falls or trauma. She denies new onset fever/night sweats, urinary incontinence, bowel incontinence, significant weight changes, significant motor weakness or changes, or loss of sensations. Her pain today is 6/10.      6 weeks of Conservative therapy:  PT: Years ago  Chiro: none  HEP: Continues HEP with her  3X/week      Treatments / Medications: (Ice/Heat/NSAIDS/APAP/etc):  Tramadol 50 mg Tid (recently dropped down from Q6h)  Pt cannot take NSAIDS (colitis)      Interventional Pain Procedures: (Previous injections)  2018 Bilat SI Joint   2019 Bilat Hip Injection  2024 - BL L3,4,5 MBB - 80% pain relief  2024 - BL L3,4,5 MBB - 80% pain relief.  10/29/2024 - Bilateral L3,4,5 RFA - some relief    Past Medical History:   Diagnosis Date    Abnormal Pap smear of vagina yrs ago    possible BX?    Allergy     Arthritis     Asthma     Hyperlipidemia     Supraventricular tachycardia     SVT (supraventricular tachycardia)     Tachycardia     Ulcerative colitis      Past Surgical History:   Procedure Laterality Date    ABLATION N/A 10/8/2018    Procedure: ABLATION;  Surgeon: Shabbir Clark MD;  Location: [facility] CATH LAB;  Service: Cardiology;  Laterality: N/A;  SVT,SVT-AVNRT RFA,KRISTI,MAC,FAS,3PREP     SECTION, CLASSIC      x 2    COLONOSCOPY N/A 2015    Procedure: COLONOSCOPY;  Surgeon: Petr Miller MD;  Location: [address]  FLR);  Service: Endoscopy;  Laterality: N/A;    COLONOSCOPY N/A 10/13/2017    Procedure: COLONOSCOPY;  Surgeon: Petr Miller MD;  Location: [facility] ENDO (4TH FLR);  Service: Endoscopy;  Laterality: N/A;    COLONOSCOPY N/A 10/27/2020    Procedure: COLONOSCOPY;  Surgeon: Petr Miller MD;  [facility]  Service: Endoscopy;  Laterality: N/A;  covid test 10/24-[facility]  prep ins. emailed- [facility]    COLONOSCOPY N/A 3/10/2023    Procedure: COLONOSCOPY;  Surgeon: Nehemias Molina MD;  Location: [facility] ENDO (4TH FLR);  Service: Endoscopy;  Laterality: N/A;  pt has sutab-inst portal-any md-tb    INJECTION OF ANESTHETIC AGENT AROUND NERVE Bilateral 2024    Procedure: BLOCK, NERVE BILATERAL L3, 4, 5 MEDIAL BRANCH;  Surgeon: Marleen Mcgill MD;  Location: [facility] [place] PAIN MGT;  Service: Pain Management;  Laterality: Bilateral;  [phone]    INJECTION OF ANESTHETIC AGENT AROUND NERVE Bilateral 2024    Procedure: BLOCK, NERVE BILATERAL L3-5 MEDIAL BRANCH;  Surgeon: Marleen Mcgill MD;  Location: [facility] [place] PAIN MGT;  Service: Pain Management;  Laterality: Bilateral;  [phone]    INJECTION OF JOINT Bilateral 2019    Procedure: Injection, Joint,  Hip--Bilateral;  Surgeon: Chauncey Clay Jr., MD;  Location: [facility] PAIN MGT;  Service: Pain Management;  Laterality: Bilateral;    RADIOFREQUENCY ABLATION Bilateral 10/29/2024    Procedure: RADIOFREQUENCY ABLATION BILATERAL L3-5;  Surgeon: Marleen Mcgill MD;  Location: [facility] [place] PAIN MGT;  Service: Pain Management;  Laterality: Bilateral;  [phone]  4 WK F/U DENNIS     Social History     Socioeconomic History    Marital status:    Tobacco Use    Smoking status: Former     Current packs/day: 0.00     Types: Cigarettes     Quit date: 1989     Years since quittin.8    Smokeless tobacco: Never   Substance and Sexual Activity    Alcohol use: Yes     Alcohol/week: 0.0 standard drinks of alcohol     Comment: events - couple times a  month    Drug use: No    Sexual activity: Yes     Partners: Male     Birth control/protection: Post-menopausal, None     Comment: Pills   Social History Narrative    Works for [employer]     Social Drivers of Health     Financial Resource Strain: Low Risk  (6/5/2024)    Overall Financial Resource Strain (CARDIA)     Difficulty of Paying Living Expenses: Not hard at all   Food Insecurity: No Food Insecurity (6/5/2024)    Hunger Vital Sign     Worried About Running Out of Food in the Last Year: Never true     Ran Out of Food in the Last Year: Never true   Transportation Needs: No Transportation Needs (6/20/2022)    PRAPARE - Transportation     Lack of Transportation (Medical): No     Lack of Transportation (Non-Medical): No   Physical Activity: Insufficiently Active (6/5/2024)    Exercise Vital Sign     Days of Exercise per Week: 2 days     Minutes of Exercise per Session: 30 min   Stress: No Stress Concern Present (6/5/2024)    Citizen of [place] [employer] - Occupational Stress Questionnaire     Feeling of Stress : Only a little   Housing Stability: Unknown (6/20/2022)    Housing Stability Vital Sign     Unable to Pay for Housing in the Last Year: No     Unstable Housing in the Last Year: No     Family History   Problem Relation Name Age of Onset    Hyperlipidemia Mother JDiaz     Multiple myeloma Mother JDiaz 75        passsed at 85    Cataracts Mother JDiaz     Allergies Mother JDiaz     Hypertension Mother JDiaz     Heart attack Father EDIAZ     Diabetes Father EDIAZ     Breast cancer Sister  63    Arthritis Maternal Grandmother SHaynes     Glaucoma Maternal Grandfather      Heart attack Paternal Grandmother CDiaz     Arthritis Paternal Grandmother CDiaz     Breast cancer Other niece 47    Heart disease Neg Hx      Colon cancer Neg Hx      Esophageal cancer Neg Hx      Amblyopia Neg Hx      Blindness Neg Hx      Macular degeneration Neg Hx      Retinal detachment Neg Hx      Strabismus Neg Hx       Stroke Neg Hx      Thyroid disease Neg Hx      Angioedema Neg Hx      Asthma Neg Hx      Atopy Neg Hx      Eczema Neg Hx      Immunodeficiency Neg Hx      Rhinitis Neg Hx      Urticaria Neg Hx      Ovarian cancer Neg Hx      Lung cancer Neg Hx         Review of patient's allergies indicates:  No Known Allergies    Current Outpatient Medications   Medication Sig    albuterol (VENTOLIN HFA) 90 mcg/actuation inhaler Inhale 2 puffs into the lungs every 6 (six) hours as needed for Wheezing. Rescue    atorvastatin (LIPITOR) 20 MG tablet Take 1 tablet (20 mg total) by mouth every evening.    diclofenac sodium (VOLTAREN) 1 % Gel Apply 2 g topically 2 (two) times daily as needed.    estradioL (DIVIGEL) 0.5 mg/0.5 gram (0.1 %) GlPk Apply one packet to upper inner thigh daily    fluticasone propionate (FLONASE) 50 mcg/actuation nasal spray SHAKE LIQUID AND USE 1 SPRAY IN EACH NOSTRIL EVERY DAY    progesterone (PROMETRIUM) 100 MG capsule TAKE ONE CAPSULE BY MOUTH EVERY EVENING    sod picosulf-mag ox-citric ac (CLENPIQ) 10 mg-3.5 gram- 12 gram/175 mL Soln Take 175 mLs by mouth As instructed.    tiZANidine (ZANAFLEX) 4 MG tablet TAKE 1/2 TO 1 TABLET BY MOUTH EVERY 8 HOURS AS NEEDED    traMADoL (ULTRAM) 50 mg tablet Take 1 tablet (50 mg total) by mouth every 8 (eight) hours as needed for Pain.     No current facility-administered medications for this visit.     Facility-Administered Medications Ordered in Other Visits   Medication    0.9%  NaCl infusion       ROS:  GENERAL: No fever. No chills. No fatigue. Denies weight loss. Denies weight gain.  HEENT: Denies headaches. Denies vision change. Denies eye pain. Denies double vision. Denies ear pain.   CV: Denies chest pain.   PULM: Denies of shortness of breath.  GI: Denies constipation. No diarrhea. No abdominal pain. Denies nausea. Denies vomiting. No blood in stool.  HEME: Denies bleeding problems.  : Denies urgency. No painful urination. No blood in urine.  MS: Denies joint  "stiffness. Denies joint swelling.  Endorses central lower back pain.  Endorses hip and groin pain.  SKIN: Denies rash.   NEURO: Denies seizures. No weakness.  PSYCH:  Denies difficulty sleeping. No anxiety. Denies depression. No suicidal thoughts.       VITALS:   Vitals:    12/17/24 1104 12/17/24 1105   BP: (!) 140/94    Pulse: 94    Resp: 12    SpO2: 100%    Weight: 82 kg (180 lb 12.4 oz)    Height: 5' 4" (1.626 m)    PainSc:   6   6   PainLoc: Back            PHYSICAL EXAM:   GENERAL: Well appearing, in no acute distress, alert and oriented x3.  PSYCH:  Mood and affect appropriate.  SKIN: Skin color, texture, turgor normal, no rashes or lesions.  HEENT:  Normocephalic, atraumatic. Cranial nerves grossly intact.  PULM: No evidence of respiratory difficulty, symmetric chest rise.  GI:  Non-distended  BACK: Normal range of motion. No pain to palpation over the spinous processes. Mild pain with extension. Positive axial loading test bilaterally left > right. Positive tenderness over bilateral SIJ left > right with positive thigh and sacral thrust test, Positive FABERE,Ganselin and Yeoman's test bilaterally. Negative FADIR   EXTREMITIES: No deformities, edema, or skin discoloration.     MUSCULOSKELETAL: Mild tenderness over bilateral piriformis. No atrophy is noted.  Neurovascularly intact x4.  Motor and sensory intact x4  NEURO: Sensation is equal and appropriate bilaterally. Bilateral upper and lower extremity strength is normal and symmetric. Bilateral upper and lower extremity coordination and muscle stretch reflexes are physiologic and symmetric.   GAIT: normal.      LABS: NA      IMAGING:    MRI LUMBAR SPINE WITHOUT CONTRAST     CLINICAL HISTORY:  Low back pain, symptoms persist with > 6wks conservative treatment; Dorsalgia, unspecified     TECHNIQUE:  Multiplanar, multisequence MR images were acquired from the thoracolumbar junction to the sacrum without the administration of contrast.     COMPARISON:  Lumbar " spine radiographs 05/02/2024     FINDINGS:  The marrow demonstrates homogeneous signal.  Vertebral body heights are maintained.  Disc desiccation L5-S1.  Conus terminates appropriately at L1-2.     Multilevel degenerative change as diesel below:     T12-L1: No focal disc abnormality.  No significant canal or neural foraminal narrowing.     L1-2: No focal disc abnormality.  No significant canal or neural foraminal narrowing.     L2-3: No focal disc abnormality.  No significant canal or neural foraminal narrowing.     L3-4: Mild facet arthropathy with no significant canal or neural foraminal narrowing.     L4-5: Facet arthropathy with thickening of the ligamentum flavum.  No significant canal or neural foraminal narrowing.     L5-S1: Minimal posterior circumferential disc bulge with right posterior paracentral annular fissure.  Facet arthropathy with thickening of the ligamentum flavum without significant canal or neural foraminal narrowing.     Impression:     Mild multilevel degenerative change predominantly on the basis of facet arthropathy.  No significant canal or neural foraminal narrowing.        Electronically signed by:Elizabeth Hinton  Date:                                            05/10/2024  Time:                                           09:19      ASSESSMENT: 59 y.o. year old female with pain, consistent with:    Encounter Diagnoses   Name Primary?    Bilateral sacroiliitis Yes    Myofascial muscle pain     Lumbar spondylosis     Facet arthropathy     Degeneration of intervertebral disc of lumbar region with discogenic back pain     Chronic, continuous use of opioids     Other chronic pain        DISCUSSION: Sharlene Smith comes to us with lower back and bilat hip pain which is worst with bending forward. Xray shows degenerative facet arthropathy of the lower lumbar spine. Exam shows pain reproduced with direct palpation of the affected area. She has been prescribed tramadol for 5 years.    Danny referred her to us to assist with management.     OPIOID MANAGEMENT:  MME: 15  Risk: Low  : Reviewed today  Naloxone:   Utox: 8/8/2024  Violations: None  Contract: 5/2/24      PLAN:  Continue HEP  Continue tramadol, last refilled 8/2/2024  Add Methocarbamol 500 mg QID PRN muscle spasm  She is s/p bilateral L3,4,5 RFA with some relief.   UDS 8/8/2024 reviewed and appropriate.  Plan bilateral SI joint injections.  Referral to physical therapy. Encouraged daily HEP.   RTC 2-3 weeks after above procedure.   Future consideration:  May consider discogram pending outcome of LMBB/RFA    Nancy Brown NP   12/17/2024     The above plan and management options were discussed at length with patient. Patient is in agreement with the above and verbalized understanding.

## 2024-12-18 ENCOUNTER — PATIENT MESSAGE (OUTPATIENT)
Dept: PAIN MEDICINE | Facility: OTHER | Age: 59
End: 2024-12-18
Payer: OTHER GOVERNMENT

## 2024-12-18 DIAGNOSIS — M46.1 BILATERAL SACROILIITIS: Primary | ICD-10-CM

## 2024-12-23 DIAGNOSIS — E78.01 FAMILIAL HYPERCHOLESTEROLEMIA: ICD-10-CM

## 2024-12-24 RX ORDER — ATORVASTATIN CALCIUM 20 MG/1
20 TABLET, FILM COATED ORAL NIGHTLY
Qty: 90 TABLET | Refills: 0 | Status: SHIPPED | OUTPATIENT
Start: 2024-12-24

## 2024-12-24 NOTE — TELEPHONE ENCOUNTER
Care Due:                  Date            Visit Type   Department     Provider  --------------------------------------------------------------------------------                                ESTABLISHED                              PATIENT -    [facility]  Last Visit: 03-      [facility]      PRIMARY CARE   Dallas  Braydonalina  Next Visit: None Scheduled  None         None Found                                                            Last  Test          Frequency    Reason                     Performed    Due Date  --------------------------------------------------------------------------------    Office Visit  12 months..  diclofenac...............  03- 03-    CBC.........  12 months..  diclofenac...............  02- 02-    CMP.........  12 months..  atorvastatin, diclofenac.  02- 02-    Lipid Panel.  12 months..  atorvastatin.............  02- 02-    [facility] Embedded Care Due Messages. Reference number: [id number].   12/23/2024 9:58:03 PM CST

## 2024-12-24 NOTE — TELEPHONE ENCOUNTER
Provider Staff:  Action required for this patient    Requires appointment   Requires labs      Please see care gap opportunities below in Care Due Message.    Thanks!  [facility]     Appointments      Date Provider   Last Visit   3/11/2024 Dallas Delgado MD   Next Visit   Visit date not found Dallas Delgado MD     Refill Decision Note   Sharlene mSith  is requesting a refill authorization.  Brief Assessment and Rationale for Refill:  Approve     Medication Therapy Plan:         Comments:     Note composed:8:37 AM 12/24/2024

## 2024-12-26 ENCOUNTER — PATIENT MESSAGE (OUTPATIENT)
Dept: PAIN MEDICINE | Facility: OTHER | Age: 59
End: 2024-12-26
Payer: OTHER GOVERNMENT

## 2024-12-26 DIAGNOSIS — G89.29 CHRONIC BILATERAL LOW BACK PAIN WITHOUT SCIATICA: ICD-10-CM

## 2024-12-26 DIAGNOSIS — M79.18 MYOFASCIAL MUSCLE PAIN: ICD-10-CM

## 2024-12-26 DIAGNOSIS — M54.50 CHRONIC BILATERAL LOW BACK PAIN WITHOUT SCIATICA: ICD-10-CM

## 2024-12-26 DIAGNOSIS — M54.40 ACUTE BILATERAL LOW BACK PAIN WITH SCIATICA, SCIATICA LATERALITY UNSPECIFIED: ICD-10-CM

## 2024-12-28 RX ORDER — TRAMADOL HYDROCHLORIDE 50 MG/1
50 TABLET ORAL EVERY 8 HOURS PRN
Qty: 90 TABLET | Refills: 0 | Status: SHIPPED | OUTPATIENT
Start: 2024-12-28

## 2024-12-30 ENCOUNTER — TELEPHONE (OUTPATIENT)
Dept: PAIN MEDICINE | Facility: CLINIC | Age: 59
End: 2024-12-30
Payer: OTHER GOVERNMENT

## 2024-12-30 RX ORDER — SODIUM CHLORIDE 9 MG/ML
INJECTION, SOLUTION INTRAVENOUS CONTINUOUS
OUTPATIENT
Start: 2024-12-30

## 2024-12-30 NOTE — TELEPHONE ENCOUNTER
PT WANTS TO GIVE THE PROCEDUREE AREA TO GIVE HER A CALL BECAUSE SHE WAS  NOT AWARE OF HER APPOINTMENT ON [facility] SO SHE WANTS TO RESCHEDULE.

## 2024-12-30 NOTE — TELEPHONE ENCOUNTER
----- Message from Jodi sent at 12/30/2024  4:17 PM CST -----  Type: Patient call    Who called: Patient    Does the patient know what this is regarding? Requesting a call back in regards to needing to cancel upcoming procedure on 12/31 ; was not aware of the appt ; will call back to reschedule; please advise    Would the patient rather a call back or response via [facility]? Call    Best call back number: [phone]    Additional information:

## 2025-01-08 ENCOUNTER — PATIENT MESSAGE (OUTPATIENT)
Dept: PAIN MEDICINE | Facility: OTHER | Age: 60
End: 2025-01-08
Payer: OTHER GOVERNMENT

## 2025-01-20 DIAGNOSIS — M54.50 CHRONIC BILATERAL LOW BACK PAIN WITHOUT SCIATICA: ICD-10-CM

## 2025-01-20 DIAGNOSIS — G89.29 CHRONIC BILATERAL LOW BACK PAIN WITHOUT SCIATICA: ICD-10-CM

## 2025-01-20 DIAGNOSIS — M79.18 MYOFASCIAL MUSCLE PAIN: ICD-10-CM

## 2025-01-20 DIAGNOSIS — M54.40 ACUTE BILATERAL LOW BACK PAIN WITH SCIATICA, SCIATICA LATERALITY UNSPECIFIED: ICD-10-CM

## 2025-01-21 NOTE — TELEPHONE ENCOUNTER
Patient requesting refill on ULTRAM  Last office visit 12/17/2024   shows last refill on 12/28/2024  Patient does have a pain contract on file with [facility]  Patient last UDS 8/8/24 was consistent with current therapy     CODEINE  Not Detected   Comment: INTERPRETIVE INFORMATION: Codeine, U  Positive Cutoff: 40 ng/mL  Methodology: Mass Spectrometry   MORPHINE  Not Detected   Comment: INTERPRETIVE INFORMATION:Morphine, U  Positive Cutoff: 20 ng/mL  Methodology: Mass Spectrometry   6-ACETYLMORPHINE  Not Detected   Comment: INTERPRETIVE INFORMATION:6-acetylmorphine, U  Positive Cutoff: 20 ng/mL  Methodology: Mass Spectrometry   OXYCODONE  Not Detected   Comment: INTERPRETIVE INFORMATION:Oxycodone, U  Positive Cutoff: 40 ng/mL  Methodology: Mass Spectrometry   NOROYXCODONE  Not Detected   Comment: INTERPRETIVE INFORMATION:Noroxycodone, U  Positive Cutoff: 100 ng/mL  Methodology: Mass Spectrometry   OXYMORPHONE  Not Detected   Comment: INTERPRETIVE INFORMATION:Oxymorphone, U  Positive Cutoff: 40 ng/mL  Methodology: Mass Spectrometry   NOROXYMORPHONE  Not Detected   Comment: INTERPRETIVE INFORMATION:Noroxymorphone, U  Positive Cutoff: 100 ng/mL  Methodology: Mass Spectrometry   HYDROCODONE  Not Detected   Comment: INTERPRETIVE INFORMATION:Hydrocodone, U  Positive Cutoff: 40 ng/mL  Methodology: Mass Spectrometry   NORHYDROCODONE  Not Detected   Comment: INTERPRETIVE INFORMATION:Norhydrocodone, U  Positive Cutoff: 100 ng/mL  Methodology: Mass Spectrometry   HYDROMORPHONE  Not Detected   Comment: INTERPRETIVE INFORMATION:Hydromorphone, U  Positive Cutoff: 20 ng/mL  Methodology: Mass Spectrometry   BUPRENORPHINE  Not Detected   Comment: INTERPRETIVE INFORMATION:Buprenorphine, U  Positive Cutoff: 5 ng/mL  Methodology: Mass Spectrometry   NORUBPRENORPHINE  Not Detected   Comment: INTERPRETIVE INFORMATION:Norbuprenorphine, U  Positive Cutoff: 20 ng/mL  Methodology: Mass Spectrometry    FENTANYL  Not Detected   Comment: INTERPRETIVE INFORMATION:Fentanyl, U  Positive Cutoff: 2 ng/mL  Methodology: Mass Spectrometry   NORFENTANYL  Not Detected   Comment: INTERPRETIVE INFORMATION:Norfentanyl, U  Positive Cutoff: 2 ng/mL  Methodology: Mass Spectrometry   MEPERIDINE METABOLITE  Not Detected   Comment: INTERPRETIVE INFORMATION:Meperidine metabolite, U  Positive Cutoff: 50 ng/mL  Methodology: Mass Spectrometry   TAPENTADOL  Not Detected   Comment: INTERPRETIVE INFORMATION:Tapentadol, U  Positive Cutoff: 100 ng/mL  Methodology: Mass Spectrometry   TAPENTADOL-O-SULF  Not Detected   Comment: INTERPRETIVE INFORMATION:Tapentadol-o-Sulf, U  Positive Cutoff: 200 ng/mL  Methodology: Mass Spectrometry   METHADONE  Negative   Comment: Presumptive negative by immunoassay. Testing by mass  spectrometry is available on request.  INTERPRETIVE INFORMATION: Methadone Screen, U  Positive Cutoff: 150 ng/mL  Methodology: Immunoassay   TRAMADOL  PresumptivePOS   Comment: Presumptive positive by immunoassay. Testing by mass  spectrometry is available on request.  INTERPRETIVE INFORMATION:Tramadol Screen, U  Positive Cutoff: 100 ng/mL  Methodology: Immunoassay   AMPHETAMINE  Not Detected   Comment: INTERPRETIVE INFORMATION:Amphetamine, U  Positive Cutoff: 50 ng/mL  Methodology: Mass Spectrometry   METHAMPHETAMINE  Not Detected   Comment: INTERPRETIVE INFORMATION:Methamphetamine, U  Positive Cutoff: 200 ng/mL  Methodology: Mass Spectrometry   MDMA- ECSTASY  Not Detected   Comment: INTERPRETIVE INFORMATION:MDMA, U  Positive Cutoff: 200 ng/mL  Methodology: Mass Spectrometry   MDA  Not Detected   Comment: INTERPRETIVE INFORMATION:MDA, U  Positive Cutoff: 200 ng/mL  Methodology: Mass Spectrometry   MDEA- Annie  Not Detected   Comment: INTERPRETIVE INFORMATION:MDEA, U  Positive Cutoff: 200 ng/mL  Methodology: Mass Spectrometry   METHYLPHENIDATE  Not Detected   Comment: INTERPRETIVE INFORMATION:Methylphenidate, U  Positive Cutoff: 100  ng/mL  Methodology: Mass Spectrometry   PHENTERMINE  Not Detected   Comment: INTERPRETIVE INFORMATION:Phentermine, U  Positive Cutoff: 100 ng/mL  Methodology: Mass Spectrometry   BENZOYLECGONINE  Negative   Comment: Presumptive negative by immunoassay. Testing by mass  spectrometry is available on request.  INTERPRETIVE INFORMATION:Cocaine Screen, U  Positive Cutoff: 150 ng/mL  Methodology: Immunoassay   ALPRAZOLAM  Not Detected   Comment: INTERPRETIVE INFORMATION:Alprazolam, U  Positive Cutoff: 40 ng/mL  Methodology: Mass Spectrometry   ALPHA-OH-ALPRAZOLAM  Not Detected   Comment: INTERPRETIVE INFORMATION:Alpha-OH-Alprazolam, U  Positive Cutoff: 20 ng/mL  Methodology: Mass Spectrometry   CLONAZEPAM  Not Detected   Comment: INTERPRETIVE INFORMATION:Clonazepam, U  Positive Cutoff: 20 ng/mL  Methodology: Mass Spectrometry   7-AMINOCLONAZEPAM  Not Detected   Comment: INTERPRETIVE INFORMATION:7-Aminoclonazepam, U  Positive Cutoff: 40 ng/mL  Methodology: Mass Spectrometry   DIAZEPAM  Not Detected   Comment: INTERPRETIVE INFORMATION:Diazepam, U  Positive Cutoff: 50 ng/mL  Methodology: Mass Spectrometry   NORDIAZEPAM  Not Detected   Comment: INTERPRETIVE INFORMATION:Nordiazepam, U  Positive Cutoff: 50 ng/mL  Methodology: Mass Spectrometry   OXAZEPAM  Not Detected   Comment: INTERPRETIVE INFORMATION:Oxazepam, U  Positive Cutoff: 50 ng/mL  Methodology: Mass Spectrometry   TEMAZEPAM  Not Detected   Comment: INTERPRETIVE INFORMATION:Temazepam, U  Positive Cutoff: 50 ng/mL  Methodology: Mass Spectrometry   Lorazepam  Not Detected   Comment: INTERPRETIVE INFORMATION:Lorazepam, U  Positive Cutoff: 60 ng/mL  Methodology: Mass Spectrometry   MIDAZOLAM  Not Detected   Comment: INTERPRETIVE INFORMATION:Midazolam, U  Positive Cutoff: 20 ng/mL  Methodology: Mass Spectrometry   ZOLPIDEM  Not Detected   Comment: INTERPRETIVE INFORMATION:Zolpidem, U  Positive Cutoff: 20 ng/mL  Methodology: Mass Spectrometry   BARBITURATES  Negative    Comment: Presumptive negative by immunoassay. Testing by mass  spectrometry is available on request.  INTERPRETIVE INFORMATION:Barbiturates Screen, U  Positive Cutoff: 200 ng/mL  Methodology: Immunoassay   Creatinine, Urine 20.0 - 400.0 mg/dL 197.9   ETHYL GLUCURONIDE  Negative   Comment: Presumptive negative by immunoassay. Testing by mass  spectrometry is available on request.  INTERPRETIVE INFORMATION:Ethyl Glucuronide Screen, U  Positive Cutoff: 500 ng/mL  Methodology: Immunoassay   MARIJUANA METABOLITE  Negative   Comment: Presumptive negative by immunoassay. Testing by mass  spectrometry is available on request.  INTERPRETIVE INFORMATION: THC (Cannabinoids) Screen, U  Positive Cutoff: 50 ng/mL  Methodology: Immunoassay   PCP  Negative   Comment: Presumptive negative by immunoassay. Testing by mass  spectrometry is available on request.  INTERPRETIVE INFORMATION:Phencyclidine Screen, U  Positive Cutoff: 25 ng/mL  Methodology: Immunoassay   CARISOPRODOL  Negative   Comment: Presumptive negative by immunoassay. Testing by mass  spectrometry is available on request.  INTERPRETIVE INFORMATION: Carisoprodol Screen, U  Positive Cutoff: 100 ng/mL  Methodology: Immunoassay  The carisoprodol immunoassay has cross-reactivity to  carisoprodol and meprobamate.   Naloxone  Not Detected   Comment: INTERPRETIVE INFORMATION:Naloxone, U  Positive Cutoff: 100 ng/mL  Methodology: Mass Spectrometry   Gabapentin  Not Detected   Comment: INTERPRETIVE INFORMATION:Gabapentin, U  Positive Cutoff: 3,000 ng/mL  Methodology: Mass Spectrometry   Pregabalin  Not Detected   Comment: INTERPRETIVE INFORMATION:Pregabalin, U  Positive Cutoff: 3,000 ng/mL  Methodology: Mass Spectrometry   Alpha-OH-Midazolam  Not Detected   Comment: INTERPRETIVE INFORMATION:Alpha-OH-Midazolam, U  Positive Cutoff: 20 ng/mL  Methodology: Mass Spectrometry   Zolpidem Metabolite  Not Detected   Comment: INTERPRETIVE INFORMATION:Zolpidem Metabolite, U  Positive  Cutoff: 100 ng/mL  Methodology: Mass Spectrometry

## 2025-01-22 RX ORDER — TRAMADOL HYDROCHLORIDE 50 MG/1
50 TABLET ORAL EVERY 8 HOURS PRN
Qty: 90 TABLET | Refills: 0 | Status: SHIPPED | OUTPATIENT
Start: 2025-01-28

## 2025-01-31 ENCOUNTER — PATIENT MESSAGE (OUTPATIENT)
Dept: PAIN MEDICINE | Facility: OTHER | Age: 60
End: 2025-01-31
Payer: OTHER GOVERNMENT

## 2025-02-04 ENCOUNTER — HOSPITAL ENCOUNTER (OUTPATIENT)
Facility: OTHER | Age: 60
Discharge: HOME | End: 2025-02-04
Attending: ANESTHESIOLOGY | Admitting: ANESTHESIOLOGY
Payer: OTHER GOVERNMENT

## 2025-02-04 VITALS
OXYGEN SATURATION: 99 % | TEMPERATURE: 98 F | SYSTOLIC BLOOD PRESSURE: 148 MMHG | HEIGHT: 64 IN | RESPIRATION RATE: 15 BRPM | BODY MASS INDEX: 30.39 KG/M2 | WEIGHT: 178 LBS | DIASTOLIC BLOOD PRESSURE: 75 MMHG | HEART RATE: 77 BPM

## 2025-02-04 DIAGNOSIS — M46.1 SACROILIITIS: Primary | ICD-10-CM

## 2025-02-04 DIAGNOSIS — M53.3 SACROILIAC JOINT DYSFUNCTION: ICD-10-CM

## 2025-02-04 DIAGNOSIS — G89.29 CHRONIC PAIN: ICD-10-CM

## 2025-02-04 PROCEDURE — 27096 INJECT SACROILIAC JOINT: CPT | Mod: 50,,, | Performed by: ANESTHESIOLOGY

## 2025-02-04 PROCEDURE — 25000003 PHARM REV CODE 250: Performed by: ANESTHESIOLOGY

## 2025-02-04 PROCEDURE — 63600175 PHARM REV CODE 636 W HCPCS: Performed by: ANESTHESIOLOGY

## 2025-02-04 PROCEDURE — 27096 INJECT SACROILIAC JOINT: CPT | Mod: 50 | Performed by: ANESTHESIOLOGY

## 2025-02-04 PROCEDURE — 25500020 PHARM REV CODE 255: Performed by: ANESTHESIOLOGY

## 2025-02-04 RX ORDER — ALPRAZOLAM 0.5 MG/1
0.5 TABLET, ORALLY DISINTEGRATING ORAL
Status: COMPLETED | OUTPATIENT
Start: 2025-02-04 | End: 2025-02-04

## 2025-02-04 RX ORDER — TRIAMCINOLONE ACETONIDE 40 MG/ML
INJECTION, SUSPENSION INTRA-ARTICULAR; INTRAMUSCULAR
Status: DISCONTINUED | OUTPATIENT
Start: 2025-02-04 | End: 2025-02-04 | Stop reason: HOSPADM

## 2025-02-04 RX ORDER — LIDOCAINE HYDROCHLORIDE 20 MG/ML
INJECTION, SOLUTION INFILTRATION; PERINEURAL
Status: DISCONTINUED | OUTPATIENT
Start: 2025-02-04 | End: 2025-02-04 | Stop reason: HOSPADM

## 2025-02-04 RX ORDER — SODIUM CHLORIDE 9 MG/ML
INJECTION, SOLUTION INTRAVENOUS CONTINUOUS
Status: DISCONTINUED | OUTPATIENT
Start: 2025-02-04 | End: 2025-02-04 | Stop reason: HOSPADM

## 2025-02-04 RX ORDER — BUPIVACAINE HYDROCHLORIDE 2.5 MG/ML
INJECTION, SOLUTION EPIDURAL; INFILTRATION; INTRACAUDAL
Status: DISCONTINUED | OUTPATIENT
Start: 2025-02-04 | End: 2025-02-04 | Stop reason: HOSPADM

## 2025-02-04 RX ADMIN — ALPRAZOLAM 0.5 MG: 0.5 TABLET, ORALLY DISINTEGRATING ORAL at 10:02

## 2025-02-04 NOTE — H&P
HPI  Patient presenting for Procedure(s) (LRB):  INJECTION,SACROILIAC JOINT BILATERAL (Bilateral)     Patient on Anti-coagulation No    No health changes since previous encounter    Past Medical History:   Diagnosis Date    Abnormal Pap smear of vagina yrs ago    possible BX?    Allergy     Arthritis     Asthma     Hyperlipidemia     Supraventricular tachycardia     SVT (supraventricular tachycardia)     Tachycardia     Ulcerative colitis      Past Surgical History:   Procedure Laterality Date    ABLATION N/A 10/08/2018    Procedure: ABLATION;  Surgeon: Shabbir Clark MD;  Location: [facility] CATH LAB;  Service: Cardiology;  Laterality: N/A;  SVT,SVT-AVNRT RFA,KRISTI,MAC,FAS,3PREP     SECTION, CLASSIC      x 2    COLONOSCOPY N/A 2015    Procedure: COLONOSCOPY;  Surgeon: Petr Miller MD;  Location: [facility] (4TH FLR);  Service: Endoscopy;  Laterality: N/A;    COLONOSCOPY N/A 10/13/2017    Procedure: COLONOSCOPY;  Surgeon: Petr Miller MD;  Location: [facility] (4TH FLR);  Service: Endoscopy;  Laterality: N/A;    COLONOSCOPY N/A 10/27/2020    Procedure: COLONOSCOPY;  Surgeon: Petr Miller MD;  Location: [facility] (4TH FLR);  Service: Endoscopy;  Laterality: N/A;  covid test 10/24-[facility]  prep ins. emailed- ERW    COLONOSCOPY N/A 03/10/2023    Procedure: COLONOSCOPY;  Surgeon: Nehemias Molina MD;  Location: [facility] (4TH FLR);  Service: Endoscopy;  Laterality: N/A;  pt has sutab-inst portal-any md-tb    COLONOSCOPY  2025    COLONOSCOPY N/A 2025    Procedure: COLONOSCOPY;  Surgeon: Wilder Gottlieb MD;  Location: [facility];  Service: Endoscopy;  Laterality: N/A;    ESOPHAGOGASTRODUODENOSCOPY      ESOPHAGOGASTRODUODENOSCOPY N/A 2025    Procedure: EGD (ESOPHAGOGASTRODUODENOSCOPY);  Surgeon: Wilder Gottlieb MD;  Location: [facility];  Service: Endoscopy;  Laterality: N/A;    INJECTION OF ANESTHETIC AGENT AROUND NERVE Bilateral 2024    Procedure: BLOCK,  NERVE BILATERAL L3, 4, 5 MEDIAL BRANCH;  Surgeon: Marleen Mcgill MD;  Location: [facility] PAIN MGT;  Service: Pain Management;  Laterality: Bilateral;  [phone]    INJECTION OF ANESTHETIC AGENT AROUND NERVE Bilateral 08/20/2024    Procedure: BLOCK, NERVE BILATERAL L3-5 MEDIAL BRANCH;  Surgeon: Marleen Mcgill MD;  Location: [facility] PAIN [facility]GT;  Service: Pain Management;  Laterality: Bilateral;  [phone]    INJECTION OF JOINT Bilateral 09/26/2019    Procedure: Injection, Joint,  Hip--Bilateral;  Surgeon: Chauncey Clay Jr., MD;  Location: [facility] PAIN MGT;  Service: Pain Management;  Laterality: Bilateral;    RADIOFREQUENCY ABLATION Bilateral 10/29/2024    Procedure: RADIOFREQUENCY ABLATION BILATERAL L3-5;  Surgeon: Marleen Mcgill MD;  Location: [facility] PAIN T;  Service: Pain Management;  Laterality: Bilateral;  [phone]  4 WK F/U DENNIS     Review of patient's allergies indicates:  No Known Allergies   Current Facility-Administered Medications   Medication    0.9% NaCl infusion     Facility-Administered Medications Ordered in Other Encounters   Medication    0.9%  NaCl infusion       PMHx, PSHx, Allergies, Medications reviewed in epic    ROS negative except pain complaints in HPI    OBJECTIVE:    LMP 12/14/2022 (Approximate)     PHYSICAL EXAMINATION:    GENERAL: Well appearing, in no acute distress, alert and oriented x3.  PSYCH:  Mood and affect appropriate.  SKIN: Skin color, texture, turgor normal, no rashes or lesions which will impact the procedure.  CV: RRR with palpation of the radial artery.  PULM: No evidence of respiratory difficulty, symmetric chest rise. Clear to auscultation.  NEURO: Cranial nerves grossly intact.    Plan:    Proceed with procedure as planned Procedure(s) (LRB):  INJECTION,SACROILIAC JOINT BILATERAL (Bilateral)    Sae Edwardsjoshua  02/04/2025

## 2025-02-04 NOTE — DISCHARGE SUMMARY
Discharge Note  Short Stay      SUMMARY     Admit Date: 2/4/2025    Attending Physician: Marleen Mcgill      Discharge Physician: Marleen Mcgill      Discharge Date: 2/4/2025 10:43 AM    Procedure(s) (LRB):  INJECTION,SACROILIAC JOINT BILATERAL (Bilateral)    Final Diagnosis: Bilateral sacroiliitis [M46.1]    Disposition: Home or self care    Patient Instructions:   Current Discharge Medication List        CONTINUE these medications which have NOT CHANGED    Details   albuterol (VENTOLIN HFA) 90 mcg/actuation inhaler Inhale 2 puffs into the lungs every 6 (six) hours as needed for Wheezing. Rescue  Qty: 18 g, Refills: 0      atorvastatin (LIPITOR) 20 MG tablet Take 1 tablet (20 mg total) by mouth every evening.  Qty: 90 tablet, Refills: 0    Associated Diagnoses: Familial hypercholesterolemia      diclofenac sodium (VOLTAREN) 1 % Gel Apply 2 g topically 2 (two) times daily as needed.  Qty: 50 g, Refills: 1    Associated Diagnoses: Arthritis pain of hand      estradioL (DIVIGEL) 0.5 mg/0.5 gram (0.1 %) GlPk Apply one packet to upper inner thigh daily  Qty: 30 packet, Refills: 11    Associated Diagnoses: Menopausal symptoms      fluticasone propionate (FLONASE) 50 mcg/actuation nasal spray SHAKE LIQUID AND USE 1 SPRAY IN EACH NOSTRIL EVERY DAY  Qty: 48 g, Refills: 3    Associated Diagnoses: Seasonal allergies      methocarbamoL (ROBAXIN) 500 MG Tab Take 1 tablet (500 mg total) by mouth 4 (four) times daily as needed (muscle spasms).  Qty: 120 tablet, Refills: 0    Associated Diagnoses: Muscle spasm      pantoprazole (PROTONIX) 40 MG tablet TAKE 1 TABLET(40 MG) BY MOUTH DAILY  Qty: 90 tablet, Refills: 0    Comments: **Patient requests 90 days supply**      progesterone (PROMETRIUM) 100 MG capsule TAKE ONE CAPSULE BY MOUTH EVERY EVENING  Qty: 30 capsule, Refills: 11    Associated Diagnoses: Menopausal symptoms      traMADoL (ULTRAM) 50 mg tablet Take 1 tablet (50 mg total) by mouth every 8 (eight) hours as needed  for Pain.  Qty: 90 tablet, Refills: 0    Comments: Quantity prescribed more than 7 day supply? Yes, quantity medically necessary  Associated Diagnoses: Acute bilateral low back pain with sciatica, sciatica laterality unspecified; Chronic bilateral low back pain without sciatica; Myofascial muscle pain                 Discharge Diagnosis: Bilateral sacroiliitis [M46.1]  Condition on Discharge: Stable with no complications to procedure   Diet on Discharge: Same as before.  Activity: as per instruction sheet.  Discharge to: Home with a responsible adult.  Follow up: 2-4 weeks       Please call my office or pager at [phone] if experienced any weakness or loss of sensation, fever > 101.5, pain uncontrolled with oral medications, persistent nausea/vomiting/or diarrhea, redness or drainage from the incisions, or any other worrisome concerns. If physician on call was not reached or could not communicate with our office for any reason please go to the nearest emergency department      Marleen Mcgill  02/04/2025

## 2025-02-04 NOTE — DISCHARGE INSTRUCTIONS

## 2025-02-04 NOTE — OP NOTE
Sacroiliac Joint Injection under Fluoroscopic Guidance    The procedure, risks, benefits, and options were discussed with the patient. There are no contraindications to the procedure. The patent expressed understanding and agreed to the procedure. Informed written consent was obtained prior to the start of the procedure and can be found in the patient's chart.    PATIENT NAME: Sharlene Smith   MRN: [id number]     DATE OF PROCEDURE: 02/04/2025    PROCEDURE: Bilateral Sacroiliac Joint Injection under Fluoroscopic Guidance    PRE-OP DIAGNOSIS: Bilateral sacroiliitis [M46.1]    POST-OP DIAGNOSIS: Same    PHYSICIAN: Marleen Mcgill MD    ASSISTANTS: Dr. Hays     MEDICATIONS INJECTED: Preservative-free Kenalog 40mg with 3cc of Bupivacine 0.25%     LOCAL ANESTHETIC INJECTED: Xylocaine 2%     SEDATION: None    ESTIMATED BLOOD LOSS: None    COMPLICATIONS: None    TECHNIQUE: Time-out was performed to identify the patient and procedure to be performed. With the patient laying in a prone position, the surgical area was prepped and draped in the usual sterile fashion using ChloraPrep and a fenestrated drape. The sacroiliac joint was determined under fluoroscopy guidance. Skin anesthesia was achieved by injecting Lidocaine 2% over the injection site. The sacroiliac joint was  then approached with a 22 gauge, 3.5 inch spinal quinke needle that was introduced under fluoroscopic guidance in the AP and Lateral views. Once the needle tip was in the area of the joint, and there was no blood, contrast dye Omnipaque (300mg/mL) was injected to confirm placement and there was no vascular runoff. Fluoroscopic imaging in the AP and lateral views revealed a clear outline of the joint space. 3 mL of the medication mixture listed above was injected slowly intraarticular and ivan-articular. Displacement of the radio opaque contrast after injection of the medication confirmed that the medication went into the area of the joint. The needles were  removed and bleeding was nil. The same procedure was repeated on the contralateral side. A sterile dressing was applied. No specimens collected. The patient tolerated the procedure well.       The patient was monitored after the procedure in the recovery area. They were given post-procedure and discharge instructions to follow at home. The patient was discharged in a stable condition.    Marleen Mcgill MD

## 2025-02-07 ENCOUNTER — PATIENT MESSAGE (OUTPATIENT)
Dept: ADMINISTRATIVE | Facility: OTHER | Age: 60
End: 2025-02-07
Payer: OTHER GOVERNMENT

## 2025-02-20 ENCOUNTER — OFFICE VISIT (OUTPATIENT)
Dept: PAIN MEDICINE | Facility: CLINIC | Age: 60
End: 2025-02-20
Payer: OTHER GOVERNMENT

## 2025-02-20 VITALS
DIASTOLIC BLOOD PRESSURE: 80 MMHG | WEIGHT: 180.56 LBS | BODY MASS INDEX: 30.99 KG/M2 | HEART RATE: 97 BPM | SYSTOLIC BLOOD PRESSURE: 138 MMHG

## 2025-02-20 DIAGNOSIS — M79.18 MYOFASCIAL MUSCLE PAIN: ICD-10-CM

## 2025-02-20 DIAGNOSIS — F11.90 CHRONIC, CONTINUOUS USE OF OPIOIDS: ICD-10-CM

## 2025-02-20 DIAGNOSIS — M54.50 CHRONIC BILATERAL LOW BACK PAIN WITHOUT SCIATICA: ICD-10-CM

## 2025-02-20 DIAGNOSIS — G89.29 CHRONIC BILATERAL LOW BACK PAIN WITHOUT SCIATICA: ICD-10-CM

## 2025-02-20 DIAGNOSIS — M47.819 FACET ARTHROPATHY: ICD-10-CM

## 2025-02-20 DIAGNOSIS — M54.40 ACUTE BILATERAL LOW BACK PAIN WITH SCIATICA, SCIATICA LATERALITY UNSPECIFIED: ICD-10-CM

## 2025-02-20 DIAGNOSIS — M48.061 SPINAL STENOSIS OF LUMBAR REGION, UNSPECIFIED WHETHER NEUROGENIC CLAUDICATION PRESENT: ICD-10-CM

## 2025-02-20 DIAGNOSIS — M46.1 BILATERAL SACROILIITIS: Primary | ICD-10-CM

## 2025-02-20 DIAGNOSIS — G89.29 OTHER CHRONIC PAIN: ICD-10-CM

## 2025-02-20 DIAGNOSIS — M62.838 MUSCLE SPASM: ICD-10-CM

## 2025-02-20 DIAGNOSIS — M47.816 LUMBAR SPONDYLOSIS: ICD-10-CM

## 2025-02-20 PROCEDURE — 99214 OFFICE O/P EST MOD 30 MIN: CPT | Mod: PBBFAC

## 2025-02-20 RX ORDER — TRAMADOL HYDROCHLORIDE 50 MG/1
50 TABLET ORAL EVERY 8 HOURS PRN
Qty: 90 TABLET | Refills: 0 | Status: SHIPPED | OUTPATIENT
Start: 2025-02-22

## 2025-02-20 NOTE — PROGRESS NOTES
PCP: Dallas Delgado MD    REFERRING PHYSICIAN: No ref. provider found    CHIEF COMPLAINT: lower back pain and bilateral hip pain    Original HISTORY OF PRESENT ILLNESS: Sharlene Smith presents to the clinic for the evaluation of the above pain. The back pain started 20 or more years ago.  The hip pain started 15 years ago.      Original Pain Description:  The pain is located in the center of the lower back and is radiating to the hips and groin . The pain is described as sharp. Exacerbating factors: Bending. Mitigating factors heat. Symptoms interfere with daily activity. The patient feels like symptoms have been unchanged. Patient denies night fever/night sweats, urinary incontinence, bowel incontinence, significant weight loss, significant motor weakness, and loss of sensations.    Original PAIN SCORES:  Best: Pain is 8  Worst: Pain is 8  Current: Pain is 8        2/20/2025     1:48 PM   Last 3 PDI Scores   Pain Disability Index (PDI) 28       INTERVAL HISTORY: (Newest visit at the bottom)   Interval History 5/24/24: Sharlene Smith returns for follow up. At our last visit she was having a lot of low back pain. She has been continuing her HEP and we ordered an MRI. We reviewed that MRI together today. It shows mainly facet arthropathy in the lower lumbar spine and one level L5/S1 of disc dessication. We reviewed the exam and today she was guarding on facet loading. She continues noting axial low back pain, 4-6/10, non-radiating, worse with household activities and improved with Tramadol. This is impacting her ability to do daily activities.     Interval History 08/08/2024  Patient is here for follow up of her back pain post BL L3,4,5 MBB (first injection) which she reported 80% pain relief for 1 day.  Pain has now returned to pre-injection level in the same quality of pain.  Denies new weakness, falls, numbness.  Pain continues to be worse with back extension, standing, sitting.    Interval  History 12/17/2024:  Sharlene Smith returns to clinic for follow-up after bilateral L3,4,5 RFA on 10/29/2024. She reports some pain relief. She continues with left > right mid lower back pain. The pain is worse with walking, standing and cleaning with mopping and sweeping. She has done [facility] in 2018. She does not continue HEP. She takes Tramadol 50 mg TID PRN with some relief. She denies any perceived side effects. She hopes she can eventually wean off of her pain medication. she denies recent health changes. She denies recent falls or trauma. She denies new onset fever/night sweats, urinary incontinence, bowel incontinence, significant weight changes, significant motor weakness or changes, or loss of sensations. Her pain today is 6/10.      Interval History 2/20/2025:  Sharlene Smith returns to clinic for follow-up after bilateral SI Joint injection on 2/4/2025. She reports some relief. She states her pain was exacerbated by walking around a lot during the Superbowl. She states her pain is manageable right now. She continues Tramadol 50 mg three times per day with good benefit. She states she stopped methocarbamol due to it making her feel funny at night. She would like to restart physical therapy. She denies any perceived side effects. She denies recent health changes. She denies recent falls or trauma. She denies new onset fever/night sweats, urinary incontinence, bowel incontinence, significant weight changes, significant motor weakness or changes, or loss of sensations. Her pain today is 4/10.      6 weeks of Conservative therapy:  PT: Years ago  Chiro: none  HEP: Continues HEP with her  3X/week      Treatments / Medications: (Ice/Heat/NSAIDS/APAP/etc):  Tramadol 50 mg Tid (recently dropped down from Q6h)  Pt cannot take NSAIDS (colitis)      Interventional Pain Procedures: (Previous injections)  2018 Bilat SI Joint   2019 Bilat Hip Injection  7/30/2024 - BL L3,4,5 MBB - 80% pain  relief  2024 - BL L3,4,5 MBB - 80% pain relief.  10/29/2024 - Bilateral L3,4,5 RFA - some relief  2025 - Bilateral SI joint    Past Medical History:   Diagnosis Date    Abnormal Pap smear of vagina yrs ago    possible BX?    Allergy     Arthritis     Asthma     Hyperlipidemia     Supraventricular tachycardia     SVT (supraventricular tachycardia)     Tachycardia     Ulcerative colitis      Past Surgical History:   Procedure Laterality Date    ABLATION N/A 10/08/2018    Procedure: ABLATION;  Surgeon: Shabbir Clark MD;  Location: [facility] CATH LAB;  Service: Cardiology;  Laterality: N/A;  SVT,SVT-AVNRT RFA,KRISTI,MAC,FAS,3PREP     SECTION, CLASSIC      x 2    COLONOSCOPY N/A 2015    Procedure: COLONOSCOPY;  Surgeon: Petr Miller MD;  [facility]  Service: Endoscopy;  Laterality: N/A;    COLONOSCOPY N/A 10/13/2017    Procedure: COLONOSCOPY;  Surgeon: Petr Miller MD;  [facility]  Service: Endoscopy;  Laterality: N/A;    COLONOSCOPY N/A 10/27/2020    Procedure: COLONOSCOPY;  Surgeon: Petr Miller MD;  Location: [facility]  Service: Endoscopy;  Laterality: N/A;  covid test 10/24-[place] [facility]  prep ins. emailed- [facility]    COLONOSCOPY N/A 03/10/2023    Procedure: COLONOSCOPY;  Surgeon: Nehemias Molina MD;  Location: [facility]  Service: Endoscopy;  Laterality: N/A;  pt has sutab-inst portal-any md-tb    COLONOSCOPY  2025    COLONOSCOPY N/A 2025    Procedure: COLONOSCOPY;  Surgeon: Wilder Gottlieb MD;  Location: [facility];  Service: Endoscopy;  Laterality: N/A;    ESOPHAGOGASTRODUODENOSCOPY      ESOPHAGOGASTRODUODENOSCOPY N/A 2025    Procedure: EGD (ESOPHAGOGASTRODUODENOSCOPY);  Surgeon: Wilder Gottlieb MD;  Location: [facility];  Service: Endoscopy;  Laterality: N/A;    INJECTION OF ANESTHETIC AGENT AROUND NERVE Bilateral 2024    Procedure: BLOCK, NERVE BILATERAL L3, 4, 5 MEDIAL BRANCH;   Surgeon: Marleen Mcgill MD;  Location: [facility] PAIN MGT;  Service: Pain Management;  Laterality: Bilateral;  [phone]    INJECTION OF ANESTHETIC AGENT AROUND NERVE Bilateral 2024    Procedure: BLOCK, NERVE BILATERAL L3-5 MEDIAL BRANCH;  Surgeon: Marleen Mcgill MD;  Location: [facility] PAIN MGT;  Service: Pain Management;  Laterality: Bilateral;  [phone]    INJECTION OF JOINT Bilateral 2019    Procedure: Injection, Joint,  Hip--Bilateral;  Surgeon: Chauncey Clay Jr., MD;  Location: [facility] PAIN MGT;  Service: Pain Management;  Laterality: Bilateral;    INJECTION, SACROILIAC JOINT Bilateral 2025    Procedure: INJECTION,SACROILIAC JOINT BILATERAL;  Surgeon: Marleen Mcgill MD;  Location: [facility] PAIN MGT;  Service: Pain Management;  Laterality: Bilateral;  2 WK F/U DENNIS  * COLONOSCOPY    RADIOFREQUENCY ABLATION Bilateral 10/29/2024    Procedure: RADIOFREQUENCY ABLATION BILATERAL L3-5;  Surgeon: Marleen Mcgill MD;  Location: [facility] PAIN MGT;  Service: Pain Management;  Laterality: Bilateral;  [phone]  4 WK F/U DENNIS     Social History     Socioeconomic History    Marital status:    Tobacco Use    Smoking status: Former     Current packs/day: 0.00     Types: Cigarettes     Quit date: 1989     Years since quittin.0    Smokeless tobacco: Never   Substance and Sexual Activity    Alcohol use: Yes     Alcohol/week: 0.0 standard drinks of alcohol     Comment: events - couple times a month    Drug use: No    Sexual activity: Yes     Partners: Male     Birth control/protection: Post-menopausal, None     Comment: Pills   Social History Narrative    Works for [employer]     Social Drivers of Health     Financial Resource Strain: Low Risk  (2024)    Overall Financial Resource Strain (CARDIA)     Difficulty of Paying Living Expenses: Not hard at all   Food Insecurity: No Food Insecurity (2024)    Hunger Vital Sign     Worried About Running Out of Food in the Last Year: Never  true     Ran Out of Food in the Last Year: Never true   Transportation Needs: No Transportation Needs (6/20/2022)    PRAPARE - Transportation     Lack of Transportation (Medical): No     Lack of Transportation (Non-Medical): No   Physical Activity: Insufficiently Active (6/5/2024)    Exercise Vital Sign     Days of Exercise per Week: 2 days     Minutes of Exercise per Session: 30 min   Stress: No Stress Concern Present (6/5/2024)    [employer] - Occupational Stress Questionnaire     Feeling of Stress : Only a little   Housing Stability: Unknown (6/20/2022)    Housing Stability Vital Sign     Unable to Pay for Housing in the Last Year: No     Unstable Housing in the Last Year: No     Family History   Problem Relation Name Age of Onset    Hyperlipidemia Mother JDiaz     Multiple myeloma Mother JDiaz 75        passsed at 85    Cataracts Mother JDiaz     Allergies Mother JDiaz     Hypertension Mother JDiaz     Heart attack Father EDIAZ     Diabetes Father EDIAZ     Breast cancer Sister  63    Arthritis Maternal Grandmother SHaynes     Glaucoma Maternal Grandfather      Heart attack Paternal Grandmother CDiaz     Arthritis Paternal Grandmother CDiaz     Breast cancer Other niece 47    Heart disease Neg Hx      Colon cancer Neg Hx      Esophageal cancer Neg Hx      Amblyopia Neg Hx      Blindness Neg Hx      Macular degeneration Neg Hx      Retinal detachment Neg Hx      Strabismus Neg Hx      Stroke Neg Hx      Thyroid disease Neg Hx      Angioedema Neg Hx      Asthma Neg Hx      Atopy Neg Hx      Eczema Neg Hx      Immunodeficiency Neg Hx      Rhinitis Neg Hx      Urticaria Neg Hx      Ovarian cancer Neg Hx      Lung cancer Neg Hx         Review of patient's allergies indicates:  No Known Allergies    Current Outpatient Medications   Medication Sig    albuterol (VENTOLIN HFA) 90 mcg/actuation inhaler Inhale 2 puffs into the lungs every 6 (six) hours as needed for Wheezing. Rescue     atorvastatin (LIPITOR) 20 MG tablet Take 1 tablet (20 mg total) by mouth every evening.    diclofenac sodium (VOLTAREN) 1 % Gel Apply 2 g topically 2 (two) times daily as needed.    estradioL (DIVIGEL) 0.5 mg/0.5 gram (0.1 %) GlPk Apply one packet to upper inner thigh daily    fluticasone propionate (FLONASE) 50 mcg/actuation nasal spray SHAKE LIQUID AND USE 1 SPRAY IN EACH NOSTRIL EVERY DAY    methocarbamoL (ROBAXIN) 500 MG Tab Take 1 tablet (500 mg total) by mouth 4 (four) times daily as needed (muscle spasms).    pantoprazole (PROTONIX) 40 MG tablet TAKE 1 TABLET(40 MG) BY MOUTH DAILY    progesterone (PROMETRIUM) 100 MG capsule TAKE ONE CAPSULE BY MOUTH EVERY EVENING    traMADoL (ULTRAM) 50 mg tablet Take 1 tablet (50 mg total) by mouth every 8 (eight) hours as needed for Pain.     No current facility-administered medications for this visit.     Facility-Administered Medications Ordered in Other Visits   Medication    0.9%  NaCl infusion       ROS:  GENERAL: No fever. No chills. No fatigue. Denies weight loss. Denies weight gain.  HEENT: Denies headaches. Denies vision change. Denies eye pain. Denies double vision. Denies ear pain.   CV: Denies chest pain.   PULM: Denies of shortness of breath.  GI: Denies constipation. No diarrhea. No abdominal pain. Denies nausea. Denies vomiting. No blood in stool.  HEME: Denies bleeding problems.  : Denies urgency. No painful urination. No blood in urine.  MS: Denies joint stiffness. Denies joint swelling.  Endorses central lower back pain.  Endorses hip and groin pain.  SKIN: Denies rash.   NEURO: Denies seizures. No weakness.  PSYCH:  Denies difficulty sleeping. No anxiety. Denies depression. No suicidal thoughts.       VITALS:   Vitals:    02/20/25 1347 02/20/25 1348   BP: 138/80    Pulse: 97    Weight: 81.9 kg (180 lb 8.9 oz)    PainSc:   4   4   PainLoc: Back            PHYSICAL EXAM:   GENERAL: Well appearing, in no acute distress, alert and oriented x3.  PSYCH:  Mood  and affect appropriate.  SKIN: Skin color, texture, turgor normal, no rashes or lesions.  HEENT:  Normocephalic, atraumatic. Cranial nerves grossly intact.  PULM: No evidence of respiratory difficulty, symmetric chest rise.  GI:  Non-distended  BACK: Normal range of motion. No pain to palpation over the spinous processes. Mild pain with extension. Positive axial loading test bilaterally left > right. Positive tenderness over bilateral SIJ left > right with positive thigh and sacral thrust test, Positive FABERE,Ganselin and Yeoman's test bilaterally. Negative FADIR   EXTREMITIES: No deformities, edema, or skin discoloration.     MUSCULOSKELETAL: Mild tenderness over bilateral piriformis. No atrophy is noted.  Neurovascularly intact x4.  Motor and sensory intact x4  NEURO: Sensation is equal and appropriate bilaterally. Bilateral upper and lower extremity strength is normal and symmetric. Bilateral upper and lower extremity coordination and muscle stretch reflexes are physiologic and symmetric.   GAIT: normal.      LABS: NA      IMAGING:    MRI LUMBAR SPINE WITHOUT CONTRAST     CLINICAL HISTORY:  Low back pain, symptoms persist with > 6wks conservative treatment; Dorsalgia, unspecified     TECHNIQUE:  Multiplanar, multisequence MR images were acquired from the thoracolumbar junction to the sacrum without the administration of contrast.     COMPARISON:  Lumbar spine radiographs 05/02/2024     FINDINGS:  The marrow demonstrates homogeneous signal.  Vertebral body heights are maintained.  Disc desiccation L5-S1.  Conus terminates appropriately at L1-2.     Multilevel degenerative change as diesel below:     T12-L1: No focal disc abnormality.  No significant canal or neural foraminal narrowing.     L1-2: No focal disc abnormality.  No significant canal or neural foraminal narrowing.     L2-3: No focal disc abnormality.  No significant canal or neural foraminal narrowing.     L3-4: Mild facet arthropathy with no  significant canal or neural foraminal narrowing.     L4-5: Facet arthropathy with thickening of the ligamentum flavum.  No significant canal or neural foraminal narrowing.     L5-S1: Minimal posterior circumferential disc bulge with right posterior paracentral annular fissure.  Facet arthropathy with thickening of the ligamentum flavum without significant canal or neural foraminal narrowing.     Impression:     Mild multilevel degenerative change predominantly on the basis of facet arthropathy.  No significant canal or neural foraminal narrowing.        Electronically signed by:Elizabeth Hinton  Date:                                            05/10/2024  Time:                                           09:19      ASSESSMENT: 59 y.o. year old female with pain, consistent with:    Encounter Diagnoses   Name Primary?    Bilateral sacroiliitis Yes    Lumbar spondylosis     Facet arthropathy     Spinal stenosis of lumbar region, unspecified whether neurogenic claudication present     Muscle spasm     Chronic, continuous use of opioids     Other chronic pain     Myofascial muscle pain          DISCUSSION: Sharlene Smith comes to us with lower back and bilat hip pain which is worst with bending forward. Xray shows degenerative facet arthropathy of the lower lumbar spine. Exam shows pain reproduced with direct palpation of the affected area. She has been prescribed tramadol for 5 years.  Dr. Delgado referred her to us to assist with management.     OPIOID MANAGEMENT:  MME: 15  Risk: Low  : Reviewed today  Naloxone:   Utox: 8/8/2024  Violations: None  Contract: 5/2/24      PLAN:  Continue HEP  Continue tramadol TID, #90, last refilled 1/23/2025.   The patient is here today for a refill of current pain medications and they believe these provide effective pain control and improvements in quality of life.  The patient notes no serious side effects, and feels the benefits outweigh the risks.  The patient was reminded of  the pain contract that they signed previously as well as the risks and benefits of the medication including possible death.  The updated [place] Board  Pharmacy prescription monitoring program was reviewed, and the patient has been found to be compliant with current treatment plan. Medication management provided by Dr. Mcgill.   D/C Methocarbamol due to side effects.   UDS 8/8/2024 reviewed and appropriate.  She is s/p bilateral SI joint injections with some relief  Encouraged daily HEP.   RTC 3 months or sooner.     Nancy Brown NP   02/20/2025     The above plan and management options were discussed at length with patient. Patient is in agreement with the above and verbalized understanding.

## 2025-03-19 DIAGNOSIS — M54.50 CHRONIC BILATERAL LOW BACK PAIN WITHOUT SCIATICA: ICD-10-CM

## 2025-03-19 DIAGNOSIS — M79.18 MYOFASCIAL MUSCLE PAIN: ICD-10-CM

## 2025-03-19 DIAGNOSIS — G89.29 CHRONIC BILATERAL LOW BACK PAIN WITHOUT SCIATICA: ICD-10-CM

## 2025-03-19 DIAGNOSIS — M54.40 ACUTE BILATERAL LOW BACK PAIN WITH SCIATICA, SCIATICA LATERALITY UNSPECIFIED: ICD-10-CM

## 2025-03-20 RX ORDER — TRAMADOL HYDROCHLORIDE 50 MG/1
50 TABLET ORAL EVERY 8 HOURS PRN
Qty: 90 TABLET | Refills: 0 | Status: SHIPPED | OUTPATIENT
Start: 2025-03-20

## 2025-03-31 ENCOUNTER — OFFICE VISIT (OUTPATIENT)
Dept: PRIMARY CARE CLINIC | Facility: CLINIC | Age: 60
End: 2025-03-31
Payer: OTHER GOVERNMENT

## 2025-03-31 VITALS
DIASTOLIC BLOOD PRESSURE: 83 MMHG | HEIGHT: 64 IN | WEIGHT: 184.94 LBS | OXYGEN SATURATION: 95 % | BODY MASS INDEX: 31.57 KG/M2 | SYSTOLIC BLOOD PRESSURE: 114 MMHG | HEART RATE: 65 BPM

## 2025-03-31 DIAGNOSIS — Z00.00 ANNUAL PHYSICAL EXAM: Primary | ICD-10-CM

## 2025-03-31 DIAGNOSIS — M19.049 ARTHRITIS PAIN OF HAND: ICD-10-CM

## 2025-03-31 DIAGNOSIS — E78.01 FAMILIAL HYPERCHOLESTEROLEMIA: ICD-10-CM

## 2025-03-31 DIAGNOSIS — Z78.0 MENOPAUSE: ICD-10-CM

## 2025-03-31 DIAGNOSIS — J30.2 SEASONAL ALLERGIES: ICD-10-CM

## 2025-03-31 PROCEDURE — 99396 PREV VISIT EST AGE 40-64: CPT | Mod: S$PBB,,, | Performed by: NURSE PRACTITIONER

## 2025-03-31 PROCEDURE — 99999 PR PBB SHADOW E&M-EST. PATIENT-LVL IV: CPT | Mod: PBBFAC,,, | Performed by: NURSE PRACTITIONER

## 2025-03-31 PROCEDURE — 99214 OFFICE O/P EST MOD 30 MIN: CPT | Mod: PBBFAC,PN | Performed by: NURSE PRACTITIONER

## 2025-03-31 RX ORDER — FLUTICASONE PROPIONATE 50 MCG
SPRAY, SUSPENSION (ML) NASAL
Qty: 48 G | Refills: 3 | Status: SHIPPED | OUTPATIENT
Start: 2025-03-31

## 2025-03-31 RX ORDER — ATORVASTATIN CALCIUM 20 MG/1
20 TABLET, FILM COATED ORAL NIGHTLY
Qty: 90 TABLET | Refills: 3 | Status: SHIPPED | OUTPATIENT
Start: 2025-03-31

## 2025-03-31 RX ORDER — DICLOFENAC SODIUM 10 MG/G
2 GEL TOPICAL 2 TIMES DAILY PRN
Qty: 50 G | Refills: 1 | Status: SHIPPED | OUTPATIENT
Start: 2025-03-31

## 2025-03-31 RX ORDER — PANTOPRAZOLE SODIUM 40 MG/1
40 TABLET, DELAYED RELEASE ORAL DAILY
Qty: 90 TABLET | Refills: 0 | Status: SHIPPED | OUTPATIENT
Start: 2025-03-31

## 2025-03-31 RX ORDER — ALBUTEROL SULFATE 90 UG/1
2 INHALANT RESPIRATORY (INHALATION) EVERY 6 HOURS PRN
Qty: 18 G | Refills: 5 | Status: SHIPPED | OUTPATIENT
Start: 2025-03-31 | End: 2026-03-31

## 2025-03-31 NOTE — PROGRESS NOTES
[facility] Note    Chief Complaint      Chief Complaint   Patient presents with    Establish Care       History of Present Illness      Sharlene Simth is a 59 y.o. female who presents today for   Chief Complaint   Patient presents with    Establish Care         History of Present Illness    CHIEF COMPLAINT:  Patient presents for an annual physical exam and to establish care with a new PCP in [facility].    HPI:  Patient reports chronic back pain currently managed with pain management treatments, including injections. She mentions a minor back issue she is addressing. Patient also notes having arthritis, which she attributes to aging. She generally feels well. Patient has a history of ulcerative colitis, for which she has undergone multiple colonoscopies. She previously received hormone replacement therapy injections but discontinued due to back pain and other symptoms. Currently, she only takes progesterone and uses a gel for hormone replacement therapy. Patient has a history of superventricular tachycardia (SVT) but reports no episodes since undergoing a procedure in 2018.    Patient denies current migraines and active ear infections.    MEDICAL HISTORY:  Patient has a history of asthma, high cholesterol, superventricular tachycardia (SVT), and ulcerative colitis. Patient has received the shingles vaccine.      ROS:  General: -fever, -chills, -fatigue, -weight gain, -weight loss  Eyes: -vision changes, -redness, -discharge  ENT: -ear pain, -nasal congestion, -sore throat  Cardiovascular: -chest pain, -palpitations, -lower extremity edema  Respiratory: -cough, -shortness of breath, +difficulty breathing  Gastrointestinal: -abdominal pain, -nausea, -vomiting, -diarrhea, -constipation, -blood in stool, +indigestion  Genitourinary: -dysuria, -hematuria, -frequency  Musculoskeletal: +joint pain, -muscle pain, +back pain  Skin: -rash, -lesion  Neurological: -headache, -dizziness, -numbness,  -tingling  Psychiatric: -anxiety, -depression, -sleep difficulty           Family History:  family history includes Allergies in her mother; Arthritis in her maternal grandmother and paternal grandmother; Asthma in her sister; Breast cancer (age of onset: 47) in an other family member; Breast cancer (age of onset: 63) in her sister; Cataracts in her mother; Diabetes in her father; Glaucoma in her maternal grandfather; Heart attack in her father and paternal grandmother; Hyperlipidemia in her mother; Hypertension in her brother and mother; Multiple myeloma (age of onset: 75) in her mother; No Known Problems in her paternal grandfather, sister, son, and son.   Family history was reviewed with patient.     Medications:  Encounter Medications[1]    Allergies:  Review of patient's allergies indicates:  No Known Allergies    Health Maintenance:  Health Maintenance   Topic Date Due    Pneumococcal Vaccines (Age 50+) (1 of 1 - PCV) Never done    COVID-19 Vaccine (6 - 2024-25 season) 09/01/2024    Mammogram  10/31/2025    Colorectal Cancer Screening  01/16/2027    Hemoglobin A1c (Diabetic Prevention Screening)  02/08/2027    TETANUS VACCINE  06/22/2028    Cervical Cancer Screening  12/20/2028    Lipid Panel  02/08/2029    RSV Vaccine (Age 60+ and Pregnant patients) (1 - 1-dose 75+ series) 05/29/2041    Hepatitis C Screening  Completed    Sign Pain Contract  Completed    Complete Opioid Risk Tool  Completed    Shingles Vaccine  Completed    Influenza Vaccine  Completed    HIV Screening  Completed     Health Maintenance Topics with due status: Not Due       Topic Last Completion Date    TETANUS VACCINE 06/22/2018    Cervical Cancer Screening 12/20/2023    Hemoglobin A1c (Diabetic Prevention Screening) 02/08/2024    Lipid Panel 02/08/2024    Mammogram 10/31/2024    Colorectal Cancer Screening 01/16/2025    RSV Vaccine (Age 60+ and Pregnant patients) Not Due       Physical Exam      Vital Signs  Pulse: 65  SpO2: 95 %  BP:  "114/83  BP Location: Right arm  Patient Position: Sitting  Pain Score: 0-No pain  Height and Weight  Height: 5' 4" (162.6 cm)  Weight: 83.9 kg (184 lb 15.5 oz)  BSA (Calculated - sq m): 1.95 sq meters  BMI (Calculated): 31.7  Weight in (lb) to have BMI = 25: 145.3]    Physical Exam    General: No acute distress. Well-developed. Well-nourished.  Eyes: EOMI. Sclerae anicteric.  HENT: Normocephalic. Atraumatic. Nares patent. Moist oral mucosa.  Cardiovascular: Regular rate. Regular rhythm. No murmurs. No rubs. No gallops. Normal S1, S2.  Respiratory: Normal respiratory effort. Clear to auscultation bilaterally. No rales. No rhonchi. No wheezing.  Musculoskeletal: No  obvious deformity.  Extremities: No lower extremity edema.  Neurological: Alert & oriented x3. No slurred speech. Normal gait.  Psychiatric: Normal mood. Normal affect. Good insight. Good judgment.  Skin: Warm. Dry. No rash.            Assessment/Plan     Sharlene Smith is a 59 y.o.female with:    Assessment & Plan    M54.50 Low back pain, unspecified  K51.90 Ulcerative colitis, unspecified, without complications  J45.909 Unspecified asthma, uncomplicated  E78.5 Hyperlipidemia, unspecified  K21.9 Gastro-esophageal reflux disease without esophagitis  Z86.79 Personal history of other diseases of the circulatory system  Z91.82 Personal history of  deployment  Z82.49 Family history of ischemic heart disease and other diseases of the circulatory system  Z80.3 Family history of malignant neoplasm of breast    IMPRESSION:  - Reviewed and updated problem list, removing resolved conditions like migraines and otitis.  - Noted history of SVT with successful ablation procedure in 2018, no recurrence since then.  - Acknowledged ongoing management of chronic pain and ulcerative colitis.    LOW BACK PAIN:  - Monitor the patient's ongoing back issues being managed with pain management and injections.  - Continue pain management treatment including " injections for back issues.    ULCERATIVE COLITIS:  - Monitor the patient's history of ulcerative colitis.    ASTHMA:  - Continue albuterol inhaler as needed.  - Continue Flonase nasal spray.  - Performed lung exam and found good lung sounds.  - Refilled albuterol inhaler prescription.    HYPERLIPIDEMIA:  - Order comprehensive lab panel including cholesterol to assess overall health status.  - Screen cholesterol levels.  - Continue statin medication.    GASTROESOPHAGEAL REFLUX DISEASE:  - Continue Protonix for gastroesophageal reflux disease.  - Refilled Protonix prescription for gastric reflux.    HISTORY OF SUPRAVENTRICULAR TACHYCARDIA:  - Monitor the patient's history of supraventricular tachycardia (SVT).  - Note that the patient has not had an episode since a procedure in 2018.    FAMILY HISTORY OF HEART DISEASE AND DIABETES:  - Monitor the patient's family history: father had a myocardial infarction and diabetes; paternal grandmother had a myocardial infarction.    BREAST CANCER SCREENING:  - Contact the office in October to order mammogram.  - Note that the patient's niece has breast cancer.  - Plan to order mammogram in October.    OTHER NOTES:  - Note that the patient's  is a retired [employer] .          As above, continue current medications and maintain follow up with specialists.  Return to clinic as needed.    Greater than 50% of visit was spent face to face with patient.  All questions were answered to patient's satisfaction.          Karen L Spencer, NP-C [facility]      This note was generated with the assistance of ambient listening technology. Verbal consent was obtained by the patient and accompanying visitor(s) for the recording of patient appointment to facilitate this note. I attest to having reviewed and edited the generated note for accuracy, though some syntax or spelling errors may persist. Please contact the author of this note for any clarification.                    [1]   Outpatient Encounter Medications as of 3/31/2025   Medication Sig Dispense Refill    progesterone (PROMETRIUM) 100 MG capsule TAKE ONE CAPSULE BY MOUTH EVERY EVENING 30 capsule 11    traMADoL (ULTRAM) 50 mg tablet Take 1 tablet (50 mg total) by mouth every 8 (eight) hours as needed for Pain. 90 tablet 0    [DISCONTINUED] albuterol (VENTOLIN HFA) 90 mcg/actuation inhaler Inhale 2 puffs into the lungs every 6 (six) hours as needed for Wheezing. Rescue 18 g 0    [DISCONTINUED] atorvastatin (LIPITOR) 20 MG tablet Take 1 tablet (20 mg total) by mouth every evening. 90 tablet 0    [DISCONTINUED] diclofenac sodium (VOLTAREN) 1 % Gel Apply 2 g topically 2 (two) times daily as needed. 50 g 1    [DISCONTINUED] fluticasone propionate (FLONASE) 50 mcg/actuation nasal spray SHAKE LIQUID AND USE 1 SPRAY IN EACH NOSTRIL EVERY DAY 48 g 3    [DISCONTINUED] pantoprazole (PROTONIX) 40 MG tablet TAKE 1 TABLET(40 MG) BY MOUTH DAILY 90 tablet 0    albuterol (VENTOLIN HFA) 90 mcg/actuation inhaler Inhale 2 puffs into the lungs every 6 (six) hours as needed for Wheezing. Rescue 18 g 5    atorvastatin (LIPITOR) 20 MG tablet Take 1 tablet (20 mg total) by mouth every evening. 90 tablet 3    diclofenac sodium (VOLTAREN) 1 % Gel Apply 2 g topically 2 (two) times daily as needed. 50 g 1    fluticasone propionate (FLONASE) 50 mcg/actuation nasal spray SHAKE LIQUID AND USE 1 SPRAY IN EACH NOSTRIL EVERY DAY 48 g 3    pantoprazole (PROTONIX) 40 MG tablet Take 1 tablet (40 mg total) by mouth once daily. 90 tablet 0    [DISCONTINUED] estradioL (DIVIGEL) 0.5 mg/0.5 gram (0.1 %) GlPk Apply one packet to upper inner thigh daily (Patient not taking: Reported on 3/31/2025) 30 packet 11    [DISCONTINUED] methocarbamoL (ROBAXIN) 500 MG Tab Take 1 tablet (500 mg total) by mouth 4 (four) times daily as needed (muscle spasms). (Patient not taking: Reported on 3/31/2025) 120 tablet 0    [DISCONTINUED] traMADoL (ULTRAM) 50 mg tablet Take 1 tablet (50  mg total) by mouth every 8 (eight) hours as needed for Pain. 90 tablet 0     Facility-Administered Encounter Medications as of 3/31/2025   Medication Dose Route Frequency Provider Last Rate Last Admin    0.9%  NaCl infusion   Intravenous Continuous Netta Amaro, NP   New Bag at 01/16/25 0632

## 2025-04-04 ENCOUNTER — RESULTS FOLLOW-UP (OUTPATIENT)
Dept: PRIMARY CARE CLINIC | Facility: CLINIC | Age: 60
End: 2025-04-04

## 2025-04-04 ENCOUNTER — LAB VISIT (OUTPATIENT)
Dept: LAB | Facility: HOSPITAL | Age: 60
End: 2025-04-04
Payer: OTHER GOVERNMENT

## 2025-04-04 DIAGNOSIS — Z00.00 ANNUAL PHYSICAL EXAM: ICD-10-CM

## 2025-04-04 LAB
ABSOLUTE EOSINOPHIL (OHS): 0.15 K/UL
ABSOLUTE MONOCYTE (OHS): 0.31 K/UL (ref 0.3–1)
ABSOLUTE NEUTROPHIL COUNT (OHS): 4.88 K/UL (ref 1.8–7.7)
ALBUMIN SERPL BCP-MCNC: 3.9 G/DL (ref 3.5–5.2)
ALP SERPL-CCNC: 96 UNIT/L (ref 40–150)
ALT SERPL W/O P-5'-P-CCNC: 19 UNIT/L (ref 10–44)
ANION GAP (OHS): 9 MMOL/L (ref 8–16)
AST SERPL-CCNC: 17 UNIT/L (ref 11–45)
BASOPHILS # BLD AUTO: 0.06 K/UL
BASOPHILS NFR BLD AUTO: 0.8 %
BILIRUB SERPL-MCNC: 0.5 MG/DL (ref 0.1–1)
BUN SERPL-MCNC: 10 MG/DL (ref 6–20)
CALCIUM SERPL-MCNC: 9.3 MG/DL (ref 8.7–10.5)
CHLORIDE SERPL-SCNC: 107 MMOL/L (ref 95–110)
CHOLEST SERPL-MCNC: 186 MG/DL (ref 120–199)
CHOLEST/HDLC SERPL: 2.4 {RATIO} (ref 2–5)
CO2 SERPL-SCNC: 24 MMOL/L (ref 23–29)
CREAT SERPL-MCNC: 0.6 MG/DL (ref 0.5–1.4)
EAG (OHS): 126 MG/DL (ref 68–131)
ERYTHROCYTE [DISTWIDTH] IN BLOOD BY AUTOMATED COUNT: 11.8 % (ref 11.5–14.5)
GFR SERPLBLD CREATININE-BSD FMLA CKD-EPI: >60 ML/MIN/1.73/M2
GLUCOSE SERPL-MCNC: 111 MG/DL (ref 70–110)
HBA1C MFR BLD: 6 % (ref 4–5.6)
HCT VFR BLD AUTO: 39.3 % (ref 37–48.5)
HDLC SERPL-MCNC: 77 MG/DL (ref 40–75)
HDLC SERPL: 41.4 % (ref 20–50)
HGB BLD-MCNC: 13 GM/DL (ref 12–16)
IMM GRANULOCYTES # BLD AUTO: 0.02 K/UL (ref 0–0.04)
IMM GRANULOCYTES NFR BLD AUTO: 0.3 % (ref 0–0.5)
LDLC SERPL CALC-MCNC: 93.6 MG/DL (ref 63–159)
LYMPHOCYTES # BLD AUTO: 2.08 K/UL (ref 1–4.8)
MCH RBC QN AUTO: 30.4 PG (ref 27–31)
MCHC RBC AUTO-ENTMCNC: 33.1 G/DL (ref 32–36)
MCV RBC AUTO: 92 FL (ref 82–98)
NONHDLC SERPL-MCNC: 109 MG/DL
NUCLEATED RBC (/100WBC) (OHS): 0 /100 WBC
PLATELET # BLD AUTO: 345 K/UL (ref 150–450)
PMV BLD AUTO: 10.2 FL (ref 9.2–12.9)
POTASSIUM SERPL-SCNC: 3.7 MMOL/L (ref 3.5–5.1)
PROT SERPL-MCNC: 7.3 GM/DL (ref 6–8.4)
RBC # BLD AUTO: 4.28 M/UL (ref 4–5.4)
RELATIVE EOSINOPHIL (OHS): 2 %
RELATIVE LYMPHOCYTE (OHS): 27.7 % (ref 18–48)
RELATIVE MONOCYTE (OHS): 4.1 % (ref 4–15)
RELATIVE NEUTROPHIL (OHS): 65.1 % (ref 38–73)
SODIUM SERPL-SCNC: 140 MMOL/L (ref 136–145)
T4 FREE SERPL-MCNC: 1 NG/DL (ref 0.71–1.51)
TRIGL SERPL-MCNC: 77 MG/DL (ref 30–150)
TSH SERPL-ACNC: 0.63 UIU/ML (ref 0.4–4)
WBC # BLD AUTO: 7.5 K/UL (ref 3.9–12.7)

## 2025-04-04 PROCEDURE — 84439 ASSAY OF FREE THYROXINE: CPT

## 2025-04-04 PROCEDURE — 84443 ASSAY THYROID STIM HORMONE: CPT

## 2025-04-04 PROCEDURE — 80061 LIPID PANEL: CPT

## 2025-04-04 PROCEDURE — 36415 COLL VENOUS BLD VENIPUNCTURE: CPT | Mod: PN

## 2025-04-04 PROCEDURE — 83036 HEMOGLOBIN GLYCOSYLATED A1C: CPT

## 2025-04-04 PROCEDURE — 80053 COMPREHEN METABOLIC PANEL: CPT

## 2025-04-04 PROCEDURE — 85025 COMPLETE CBC W/AUTO DIFF WBC: CPT

## 2025-04-18 ENCOUNTER — PATIENT MESSAGE (OUTPATIENT)
Dept: PAIN MEDICINE | Facility: CLINIC | Age: 60
End: 2025-04-18
Payer: OTHER GOVERNMENT

## 2025-04-18 DIAGNOSIS — G89.29 CHRONIC BILATERAL LOW BACK PAIN WITHOUT SCIATICA: ICD-10-CM

## 2025-04-18 DIAGNOSIS — M54.40 ACUTE BILATERAL LOW BACK PAIN WITH SCIATICA, SCIATICA LATERALITY UNSPECIFIED: ICD-10-CM

## 2025-04-18 DIAGNOSIS — M54.50 CHRONIC BILATERAL LOW BACK PAIN WITHOUT SCIATICA: ICD-10-CM

## 2025-04-18 DIAGNOSIS — M79.18 MYOFASCIAL MUSCLE PAIN: ICD-10-CM

## 2025-04-21 RX ORDER — TRAMADOL HYDROCHLORIDE 50 MG/1
50 TABLET ORAL EVERY 8 HOURS PRN
Qty: 90 TABLET | Refills: 0 | Status: SHIPPED | OUTPATIENT
Start: 2025-04-21

## 2025-05-21 ENCOUNTER — LAB VISIT (OUTPATIENT)
Dept: LAB | Facility: OTHER | Age: 60
End: 2025-05-21
Payer: OTHER GOVERNMENT

## 2025-05-21 ENCOUNTER — OFFICE VISIT (OUTPATIENT)
Dept: PAIN MEDICINE | Facility: CLINIC | Age: 60
End: 2025-05-21
Payer: OTHER GOVERNMENT

## 2025-05-21 VITALS
SYSTOLIC BLOOD PRESSURE: 145 MMHG | DIASTOLIC BLOOD PRESSURE: 95 MMHG | WEIGHT: 183 LBS | BODY MASS INDEX: 31.24 KG/M2 | HEART RATE: 102 BPM | HEIGHT: 64 IN

## 2025-05-21 DIAGNOSIS — F11.20 UNCOMPLICATED OPIOID DEPENDENCE: Primary | ICD-10-CM

## 2025-05-21 DIAGNOSIS — M54.50 CHRONIC BILATERAL LOW BACK PAIN WITHOUT SCIATICA: ICD-10-CM

## 2025-05-21 DIAGNOSIS — M51.360 DEGENERATION OF INTERVERTEBRAL DISC OF LUMBAR REGION WITH DISCOGENIC BACK PAIN: ICD-10-CM

## 2025-05-21 DIAGNOSIS — M47.819 FACET ARTHROPATHY: ICD-10-CM

## 2025-05-21 DIAGNOSIS — M48.061 SPINAL STENOSIS OF LUMBAR REGION, UNSPECIFIED WHETHER NEUROGENIC CLAUDICATION PRESENT: ICD-10-CM

## 2025-05-21 DIAGNOSIS — F11.20 UNCOMPLICATED OPIOID DEPENDENCE: ICD-10-CM

## 2025-05-21 DIAGNOSIS — M79.18 MYOFASCIAL MUSCLE PAIN: ICD-10-CM

## 2025-05-21 DIAGNOSIS — M47.816 LUMBAR SPONDYLOSIS: ICD-10-CM

## 2025-05-21 DIAGNOSIS — M53.3 SACROILIAC JOINT PAIN: ICD-10-CM

## 2025-05-21 DIAGNOSIS — M47.816 FACET ARTHRITIS OF LUMBAR REGION: ICD-10-CM

## 2025-05-21 DIAGNOSIS — M54.40 ACUTE BILATERAL LOW BACK PAIN WITH SCIATICA, SCIATICA LATERALITY UNSPECIFIED: ICD-10-CM

## 2025-05-21 DIAGNOSIS — G89.29 CHRONIC BILATERAL LOW BACK PAIN WITHOUT SCIATICA: ICD-10-CM

## 2025-05-21 PROCEDURE — 80326 AMPHETAMINES 5 OR MORE: CPT

## 2025-05-21 PROCEDURE — 99213 OFFICE O/P EST LOW 20 MIN: CPT | Mod: PBBFAC

## 2025-05-21 PROCEDURE — 99214 OFFICE O/P EST MOD 30 MIN: CPT | Mod: S$PBB,,,

## 2025-05-21 PROCEDURE — 99999 PR PBB SHADOW E&M-EST. PATIENT-LVL III: CPT | Mod: PBBFAC,,,

## 2025-05-21 NOTE — PROGRESS NOTES
Interventional Pain Management - Established Visit  Follow-Up     PCP: Amber Clifton NP    REFERRING PHYSICIAN: No ref. provider found    CHIEF COMPLAINT: lower back pain and bilateral hip pain    Original HISTORY OF PRESENT ILLNESS: Sharlene Smith presents to the clinic for the evaluation of the above pain. The back pain started 20 or more years ago.  The hip pain started 15 years ago.      Original Pain Description:  The pain is located in the center of the lower back and is radiating to the hips and groin. The pain is described as sharp. Exacerbating factors: Bending. Mitigating factors heat. Symptoms interfere with daily activity. The patient feels like symptoms have been unchanged. Patient denies night fever/night sweats, urinary incontinence, bowel incontinence, significant weight loss, significant motor weakness, and loss of sensations.    Original PAIN SCORES:  Best: Pain is 8  Worst: Pain is 8  Current: Pain is 8        5/21/2025     1:38 PM   Last 3 PDI Scores   Pain Disability Index (PDI) 40       INTERVAL HISTORY: (Newest visit at the bottom)   Interval History 5/24/24: Sharlene Smith returns for follow up. At our last visit she was having a lot of low back pain. She has been continuing her HEP and we ordered an MRI. We reviewed that MRI together today. It shows mainly facet arthropathy in the lower lumbar spine and one level L5/S1 of disc dessication. We reviewed the exam and today she was guarding on facet loading. She continues noting axial low back pain, 4-6/10, non-radiating, worse with household activities and improved with Tramadol. This is impacting her ability to do daily activities.     Interval History 08/08/2024  Patient is here for follow up of her back pain post BL L3,4,5 MBB (first injection) which she reported 80% pain relief for 1 day.  Pain has now returned to pre-injection level in the same quality of pain.  Denies new weakness, falls, numbness.  Pain continues to be  worse with back extension, standing, sitting.    Interval History 12/17/2024:  Sharlene Smith returns to clinic for follow-up after bilateral L3,4,5 RFA on 10/29/2024. She reports some pain relief. She continues with left > right mid lower back pain. The pain is worse with walking, standing and cleaning with mopping and sweeping. She has done [facility] in 2018. She does not continue HEP. She takes Tramadol 50 mg TID PRN with some relief. She denies any perceived side effects. She hopes she can eventually wean off of her pain medication. she denies recent health changes. She denies recent falls or trauma. She denies new onset fever/night sweats, urinary incontinence, bowel incontinence, significant weight changes, significant motor weakness or changes, or loss of sensations. Her pain today is 6/10.      Interval History 2/20/2025:  Sharlene Smith returns to clinic for follow-up after bilateral SI Joint injection on 2/4/2025. She reports some relief. She states her pain was exacerbated by walking around a lot during the Superbowl. She states her pain is manageable right now. She continues Tramadol 50 mg three times per day with good benefit. She states she stopped methocarbamol due to it making her feel funny at night. She would like to restart physical therapy. She denies any perceived side effects. She denies recent health changes. She denies recent falls or trauma. She denies new onset fever/night sweats, urinary incontinence, bowel incontinence, significant weight changes, significant motor weakness or changes, or loss of sensations. Her pain today is 4/10.      Interval History 5/21/2025:  Sharlene Smith returns to clinic for follow-up of lower back pain. She is well-managed on Tramadol 50 mg three times per day as needed. She is wondering if she can get it for 4 times per day. She denies any perceived side effects. She continues HEP 3 times per week.  She denies recent health changes. She  denies recent falls or trauma. She denies new onset fever/night sweats, urinary incontinence, bowel incontinence, significant weight changes, significant motor weakness or changes, or loss of sensations. Her pain today is 5/10.      6 weeks of Conservative therapy:  PT: Years ago  Chiro: none  HEP: Continues HEP with her  3X/week      Treatments / Medications: (Ice/Heat/NSAIDS/APAP/etc):  Tramadol 50 mg Tid (recently dropped down from Q6h)  Pt cannot take NSAIDS (colitis)      Interventional Pain Procedures: (Previous injections)   Bilat SI Joint   2019 Bilat Hip Injection  2024 - BL L3,4,5 MBB - 80% pain relief  2024 - BL L3,4,5 MBB - 80% pain relief.  10/29/2024 - Bilateral L3,4,5 RFA - some relief  2025 - Bilateral SI joint    Past Medical History:   Diagnosis Date    Abnormal Pap smear of vagina yrs ago    possible BX?    Allergy     Arthritis     Asthma     Hyperlipidemia     Menstrual cycle disorder 10/16/2012    Migraine headache 10/16/2012    Otitis 10/30/2014    Supraventricular tachycardia     SVT (supraventricular tachycardia)     SVT (supraventricular tachycardia) 10/07/2013    AVNRT      Tachycardia     Ulcerative colitis      Past Surgical History:   Procedure Laterality Date    ABLATION N/A 10/08/2018    Procedure: ABLATION;  Surgeon: Shabbir Clark MD;  Location: [facility] CATH LAB;  Service: Cardiology;  Laterality: N/A;  SVT,SVT-AVNRT RFA,KRISTI,MAC,FAS,3PREP     SECTION, CLASSIC      x 2    COLONOSCOPY N/A 2015    Procedure: COLONOSCOPY;  Surgeon: Petr Miller MD;  Location: [facility] ENDO (4TH FLR);  Service: Endoscopy;  Laterality: N/A;    COLONOSCOPY N/A 10/13/2017    Procedure: COLONOSCOPY;  Surgeon: Petr Miller MD;  Location: [facility] ENDO (4TH FLR);  Service: Endoscopy;  Laterality: N/A;    COLONOSCOPY N/A 10/27/2020    Procedure: COLONOSCOPY;  Surgeon: Petr Miller MD;  Location: [facility] ENDO (4TH FLR);  Service: Endoscopy;  Laterality: N/A;  covid  test 10/24-[place] [facility]  prep ins. emailed- ER[facility]    COLONOSCOPY N/A 03/10/2023    Procedure: COLONOSCOPY;  Surgeon: Nehemias Molina MD;  [facility]  Service: Endoscopy;  Laterality: N/A;  pt has sutab-inst portal-any md-tb    COLONOSCOPY  01/16/2025    COLONOSCOPY N/A 1/16/2025    Procedure: COLONOSCOPY;  Surgeon: Wilder Gottlieb MD;  Location: [facility];  Service: Endoscopy;  Laterality: N/A;    ESOPHAGOGASTRODUODENOSCOPY      ESOPHAGOGASTRODUODENOSCOPY N/A 1/16/2025    Procedure: EGD (ESOPHAGOGASTRODUODENOSCOPY);  Surgeon: Wilder Gottlieb MD;  Location: [facility];  Service: Endoscopy;  Laterality: N/A;    INJECTION OF ANESTHETIC AGENT AROUND NERVE Bilateral 07/30/2024    Procedure: BLOCK, NERVE BILATERAL L3, 4, 5 MEDIAL BRANCH;  Surgeon: Marleen Mcgill MD;  Location: [facility] PAIN MGT;  Service: Pain Management;  Laterality: Bilateral;  [phone]    INJECTION OF ANESTHETIC AGENT AROUND NERVE Bilateral 08/20/2024    Procedure: BLOCK, NERVE BILATERAL L3-5 MEDIAL BRANCH;  Surgeon: Marleen Mcgill MD;  Location: [facility] PAIN MGT;  Service: Pain Management;  Laterality: Bilateral;  [phone]    INJECTION OF JOINT Bilateral 09/26/2019    Procedure: Injection, Joint,  Hip--Bilateral;  Surgeon: Chauncey Clay Jr., MD;  Location: [facility] PAIN MGT;  Service: Pain Management;  Laterality: Bilateral;    INJECTION, SACROILIAC JOINT Bilateral 2/4/2025    Procedure: INJECTION,SACROILIAC JOINT BILATERAL;  Surgeon: Marleen Mcgill MD;  Location: [facility] PAIN MGT;  Service: Pain Management;  Laterality: Bilateral;  2 WK F/U DENNIS  *1/16 COLONOSCOPY    RADIOFREQUENCY ABLATION Bilateral 10/29/2024    Procedure: RADIOFREQUENCY ABLATION BILATERAL L3-5;  Surgeon: Marleen Mcgill MD;  Location: [facility] PAIN MGT;  Service: Pain Management;  Laterality: Bilateral;  [phone]  4 WK F/U DENNIS     Social History     Socioeconomic History    Marital status:    Tobacco Use    Smoking status: Former      Current packs/day: 0.00     Types: Cigarettes     Quit date: 1989     Years since quittin.2    Smokeless tobacco: Never   Substance and Sexual Activity    Alcohol use: Yes     Alcohol/week: 0.0 standard drinks of alcohol     Comment: events - couple times a month    Drug use: No    Sexual activity: Yes     Partners: Male     Birth control/protection: Post-menopausal, None     Comment: Pills   Social History Narrative    Works for [employer]     Social Drivers of Health     Financial Resource Strain: Low Risk  (2024)    Overall Financial Resource Strain (CARDIA)     Difficulty of Paying Living Expenses: Not hard at all   Food Insecurity: No Food Insecurity (2024)    Hunger Vital Sign     Worried About Running Out of Food in the Last Year: Never true     Ran Out of Food in the Last Year: Never true   Transportation Needs: No Transportation Needs (2022)    PRAPARE - Transportation     Lack of Transportation (Medical): No     Lack of Transportation (Non-Medical): No   Physical Activity: Insufficiently Active (2024)    Exercise Vital Sign     Days of Exercise per Week: 2 days     Minutes of Exercise per Session: 30 min   Stress: No Stress Concern Present (2024)    [employer] [place] [employer] - Occupational Stress Questionnaire     Feeling of Stress : Only a little   Housing Stability: Unknown (2022)    Housing Stability Vital Sign     Unable to Pay for Housing in the Last Year: No     Unstable Housing in the Last Year: No     Family History   Problem Relation Name Age of Onset    Hyperlipidemia Mother JDiaz     Multiple myeloma Mother JDiaz 75        passsed at 85    Cataracts Mother JDiaz     Allergies Mother JDiaz     Hypertension Mother JDiaz     Heart attack Father EDIAZ     Diabetes Father EDIAZ     Breast cancer Sister Jacqueline 63    Asthma Sister Mar     No Known Problems Sister Luh     Hypertension Brother Thee     Arthritis Maternal Grandmother Aby      Glaucoma Maternal Grandfather      Heart attack Paternal Grandmother CDiaz     Arthritis Paternal Grandmother CDiaz     No Known Problems Paternal Grandfather      Breast cancer Other Neice Leina 47    No Known Problems Son Naseem     No Known Problems Son Mann     Heart disease Neg Hx      Colon cancer Neg Hx      Esophageal cancer Neg Hx      Amblyopia Neg Hx      Blindness Neg Hx      Macular degeneration Neg Hx      Retinal detachment Neg Hx      Strabismus Neg Hx      Stroke Neg Hx      Thyroid disease Neg Hx      Angioedema Neg Hx      Atopy Neg Hx      Eczema Neg Hx      Immunodeficiency Neg Hx      Rhinitis Neg Hx      Urticaria Neg Hx      Ovarian cancer Neg Hx      Lung cancer Neg Hx         Review of patient's allergies indicates:  No Known Allergies    Current Outpatient Medications   Medication Sig    albuterol (VENTOLIN HFA) 90 mcg/actuation inhaler Inhale 2 puffs into the lungs every 6 (six) hours as needed for Wheezing. Rescue    atorvastatin (LIPITOR) 20 MG tablet Take 1 tablet (20 mg total) by mouth every evening.    diclofenac sodium (VOLTAREN) 1 % Gel Apply 2 g topically 2 (two) times daily as needed.    fluticasone propionate (FLONASE) 50 mcg/actuation nasal spray SHAKE LIQUID AND USE 1 SPRAY IN EACH NOSTRIL EVERY DAY    pantoprazole (PROTONIX) 40 MG tablet Take 1 tablet (40 mg total) by mouth once daily.    progesterone (PROMETRIUM) 100 MG capsule TAKE ONE CAPSULE BY MOUTH EVERY EVENING    traMADoL (ULTRAM) 50 mg tablet Take 1 tablet (50 mg total) by mouth every 8 (eight) hours as needed for Pain.     No current facility-administered medications for this visit.     Facility-Administered Medications Ordered in Other Visits   Medication    0.9%  NaCl infusion       ROS:  GENERAL: No fever. No chills. No fatigue. Denies weight loss. Denies weight gain.  HEENT: Denies headaches. Denies vision change. Denies eye pain. Denies double vision. Denies ear pain.   CV: Denies chest pain.   PULM: Denies of  "shortness of breath.  GI: Denies constipation. No diarrhea. No abdominal pain. Denies nausea. Denies vomiting. No blood in stool.  HEME: Denies bleeding problems.  : Denies urgency. No painful urination. No blood in urine.  MS: Denies joint stiffness. Denies joint swelling.  Endorses central lower back pain.  Endorses hip and groin pain.  SKIN: Denies rash.   NEURO: Denies seizures. No weakness.  PSYCH:  Denies difficulty sleeping. No anxiety. Denies depression. No suicidal thoughts.       VITALS:   Vitals:    05/21/25 1335   BP: (!) 145/95   Pulse: 102   Weight: 83 kg (182 lb 15.7 oz)   Height: 5' 4" (1.626 m)   PainSc:   5   PainLoc: Back       PHYSICAL EXAM:   GENERAL: Well appearing, in no acute distress, alert and oriented x3.  PSYCH:  Mood and affect appropriate.  SKIN: Skin color, texture, turgor normal, no rashes or lesions.  HEENT:  Normocephalic, atraumatic. Cranial nerves grossly intact.  PULM: No evidence of respiratory difficulty, symmetric chest rise.  GI:  Non-distended  BACK: Normal range of motion. No pain to palpation over the spinous processes. Mild pain with extension. Positive axial loading test bilaterally left > right. Positive tenderness over bilateral SIJ left > right with positive thigh and sacral thrust test, Positive FABERE,Ganselin and Yeoman's test bilaterally. Negative FADIR   EXTREMITIES: No deformities, edema, or skin discoloration.     MUSCULOSKELETAL: Mild tenderness over bilateral piriformis. No atrophy is noted.  Neurovascularly intact x4.  Motor and sensory intact x4  NEURO: Sensation is equal and appropriate bilaterally. Bilateral upper and lower extremity strength is normal and symmetric. Bilateral upper and lower extremity coordination and muscle stretch reflexes are physiologic and symmetric.   GAIT: normal.      LABS: NA      IMAGING:    MRI LUMBAR SPINE WITHOUT CONTRAST     CLINICAL HISTORY:  Low back pain, symptoms persist with > 6wks conservative treatment; Dorsalgia, " unspecified     TECHNIQUE:  Multiplanar, multisequence MR images were acquired from the thoracolumbar junction to the sacrum without the administration of contrast.     COMPARISON:  Lumbar spine radiographs 05/02/2024     FINDINGS:  The marrow demonstrates homogeneous signal.  Vertebral body heights are maintained.  Disc desiccation L5-S1.  Conus terminates appropriately at L1-2.     Multilevel degenerative change as diesel below:     T12-L1: No focal disc abnormality.  No significant canal or neural foraminal narrowing.     L1-2: No focal disc abnormality.  No significant canal or neural foraminal narrowing.     L2-3: No focal disc abnormality.  No significant canal or neural foraminal narrowing.     L3-4: Mild facet arthropathy with no significant canal or neural foraminal narrowing.     L4-5: Facet arthropathy with thickening of the ligamentum flavum.  No significant canal or neural foraminal narrowing.     L5-S1: Minimal posterior circumferential disc bulge with right posterior paracentral annular fissure.  Facet arthropathy with thickening of the ligamentum flavum without significant canal or neural foraminal narrowing.     Impression:     Mild multilevel degenerative change predominantly on the basis of facet arthropathy.  No significant canal or neural foraminal narrowing.        Electronically signed by:Elizabeth Hinton  Date:                                            05/10/2024  Time:                                           09:19      ASSESSMENT: 59 y.o. year old female with pain, consistent with:    Encounter Diagnoses   Name Primary?    Uncomplicated opioid dependence Yes    Sacroiliac joint pain     Myofascial muscle pain     Lumbar spondylosis     Facet arthropathy     Spinal stenosis of lumbar region, unspecified whether neurogenic claudication present     Degeneration of intervertebral disc of lumbar region with discogenic back pain     Facet arthritis of lumbar region            DISCUSSION: Sharlene  Amy Smith comes to us with lower back and bilat hip pain which is worst with bending forward. Xray shows degenerative facet arthropathy of the lower lumbar spine. Exam shows pain reproduced with direct palpation of the affected area. She has been prescribed tramadol for 5 years.  Dr. Delgado referred her to us to assist with management.     OPIOID MANAGEMENT:  MME: 15  Risk: Low  : Reviewed today  Naloxone:   Utox: 8/8/2024, 5/21/2025.   Violations: None  Contract: 5/2/24      PLAN:  Previous imaging was reviewed and discussed with the patient today.   Continue tramadol TID, #90, last refilled 4/21/2025. Dr Mcgill refilled today.   She can discuss her medication regimen with Dr Mcgill.   The patient is here today for a refill of current pain medications and they believe these provide effective pain control and improvements in quality of life.  The patient notes no serious side effects, and feels the benefits outweigh the risks.  The patient was reminded of the pain contract that they signed previously as well as the risks and benefits of the medication including possible death.  The updated [place] Board [employer] prescription monitoring program was reviewed, and the patient has been found to be compliant with current treatment plan. Medication management provided by Dr. Mcgill.   UDS 8/8/2024 reviewed and appropriate. Repeat today.   Encouraged daily HEP. Provided patient with back pain exercises and stretches  RTC 3 months or sooner for annual visit with MD only.     Nancy Brown NP   05/21/2025     The above plan and management options were discussed at length with patient. Patient is in agreement with the above and verbalized understanding.

## 2025-05-22 RX ORDER — TRAMADOL HYDROCHLORIDE 50 MG/1
50 TABLET, FILM COATED ORAL EVERY 8 HOURS PRN
Qty: 90 TABLET | Refills: 0 | Status: SHIPPED | OUTPATIENT
Start: 2025-05-22

## 2025-05-23 ENCOUNTER — APPOINTMENT (OUTPATIENT)
Dept: RADIOLOGY | Facility: CLINIC | Age: 60
End: 2025-05-23
Attending: NURSE PRACTITIONER
Payer: OTHER GOVERNMENT

## 2025-05-23 DIAGNOSIS — Z78.0 MENOPAUSE: ICD-10-CM

## 2025-05-23 PROCEDURE — 77080 DXA BONE DENSITY AXIAL: CPT | Mod: 26,,, | Performed by: INTERNAL MEDICINE

## 2025-05-23 PROCEDURE — 77080 DXA BONE DENSITY AXIAL: CPT | Mod: TC,PO

## 2025-05-26 LAB
1OH-MIDAZOLAM UR QL SCN: NOT DETECTED
6MAM UR QL: NOT DETECTED
7AMINOCLONAZEPAM UR QL: NOT DETECTED
A-OH ALPRAZ UR QL: NOT DETECTED
ALPRAZ UR QL: NOT DETECTED
AMPHET UR QL SCN: NOT DETECTED
ANNOTATION COMMENT IMP: NORMAL
AR NOROXYMORPHONE (CUTOFF 100 NG/ML): NOT DETECTED
AR OXYMORPHONE (CUTOFF 40 NG/ML): NOT DETECTED
BARBITURATES UR QL: NEGATIVE
BUPRENORPHINE UR QL: NOT DETECTED
BZE UR QL: NEGATIVE
CARBOXYTHC UR QL: NEGATIVE
CARISOPRODOL UR QL: NEGATIVE
CLONAZEPAM UR QL: NOT DETECTED
CODEINE UR QL: NOT DETECTED
CREAT UR-MCNC: 176.5 MG/DL
DIAZEPAM UR QL: NOT DETECTED
ETHYL GLUCURONIDE UR QL: NEGATIVE
FENTANYL UR QL: NOT DETECTED
GABAPENTIN UR QL CFM: NOT DETECTED
HYDROCODONE UR QL: NOT DETECTED
HYDROMORPHONE UR QL: NOT DETECTED
LORAZEPAM UR QL: NOT DETECTED
MDA UR QL: NOT DETECTED
MDEA UR QL: NOT DETECTED
MDMA UR QL: NOT DETECTED
ME-PHENIDATE UR QL: NOT DETECTED
METHADONE UR QL: NEGATIVE
METHAMPHET UR QL: NOT DETECTED
MIDAZOLAM UR QL SCN: NOT DETECTED
MORPHINE UR QL: NOT DETECTED
NALOXONE UR QL CFM: NOT DETECTED
NORBUPRENORPHINE UR QL CFM: NOT DETECTED
NORDIAZEPAM UR QL: NOT DETECTED
NORFENTANYL UR QL: NOT DETECTED
NORMEPERIDINE UR QL CFM: NOT DETECTED
NOROXYCODONE UR QL CFM: NOT DETECTED
NOROXYMORPHONE UR QL SCN: NOT DETECTED
OXAZEPAM UR QL: NOT DETECTED
OXYCODONE UR QL: NOT DETECTED
PATHOLOGY STUDY: NORMAL
PCP UR QL: NEGATIVE
PHENTERMINE UR QL: NOT DETECTED
PREGABALIN UR QL CFM: NOT DETECTED
SERVICE CMNT-IMP: NORMAL
TAPENTADOL UR QL SCN: NOT DETECTED
TAPENTADOL UR QL SCN: NOT DETECTED
TEMAZEPAM UR QL: NOT DETECTED
TRAMADOL UR QL: NORMAL
ZOLPIDEM PHENYL-4-CARB UR QL SCN: NOT DETECTED
ZOLPIDEM UR QL: NOT DETECTED

## 2025-06-16 ENCOUNTER — E-VISIT (OUTPATIENT)
Dept: PRIMARY CARE CLINIC | Facility: CLINIC | Age: 60
End: 2025-06-16
Payer: OTHER GOVERNMENT

## 2025-06-16 DIAGNOSIS — K51.819 OTHER ULCERATIVE COLITIS WITH COMPLICATION: Primary | ICD-10-CM

## 2025-06-16 RX ORDER — PREDNISONE 20 MG/1
40 TABLET ORAL DAILY
Qty: 30 TABLET | Refills: 0 | Status: SHIPPED | OUTPATIENT
Start: 2025-06-16

## 2025-06-16 NOTE — PROGRESS NOTES
Patient ID: Sharlene Smith is a 60 y.o. female.        E-Visit Time Tracking:   Day 1 Time (in minutes): 20  Total Time (in minutes): 20      Chief Complaint: General Illness (Entered automatically based on patient selection in [employer].)    Ulcerative colitis flare up    The patient initiated a request through [employer] on 6/16/2025 for evaluation and management with a chief complaint of General Illness (Entered automatically based on patient selection in [employer].)     I evaluated the questionnaire submission on 06/16/2025.    [facility]-Chronic Conditions    6/16/2025  8:00 AM CDT - Filed by Patient   What do you need help with? Other Concern   Do you agree to participate in an E-Visit? Yes   If you have any of the following symptoms, please go to the nearest emergency room or call 911: I acknowledge   What is the main issue you would like addressed today? UC flareup   Please describe your symptoms. abdominal pain, frequent bowel movement, diarrhea, headache   Where is your problem located? abdominal   On a scale of 1-10, where 10 is the worst you can imagine, how severe are your symptoms? (range: 1 - 10) 7   Have you had these symptoms before? Yes   How long have you had these symptoms? A few days   What helps with your symptoms? steroid   What makes your symptoms feel worse? food   Are your symptoms related to a condition you currently have? Yes   What condition do you currently have? ulcerative colitis   When were you last seen for this condition?    Provide any additional information you feel is important. walker etienne in remission for a while and started having UC symptoms yesterday.. took over counter meds but still symptomatic   Please attach any relevant images or files    Are you able to take your vital signs? No         Encounter Diagnosis   Name Primary?    Other ulcerative colitis with complication Yes        No orders of the defined types were placed in this encounter.     Medications  Ordered This Encounter   Medications    predniSONE (DELTASONE) 20 MG tablet     Sig: Take 2 tablets (40 mg total) by mouth once daily.     Dispense:  30 tablet     Refill:  0        No follow-ups on file.

## 2025-06-18 DIAGNOSIS — G89.29 CHRONIC BILATERAL LOW BACK PAIN WITHOUT SCIATICA: ICD-10-CM

## 2025-06-18 DIAGNOSIS — M54.50 CHRONIC BILATERAL LOW BACK PAIN WITHOUT SCIATICA: ICD-10-CM

## 2025-06-18 DIAGNOSIS — M79.18 MYOFASCIAL MUSCLE PAIN: ICD-10-CM

## 2025-06-18 DIAGNOSIS — M54.40 ACUTE BILATERAL LOW BACK PAIN WITH SCIATICA, SCIATICA LATERALITY UNSPECIFIED: ICD-10-CM

## 2025-06-18 RX ORDER — TRAMADOL HYDROCHLORIDE 50 MG/1
50 TABLET, FILM COATED ORAL EVERY 8 HOURS PRN
Qty: 90 TABLET | Refills: 0 | Status: SHIPPED | OUTPATIENT
Start: 2025-06-21

## 2025-06-24 ENCOUNTER — OFFICE VISIT (OUTPATIENT)
Dept: URGENT CARE | Facility: CLINIC | Age: 60
End: 2025-06-24
Payer: OTHER GOVERNMENT

## 2025-06-24 VITALS
WEIGHT: 182 LBS | SYSTOLIC BLOOD PRESSURE: 135 MMHG | OXYGEN SATURATION: 99 % | DIASTOLIC BLOOD PRESSURE: 85 MMHG | HEART RATE: 90 BPM | RESPIRATION RATE: 18 BRPM | TEMPERATURE: 98 F | HEIGHT: 64 IN | BODY MASS INDEX: 31.07 KG/M2

## 2025-06-24 DIAGNOSIS — M72.2 PLANTAR FASCIITIS: Primary | ICD-10-CM

## 2025-06-24 DIAGNOSIS — M79.672 LEFT FOOT PAIN: ICD-10-CM

## 2025-06-24 PROCEDURE — 99213 OFFICE O/P EST LOW 20 MIN: CPT | Mod: S$GLB,,,

## 2025-06-24 RX ORDER — PREDNISONE 20 MG/1
40 TABLET ORAL DAILY
Qty: 6 TABLET | Refills: 0 | Status: SHIPPED | OUTPATIENT
Start: 2025-06-24 | End: 2025-06-27

## 2025-06-24 NOTE — PROGRESS NOTES
"Subjective:      Patient ID: Sharlene Smith is a 60 y.o. female.    Vitals:  height is 5' 4" (1.626 m) and weight is 82.6 kg (182 lb). Her oral temperature is 98.1 °F (36.7 °C). Her blood pressure is 135/85 and her pulse is 90. Her respiration is 18 and oxygen saturation is 99%.     Chief Complaint: Foot Pain    60-year-old female reports of pain to plantar aspect of her left foot.  She denies any injury or trauma.  She reports pain when flexing her toes upwards.  She has been applying Voltaren gel and naproxen with no improvement.    Foot Pain  This is a new problem. The current episode started in the past 7 days. The problem has been gradually worsening. Pertinent negatives include no abdominal pain, anorexia, arthralgias, change in bowel habit, chest pain, chills, congestion, coughing, diaphoresis, fatigue, fever, headaches, joint swelling, myalgias, nausea, neck pain, numbness, rash, sore throat, swollen glands, urinary symptoms, vertigo, visual change, vomiting or weakness. The symptoms are aggravated by walking. She has tried acetaminophen and NSAIDs (arthritis) for the symptoms. The treatment provided no relief.       Constitution: Negative for activity change, appetite change, chills, sweating, fatigue and fever.   HENT:  Negative for ear pain, ear discharge, foreign body in ear, congestion and sore throat.    Neck: Negative for neck pain and painful lymph nodes.   Cardiovascular:  Negative for chest trauma and chest pain.   Eyes:  Negative for eye trauma and foreign body in eye.   Respiratory:  Negative for sleep apnea, chest tightness and cough.    Gastrointestinal:  Negative for abdominal trauma, abdominal pain, abdominal bloating, history of abdominal surgery, nausea and vomiting.   Endocrine: hair loss and cold intolerance.   Genitourinary:  Negative for dysuria and frequency.   Musculoskeletal:  Negative for pain, trauma, joint pain, joint swelling, abnormal ROM of joint, arthritis, back pain " and muscle ache.   Skin:  Negative for color change, pale, rash and erythema.   Allergic/Immunologic: Negative for environmental allergies and seasonal allergies.   Neurological:  Negative for dizziness, history of vertigo, light-headedness, headaches, disorientation, altered mental status and numbness.   Hematologic/Lymphatic: Negative for swollen lymph nodes and easy bruising/bleeding. Does not bruise/bleed easily.   Psychiatric/Behavioral:  Negative for altered mental status and disorientation.       Objective:     Physical Exam   Constitutional: She is oriented to person, place, and time. She appears well-developed.   HENT:   Head: Normocephalic and atraumatic. Head is without abrasion, without contusion and without laceration.   Ears:   Right Ear: External ear normal.   Left Ear: External ear normal.   Nose: Nose normal.   Mouth/Throat: Oropharynx is clear and moist and mucous membranes are normal.   Eyes: Conjunctivae, EOM and lids are normal. Pupils are equal, round, and reactive to light.   Neck: Trachea normal and phonation normal. Neck supple.   Cardiovascular: Normal rate, regular rhythm and normal heart sounds.   Pulmonary/Chest: Effort normal and breath sounds normal. No stridor. No respiratory distress.   Musculoskeletal: Normal range of motion.         General: Tenderness present. No swelling, deformity or signs of injury. Normal range of motion.      Right lower leg: No edema.      Left lower leg: No edema.      Comments: Tenderness over plantar fascia of the left foot   Neurological: She is alert and oriented to person, place, and time.   Skin: Skin is warm, dry, intact and no rash. Capillary refill takes less than 2 seconds. No abrasion, No burn, No bruising, No erythema and No ecchymosis   Psychiatric: Her speech is normal and behavior is normal. Judgment and thought content normal.   Nursing note and vitals reviewed.      Assessment:     1. Plantar fasciitis    2. Left foot pain        Plan:        Plantar fasciitis  -     Ambulatory referral/consult to Podiatry  -     predniSONE (DELTASONE) 20 MG tablet; Take 2 tablets (40 mg total) by mouth once daily. for 3 days  Dispense: 6 tablet; Refill: 0    Left foot pain  -     predniSONE (DELTASONE) 20 MG tablet; Take 2 tablets (40 mg total) by mouth once daily. for 3 days  Dispense: 6 tablet; Refill: 0

## 2025-06-24 NOTE — PATIENT INSTRUCTIONS
"Take medications as prescribed.  Please follow-up with podiatry for further evaluation.    How is plantar fasciitis treated? -- The first step is to try the things you can do on your own. If you do not get better, or your symptoms are severe, your doctor or nurse might suggest:   Taping up your foot in a special way that helps the support the foot (picture 1)   Special shoe inserts made to fit your foot   Shots (that go into your foot) of a medicine called a steroid, which can help with the pain   Putting a splint over your foot and ankle   Surgery (this is only an option for some people who do not get better with other treatments)  Some doctors also suggest a treatment called "shock wave therapy." This treatment is painful and has not been proven to work.  How can I prevent getting heel pain again? -- To reduce the chances that your pain will come back:   Wear shoes that fit well, have a lot of cushion, and support your heel and ankle.   Do not wear slippers, flip-flops, slip-ons, or poorly fitted shoes.   Do not go barefoot.   Do not wear worn-out shoes.  - Rest.    - Drink plenty of fluids.    - Acetaminophen (tylenol) or Ibuprofen (advil,motrin) as directed as needed for fever/pain. Avoid tylenol if you have a history of liver disease. Do not take ibuprofen if you have a history of GI bleeding, kidney disease, or if you take blood thinners.     - Follow up with your PCP or specialty clinic as directed in the next 1-2 weeks if not improved or as needed.  You can call [phone] to schedule an appointment with the appropriate provider.    - Go to the ER or seek medical attention immediately if you develop new or worsening symptoms.     - You must understand that you have received an Urgent Care treatment only and that you may be released before all of your medical problems are known or treated.   - You, the patient, will arrange for follow up care as instructed.   - If your condition worsens or fails to " improve we recommend that you receive another evaluation at the ER immediately or contact your PCP to discuss your concerns or return here.

## 2025-07-02 ENCOUNTER — OFFICE VISIT (OUTPATIENT)
Dept: OBSTETRICS AND GYNECOLOGY | Facility: CLINIC | Age: 60
End: 2025-07-02
Payer: OTHER GOVERNMENT

## 2025-07-02 VITALS
HEIGHT: 64 IN | WEIGHT: 187.19 LBS | BODY MASS INDEX: 31.96 KG/M2 | DIASTOLIC BLOOD PRESSURE: 80 MMHG | SYSTOLIC BLOOD PRESSURE: 120 MMHG | HEART RATE: 101 BPM

## 2025-07-02 DIAGNOSIS — Z01.419 ENCOUNTER FOR GYNECOLOGICAL EXAMINATION WITHOUT ABNORMAL FINDING: Primary | ICD-10-CM

## 2025-07-02 DIAGNOSIS — N95.1 MENOPAUSAL SYMPTOMS: ICD-10-CM

## 2025-07-02 DIAGNOSIS — Z12.31 VISIT FOR SCREENING MAMMOGRAM: ICD-10-CM

## 2025-07-02 PROCEDURE — 99214 OFFICE O/P EST MOD 30 MIN: CPT | Mod: PBBFAC | Performed by: PHYSICIAN ASSISTANT

## 2025-07-02 PROCEDURE — 99999 PR PBB SHADOW E&M-EST. PATIENT-LVL IV: CPT | Mod: PBBFAC,,, | Performed by: PHYSICIAN ASSISTANT

## 2025-07-02 RX ORDER — TESTOSTERONE 20.25 MG/1.25G
GEL TOPICAL
Qty: 75 G | Refills: 0 | Status: SHIPPED | OUTPATIENT
Start: 2025-07-02

## 2025-07-02 RX ORDER — PROGESTERONE 200 MG/1
200 CAPSULE ORAL NIGHTLY
Qty: 30 CAPSULE | Refills: 11 | Status: SHIPPED | OUTPATIENT
Start: 2025-07-02 | End: 2026-07-02

## 2025-07-02 RX ORDER — ESTRADIOL 1 MG/G
1 GEL TOPICAL DAILY
Qty: 30 G | Refills: 11 | Status: SHIPPED | OUTPATIENT
Start: 2025-07-02 | End: 2026-07-02

## 2025-07-02 NOTE — PROGRESS NOTES
CC: Well woman exam    Sharlene Smith is a 60 y.o. female  presents for well woman exam.  LMP: Patient's last menstrual period was 2022 (approximate). Denies vaginal bleeding. Sexually active with one long term partner, her . No recent history of abnormal pap.    She is on HRT and wishes to continue. Ran out of divigel a few weeks. Reports bothersome hot flashes even with divigel using 0.5mg daily. Stopped testosterone IM due to acne. She does report low libido and has questions about testosterone pellets. She is taking progesterone 100mg QHS.    Current Tx:  Progesterone 100mg QHS  Divigel 0.5mg QD    Mammogram: 10/31/2024 TC 16.65%  Pap: 2023 negative, HPV negative  PCP: Amber Clifton NP  Routine Screening Labs: 2025  Colonoscopy: 2025 repeat in 2 years  DEXA: 2025 normal    Past Medical History:   Diagnosis Date    Abnormal Pap smear of vagina yrs ago    possible BX?    Allergy     Arthritis     Asthma     Hyperlipidemia     Menstrual cycle disorder 10/16/2012    Migraine headache 10/16/2012    Otitis 10/30/2014    Supraventricular tachycardia     SVT (supraventricular tachycardia)     SVT (supraventricular tachycardia) 10/07/2013    AVNRT      Tachycardia     Ulcerative colitis      Past Surgical History:   Procedure Laterality Date    ABLATION N/A 10/08/2018    Procedure: ABLATION;  Surgeon: Shabbir Clark MD;  Location: [facility] CATH LAB;  Service: Cardiology;  Laterality: N/A;  SVT,SVT-AVNRT RFA,KRISTI,MAC,FAS,3PREP     SECTION, CLASSIC      x 2    COLONOSCOPY N/A 2015    Procedure: COLONOSCOPY;  Surgeon: Petr Miller MD;  Location: [facility] ENDO ([address] [place] [address]);  Service: Endoscopy;  Laterality: N/A;    COLONOSCOPY N/A 10/13/2017    Procedure: COLONOSCOPY;  Surgeon: Petr Miller MD;  [facility]  Service: Endoscopy;  Laterality: N/A;    COLONOSCOPY N/A 10/27/2020    Procedure: COLONOSCOPY;  Surgeon: Petr Miller MD;   Location: [facility] (4TH FLR);  Service: Endoscopy;  Laterality: N/A;  covid test 10/24-[facility]  prep ins. emailed- [facility]    COLONOSCOPY N/A 03/10/2023    Procedure: COLONOSCOPY;  Surgeon: Nehemias Molina MD;  Location: [facility] (4TH FLR);  Service: Endoscopy;  Laterality: N/A;  pt has sutab-inst portal-any md-tb    COLONOSCOPY  01/16/2025    COLONOSCOPY N/A 1/16/2025    Procedure: COLONOSCOPY;  Surgeon: Wilder Gottlieb MD;  Location: [facility];  Service: Endoscopy;  Laterality: N/A;    ESOPHAGOGASTRODUODENOSCOPY      ESOPHAGOGASTRODUODENOSCOPY N/A 1/16/2025    Procedure: EGD (ESOPHAGOGASTRODUODENOSCOPY);  Surgeon: Wilder Gottlieb MD;  Location: [facility];  Service: Endoscopy;  Laterality: N/A;    INJECTION OF ANESTHETIC AGENT AROUND NERVE Bilateral 07/30/2024    Procedure: BLOCK, NERVE BILATERAL L3, 4, 5 MEDIAL BRANCH;  Surgeon: Marleen Mcgill MD;  Location: [facility] PAIN MGT;  Service: Pain Management;  Laterality: Bilateral;  [phone]    INJECTION OF ANESTHETIC AGENT AROUND NERVE Bilateral 08/20/2024    Procedure: BLOCK, NERVE BILATERAL L3-5 MEDIAL BRANCH;  Surgeon: Marleen Mcgill MD;  Location: [facility] PAIN MGT;  Service: Pain Management;  Laterality: Bilateral;  [phone]    INJECTION OF JOINT Bilateral 09/26/2019    Procedure: Injection, Joint,  Hip--Bilateral;  Surgeon: Chauncey Clay Jr., MD;  Location: [facility] PAIN MGT;  Service: Pain Management;  Laterality: Bilateral;    INJECTION, SACROILIAC JOINT Bilateral 2/4/2025    Procedure: INJECTION,SACROILIAC JOINT BILATERAL;  Surgeon: Marleen Mcgill MD;  Location: [facility] PAIN MGT;  Service: Pain Management;  Laterality: Bilateral;  2 WK F/U DENNIS  *1/16 COLONOSCOPY    RADIOFREQUENCY ABLATION Bilateral 10/29/2024    Procedure: RADIOFREQUENCY ABLATION BILATERAL L3-5;  Surgeon: Marleen Mcgill MD;  Location: [facility] PAIN MGT;  Service: Pain Management;  Laterality: Bilateral;  [phone]  4 WK F/U DENNIS     Social History[1]  Family History  "  Problem Relation Name Age of Onset    Hyperlipidemia Mother JDiaz     Multiple myeloma Mother JDiaz 75        passsed at 85    Cataracts Mother JDiaz     Allergies Mother JDiaz     Hypertension Mother JDiaz     Heart attack Father EDIAZ     Diabetes Father EDIAZ     Breast cancer Sister Jacqueline 63    Asthma Sister Mar     No Known Problems Sister Luh     Hypertension Brother Thee     Arthritis Maternal Grandmother SHaynes     Glaucoma Maternal Grandfather      Heart attack Paternal Grandmother CDiaz     Arthritis Paternal Grandmother CDiaz     No Known Problems Paternal Grandfather      Breast cancer Other Neice Leina 47    No Known Problems Son Naseem     No Known Problems Son Mann     Heart disease Neg Hx      Colon cancer Neg Hx      Esophageal cancer Neg Hx      Amblyopia Neg Hx      Blindness Neg Hx      Macular degeneration Neg Hx      Retinal detachment Neg Hx      Strabismus Neg Hx      Stroke Neg Hx      Thyroid disease Neg Hx      Angioedema Neg Hx      Atopy Neg Hx      Eczema Neg Hx      Immunodeficiency Neg Hx      Rhinitis Neg Hx      Urticaria Neg Hx      Ovarian cancer Neg Hx      Lung cancer Neg Hx       OB History          4    Para   4    Term   2            AB        Living   2         SAB        IAB        Ectopic        Multiple        Live Births   2               Current Medications[2]    The 10-year ASCVD risk score (Rose LARSEN, et al., 2019) is: 2.9%    Values used to calculate the score:      Age: 60 years      Sex: Female      Is Non- : Yes      Diabetic: No      Tobacco smoker: No      Systolic Blood Pressure: 120 mmHg      Is BP treated: No      HDL Cholesterol: 77 mg/dL      Total Cholesterol: 186 mg/dL    /80 (Patient Position: Sitting)   Pulse 101   Ht 5' 4" (1.626 m)   Wt 84.9 kg (187 lb 3.2 oz)   LMP 2022 (Approximate)   BMI 32.13 kg/m²       ROS:  GENERAL: Denies weight gain or weight loss. Feeling well overall.   SKIN: " Denies rash or lesions.   HEAD: Denies head injury or headache.   NODES: Denies enlarged lymph nodes.   CHEST: Denies chest pain or shortness of breath.   CARDIOVASCULAR: Denies palpitations or left sided chest pain.   ABDOMEN: No abdominal pain, constipation, diarrhea, nausea, vomiting or rectal bleeding.   URINARY: No frequency, dysuria, hematuria, or burning on urination.  REPRODUCTIVE: See HPI.   BREASTS: Denies pain, lumps, or nipple discharge.   HEMATOLOGIC: No easy bruisability or excessive bleeding.   MUSCULOSKELETAL: Denies joint pain or swelling.   NEUROLOGIC: Denies syncope or weakness.   PSYCHIATRIC: Denies depression, anxiety or mood swings.    PHYSICAL EXAM:  APPEARANCE: Well nourished, well developed, in no acute distress.  AFFECT: WNL, alert and oriented x 3  CHEST: Good respiratory effect  ABDOMEN: Soft.  No tenderness or masses.  No hepatosplenomegaly.  No hernias.  BREASTS: Symmetrical, no skin changes or visible lesions.  No palpable masses, nipple discharge bilaterally.  PELVIC: Normal external genitalia without lesions.  Normal hair distribution.  Adequate perineal body, normal urethral meatus.  Vagina moist and well rugated without lesions or discharge.  Cervix pink, without lesions, discharge or tenderness.  No significant cystocele or rectocele.  Bimanual exam shows uterus to be normal size, regular, mobile and nontender.  Adnexa without masses or tenderness.    EXTREMITIES: No edema.    ASSESSMENT:   Encounter for gynecological examination without abnormal finding    Visit for screening mammogram  -     Mammo Digital Screening Bilat w/ Filippo (XPD); Future; Expected date: 07/02/2025    Menopausal symptoms  -     estradioL (DIVIGEL) 1 mg/gram (0.1 %) topical gel; Place 1 g onto the skin once daily.  Dispense: 30 g; Refill: 11  -     progesterone (PROMETRIUM) 200 MG capsule; Take 1 capsule (200 mg total) by mouth nightly.  Dispense: 30 capsule; Refill: 11  -     testosterone (ANDROGEL) 20.25  mg/1.25 gram (1.62 %) GlPm; Apply 1/2 pump to the upper thigh daily  Dispense: 75 g; Refill: 0          PLAN:   Annual screening mammogram  Reviewed options for testosterone therapy and side effects.  Start androgel 1/2 pump daily to thigh- counseled on use.  Increase Progesterone to 200mg QHS  Increase Divigel to 1.0 mg QD  Follow up in 3 months    Patient was counseled today on A.C.S. Pap guidelines and recommendations for yearly pelvic exams, mammograms and monthly self breast exams; to see her PCP for other health maintenance. Patient is aware that if problems are addressed during well woman visit, she will also be billed or an OV.                            [1]   Social History  Socioeconomic History    Marital status:    Tobacco Use    Smoking status: Former     Current packs/day: 0.00     Types: Cigarettes     Quit date: 1989     Years since quittin.3    Smokeless tobacco: Never   Substance and Sexual Activity    Alcohol use: Yes     Alcohol/week: 0.0 standard drinks of alcohol     Comment: events - couple times a month    Drug use: No    Sexual activity: Yes     Partners: Male     Birth control/protection: Post-menopausal, None     Comment: Pills   Social History Narrative    Works for [employer]     Social Drivers of Health     Financial Resource Strain: Low Risk  (2024)    Overall Financial Resource Strain (CARDIA)     Difficulty of Paying Living Expenses: Not hard at all   Food Insecurity: No Food Insecurity (2024)    Hunger Vital Sign     Worried About Running Out of Food in the Last Year: Never true     Ran Out of Food in the Last Year: Never true   Transportation Needs: No Transportation Needs (2022)    PRAPARE - Transportation     Lack of Transportation (Medical): No     Lack of Transportation (Non-Medical): No   Physical Activity: Insufficiently Active (2024)    Exercise Vital Sign     Days of Exercise per Week: 2 days     Minutes of Exercise per Session: 30 min    Stress: No Stress Concern Present (6/5/2024)    [employer] [employer] - Occupational Stress Questionnaire     Feeling of Stress : Only a little   Housing Stability: Unknown (6/20/2022)    Housing Stability Vital Sign     Unable to Pay for Housing in the Last Year: No     Unstable Housing in the Last Year: No   [2]   Current Outpatient Medications:     albuterol (VENTOLIN HFA) 90 mcg/actuation inhaler, Inhale 2 puffs into the lungs every 6 (six) hours as needed for Wheezing. Rescue, Disp: 18 g, Rfl: 5    atorvastatin (LIPITOR) 20 MG tablet, Take 1 tablet (20 mg total) by mouth every evening., Disp: 90 tablet, Rfl: 3    diclofenac sodium (VOLTAREN) 1 % Gel, Apply 2 g topically 2 (two) times daily as needed., Disp: 50 g, Rfl: 1    estradioL (DIVIGEL) 1 mg/gram (0.1 %) topical gel, Place 1 g onto the skin once daily., Disp: 30 g, Rfl: 11    fluticasone propionate (FLONASE) 50 mcg/actuation nasal spray, SHAKE LIQUID AND USE 1 SPRAY IN EACH NOSTRIL EVERY DAY, Disp: 48 g, Rfl: 3    pantoprazole (PROTONIX) 40 MG tablet, Take 1 tablet (40 mg total) by mouth once daily., Disp: 90 tablet, Rfl: 0    predniSONE (DELTASONE) 20 MG tablet, Take 2 tablets (40 mg total) by mouth once daily., Disp: 30 tablet, Rfl: 0    progesterone (PROMETRIUM) 200 MG capsule, Take 1 capsule (200 mg total) by mouth nightly., Disp: 30 capsule, Rfl: 11    testosterone (ANDROGEL) 20.25 mg/1.25 gram (1.62 %) GlPm, Apply 1/2 pump to the upper thigh daily, Disp: 75 g, Rfl: 0    traMADoL (ULTRAM) 50 mg tablet, Take 1 tablet (50 mg total) by mouth every 8 (eight) hours as needed for Pain., Disp: 90 tablet, Rfl: 0  No current facility-administered medications for this visit.    Facility-Administered Medications Ordered in Other Visits:     0.9%  NaCl infusion, , Intravenous, Continuous, Netta Amaro NP, New Bag at 01/16/25 0401

## 2025-07-03 ENCOUNTER — E-VISIT (OUTPATIENT)
Dept: PRIMARY CARE CLINIC | Facility: CLINIC | Age: 60
End: 2025-07-03
Payer: OTHER GOVERNMENT

## 2025-07-03 ENCOUNTER — TELEPHONE (OUTPATIENT)
Dept: PRIMARY CARE CLINIC | Facility: CLINIC | Age: 60
End: 2025-07-03
Payer: OTHER GOVERNMENT

## 2025-07-03 DIAGNOSIS — M72.2 PLANTAR FASCIITIS: Primary | ICD-10-CM

## 2025-07-03 NOTE — PROGRESS NOTES
Patient ID: Sharlene Smith is a 60 y.o. female.        E-Visit Time Tracking:             Chief Complaint: General Illness (Entered automatically based on patient selection in [facility].)      The patient initiated a request through [facility] on 7/3/2025 for evaluation and management with a chief complaint of General Illness (Entered automatically based on patient selection in [employer].)     I evaluated the questionnaire submission on 07/03/2025.    [facility] Peq Evisit [facility]-Muscle,Back,Joint    7/3/2025  1:01 PM CDT - Filed by Patient   What do you need help with? Foot Problem   Do you agree to participate in an E-Visit? Yes   If you have any of the following symptoms, please present to your local emergency room or call 911:  I acknowledge   What is the main issue you would like addressed today? plantar fasciitis- need podiatrist referral   Do you have any of the following? (New or worsening joint pain, Follow up after treatment change) New or worsening joint pain   Provide any information you feel is important to your history not asked above    Please attach any relevant images or files    Are you able to take your vitals? No   Do you have a history of any of the following? (Bone infection, Fibromyalgia, Gout, Lupus, Paget's disease, Psoriasis, Rheumatoid arthritis, Sarcoidosis, Tendonitis, Wear-and-tear arthritis, None) None   What joint(s) are you having a problem with? (Shoulder, Elbow, Wrist, Hand, Hip, Knee, Ankle, Foot, Toes) Foot   Describe the exact location of the pain. ball of foot   What activities make your joint pain worse? (Bending over, Bending joint, Stairs, Cooking, Getting up, Lifting, Lying down, Overhead activities, Reaching, Running, Bathing, Sitting, Standing, Stretching, Walking, Other) Walking   What have you used to help with the problem thus far? (Tylenol, Anti-inflammatory [ibuprofen, Aleve, Advil], Heat, Rest, Stretching of the joint, Cold or ice, Compression or wrap, Elevation,  Immobilization or splinting) Stretching the joint   Has there been any change in your joint pain based on the treatment you have tried?(Improved, Unchanged, Worsened) Unchanged   Do you have any history of joint injuries, surgeries, or other medical conditions that may be related to your joint pain? No         Encounter Diagnosis   Name Primary?    Plantar fasciitis Yes        Orders Placed This Encounter   Procedures    Ambulatory referral/consult to Podiatry     Standing Status:   Future     Expected Date:   7/10/2025     Expiration Date:   8/3/2026     Referral Priority:   Routine     Referral Type:   Consultation     Referral Reason:   Specialty Services Required     Requested Specialty:   Podiatry     Number of Visits Requested:   1            No follow-ups on file.

## 2025-07-10 ENCOUNTER — OFFICE VISIT (OUTPATIENT)
Dept: PODIATRY | Facility: CLINIC | Age: 60
End: 2025-07-10
Payer: OTHER GOVERNMENT

## 2025-07-10 VITALS
BODY MASS INDEX: 31.96 KG/M2 | HEIGHT: 64 IN | DIASTOLIC BLOOD PRESSURE: 84 MMHG | HEART RATE: 92 BPM | WEIGHT: 187.19 LBS | SYSTOLIC BLOOD PRESSURE: 125 MMHG

## 2025-07-10 DIAGNOSIS — M72.2 PLANTAR FASCIITIS: ICD-10-CM

## 2025-07-10 DIAGNOSIS — M24.573 EQUINUS CONTRACTURE OF ANKLE: ICD-10-CM

## 2025-07-10 DIAGNOSIS — M77.9 CAPSULITIS: ICD-10-CM

## 2025-07-10 DIAGNOSIS — M79.672 FOOT PAIN, LEFT: Primary | ICD-10-CM

## 2025-07-10 PROCEDURE — 99214 OFFICE O/P EST MOD 30 MIN: CPT | Mod: PBBFAC,PN | Performed by: PODIATRIST

## 2025-07-10 PROCEDURE — 99999 PR PBB SHADOW E&M-EST. PATIENT-LVL IV: CPT | Mod: PBBFAC,,, | Performed by: PODIATRIST

## 2025-07-10 PROCEDURE — 29540 STRAPPING ANKLE &/FOOT: CPT | Mod: PBBFAC,PN,LT | Performed by: PODIATRIST

## 2025-07-10 RX ORDER — LIDOCAINE AND PRILOCAINE 25; 25 MG/G; MG/G
CREAM TOPICAL
Qty: 30 G | Refills: 3 | Status: SHIPPED | OUTPATIENT
Start: 2025-07-10

## 2025-07-10 NOTE — PROGRESS NOTES
Subjective:      Patient ID: Sharlene Smith is a 60 y.o. female.    Chief Complaint: Plantar Fasciitis    Sharp deep pain in the bottom of the left forefoot in the area where the 2nd toe attaches.  Gradual onset, worsening over the past few weeks.  Aggravated with increased weight-bearing prolonged standing some shoes.  No prior medical treatment.  No self-treatment.  Denies trauma and surgery both feet.    Review of Systems   Constitutional: Negative for chills, diaphoresis, fever, malaise/fatigue and night sweats.   Cardiovascular:  Negative for claudication, cyanosis, leg swelling and syncope.   Skin:  Negative for color change, dry skin, nail changes, rash, suspicious lesions and unusual hair distribution.   Musculoskeletal:  Positive for joint pain. Negative for falls, joint swelling, muscle cramps, muscle weakness and stiffness.   Gastrointestinal:  Negative for constipation, diarrhea, nausea and vomiting.   Neurological:  Negative for brief paralysis, disturbances in coordination, focal weakness, numbness, paresthesias, sensory change and tremors.           Objective:      Physical Exam  Constitutional:       General: She is not in acute distress.     Appearance: She is well-developed. She is not diaphoretic.   Cardiovascular:      Pulses:           Popliteal pulses are 2+ on the right side and 2+ on the left side.        Dorsalis pedis pulses are 2+ on the right side and 2+ on the left side.        Posterior tibial pulses are 2+ on the right side and 2+ on the left side.      Comments: Capillary refill 3 seconds all toes/distal feet, all toes/both feet warm to touch.      Negative lymphadenopathy bilateral popliteal fossa and tarsal tunnel.      Negavie lower extremity edema bilateral.    Musculoskeletal:      Right ankle: No swelling, deformity, ecchymosis or lacerations. Normal range of motion. Normal pulse.      Right Achilles Tendon: Normal. No defects. Raymond's test negative.      Comments:  Pain to palpation inferior mtpj 2 left without evidence of trauma or infection.    Ankle dorsiflexion decreased at <10 degrees bilateral with moderate increase with knee flexion bilateral.    Otherwise, Normal angle, base, station of gait. All ten toes without clubbing, cyanosis, or signs of ischemia.  No pain to palpation bilateral lower extremities.  Range of motion, stability, muscle strength, and muscle tone normal bilateral feet and legs.    Lymphadenopathy:      Lower Body: No right inguinal adenopathy. No left inguinal adenopathy.      Comments: Negative lymphadenopathy bilateral popliteal fossa and tarsal tunnel.    Negative lymphangitic streaking bilateral feet/ankles/legs.   Skin:     General: Skin is warm and dry.      Capillary Refill: Capillary refill takes 2 to 3 seconds.      Coloration: Skin is not pale.      Findings: No abrasion, bruising, burn, ecchymosis, erythema, laceration, lesion or rash.      Nails: There is no clubbing.      Comments: Skin is normal age and health appropriate color, turgor, texture, and temperature bilateral lower extremities without ulceration, hyperpigmentation, discoloration, masses nodules or cords palpated.  No ecchymosis, erythema, edema, or cardinal signs of infection bilateral lower extremities.      Neurological:      Mental Status: She is alert and oriented to person, place, and time.      Sensory: No sensory deficit.      Motor: No tremor, atrophy or abnormal muscle tone.      Gait: Gait normal.      Deep Tendon Reflexes:      Reflex Scores:       Patellar reflexes are 2+ on the right side and 2+ on the left side.       Achilles reflexes are 2+ on the right side and 2+ on the left side.     Comments:   Negative moulder sign/click bilateral all intermetatarsal spaces.    Negative tinel sign to percussion sural, superficial peroneal, deep peroneal, saphenous, and posterior tibial nerves right and left ankles and feet.    Psychiatric:         Behavior: Behavior is  cooperative.           Assessment:       Encounter Diagnoses   Name Primary?    Plantar fasciitis     Foot pain, left Yes    Capsulitis     Equinus contracture of ankle          Plan:       Sharlene was seen today for plantar fasciitis.    Diagnoses and all orders for this visit:    Foot pain, left    Plantar fasciitis  -     Ambulatory referral/consult to Podiatry    Capsulitis    Equinus contracture of ankle    Other orders  -     LIDOcaine-prilocaine (EMLA) cream; Apply topically as needed.      I counseled the patient on her conditions, their implications and medical management.        Patient will stretch the tendo achilles complex three times daily as demonstrated in the office.  Literature was dispensed illustrating proper stretching technique.    I applied a plantar rest strapping to the patient's left foot to offload symptomatic area, support the arch, and relieve pain.    Patient will obtain over the counter arch supports and wear them in shoes whenever possible.  Athletic shoes intended for walking or running are usually best.    Discussed conservative treatment with shoes of adequate dimensions, material, and style to alleviate symptoms and delay or prevent surgical intervention.    Emla              Follow up in about 1 month (around 8/10/2025).

## 2025-07-15 DIAGNOSIS — M54.40 ACUTE BILATERAL LOW BACK PAIN WITH SCIATICA, SCIATICA LATERALITY UNSPECIFIED: ICD-10-CM

## 2025-07-15 DIAGNOSIS — G89.29 CHRONIC BILATERAL LOW BACK PAIN WITHOUT SCIATICA: ICD-10-CM

## 2025-07-15 DIAGNOSIS — M54.50 CHRONIC BILATERAL LOW BACK PAIN WITHOUT SCIATICA: ICD-10-CM

## 2025-07-15 DIAGNOSIS — M79.18 MYOFASCIAL MUSCLE PAIN: ICD-10-CM

## 2025-07-15 RX ORDER — TRAMADOL HYDROCHLORIDE 50 MG/1
50 TABLET, FILM COATED ORAL EVERY 8 HOURS PRN
Qty: 90 TABLET | Refills: 0 | Status: SHIPPED | OUTPATIENT
Start: 2025-07-18

## 2025-08-06 ENCOUNTER — E-VISIT (OUTPATIENT)
Dept: PRIMARY CARE CLINIC | Facility: CLINIC | Age: 60
End: 2025-08-06
Payer: OTHER GOVERNMENT

## 2025-08-06 DIAGNOSIS — K51.919 ULCERATIVE COLITIS WITH COMPLICATION, UNSPECIFIED LOCATION: Primary | ICD-10-CM

## 2025-08-06 NOTE — PROGRESS NOTES
Patient ID: Sahrlene Smith is a 60 y.o. female.        E-Visit Time Tracking:         Six minutes was spent on this visit.    Chief Complaint: General Illness (Entered automatically based on patient selection in [employer].)      The patient initiated a request through [employer] on 8/6/2025 for evaluation and management with a chief complaint of General Illness (Entered automatically based on patient selection in [employer].)     I evaluated the questionnaire submission on 08/06/2025.    [facility]-Chronic Conditions    8/6/2025  9:24 AM CDT - Filed by Patient   What do you need help with? Other Concern   Do you agree to participate in an E-Visit? Yes   If you have any of the following symptoms, please go to the nearest emergency room or call 911: I acknowledge   What is the main issue you would like addressed today? gastroenterologist referral   Please describe your symptoms. nausea, etc   Where is your problem located? stomach   On a scale of 1-10, where 10 is the worst you can imagine, how severe are your symptoms? (range: 1 - 10) 7   Have you had these symptoms before? Yes   How long have you been having these symptoms? (Just today, For a few days, For a week, For one to four weeks, For more than a month) A few days   What helps with your symptoms? medicine   What makes your symptoms feel worse? food   Are these symptoms related to a condition that you currently have? (Yes, No, Not sure) Yes   What condition do you currently have? IBD   When were you last seen for this condition?    Provide any information you feel is important to your history not asked above    Please attach any relevant images or files    Are you able to take your vitals? No         Encounter Diagnosis   Name Primary?    Ulcerative colitis with complication, unspecified location Yes        Orders Placed This Encounter   Procedures    Ambulatory referral/consult to Gastroenterology     Standing Status:   Future     Expected Date:    8/13/2025     Expiration Date:   9/6/2026     Referral Priority:   Routine     Referral Type:   Consultation     Referral Reason:   Specialty Services Required     Requested Specialty:   Gastroenterology     Number of Visits Requested:   1            No follow-ups on file.

## 2025-08-13 DIAGNOSIS — M79.18 MYOFASCIAL MUSCLE PAIN: ICD-10-CM

## 2025-08-13 DIAGNOSIS — M54.40 ACUTE BILATERAL LOW BACK PAIN WITH SCIATICA, SCIATICA LATERALITY UNSPECIFIED: ICD-10-CM

## 2025-08-13 DIAGNOSIS — M54.50 CHRONIC BILATERAL LOW BACK PAIN WITHOUT SCIATICA: ICD-10-CM

## 2025-08-13 DIAGNOSIS — G89.29 CHRONIC BILATERAL LOW BACK PAIN WITHOUT SCIATICA: ICD-10-CM

## 2025-08-13 RX ORDER — TRAMADOL HYDROCHLORIDE 50 MG/1
50 TABLET, FILM COATED ORAL EVERY 8 HOURS PRN
Qty: 90 TABLET | Refills: 0 | Status: SHIPPED | OUTPATIENT
Start: 2025-08-14

## 2025-08-27 ENCOUNTER — OFFICE VISIT (OUTPATIENT)
Dept: PAIN MEDICINE | Facility: CLINIC | Age: 60
End: 2025-08-27
Payer: OTHER GOVERNMENT

## 2025-08-27 VITALS
TEMPERATURE: 98 F | RESPIRATION RATE: 18 BRPM | BODY MASS INDEX: 31.32 KG/M2 | OXYGEN SATURATION: 98 % | WEIGHT: 183.44 LBS | HEIGHT: 64 IN | SYSTOLIC BLOOD PRESSURE: 149 MMHG | DIASTOLIC BLOOD PRESSURE: 92 MMHG | HEART RATE: 95 BPM

## 2025-08-27 DIAGNOSIS — M47.816 LUMBAR SPONDYLOSIS: Primary | ICD-10-CM

## 2025-08-27 DIAGNOSIS — M54.40 ACUTE BILATERAL LOW BACK PAIN WITH SCIATICA, SCIATICA LATERALITY UNSPECIFIED: ICD-10-CM

## 2025-08-27 DIAGNOSIS — M54.50 CHRONIC BILATERAL LOW BACK PAIN WITHOUT SCIATICA: ICD-10-CM

## 2025-08-27 DIAGNOSIS — G89.29 CHRONIC BILATERAL LOW BACK PAIN WITHOUT SCIATICA: ICD-10-CM

## 2025-08-27 DIAGNOSIS — M79.18 MYOFASCIAL MUSCLE PAIN: ICD-10-CM

## 2025-08-27 DIAGNOSIS — F11.20 UNCOMPLICATED OPIOID DEPENDENCE: ICD-10-CM

## 2025-08-27 DIAGNOSIS — G89.4 CHRONIC PAIN SYNDROME: ICD-10-CM

## 2025-08-27 PROBLEM — M47.819 FACET ARTHROPATHY: Status: ACTIVE | Noted: 2025-08-27

## 2025-08-27 PROCEDURE — 99214 OFFICE O/P EST MOD 30 MIN: CPT | Mod: PBBFAC | Performed by: ANESTHESIOLOGY

## 2025-08-27 PROCEDURE — 99999 PR PBB SHADOW E&M-EST. PATIENT-LVL IV: CPT | Mod: PBBFAC,,, | Performed by: ANESTHESIOLOGY

## 2025-08-27 PROCEDURE — 99214 OFFICE O/P EST MOD 30 MIN: CPT | Mod: S$PBB,,, | Performed by: ANESTHESIOLOGY

## 2025-08-27 RX ORDER — TRAMADOL HYDROCHLORIDE 50 MG/1
50 TABLET, FILM COATED ORAL EVERY 8 HOURS PRN
Qty: 84 TABLET | Refills: 2 | Status: SHIPPED | OUTPATIENT
Start: 2025-09-13 | End: 2025-12-06

## 2025-09-02 ENCOUNTER — OFFICE VISIT (OUTPATIENT)
Dept: GASTROENTEROLOGY | Facility: CLINIC | Age: 60
End: 2025-09-02
Payer: OTHER GOVERNMENT

## 2025-09-02 ENCOUNTER — TELEPHONE (OUTPATIENT)
Dept: GASTROENTEROLOGY | Facility: CLINIC | Age: 60
End: 2025-09-02
Payer: OTHER GOVERNMENT

## 2025-09-02 ENCOUNTER — HOSPITAL ENCOUNTER (OUTPATIENT)
Dept: RADIOLOGY | Facility: HOSPITAL | Age: 60
Discharge: HOME | End: 2025-09-02
Attending: INTERNAL MEDICINE
Payer: OTHER GOVERNMENT

## 2025-09-02 VITALS — HEIGHT: 64 IN | BODY MASS INDEX: 31.84 KG/M2 | WEIGHT: 186.5 LBS

## 2025-09-02 DIAGNOSIS — K51.00 ULCERATIVE PANCOLITIS: ICD-10-CM

## 2025-09-02 DIAGNOSIS — K30 UPSET STOMACH: ICD-10-CM

## 2025-09-02 DIAGNOSIS — R11.0 CHRONIC NAUSEA: ICD-10-CM

## 2025-09-02 DIAGNOSIS — R19.8 ALTERNATING CONSTIPATION AND DIARRHEA: Primary | ICD-10-CM

## 2025-09-02 DIAGNOSIS — R10.13 DYSPEPSIA: ICD-10-CM

## 2025-09-02 DIAGNOSIS — R68.81 EARLY SATIETY: ICD-10-CM

## 2025-09-02 PROCEDURE — 99214 OFFICE O/P EST MOD 30 MIN: CPT | Mod: S$PBB,,, | Performed by: INTERNAL MEDICINE

## 2025-09-02 PROCEDURE — 99999 PR PBB SHADOW E&M-EST. PATIENT-LVL IV: CPT | Mod: PBBFAC,,, | Performed by: INTERNAL MEDICINE

## 2025-09-02 PROCEDURE — 99214 OFFICE O/P EST MOD 30 MIN: CPT | Mod: PBBFAC,PN | Performed by: INTERNAL MEDICINE

## 2025-09-02 RX ORDER — MESALAMINE 0.38 G/1
1.5 CAPSULE, EXTENDED RELEASE ORAL DAILY
Qty: 120 CAPSULE | Refills: 11 | Status: SHIPPED | OUTPATIENT
Start: 2025-09-02

## (undated) DEVICE — DRESSING LEUKOPLAST FLEX 1X3IN